# Patient Record
Sex: FEMALE | Race: WHITE | NOT HISPANIC OR LATINO | Employment: FULL TIME | URBAN - NONMETROPOLITAN AREA
[De-identification: names, ages, dates, MRNs, and addresses within clinical notes are randomized per-mention and may not be internally consistent; named-entity substitution may affect disease eponyms.]

---

## 2017-01-30 ENCOUNTER — OFFICE VISIT (OUTPATIENT)
Dept: PSYCHIATRY | Facility: OTHER | Age: 27
End: 2017-01-30
Attending: PSYCHIATRY & NEUROLOGY

## 2017-01-30 VITALS
DIASTOLIC BLOOD PRESSURE: 78 MMHG | HEIGHT: 60 IN | WEIGHT: 155 LBS | TEMPERATURE: 98.4 F | HEART RATE: 93 BPM | BODY MASS INDEX: 30.43 KG/M2 | SYSTOLIC BLOOD PRESSURE: 114 MMHG

## 2017-01-30 DIAGNOSIS — F41.1 GAD (GENERALIZED ANXIETY DISORDER): ICD-10-CM

## 2017-01-30 DIAGNOSIS — F90.2 ADHD (ATTENTION DEFICIT HYPERACTIVITY DISORDER), COMBINED TYPE: Primary | ICD-10-CM

## 2017-01-30 PROCEDURE — 99214 OFFICE O/P EST MOD 30 MIN: CPT | Performed by: PSYCHIATRY & NEUROLOGY

## 2017-01-30 RX ORDER — DEXTROAMPHETAMINE SACCHARATE, AMPHETAMINE ASPARTATE MONOHYDRATE, DEXTROAMPHETAMINE SULFATE AND AMPHETAMINE SULFATE 7.5; 7.5; 7.5; 7.5 MG/1; MG/1; MG/1; MG/1
30 CAPSULE, EXTENDED RELEASE ORAL 2 TIMES DAILY
Qty: 30 CAPSULE | Refills: 0 | Status: SHIPPED | OUTPATIENT
Start: 2017-01-30 | End: 2017-03-08

## 2017-01-30 RX ORDER — BUSPIRONE HYDROCHLORIDE 5 MG/1
5 TABLET ORAL 3 TIMES DAILY
Qty: 90 TABLET | Refills: 11 | Status: SHIPPED | OUTPATIENT
Start: 2017-01-30 | End: 2017-07-05

## 2017-01-30 RX ORDER — DEXTROAMPHETAMINE SACCHARATE, AMPHETAMINE ASPARTATE MONOHYDRATE, DEXTROAMPHETAMINE SULFATE AND AMPHETAMINE SULFATE 7.5; 7.5; 7.5; 7.5 MG/1; MG/1; MG/1; MG/1
30 CAPSULE, EXTENDED RELEASE ORAL 2 TIMES DAILY
Qty: 60 CAPSULE | Refills: 0 | Status: SHIPPED | OUTPATIENT
Start: 2017-01-30 | End: 2017-01-30 | Stop reason: DRUGHIGH

## 2017-01-30 RX ORDER — DEXTROAMPHETAMINE SACCHARATE, AMPHETAMINE ASPARTATE MONOHYDRATE, DEXTROAMPHETAMINE SULFATE AND AMPHETAMINE SULFATE 7.5; 7.5; 7.5; 7.5 MG/1; MG/1; MG/1; MG/1
30 CAPSULE, EXTENDED RELEASE ORAL 2 TIMES DAILY
Qty: 30 CAPSULE | Refills: 0 | Status: SHIPPED | OUTPATIENT
Start: 2017-02-15 | End: 2017-03-08

## 2017-01-30 RX ORDER — DEXTROAMPHETAMINE SACCHARATE, AMPHETAMINE ASPARTATE MONOHYDRATE, DEXTROAMPHETAMINE SULFATE AND AMPHETAMINE SULFATE 7.5; 7.5; 7.5; 7.5 MG/1; MG/1; MG/1; MG/1
30 CAPSULE, EXTENDED RELEASE ORAL 2 TIMES DAILY
Qty: 60 CAPSULE | Refills: 0 | Status: SHIPPED | OUTPATIENT
Start: 2017-03-01 | End: 2017-04-19

## 2017-01-30 ASSESSMENT — ANXIETY QUESTIONNAIRES
3. WORRYING TOO MUCH ABOUT DIFFERENT THINGS: SEVERAL DAYS
1. FEELING NERVOUS, ANXIOUS, OR ON EDGE: MORE THAN HALF THE DAYS
GAD7 TOTAL SCORE: 8
5. BEING SO RESTLESS THAT IT IS HARD TO SIT STILL: NOT AT ALL
2. NOT BEING ABLE TO STOP OR CONTROL WORRYING: MORE THAN HALF THE DAYS
IF YOU CHECKED OFF ANY PROBLEMS ON THIS QUESTIONNAIRE, HOW DIFFICULT HAVE THESE PROBLEMS MADE IT FOR YOU TO DO YOUR WORK, TAKE CARE OF THINGS AT HOME, OR GET ALONG WITH OTHER PEOPLE: SOMEWHAT DIFFICULT
6. BECOMING EASILY ANNOYED OR IRRITABLE: SEVERAL DAYS
7. FEELING AFRAID AS IF SOMETHING AWFUL MIGHT HAPPEN: SEVERAL DAYS

## 2017-01-30 ASSESSMENT — PAIN SCALES - GENERAL: PAINLEVEL: MILD PAIN (3)

## 2017-01-30 ASSESSMENT — PATIENT HEALTH QUESTIONNAIRE - PHQ9: 5. POOR APPETITE OR OVEREATING: SEVERAL DAYS

## 2017-01-30 NOTE — NURSING NOTE
Chief Complaint   Patient presents with     RECHECK     Mental health.  Patient would like to discuss medications and a couple of life events.       Initial /78 mmHg  Pulse 93  Temp(Src) 98.4  F (36.9  C)  Ht 5' (1.524 m)  Wt 155 lb (70.308 kg)  BMI 30.27 kg/m2 Estimated body mass index is 30.27 kg/(m^2) as calculated from the following:    Height as of this encounter: 5' (1.524 m).    Weight as of this encounter: 155 lb (70.308 kg).  BP completed using cuff size: shaw LYN

## 2017-01-30 NOTE — MR AVS SNAPSHOT
"              After Visit Summary   2017    Nyasia Raya    MRN: [id number]           Patient Information     Date Of Birth          1990        Visit Information        Provider Department      2017 4:40 PM Jeanne Guerrero MD [facility]        Today's Diagnoses     ADHD (attention deficit hyperactivity disorder), combined type    -  1     RANDI (generalized anxiety disorder)            Follow-ups after your visit        Who to contact     If you have questions or need follow up information about today's clinic visit or your schedule please contact [facility] directly at [phone].  Normal or non-critical lab and imaging results will be communicated to you by Oxtexhart, letter or phone within 4 business days after the clinic has received the results. If you do not hear from us within 7 days, please contact the clinic through Oxtexhart or phone. If you have a critical or abnormal lab result, we will notify you by phone as soon as possible.  Submit refill requests through Probiodrug or call your pharmacy and they will forward the refill request to us. Please allow 3 business days for your refill to be completed.          Additional Information About Your Visit        MyChart Information     Probiodrug lets you send messages to your doctor, view your test results, renew your prescriptions, schedule appointments and more. To sign up, go to [email or web address] . Click on \"Log in\" on the left side of the screen, which will take you to the Welcome page. Then click on \"Sign up Now\" on the right side of the page.     You will be asked to enter the access code listed below, as well as some personal information. Please follow the directions to create your username and password.     Your access code is: [email or web address]  Expires: 2017  5:43 PM     Your access code will  in 90 days. If you need help or a new code, please call your [facility] or [phone].        Care " EveryWhere ID     This is your Care EveryWhere ID. This could be used by other organizations to access your [place] medical records  [id number]        Your Vitals Were     Pulse Temperature Height BMI (Body Mass Index)          93 98.4  F (36.9  C) 5' (1.524 m) 30.27 kg/m2         Blood Pressure from Last 3 Encounters:   01/30/17 114/78   09/12/16 123/78   08/09/16 96/76    Weight from Last 3 Encounters:   01/30/17 155 lb (70.308 kg)   08/09/16 155 lb (70.308 kg)   04/18/16 158 lb (71.668 kg)              Today, you had the following     No orders found for display         Today's Medication Changes          These changes are accurate as of: 1/30/17  5:43 PM.  If you have any questions, ask your nurse or doctor.               Start taking these medicines.        Dose/Directions    * amphetamine-dextroamphetamine 30 MG per 24 hr capsule   Commonly known as:  ADDERALL XR   Used for:  ADHD (attention deficit hyperactivity disorder), combined type   Replaces:  amphetamine-dextroamphetamine 20 MG per 24 hr capsule   Started by:  Jeanne Guerrero MD        Dose:  30 mg   Take 1 capsule (30 mg) by mouth 2 times daily   Quantity:  30 capsule   Refills:  0       * amphetamine-dextroamphetamine 30 MG per 24 hr capsule   Commonly known as:  ADDERALL XR   Used for:  ADHD (attention deficit hyperactivity disorder), combined type   Replaces:  amphetamine-dextroamphetamine 20 MG per 24 hr capsule   Started by:  Jeanne Guerrero MD        Dose:  30 mg   Start taking on:  2/15/2017   Take 1 capsule (30 mg) by mouth 2 times daily   Quantity:  30 capsule   Refills:  0       * amphetamine-dextroamphetamine 30 MG per 24 hr capsule   Commonly known as:  ADDERALL XR   Used for:  ADHD (attention deficit hyperactivity disorder), combined type   Replaces:  amphetamine-dextroamphetamine 20 MG per 24 hr capsule   Started by:  Jeanne Guerrero MD        Dose:  30 mg   Start taking on:  3/1/2017   Take 1 capsule (30 mg) by mouth 2 times  daily   Quantity:  60 capsule   Refills:  0       busPIRone 5 MG tablet   Commonly known as:  BUSPAR   Used for:  RANDI (generalized anxiety disorder)   Started by:  Jeanne Guerrero MD        Dose:  5 mg   Take 1 tablet (5 mg) by mouth 3 times daily   Quantity:  90 tablet   Refills:  11       * Notice:  This list has 3 medication(s) that are the same as other medications prescribed for you. Read the directions carefully, and ask your doctor or other care provider to review them with you.      These medicines have changed or have updated prescriptions.        Dose/Directions    etonogestrel 68 MG Impl   Commonly known as:  IMPLANON/NEXPLANON   This may have changed:  when to take this   Used for:  General counseling for initiation of other contraceptive measures        Dose:  1 each   1 each (68 mg) by Subdermal route once for 1 dose   Quantity:  1 each   Refills:  0         Stop taking these medicines if you haven't already. Please contact your care team if you have questions.     amphetamine-dextroamphetamine 20 MG per 24 hr capsule   Commonly known as:  ADDERALL XR   Replaced by:  amphetamine-dextroamphetamine 30 MG per 24 hr capsule   Stopped by:  Jeanne Guerrero MD           amphetamine-dextroamphetamine 20 MG per 24 hr capsule   Commonly known as:  ADDERALL XR   Replaced by:  amphetamine-dextroamphetamine 30 MG per 24 hr capsule   Stopped by:  Jeanne Guerrero MD           amphetamine-dextroamphetamine 20 MG per 24 hr capsule   Commonly known as:  ADDERALL XR   Replaced by:  amphetamine-dextroamphetamine 30 MG per 24 hr capsule   Stopped by:  Jeanne Guerrero MD                Where to get your medicines      These medications were sent to [id number]  [place] - [address] [place] [facility] [address], [place] [address]     Phone:  [phone]    - busPIRone 5 MG tablet      Some of these will need a paper prescription and others can be bought over the counter.  Ask  your nurse if you have questions.     Bring a paper prescription for each of these medications    - amphetamine-dextroamphetamine 30 MG per 24 hr capsule  - amphetamine-dextroamphetamine 30 MG per 24 hr capsule  - amphetamine-dextroamphetamine 30 MG per 24 hr capsule             Primary Care Provider Office Phone # Fax #    Nhung Devi [phone]       [facility] [address] [facility] [address]        Thank you!     Thank you for choosing [facility]  for your care. Our goal is always to provide you with excellent care. Hearing back from our patients is one way we can continue to improve our services. Please take a few minutes to complete the written survey that you may receive in the mail after your visit with us. Thank you!             Your Updated Medication List - Protect others around you: Learn how to safely use, store and throw away your medicines at www.disposemymeds.org.          This list is accurate as of: 1/30/17  5:43 PM.  Always use your most recent med list.                   Brand Name Dispense Instructions for use    albuterol 108 (90 BASE) MCG/ACT Inhaler    PROAIR HFA/PROVENTIL HFA/VENTOLIN HFA    1 Inhaler    Inhale 2 puffs into the lungs every 6 hours as needed for shortness of breath / dyspnea       * amphetamine-dextroamphetamine 30 MG per 24 hr capsule    ADDERALL XR    30 capsule    Take 1 capsule (30 mg) by mouth 2 times daily       * amphetamine-dextroamphetamine 30 MG per 24 hr capsule   Start taking on:  2/15/2017    ADDERALL XR    30 capsule    Take 1 capsule (30 mg) by mouth 2 times daily       * amphetamine-dextroamphetamine 30 MG per 24 hr capsule   Start taking on:  3/1/2017    ADDERALL XR    60 capsule    Take 1 capsule (30 mg) by mouth 2 times daily       busPIRone 5 MG tablet    BUSPAR    90 tablet    Take 1 tablet (5 mg) by mouth 3 times daily       diphenhydrAMINE 25 MG tablet    BENADRYL    120 tablet    Take 3-4 tabs bedtime        etonogestrel 68 MG Impl    IMPLANON/NEXPLANON    1 each    1 each (68 mg) by Subdermal route once for 1 dose       hydrOXYzine 25 MG tablet    ATARAX    120 tablet    Take 2 tabs up to 2 times daily as needed for anxiety / panic       melatonin 3 MG Caps     30 capsule    Take 1 capsule by mouth At Bedtime       * Notice:  This list has 3 medication(s) that are the same as other medications prescribed for you. Read the directions carefully, and ask your doctor or other care provider to review them with you.

## 2017-01-30 NOTE — PROGRESS NOTES
"  PSYCHIATRY CLINIC PROGRESS NOTE   20 minute medication management, more than 50% of time spent counseling patient on medications, medication side effects, symptom history and management   SUBJECTIVE / INTERIM HISTORY                                                                       The pt was last seen in clinic 2016: -- . Continue Adderall XR 20 mg am and 20 XR mg afternoon. Gave script for Adderall for  and  d/c. Hydroxyzine 50 mg up to bid prn anxieyt / panic. Buspar 5 mg tid.  Continue Benadryl and melatonin for insomnia   Social- Her fiance, Nu, [place] whom she met at [facility]. He is a good trent and keeps her on track Children-  3 year old is Wallace who is \"the happiest little trent in the world\".  - her and Nu engaged  - job going well at [employer]  - would like to go back on Buspar along with the hydroxyzine this combo she thinks helps a lot  - overall doing much better in terms sx of ADD: able to keep track of things, stay on track  -  Living in [place] now: was in [place]  - sleeping better it seems since started stimulant. Also takes melatonin and benadryl OTC at times to help her sleep  SUBSTANCE USE- MJ in past    SYMPTOMS- sx ADD improved. Sleeping better. Getting more tasks done: not as many unfinished projects / chores  MEDICAL ROS-  Back pain (spondylolisthesis), asthma (cough, SOB/wheezing)  MEDICAL / SURGICAL HISTORY                pregnant [if applicable]--no   Medical Team:     PMD- Dr. Devi       Therapist-  Patient Active Problem List   Diagnosis     Insomnia     Asthma     Spondylolisthesis     Family history of congenital heart defect     Night terrors, adult     Status post  delivery     Obesity     Family planning     Depression     Anxiety     Encounter for routine gynecological examination     Hemorrhage of rectum and anus     Smoker     NO SHOW     ALLERGY   Review of patient's allergies indicates no known allergies.  MEDICATIONS                                   "                                                           Current Outpatient Prescriptions   Medication Sig     hydrOXYzine (ATARAX) 25 MG tablet Take 2 tabs up to 2 times daily as needed for anxiety / panic     amphetamine-dextroamphetamine (ADDERALL XR) 20 MG per 24 hr capsule Take 1 capsule (20 mg) by mouth 2 times daily     melatonin 3 MG CAPS Take 1 capsule by mouth At Bedtime     diphenhydrAMINE (BENADRYL) 25 MG tablet Take 3-4 tabs bedtime     albuterol (PROAIR HFA, PROVENTIL HFA, VENTOLIN HFA) 108 (90 BASE) MCG/ACT inhaler Inhale 2 puffs into the lungs every 6 hours as needed for shortness of breath / dyspnea     amphetamine-dextroamphetamine (ADDERALL XR) 20 MG per 24 hr capsule Take 1 capsule (20 mg) by mouth 2 times daily     amphetamine-dextroamphetamine (ADDERALL XR) 20 MG per capsule Take 1 capsule (20 mg) by mouth 2 times daily     etonogestrel (IMPLANON/NEXPLANON) 68 MG IMPL 1 each (68 mg) by Subdermal route once for 1 dose (Patient taking differently: 1 each by Subdermal route continuous )     [DISCONTINUED] TRAZODONE HCL 1 tablet At Bedtime.     No current facility-administered medications for this visit.       VITALS   /78 mmHg  Pulse 93  Temp(Src) 98.4  F (36.9  C)  Ht 5' (1.524 m)  Wt 155 lb (70.308 kg)  BMI 30.27 kg/m2     PHQ9                       PHQ-9 SCORE 4/18/2016 8/9/2016 1/30/2017   Total Score - - -   Total Score 3 4 5       LABS                                                                                                                           TSH   Date Value Ref Range Status   06/08/2015 0.44 0.40 - 4.00 mU/L Final   ]   MENTAL STATUS EXAM                                                                                        Alert. Oriented to person, place, and date / time. Well groomed, calm, cooperative with good eye contact. No problems with speech or psychomotor behavior. Mood was described as anxious and affect was congruent to speech content and full range.  Thought process, including associations, was unremarkable and thought content was devoid of suicidal and homicidal ideation and psychotic thought. No hallucinations. Insight was good. Judgment was intact and adequate for safety. Fund of knowledge was intact. Pt demonstrates no obvious problems with attention, concentration, language, recent or remote memory although these were not formally tested.     ASSESSMENT                                                                                                      HISTORICAL:  Initial psych consult 6/17/15  Notes:             Gabapentin: lactation. buspar nausea.   Nyasia Raya ADD, depression NOS, and night terrors. Nice young lady who presents as mature for her age : she went through a lot in her household growing up with mom with MS dad working all the time and 5 siblings. Sounds like Nyasia took care of the household and she worked 2 jobs. Didn't end up finishing HS in [place] and looking back she is able to recognize had a lot on her plate. Diagnosed with ADD in past but parents didn't want her on stimulants.  For today we will continue Adderall XR as however increase dose as she is working at [employer] and for most part doing okay but getting more distracted with increased demands of work. . Will continue melatonin and Benadryl for her / insomnia. Tried gabapentin but had lactation thus d/c..Hydroxyzine helping and she notes buspar helped in past too and we are going to have her take hydroxyzine and buspar.     TREATMENT RISK STATEMENT: The risks, benefits, alternatives and potential adverse effects have been explained and are understood by the pt. The pt agrees to the treatment plan with the ability to do so. The pt knows to call the clinic for any problems or access emergency care if needed. Substance use is not a problem as noted above.     DIAGNOSES      (Use of Axes system will continue, although absent from DSM 5)          Axis I - ADD  RANDI  Night  terrors  Axis II - no dx  Axis III - spondolesthesis  Axis IV- Psychosocial Stressors include: mod   Axis V - Global Assessment of Functioning current: 41- 50 Serious symptoms OR any serious impairment in social, occupational, or school functioning  PLAN                                                                                                                         1) MEDICATIONS:   -- Increase Adderall XR 20 mg am and 20 XR mg afternoon to Adderall XR 30 mg bid and gave script for today 30 tabs and then script that starts 2/15/17 for 30 tabs (she is paying out of pocket so easier for her to break up this way because of cost). Continue Hydroxyzine 50 mg up to bid prn anxieyt / panic. Buspar 5 mg tid.  Continue Benadryl and melatonin for insomnia    2) THERAPY: No Change    3) LABS: None    4) PT MONITOR [call for probs]: Worsening symptoms, side effects from medications, SI/HI    5) REFERRALS [CD tx, medical, tests other]: None    6)  RTC: ~1-2 months

## 2017-01-31 ASSESSMENT — PATIENT HEALTH QUESTIONNAIRE - PHQ9: SUM OF ALL RESPONSES TO PHQ QUESTIONS 1-9: 5

## 2017-01-31 ASSESSMENT — ANXIETY QUESTIONNAIRES: GAD7 TOTAL SCORE: 8

## 2017-02-13 ENCOUNTER — TELEPHONE (OUTPATIENT)
Dept: PSYCHIATRY | Facility: OTHER | Age: 27
End: 2017-02-13

## 2017-02-13 NOTE — TELEPHONE ENCOUNTER
Pharmacy calling to get ok for early refill of Adderall XR 30 mg.  Patient will be leaving town and would like early refill of 2 days.  Ok per Dr. Guerrero.

## 2017-03-08 ENCOUNTER — OFFICE VISIT (OUTPATIENT)
Dept: OBGYN | Facility: OTHER | Age: 27
End: 2017-03-08
Attending: OBSTETRICS & GYNECOLOGY

## 2017-03-08 VITALS
DIASTOLIC BLOOD PRESSURE: 64 MMHG | BODY MASS INDEX: 31.41 KG/M2 | SYSTOLIC BLOOD PRESSURE: 100 MMHG | HEART RATE: 60 BPM | WEIGHT: 160 LBS | HEIGHT: 60 IN

## 2017-03-08 DIAGNOSIS — Z30.09 FAMILY PLANNING: Primary | ICD-10-CM

## 2017-03-08 PROCEDURE — 11981 INSERTION DRUG DLVR IMPLANT: CPT | Performed by: OBSTETRICS & GYNECOLOGY

## 2017-03-08 ASSESSMENT — PATIENT HEALTH QUESTIONNAIRE - PHQ9: 5. POOR APPETITE OR OVEREATING: MORE THAN HALF THE DAYS

## 2017-03-08 ASSESSMENT — ANXIETY QUESTIONNAIRES
1. FEELING NERVOUS, ANXIOUS, OR ON EDGE: MORE THAN HALF THE DAYS
IF YOU CHECKED OFF ANY PROBLEMS ON THIS QUESTIONNAIRE, HOW DIFFICULT HAVE THESE PROBLEMS MADE IT FOR YOU TO DO YOUR WORK, TAKE CARE OF THINGS AT HOME, OR GET ALONG WITH OTHER PEOPLE: SOMEWHAT DIFFICULT
5. BEING SO RESTLESS THAT IT IS HARD TO SIT STILL: SEVERAL DAYS
3. WORRYING TOO MUCH ABOUT DIFFERENT THINGS: SEVERAL DAYS
6. BECOMING EASILY ANNOYED OR IRRITABLE: SEVERAL DAYS
7. FEELING AFRAID AS IF SOMETHING AWFUL MIGHT HAPPEN: NOT AT ALL
2. NOT BEING ABLE TO STOP OR CONTROL WORRYING: SEVERAL DAYS
GAD7 TOTAL SCORE: 8

## 2017-03-08 NOTE — MR AVS SNAPSHOT
"              After Visit Summary   3/8/2017    Nyasia Raya    MRN: [id number]           Patient Information     Date Of Birth          1990        Visit Information        Provider Department      3/8/2017 1:40 PM Leena Mayorga MD [facility]        Today's Diagnoses     Family planning    -  1      Care Instructions    Nexplanon should be removed or replaced in 3 years or sooner if desired.  Return for annual exam.          Follow-ups after your visit        Your next 10 appointments already scheduled     Apr 12, 2017  8:20 AM CDT   (Arrive by 8:05 AM)   Return Visit with Jeanne Guerrero MD   [facility]    [address] [place] [id number] [phone]              Who to contact     If you have questions or need follow up information about today's clinic visit or your schedule please contact [facility] directly at [phone].  Normal or non-critical lab and imaging results will be communicated to you by MyChart, letter or phone within 4 business days after the clinic has received the results. If you do not hear from us within 7 days, please contact the clinic through MyChart or phone. If you have a critical or abnormal lab result, we will notify you by phone as soon as possible.  Submit refill requests through Pheedo or call your pharmacy and they will forward the refill request to us. Please allow 3 business days for your refill to be completed.          Additional Information About Your Visit        Tunesathart Information     Pheedo lets you send messages to your doctor, view your test results, renew your prescriptions, schedule appointments and more. To sign up, go to [email or web address] . Click on \"Log in\" on the left side of the screen, which will take you to the Welcome page. Then click on \"Sign up Now\" on the right side of the page.     You will be asked to enter the access code listed below, as well as some " personal information. Please follow the directions to create your username and password.     Your access code is: [email or web address]  Expires: 2017  5:43 PM     Your access code will  in 90 days. If you need help or a new code, please call your [place] clinic or [phone].        Care EveryWhere ID     This is your Care EveryWhere ID. This could be used by other organizations to access your [place] medical records  [id number]        Your Vitals Were     Pulse Height BMI (Body Mass Index)             60 5' (1.524 m) 31.25 kg/m2          Blood Pressure from Last 3 Encounters:   17 100/64   17 114/78   16 123/78    Weight from Last 3 Encounters:   17 160 lb (72.6 kg)   17 155 lb (70.3 kg)   16 155 lb (70.3 kg)              We Performed the Following     INSERTION NON-BIODEGRADABLE DRUG DELIVERY IMPLANT     REMOVAL NON-BIODEGRADABLE DRUG DELIVERY IMPLANT          Today's Medication Changes          These changes are accurate as of: 3/8/17  2:03 PM.  If you have any questions, ask your nurse or doctor.               These medicines have changed or have updated prescriptions.        Dose/Directions    * etonogestrel 68 MG Impl   Commonly known as:  IMPLANON/NEXPLANON   This may have changed:  when to take this   Used for:  General counseling for initiation of other contraceptive measures   Changed by:  Leena Mayorga MD        Dose:  1 each   1 each (68 mg) by Subdermal route once for 1 dose   Quantity:  1 each   Refills:  0       * etonogestrel 68 MG Impl   Commonly known as:  IMPLANON/NEXPLANON   This may have changed:  You were already taking a medication with the same name, and this prescription was added. Make sure you understand how and when to take each.   Used for:  Family planning   Changed by:  Leena Mayorga MD        Dose:  1 each   1 each (68 mg) by Subdermal route once for 1 dose   Quantity:  1 each   Refills:  0       * Notice:  This list has 2  medication(s) that are the same as other medications prescribed for you. Read the directions carefully, and ask your doctor or other care provider to review them with you.         Where to get your medicines      Some of these will need a paper prescription and others can be bought over the counter.  Ask your nurse if you have questions.     You don't need a prescription for these medications     etonogestrel 68 MG Impl                Primary Care Provider Office Phone # Fax #    Nhung Devi [phone]       [facility] [address] [place] [address] [place] [address]        Thank you!     Thank you for choosing [facility]  for your care. Our goal is always to provide you with excellent care. Hearing back from our patients is one way we can continue to improve our services. Please take a few minutes to complete the written survey that you may receive in the mail after your visit with us. Thank you!             Your Updated Medication List - Protect others around you: Learn how to safely use, store and throw away your medicines at www.disposemymeds.org.          This list is accurate as of: 3/8/17  2:03 PM.  Always use your most recent med list.                   Brand Name Dispense Instructions for use    albuterol 108 (90 BASE) MCG/ACT Inhaler    PROAIR HFA/PROVENTIL HFA/VENTOLIN HFA    1 Inhaler    Inhale 2 puffs into the lungs every 6 hours as needed for shortness of breath / dyspnea       amphetamine-dextroamphetamine 30 MG per 24 hr capsule    ADDERALL XR    60 capsule    Take 1 capsule (30 mg) by mouth 2 times daily       busPIRone 5 MG tablet    BUSPAR    90 tablet    Take 1 tablet (5 mg) by mouth 3 times daily       diphenhydrAMINE 25 MG tablet    BENADRYL    120 tablet    Take 3-4 tabs bedtime       * etonogestrel 68 MG Impl    IMPLANON/NEXPLANON    1 each    1 each (68 mg) by Subdermal route once for 1 dose       * etonogestrel 68 MG Impl    IMPLANON/NEXPLANON    1 each     1 each (68 mg) by Subdermal route once for 1 dose       hydrOXYzine 25 MG tablet    ATARAX    120 tablet    Take 2 tabs up to 2 times daily as needed for anxiety / panic       melatonin 3 MG Caps     30 capsule    Take 1 capsule by mouth At Bedtime       * Notice:  This list has 2 medication(s) that are the same as other medications prescribed for you. Read the directions carefully, and ask your doctor or other care provider to review them with you.

## 2017-03-08 NOTE — NURSING NOTE
Chief Complaint   Patient presents with     Contraception       Initial /64  Pulse 60  Ht 5' (1.524 m)  Wt 160 lb (72.6 kg)  BMI 31.25 kg/m2 Estimated body mass index is 31.25 kg/(m^2) as calculated from the following:    Height as of this encounter: 5' (1.524 m).    Weight as of this encounter: 160 lb (72.6 kg).  Medication Reconciliation: amy Ramos

## 2017-03-08 NOTE — PATIENT INSTRUCTIONS
Nexplanon should be removed or replaced in 3 years or sooner if desired.  Return for annual exam.

## 2017-03-08 NOTE — PROGRESS NOTES
Nyasia Raya is a 27 year old female here with a 3 year old nexplanon. RBAQ's      O:   /64  Pulse 60  Ht 5' (1.524 m)  Wt 160 lb (72.6 kg)  BMI 31.25 kg/m2   nexplanon removed from left arm in usual manner.  Nexplanon placed in left arm in usual manner    A:  Family planning    P:  nexplanon removed and replaced  Aftercare discussed  rto annual  Greater than 15 minutes were spent face to face counseling this patient in addition to procedure    Leena Mayorga MD

## 2017-03-09 ASSESSMENT — PATIENT HEALTH QUESTIONNAIRE - PHQ9: SUM OF ALL RESPONSES TO PHQ QUESTIONS 1-9: 3

## 2017-03-09 ASSESSMENT — ANXIETY QUESTIONNAIRES: GAD7 TOTAL SCORE: 8

## 2017-03-30 ENCOUNTER — HOSPITAL ENCOUNTER (EMERGENCY)
Facility: HOSPITAL | Age: 27
Discharge: HOME | End: 2017-03-30
Attending: NURSE PRACTITIONER | Admitting: NURSE PRACTITIONER

## 2017-03-30 VITALS — TEMPERATURE: 97.9 F | OXYGEN SATURATION: 98 % | RESPIRATION RATE: 18 BRPM

## 2017-03-30 DIAGNOSIS — K08.89 PAIN, DENTAL: ICD-10-CM

## 2017-03-30 PROCEDURE — 99214 OFFICE O/P EST MOD 30 MIN: CPT | Mod: 25

## 2017-03-30 PROCEDURE — 96372 THER/PROPH/DIAG INJ SC/IM: CPT

## 2017-03-30 PROCEDURE — 25000128 H RX IP 250 OP 636: Performed by: NURSE PRACTITIONER

## 2017-03-30 PROCEDURE — 99213 OFFICE O/P EST LOW 20 MIN: CPT | Performed by: NURSE PRACTITIONER

## 2017-03-30 RX ORDER — KETOROLAC TROMETHAMINE 30 MG/ML
60 INJECTION, SOLUTION INTRAMUSCULAR; INTRAVENOUS ONCE
Status: COMPLETED | OUTPATIENT
Start: 2017-03-30 | End: 2017-03-30

## 2017-03-30 RX ORDER — AMOXICILLIN 500 MG/1
1000 CAPSULE ORAL 2 TIMES DAILY
Qty: 40 CAPSULE | Refills: 0 | Status: SHIPPED | OUTPATIENT
Start: 2017-03-30 | End: 2017-04-09

## 2017-03-30 RX ADMIN — KETOROLAC TROMETHAMINE 60 MG: 30 INJECTION, SOLUTION INTRAMUSCULAR; INTRAVENOUS at 19:03

## 2017-03-30 NOTE — ED AVS SNAPSHOT
[facility]    [address]    [place] [address]    Phone:  [phone]                                       Nyasia Raya   MRN: [id number]    Department:  [facility]   Date of Visit:  3/30/2017           Patient Information     Date Of Birth          1990        Your diagnoses for this visit were:     Pain, dental        You were seen by Latasha Turk NP.      Follow-up Information     Follow up with Nhung Devi MD.    Specialty:  Family Practice    Why:  As needed, If symptoms worsen    Contact information:     FAMILY PRACTICE  [address]  [place] [address] [phone]          Follow up with [facility].    Specialty:  EMERGENCY MEDICINE    Why:  As needed, If symptoms worsen    Contact information:    [address]  [place] [address] [phone]    Additional information:    From [facility]: Take [address]. Turn left at [address]. Turn left at the first stoplight on [address]. At the first stop sign, take a right onto [address]. Take a left into the parking lot and continue through until you reach the North enterance of the building.       From [place]: Take [address]. Take the [address] ramp towards [place]. Turn left onto [address]. Take a slight right onto [address]. Turn left at the first stoplight on [address]. At the first stop sign, take a right onto [address]. Take a left into the parking lot and continue through until you reach the North enterance of the building.       From [place]: Take [address]. Take a right at [address]. At the first stop sign, take a right onto [address]. Take a left into the parking lot and continue through until you reach the North enterance of the building.       Discharge References/Attachments     DENTAL PAIN (ENGLISH)      Future Appointments        Provider Department Dept Phone  [place]    4/12/2017 8:20 AM Jeanne Guerrero MD [facility] [phone] [facility]    3/14/2018 1:30 PM Leena Mayorga MD, MD [facility] [phone] [facility]         Review of your medicines      START taking        Dose / Directions Last dose taken    amoxicillin 500 MG capsule   Commonly known as:  AMOXIL   Dose:  1000 mg   Quantity:  40 capsule        Take 2 capsules (1,000 mg) by mouth 2 times daily for 10 days   Refills:  0          Our records show that you are taking the medicines listed below. If these are incorrect, please call your family doctor or clinic.        Dose / Directions Last dose taken    albuterol 108 (90 BASE) MCG/ACT Inhaler   Commonly known as:  PROAIR HFA/PROVENTIL HFA/VENTOLIN HFA   Dose:  2 puff   Quantity:  1 Inhaler        Inhale 2 puffs into the lungs every 6 hours as needed for shortness of breath / dyspnea   Refills:  1        amphetamine-dextroamphetamine 30 MG per 24 hr capsule   Commonly known as:  ADDERALL XR   Dose:  30 mg   Quantity:  60 capsule        Take 1 capsule (30 mg) by mouth 2 times daily   Refills:  0        busPIRone 5 MG tablet   Commonly known as:  BUSPAR   Dose:  5 mg   Quantity:  90 tablet        Take 1 tablet (5 mg) by mouth 3 times daily   Refills:  11        diphenhydrAMINE 25 MG tablet   Commonly known as:  BENADRYL   Quantity:  120 tablet        Take 3-4 tabs bedtime   Refills:  11        etonogestrel 68 MG Impl   Commonly known as:  IMPLANON/NEXPLANON   Dose:  1 each        1 each (68 mg) by Subdermal route continuous   Refills:  0        hydrOXYzine 25 MG tablet   Commonly known as:  ATARAX   Quantity:  120 tablet        Take 2 tabs up to 2 times daily as needed for anxiety / panic   Refills:  11        melatonin 3 MG Caps   Dose:  1 capsule   Quantity:  30 capsule        Take 1 capsule by mouth At Bedtime   Refills:  11        ORABASE-B 20 % Pste paste   Generic drug:  benzocaine        Take by mouth 4 times daily as  "needed for moderate pain   Refills:  0                Prescriptions were sent or printed at these locations (1 Prescription)                   [facility] - [place], [place] - [address]   [address] [address]    Telephone:  [phone]   Fax:  [phone]   Hours:                  E-Prescribed (1 of 1)         amoxicillin (AMOXIL) 500 MG capsule                Orders Needing Specimen Collection     None      Pending Results     No orders found from 3/28/2017 to 3/31/2017.            Pending Culture Results     No orders found from 3/28/2017 to 3/31/2017.            Thank you for choosing [facility]       Thank you for choosing [facility] for your care. Our goal is always to provide you with excellent care. Hearing back from our patients is one way we can continue to improve our services. Please take a few minutes to complete the written survey that you may receive in the mail after you visit with us. Thank you!        TriblioharContentRealtime Information     [facility] lets you send messages to your doctor, view your test results, renew your prescriptions, schedule appointments and more. To sign up, go to [email or web address] . Click on \"Log in\" on the left side of the screen, which will take you to the Welcome page. Then click on \"Sign up Now\" on the right side of the page.     You will be asked to enter the access code listed below, as well as some personal information. Please follow the directions to create your username and password.     Your access code is: [email or web address]  Expires: 2017  6:43 PM     Your access code will  in 90 days. If you need help or a new code, please call your [facility] clinic or [phone].        Care EveryWhere ID     This is your Care EveryWhere ID. This could be used by other organizations to access your [facility] medical records  [id number]        After Visit Summary       This is your record. Keep this with you and show to your community pharmacist(s) and doctor(s) at " your next visit.

## 2017-03-30 NOTE — ED NOTES
Pt ambulated to room 1 accompanied by S.O. Pt reports lower molars hurting 10/10 with chewing. Pt using orajel with some relief. No relief from pills.

## 2017-03-30 NOTE — ED AVS SNAPSHOT
[facility]    [address] [place] [address]    Phone:  [phone]                                       Nyasia Raya   MRN: [id number]    Department:  [facility]   Date of Visit:  3/30/2017           After Visit Summary Signature Page     I have received my discharge instructions, and my questions have been answered. I have discussed any challenges I see with this plan with the nurse or doctor.    ..........................................................................................................................................  Patient/Patient Representative Signature      ..........................................................................................................................................  Patient Representative Print Name and Relationship to Patient    ..................................................               ................................................  Date                                            Time    ..........................................................................................................................................  Reviewed by Signature/Title    ...................................................              ..............................................  Date                                                            Time

## 2017-04-02 NOTE — ED PROVIDER NOTES
"  History     Chief Complaint   Patient presents with     Dental Pain     bilateral lower teeth. states not enough money to go to the dentist. \"my fillings are pushing out.\"      The history is provided by the patient. No  was used.     Nyasia Raya is a 27 year old female who presents with dental pain.  She has not taken anything for pain today. It started today.     I have reviewed the Medications, Allergies, Past Medical and Surgical History, and Social History in the Epic system.    Review of Systems   Constitutional: Negative.    HENT: Positive for dental problem.    Eyes: Negative.    Respiratory: Negative.    Cardiovascular: Negative.    Gastrointestinal: Negative.    Genitourinary: Negative.    Musculoskeletal: Negative.    Neurological: Negative.    Hematological: Negative.        Physical Exam   Heart Rate: 96  Temp: 97.9  F (36.6  C)  Resp: 18  SpO2: 98 %  Physical Exam   Constitutional: She is oriented to person, place, and time. She appears well-developed and well-nourished.   HENT:   Head: Normocephalic and atraumatic.   Right Ear: External ear normal.   Left Ear: External ear normal.   Dental disease through out   Eyes: Conjunctivae and EOM are normal. Pupils are equal, round, and reactive to light. Right eye exhibits no discharge. Left eye exhibits no discharge. No scleral icterus.   Neck: Normal range of motion. Neck supple.   Cardiovascular: Normal rate, regular rhythm and normal heart sounds.    Pulmonary/Chest: Effort normal and breath sounds normal.   Musculoskeletal: Normal range of motion.   Lymphadenopathy:     She has no cervical adenopathy.   Neurological: She is alert and oriented to person, place, and time.   Skin: Skin is warm and dry.   Nursing note and vitals reviewed.      ED Course     ED Course     Procedures             Labs Ordered and Resulted from Time of ED Arrival Up to the Time of Departure from the ED - No data to display    Assessments & Plan (with " Medical Decision Making)     I have reviewed the nursing notes.    I have reviewed the findings, diagnosis, plan and need for follow up with the patient.  Ibuprofen 800 mg TID with food , stop for any stomach upset.  Follow up with the dentist, resources provided  Pt verbalizes understanding and agreement with plan.  Follow up for worsening symptoms    Discharge Medication List as of 3/30/2017  7:24 PM      START taking these medications    Details   amoxicillin (AMOXIL) 500 MG capsule Take 2 capsules (1,000 mg) by mouth 2 times daily for 10 days, Disp-40 capsule, R-0, E-Prescribe             Final diagnoses:   Pain, dental       3/30/2017   [facility]     Latasha Turk, NP  04/03/17 2252

## 2017-04-03 ASSESSMENT — ENCOUNTER SYMPTOMS
NEUROLOGICAL NEGATIVE: 1
CONSTITUTIONAL NEGATIVE: 1
RESPIRATORY NEGATIVE: 1
CARDIOVASCULAR NEGATIVE: 1
MUSCULOSKELETAL NEGATIVE: 1
GASTROINTESTINAL NEGATIVE: 1
HEMATOLOGIC/LYMPHATIC NEGATIVE: 1
EYES NEGATIVE: 1

## 2017-04-12 ENCOUNTER — TELEPHONE (OUTPATIENT)
Dept: PSYCHIATRY | Facility: OTHER | Age: 27
End: 2017-04-12

## 2017-04-12 NOTE — TELEPHONE ENCOUNTER
7:56 AM    Reason for Call: OVERBOOK    Patient is having the following symptoms: would like to be seen sooner than next available which is 5-22-17     The patient is requesting an appointment for ASAP with Jeanne Guerrero.    Was an appointment offered for this call? Yes, patient was scheduled for 5-22-17 and added to the wait list    Preferred method for responding to this message: Telephone Call [phone]    If we cannot reach you directly, may we leave a detailed response at the number you provided? Yes    Can this message wait until your PCP/provider returns, if unavailable today? YES    Yulia Garcia

## 2017-04-19 ENCOUNTER — OFFICE VISIT (OUTPATIENT)
Dept: PSYCHIATRY | Facility: OTHER | Age: 27
End: 2017-04-19
Attending: PSYCHIATRY & NEUROLOGY

## 2017-04-19 VITALS
HEIGHT: 60 IN | DIASTOLIC BLOOD PRESSURE: 76 MMHG | BODY MASS INDEX: 30.43 KG/M2 | TEMPERATURE: 97.4 F | OXYGEN SATURATION: 99 % | WEIGHT: 155 LBS | SYSTOLIC BLOOD PRESSURE: 120 MMHG | HEART RATE: 82 BPM

## 2017-04-19 DIAGNOSIS — F90.2 ADHD (ATTENTION DEFICIT HYPERACTIVITY DISORDER), COMBINED TYPE: Primary | ICD-10-CM

## 2017-04-19 PROCEDURE — 99213 OFFICE O/P EST LOW 20 MIN: CPT | Performed by: PSYCHIATRY & NEUROLOGY

## 2017-04-19 RX ORDER — DEXTROAMPHETAMINE SACCHARATE, AMPHETAMINE ASPARTATE MONOHYDRATE, DEXTROAMPHETAMINE SULFATE AND AMPHETAMINE SULFATE 7.5; 7.5; 7.5; 7.5 MG/1; MG/1; MG/1; MG/1
30 CAPSULE, EXTENDED RELEASE ORAL 2 TIMES DAILY
Qty: 60 CAPSULE | Refills: 0 | Status: SHIPPED | OUTPATIENT
Start: 2017-06-14 | End: 2017-07-05

## 2017-04-19 RX ORDER — DEXTROAMPHETAMINE SACCHARATE, AMPHETAMINE ASPARTATE MONOHYDRATE, DEXTROAMPHETAMINE SULFATE AND AMPHETAMINE SULFATE 7.5; 7.5; 7.5; 7.5 MG/1; MG/1; MG/1; MG/1
30 CAPSULE, EXTENDED RELEASE ORAL 2 TIMES DAILY
Qty: 30 CAPSULE | Refills: 0 | Status: SHIPPED | OUTPATIENT
Start: 2017-05-03 | End: 2017-10-02

## 2017-04-19 RX ORDER — DEXTROAMPHETAMINE SACCHARATE, AMPHETAMINE ASPARTATE MONOHYDRATE, DEXTROAMPHETAMINE SULFATE AND AMPHETAMINE SULFATE 7.5; 7.5; 7.5; 7.5 MG/1; MG/1; MG/1; MG/1
30 CAPSULE, EXTENDED RELEASE ORAL 2 TIMES DAILY
Qty: 30 CAPSULE | Refills: 0 | Status: SHIPPED | OUTPATIENT
Start: 2017-04-19 | End: 2017-07-05

## 2017-04-19 RX ORDER — DEXTROAMPHETAMINE SACCHARATE, AMPHETAMINE ASPARTATE MONOHYDRATE, DEXTROAMPHETAMINE SULFATE AND AMPHETAMINE SULFATE 7.5; 7.5; 7.5; 7.5 MG/1; MG/1; MG/1; MG/1
30 CAPSULE, EXTENDED RELEASE ORAL 2 TIMES DAILY
Qty: 60 CAPSULE | Refills: 0 | Status: SHIPPED | OUTPATIENT
Start: 2017-05-17 | End: 2017-07-05

## 2017-04-19 RX ORDER — DEXTROAMPHETAMINE SACCHARATE, AMPHETAMINE ASPARTATE MONOHYDRATE, DEXTROAMPHETAMINE SULFATE AND AMPHETAMINE SULFATE 7.5; 7.5; 7.5; 7.5 MG/1; MG/1; MG/1; MG/1
30 CAPSULE, EXTENDED RELEASE ORAL 2 TIMES DAILY
Qty: 60 CAPSULE | Refills: 0 | Status: SHIPPED | OUTPATIENT
Start: 2017-04-19 | End: 2017-04-19

## 2017-04-19 ASSESSMENT — PAIN SCALES - GENERAL: PAINLEVEL: MODERATE PAIN (5)

## 2017-04-19 NOTE — MR AVS SNAPSHOT
"              After Visit Summary   4/19/2017    Nyasia Raya    MRN: [id number]           Patient Information     Date Of Birth          1990        Visit Information        Provider Department      4/19/2017 9:00 AM Jeanne Guerrero MD [facility]        Today's Diagnoses     ADHD (attention deficit hyperactivity disorder), combined type    -  1       Follow-ups after your visit        Your next 10 appointments already scheduled     Jul 05, 2017  2:20 PM CDT   (Arrive by 2:05 PM)   Return Visit with Jeanne Guerrero MD   [facility] ([facility])    [address] [place], [place] [address] [id number] [phone]            Mar 14, 2018  1:30 PM CDT   (Arrive by 1:15 PM)   PHYSICAL with Leena Mayorga MD   [facility] ([facility])    [address] [place], [place] [address] [id number] [phone]              Who to contact     If you have questions or need follow up information about today's clinic visit or your schedule please contact [facility] directly at [phone].  Normal or non-critical lab and imaging results will be communicated to you by AppInstitutehart, letter or phone within 4 business days after the clinic has received the results. If you do not hear from us within 7 days, please contact the clinic through [employer] or phone. If you have a critical or abnormal lab result, we will notify you by phone as soon as possible.  Submit refill requests through [employer] or call your pharmacy and they will forward the refill request to us. Please allow 3 business days for your refill to be completed.          Additional Information About Your Visit        AppInstitutehart Information     [employer] lets you send messages to your doctor, view your test results, renew your prescriptions, schedule appointments and more. To sign up, go to [email or web address] . Click on \"Log in\" on the left side of the screen, which will take you to the Welcome page. Then click on \"Sign " "up Now\" on the right side of the page.     You will be asked to enter the access code listed below, as well as some personal information. Please follow the directions to create your username and password.     Your access code is: [email or web address]  Expires: 2017  6:43 PM     Your access code will  in 90 days. If you need help or a new code, please call your [facility] or [phone].        Care EveryWhere ID     This is your Care EveryWhere ID. This could be used by other organizations to access your [facility] medical records  [id number]        Your Vitals Were     Pulse Temperature Height Pulse Oximetry BMI (Body Mass Index)       82 97.4  F (36.3  C) (Tympanic) 5' (1.524 m) 99% 30.27 kg/m2        Blood Pressure from Last 3 Encounters:   17 120/76   17 100/64   17 114/78    Weight from Last 3 Encounters:   17 155 lb (70.3 kg)   17 160 lb (72.6 kg)   17 155 lb (70.3 kg)              Today, you had the following     No orders found for display         Today's Medication Changes          These changes are accurate as of: 17  9:36 AM.  If you have any questions, ask your nurse or doctor.               These medicines have changed or have updated prescriptions.        Dose/Directions    * amphetamine-dextroamphetamine 30 MG per 24 hr capsule   Commonly known as:  ADDERALL XR   This may have changed:  You were already taking a medication with the same name, and this prescription was added. Make sure you understand how and when to take each.   Used for:  ADHD (attention deficit hyperactivity disorder), combined type   Changed by:  Jeanne Guerrero MD        Dose:  30 mg   Take 1 capsule (30 mg) by mouth 2 times daily   Quantity:  30 capsule   Refills:  0       * amphetamine-dextroamphetamine 30 MG per 24 hr capsule   Commonly known as:  ADDERALL XR   This may have changed:  Another medication with the same name was added. Make sure you understand how and when to take " each.   Used for:  ADHD (attention deficit hyperactivity disorder), combined type   Changed by:  Jeanne Guerrero MD        Dose:  30 mg   Start taking on:  5/3/2017   Take 1 capsule (30 mg) by mouth 2 times daily   Quantity:  30 capsule   Refills:  0       * amphetamine-dextroamphetamine 30 MG per 24 hr capsule   Commonly known as:  ADDERALL XR   This may have changed:  You were already taking a medication with the same name, and this prescription was added. Make sure you understand how and when to take each.   Used for:  ADHD (attention deficit hyperactivity disorder), combined type   Changed by:  Jeanne Guerrero MD        Dose:  30 mg   Start taking on:  5/17/2017   Take 1 capsule (30 mg) by mouth 2 times daily   Quantity:  60 capsule   Refills:  0       * amphetamine-dextroamphetamine 30 MG per 24 hr capsule   Commonly known as:  ADDERALL XR   This may have changed:  You were already taking a medication with the same name, and this prescription was added. Make sure you understand how and when to take each.   Used for:  ADHD (attention deficit hyperactivity disorder), combined type   Changed by:  Jeanne Guerrero MD        Dose:  30 mg   Start taking on:  6/14/2017   Take 1 capsule (30 mg) by mouth 2 times daily   Quantity:  60 capsule   Refills:  0       * Notice:  This list has 4 medication(s) that are the same as other medications prescribed for you. Read the directions carefully, and ask your doctor or other care provider to review them with you.      Stop taking these medicines if you haven't already. Please contact your care team if you have questions.     ORABASE-B 20 % Pste paste   Generic drug:  benzocaine   Stopped by:  Jeanne Guerrero MD                Where to get your medicines      Some of these will need a paper prescription and others can be bought over the counter.  Ask your nurse if you have questions.     Bring a paper prescription for each of these medications      amphetamine-dextroamphetamine 30 MG per 24 hr capsule    amphetamine-dextroamphetamine 30 MG per 24 hr capsule    amphetamine-dextroamphetamine 30 MG per 24 hr capsule    amphetamine-dextroamphetamine 30 MG per 24 hr capsule                Primary Care Provider Office Phone # Fax #    Nhung Devi [phone]        FAMILY [facility] [address] [place] [address]        Thank you!     Thank you for choosing [facility]  for your care. Our goal is always to provide you with excellent care. Hearing back from our patients is one way we can continue to improve our services. Please take a few minutes to complete the written survey that you may receive in the mail after your visit with us. Thank you!             Your Updated Medication List - Protect others around you: Learn how to safely use, store and throw away your medicines at www.disposemymeds.org.          This list is accurate as of: 4/19/17  9:36 AM.  Always use your most recent med list.                   Brand Name Dispense Instructions for use    albuterol 108 (90 BASE) MCG/ACT Inhaler    PROAIR HFA/PROVENTIL HFA/VENTOLIN HFA    1 Inhaler    Inhale 2 puffs into the lungs every 6 hours as needed for shortness of breath / dyspnea       * amphetamine-dextroamphetamine 30 MG per 24 hr capsule    ADDERALL XR    30 capsule    Take 1 capsule (30 mg) by mouth 2 times daily       * amphetamine-dextroamphetamine 30 MG per 24 hr capsule   Start taking on:  5/3/2017    ADDERALL XR    30 capsule    Take 1 capsule (30 mg) by mouth 2 times daily       * amphetamine-dextroamphetamine 30 MG per 24 hr capsule   Start taking on:  5/17/2017    ADDERALL XR    60 capsule    Take 1 capsule (30 mg) by mouth 2 times daily       * amphetamine-dextroamphetamine 30 MG per 24 hr capsule   Start taking on:  6/14/2017    ADDERALL XR    60 capsule    Take 1 capsule (30 mg) by mouth 2 times daily       busPIRone 5 MG tablet    BUSPAR    90 tablet     Take 1 tablet (5 mg) by mouth 3 times daily       diphenhydrAMINE 25 MG tablet    BENADRYL    120 tablet    Take 3-4 tabs bedtime       etonogestrel 68 MG Impl    IMPLANON/NEXPLANON     1 each (68 mg) by Subdermal route continuous       hydrOXYzine 25 MG tablet    ATARAX    120 tablet    Take 2 tabs up to 2 times daily as needed for anxiety / panic       melatonin 3 MG Caps     30 capsule    Take 1 capsule by mouth At Bedtime       * Notice:  This list has 4 medication(s) that are the same as other medications prescribed for you. Read the directions carefully, and ask your doctor or other care provider to review them with you.

## 2017-04-19 NOTE — PROGRESS NOTES
"  PSYCHIATRY CLINIC PROGRESS NOTE   20 minute medication management, more than 50% of time spent counseling patient on medications, medication side effects, symptom history and management   SUBJECTIVE / INTERIM HISTORY                                                                       The pt was last seen in clinic 1/30/17: -- Increase Adderall XR 20 mg am and 20 XR mg afternoon to Adderall XR 30 mg bid and gave script for today 30 tabs and then script that starts 2/15/17 for 30 tabs (she is paying out of pocket so easier for her to break up this way because of cost). Continue Hydroxyzine 50 mg up to bid prn anxieyt / panic. Buspar 5 mg tid.  Continue Benadryl and melatonin for insomnia   Social- Her fiance, Nu, [place] whom she [facility] at [facility]. He is a good trent and keeps her on track Children-  3 year old is Wallace who is \"the happiest little trent in the world\".  - with increased Adderall: family, home, work all are going better  - missed last appointment: ended up working that day picked up an extra shift  - her bosses ask her if she is doing okay: they note she looks frazzled like she is going to cry  - her and Nu engaged  - job going well at [employer]  - overall doing much better in terms sx of ADD: able to keep track of things, stay on track  -  Living in [place] now: was in [place]  - sleeping better it seems since started stimulant. Also takes melatonin and benadryl OTC at times to help her sleep  SUBSTANCE USE- MJ in past    SYMPTOMS- sx ADD improved. Sleeping better. Getting more tasks done: not as many unfinished projects / chores. Anxiety has improved as has insomnia.  MEDICAL ROS-  Back pain (spondylolisthesis), asthma (cough, SOB/wheezing)  MEDICAL / SURGICAL HISTORY                pregnant [if applicable]--no   Medical Team:     PMD- Dr. Devi       Therapist-  Patient Active Problem List   Diagnosis     Insomnia     Asthma     Spondylolisthesis     Family history of congenital heart defect     Night " supriya, adult     Status post  delivery     Obesity     Family planning     Depression     Anxiety     Encounter for routine gynecological examination     Hemorrhage of rectum and anus     Smoker     NO SHOW     ALLERGY   Review of patient's allergies indicates no known allergies.  MEDICATIONS                                                                                             Current Outpatient Prescriptions   Medication Sig     etonogestrel (IMPLANON/NEXPLANON) 68 MG IMPL 1 each (68 mg) by Subdermal route continuous     amphetamine-dextroamphetamine (ADDERALL XR) 30 MG per 24 hr capsule Take 1 capsule (30 mg) by mouth 2 times daily     busPIRone (BUSPAR) 5 MG tablet Take 1 tablet (5 mg) by mouth 3 times daily     hydrOXYzine (ATARAX) 25 MG tablet Take 2 tabs up to 2 times daily as needed for anxiety / panic     melatonin 3 MG CAPS Take 1 capsule by mouth At Bedtime     diphenhydrAMINE (BENADRYL) 25 MG tablet Take 3-4 tabs bedtime     albuterol (PROAIR HFA, PROVENTIL HFA, VENTOLIN HFA) 108 (90 BASE) MCG/ACT inhaler Inhale 2 puffs into the lungs every 6 hours as needed for shortness of breath / dyspnea     [DISCONTINUED] TRAZODONE HCL 1 tablet At Bedtime.     No current facility-administered medications for this visit.        VITALS   /76 (BP Location: Left arm, Patient Position: Chair, Cuff Size: Adult Regular)  Pulse 82  Temp 97.4  F (36.3  C) (Tympanic)  Ht 5' (1.524 m)  Wt 155 lb (70.3 kg)  SpO2 99%  BMI 30.27 kg/m2     PHQ9                       PHQ-9 SCORE 2016 2017 3/8/2017   Total Score - - -   Total Score 4 5 3       LABS                                                                                                                           TSH   Date Value Ref Range Status   2015 0.44 0.40 - 4.00 mU/L Final   ]   MENTAL STATUS EXAM                                                                                        Alert. Oriented to person, place, and date /  time. Well groomed, calm, cooperative with good eye contact. No problems with speech or psychomotor behavior. Mood was euthymic and affect was congruent to speech content and full range. Thought process, including associations, was unremarkable and thought content was devoid of suicidal and homicidal ideation and psychotic thought. No hallucinations. Insight was good. Judgment was intact and adequate for safety. Fund of knowledge was intact. Pt demonstrates no obvious problems with attention, concentration, language, recent or remote memory although these were not formally tested.     ASSESSMENT                                                                                                      HISTORICAL:  Initial psych consult 6/17/15  Notes:             Gabapentin: lactation. buspar nausea.   Nyasia Laurents ADD, depression NOS, and night terrors. Nice young lady who presents as mature for her age : she went through a lot in her household growing up with mom with MS dad working all the time and 5 siblings. Sounds like Nyasia took care of the household and she worked 2 jobs. Didn't end up finishing HS in [place] and looking back she is able to recognize had a lot on her plate. Diagnosed with ADD in past but parents didn't want her on stimulants.  For today we will continue Adderall XR as however increase dose as she is working at [employer] and for most part doing okay but getting more distracted with increased demands of work. . Will continue melatonin and Benadryl for her / insomnia. Tried gabapentin but had lactation thus d/c..Hydroxyzine helping and buspar helping as well. Also benadryl and melatonin helping. No changes today.     TREATMENT RISK STATEMENT: The risks, benefits, alternatives and potential adverse effects have been explained and are understood by the pt. The pt agrees to the treatment plan with the ability to do so. The pt knows to call the clinic for any problems or access emergency care if needed.  Substance use is not a problem as noted above.     DIAGNOSES      (Use of Axes system will continue, although absent from DSM 5)          Axis I - ADD  RANDI  Night terrors  Axis II - no dx  Axis III - spondolesthesis  Axis IV- Psychosocial Stressors include: mod   Axis V - Global Assessment of Functioning current: 41- 50 Serious symptoms OR any serious impairment in social, occupational, or school functioning  PLAN                                                                                                                         1) MEDICATIONS:   -- continue  Adderall XR 30 mg bid and gave script 4/19/17, 5/17/17, 6/14/17 Continue Hydroxyzine 50 mg up to bid prn anxieyt / panic. Buspar 5 mg tid.  Continue Benadryl and melatonin for insomnia    2) THERAPY: No Change    3) LABS: None    4) PT MONITOR [call for probs]: Worsening symptoms, side effects from medications, SI/HI    5) REFERRALS [CD tx, medical, tests other]: None    6)  RTC: ~3 months

## 2017-04-19 NOTE — NURSING NOTE
Chief Complaint   Patient presents with     RECHECK     Mental health.       Initial /76 (BP Location: Left arm, Patient Position: Chair, Cuff Size: Adult Regular)  Pulse 82  Temp 97.4  F (36.3  C) (Tympanic)  Ht 5' (1.524 m)  Wt 155 lb (70.3 kg)  SpO2 99%  BMI 30.27 kg/m2 Estimated body mass index is 30.27 kg/(m^2) as calculated from the following:    Height as of this encounter: 5' (1.524 m).    Weight as of this encounter: 155 lb (70.3 kg).  Medication Reconciliation: complete     LUIS CARLOS ARMSTRONG

## 2017-06-29 ENCOUNTER — HOSPITAL ENCOUNTER (EMERGENCY)
Facility: HOSPITAL | Age: 27
Discharge: HOME | End: 2017-06-29
Attending: PHYSICIAN ASSISTANT | Admitting: PHYSICIAN ASSISTANT

## 2017-06-29 VITALS
OXYGEN SATURATION: 98 % | SYSTOLIC BLOOD PRESSURE: 120 MMHG | HEART RATE: 98 BPM | RESPIRATION RATE: 16 BRPM | TEMPERATURE: 98 F | DIASTOLIC BLOOD PRESSURE: 75 MMHG

## 2017-06-29 DIAGNOSIS — K04.7 DENTAL INFECTION: ICD-10-CM

## 2017-06-29 PROCEDURE — 99283 EMERGENCY DEPT VISIT LOW MDM: CPT | Performed by: PHYSICIAN ASSISTANT

## 2017-06-29 PROCEDURE — 99283 EMERGENCY DEPT VISIT LOW MDM: CPT

## 2017-06-29 RX ORDER — PENICILLIN V POTASSIUM 500 MG/1
500 TABLET, FILM COATED ORAL 4 TIMES DAILY
Qty: 40 TABLET | Refills: 0 | Status: SHIPPED | OUTPATIENT
Start: 2017-06-29 | End: 2017-07-09

## 2017-06-29 NOTE — ED AVS SNAPSHOT
[facility]    [address] [place] [address]    Phone:  [phone]                                       Nyasia Raya   MRN: [id number]    Department:  [facility]   Date of Visit:  6/29/2017           After Visit Summary Signature Page     I have received my discharge instructions, and my questions have been answered. I have discussed any challenges I see with this plan with the nurse or doctor.    ..........................................................................................................................................  Patient/Patient Representative Signature      ..........................................................................................................................................  Patient Representative Print Name and Relationship to Patient    ..................................................               ................................................  Date                                            Time    ..........................................................................................................................................  Reviewed by Signature/Title    ...................................................              ..............................................  Date                                                            Time

## 2017-06-29 NOTE — DISCHARGE INSTRUCTIONS
"Take the Penicillin as prescribed for your dental infection. Follow up with a dentist [facility] for definitive treatment. RETURN HERE WITH ANY WORSENING SWELLING, DIFFICULTY SWALLOWING OR BREATHING, OR FEVERS DEVELOP.        Dental Abscess    A dental abscess is an infection of the tooth socket. It often starts with a crack or cavity in the tooth. A pocket of pus forms between the tooth and the bone. The infection causes pain and swelling of the gum, cheek, or jaw. The pain is often made worse by drinking hot or cold fluids, or biting on hard foods. Pain may be felt in the facial sinus or in the ear. A severe infection can interfere with swallowing and breathing.  Causes    Cavities    Trauma    Previous dental work  Symptoms    Pain    Swelling around the tooth or face and cheek    Redness    Bad breath    Bad taste in the mouth    Fever  You will be started on an antibiotic. However, final treatment requires drainage of the pus. This can be done by removing the tooth or performing a root canal. A root canal is done by an oral surgeon. It involves drilling an opening in the tooth to drain the pus. After the infection has healed, a crown is placed over the tooth.  Home care  The following guidelines will help you care for your abscess at home:    Avoid hot and cold foods and liquids, since your tooth may be sensitive to temperature changes.    If your tooth is chipped or cracked, or if there is a large open cavity, apply oil of cloves (available over-the-counter in drug stores) directly to the tooth to reduce pain. Some drugstores carry an over-the-counter \"toothache kit.\" This contains oil of cloves and a paste, which can be applied over the exposed tooth to decrease sensitivity.    Apply an ice pack (ice cubes in a plastic bag, wrapped in a towel) over the injured area for 10 to 20 minutes every 1 to 2 hours the first day for pain relief. Continue this 3 to 4 times a day until the pain and swelling goes away.    You " can take acetaminophen or ibuprofen for pain, unless you were given a different pain medicine to use. (Note: If you have chronic liver or kidney disease, have ever had a stomach ulcer or gastrointestinal bleeding, or are taking blood-thinning medicines, talk with your healthcare provider before using these medicines.)    An antibiotic will be prescribed. Take it as directed until completed, even if you are feeling better sooner.  Follow-up care  Follow up as advised with a dentist or oral surgeon. Even though your pain may improve with the treatment given today, only a dentist or oral surgeon can provide full treatment for this problem.    If a culture was done, you will be notified if the treatment needs to be changed. You can call in as directed for the results.    If X-rays were taken, they will be reviewed by a specialist. You will be notified of the results, especially if they affect treatment.  Call 911  Call emergency services right away if any of these occur:    Trouble breathing or swallowing, or wheezing    Hoarse voice or trouble speaking    Confusion    Extreme drowsiness or trouble awakening    Fainting or loss of consciousness    Rapid heart rate  When to seek medical advice  Call your healthcare provider right away if any of these occur:    Your face or eyelid becomes swollen or red.    Pain worsens or spreads to the neck.    You develop a fever of 100.4 F (38 C) or higher.    You have unusual drowsiness, a headache or stiff neck, or weakness.    Pus drains from the gum or tooth.    You are unable to open your mouth wide.  Date Last Reviewed: 7/30/2015 2000-2017 [employer]. [address] [place] [address], [place] [address]. All rights reserved. This information is not intended as a substitute for professional medical care. Always follow your healthcare professional's instructions.

## 2017-06-29 NOTE — ED NOTES
Presents with dental pain. The patient states she has had issues with her left lower jaw (wisdom teeth area) since September but she has not had insurance therefore she let that go. The patient states she can't hear from her right ear, she can't see from her right eye and she is in a lot of pain. Assessment as per chart. Call light in reach

## 2017-06-29 NOTE — ED AVS SNAPSHOT
[facility]    [address] [address]    Phone:  [phone]                                       Nyasia Raya   MRN: [id number]    Department:  [facility]   Date of Visit:  6/29/2017           Patient Information     Date Of Birth          1990        Your diagnoses for this visit were:     Dental infection        You were seen by Adina Frazier PA-C.      Follow-up Information     Follow up with Dentist In 1 week.        Follow up with [facility].    Specialty:  EMERGENCY MEDICINE    Why:  If symptoms worsen    Contact information:    [address] [place] [address] [phone]    Additional information:    From [place] Area: Take [address]. Turn left at [address]. Turn left at the first stoplight on [address]. At the first stop sign, take a right onto [address]. Take a left into the parking lot and continue through until you reach the North enterance of the building.       From [place]: Take [address]. Take the [address] ramp towards [place]. Turn left onto [address]. Take a slight right onto [address]. Turn left at the first stoplight on [address]. At the first stop sign, take a right onto [address]. Take a left into the parking lot and continue through until you reach the North enterance of the building.       From [place]: Take [address]. Take a right at [address]. At the first stop sign, take a right onto [address]. Take a left into the parking lot and continue through until you reach the North enterance of the building.         Discharge Instructions       Take the Penicillin as prescribed for your dental infection. Follow up with a dentist ASAP for definitive treatment. RETURN HERE WITH ANY WORSENING SWELLING, DIFFICULTY SWALLOWING OR BREATHING, OR FEVERS DEVELOP.        Dental Abscess    A dental abscess is an  "infection of the tooth socket. It often starts with a crack or cavity in the tooth. A pocket of pus forms between the tooth and the bone. The infection causes pain and swelling of the gum, cheek, or jaw. The pain is often made worse by drinking hot or cold fluids, or biting on hard foods. Pain may be felt in the facial sinus or in the ear. A severe infection can interfere with swallowing and breathing.  Causes    Cavities    Trauma    Previous dental work  Symptoms    Pain    Swelling around the tooth or face and cheek    Redness    Bad breath    Bad taste in the mouth    Fever  You will be started on an antibiotic. However, final treatment requires drainage of the pus. This can be done by removing the tooth or performing a root canal. A root canal is done by an oral surgeon. It involves drilling an opening in the tooth to drain the pus. After the infection has healed, a crown is placed over the tooth.  Home care  The following guidelines will help you care for your abscess at home:    Avoid hot and cold foods and liquids, since your tooth may be sensitive to temperature changes.    If your tooth is chipped or cracked, or if there is a large open cavity, apply oil of cloves (available over-the-counter in drug stores) directly to the tooth to reduce pain. Some drugstores carry an over-the-counter \"toothache kit.\" This contains oil of cloves and a paste, which can be applied over the exposed tooth to decrease sensitivity.    Apply an ice pack (ice cubes in a plastic bag, wrapped in a towel) over the injured area for 10 to 20 minutes every 1 to 2 hours the first day for pain relief. Continue this 3 to 4 times a day until the pain and swelling goes away.    You can take acetaminophen or ibuprofen for pain, unless you were given a different pain medicine to use. (Note: If you have chronic liver or kidney disease, have ever had a stomach ulcer or gastrointestinal bleeding, or are taking blood-thinning medicines, talk with " your healthcare provider before using these medicines.)    An antibiotic will be prescribed. Take it as directed until completed, even if you are feeling better sooner.  Follow-up care  Follow up as advised with a dentist or oral surgeon. Even though your pain may improve with the treatment given today, only a dentist or oral surgeon can provide full treatment for this problem.    If a culture was done, you will be notified if the treatment needs to be changed. You can call in as directed for the results.    If X-rays were taken, they will be reviewed by a specialist. You will be notified of the results, especially if they affect treatment.  Call 911  Call emergency services right away if any of these occur:    Trouble breathing or swallowing, or wheezing    Hoarse voice or trouble speaking    Confusion    Extreme drowsiness or trouble awakening    Fainting or loss of consciousness    Rapid heart rate  When to seek medical advice  Call your healthcare provider right away if any of these occur:    Your face or eyelid becomes swollen or red.    Pain worsens or spreads to the neck.    You develop a fever of 100.4 F (38 C) or higher.    You have unusual drowsiness, a headache or stiff neck, or weakness.    Pus drains from the gum or tooth.    You are unable to open your mouth wide.  Date Last Reviewed: 7/30/2015 2000-2017 [employer]. [address] [place] [address]. All rights reserved. This information is not intended as a substitute for professional medical care. Always follow your healthcare professional's instructions.          Future Appointments        Provider Department Dept Phone Center    7/5/2017 2:20 PM Jeanne Guerrero MD [facility] [phone] [place]    3/14/2018 1:30 PM Leena Mayorga MD, MD [facility] [phone] [place]         Review of your medicines      START taking        Dose / Directions Last dose taken    penicillin V  potassium 500 MG tablet   Commonly known as:  VEETID   Dose:  500 mg   Quantity:  40 tablet        Take 1 tablet (500 mg) by mouth 4 times daily for 10 days   Refills:  0          Our records show that you are taking the medicines listed below. If these are incorrect, please call your family doctor or clinic.        Dose / Directions Last dose taken    albuterol 108 (90 BASE) MCG/ACT Inhaler   Commonly known as:  PROAIR HFA/PROVENTIL HFA/VENTOLIN HFA   Dose:  2 puff   Quantity:  1 Inhaler        Inhale 2 puffs into the lungs every 6 hours as needed for shortness of breath / dyspnea   Refills:  1        * amphetamine-dextroamphetamine 30 MG per 24 hr capsule   Commonly known as:  ADDERALL XR   Dose:  30 mg   Quantity:  30 capsule        Take 1 capsule (30 mg) by mouth 2 times daily   Refills:  0        * amphetamine-dextroamphetamine 30 MG per 24 hr capsule   Commonly known as:  ADDERALL XR   Dose:  30 mg   Quantity:  30 capsule        Take 1 capsule (30 mg) by mouth 2 times daily   Refills:  0        * amphetamine-dextroamphetamine 30 MG per 24 hr capsule   Commonly known as:  ADDERALL XR   Dose:  30 mg   Quantity:  60 capsule        Take 1 capsule (30 mg) by mouth 2 times daily   Refills:  0        * amphetamine-dextroamphetamine 30 MG per 24 hr capsule   Commonly known as:  ADDERALL XR   Dose:  30 mg   Quantity:  60 capsule        Take 1 capsule (30 mg) by mouth 2 times daily   Refills:  0        busPIRone 5 MG tablet   Commonly known as:  BUSPAR   Dose:  5 mg   Quantity:  90 tablet        Take 1 tablet (5 mg) by mouth 3 times daily   Refills:  11        diphenhydrAMINE 25 MG tablet   Commonly known as:  BENADRYL   Quantity:  120 tablet        Take 3-4 tabs bedtime   Refills:  11        etonogestrel 68 MG Impl   Commonly known as:  IMPLANON/NEXPLANON   Dose:  1 each        1 each (68 mg) by Subdermal route continuous   Refills:  0        hydrOXYzine 25 MG tablet   Commonly known as:  ATARAX   Quantity:  120 tablet  "       Take 2 tabs up to 2 times daily as needed for anxiety / panic   Refills:  11        melatonin 3 MG Caps   Dose:  1 capsule   Quantity:  30 capsule        Take 1 capsule by mouth At Bedtime   Refills:  11        * Notice:  This list has 4 medication(s) that are the same as other medications prescribed for you. Read the directions carefully, and ask your doctor or other care provider to review them with you.            Prescriptions were sent or printed at these locations (1 Prescription)                   [facility] [id number]  [place], [place] - [address] [facility] [address]   [address] [facility] [address] [address]    Telephone:  [phone]   Fax:  [phone]   Hours:                  E-Prescribed (1 of 1)         penicillin V potassium (VEETID) 500 MG tablet                Orders Needing Specimen Collection     None      Pending Results     No orders found from 2017 to 2017.            Pending Culture Results     No orders found from 2017 to 2017.            Thank you for choosing [facility]       Thank you for choosing [facility] for your care. Our goal is always to provide you with excellent care. Hearing back from our patients is one way we can continue to improve our services. Please take a few minutes to complete the written survey that you may receive in the mail after you visit with us. Thank you!        [employer] Information     [employer] lets you send messages to your doctor, view your test results, renew your prescriptions, schedule appointments and more. To sign up, go to [email or web address] . Click on \"Log in\" on the left side of the screen, which will take you to the Welcome page. Then click on \"Sign up Now\" on the right side of the page.     You will be asked to enter the access code listed below, as well as some personal information. Please follow the directions to create your username and password.     Your access code is: [email or web address]  Expires: 8/15/2017  9:33 AM     Your access code will  " in 90 days. If you need help or a new code, please call your [facility] clinic or [phone].        Care EveryWhere ID     This is your Care EveryWhere ID. This could be used by other organizations to access your [facility] medical records  [id number]        Equal Access to Services     BRONSON LUNDY : Varun faria Sovasile, waaxda luqadaha, qaybta kaalmada eliana, estuardo banks. So [facility] [phone].    ATENCIÓN: Si habla español, tiene a mar disposición servicios gratuitos de asistencia lingüística. Llame al [phone].    We comply with applicable federal civil rights laws and [place] laws. We do not discriminate on the basis of race, color, national origin, age, disability sex, sexual orientation or gender identity.            After Visit Summary       This is your record. Keep this with you and show to your community pharmacist(s) and doctor(s) at your next visit.

## 2017-06-29 NOTE — PROGRESS NOTES
Met briefly with Nyasia as she does not have insurance and is in need of dental care.  She shared that she was going to get insurance through her fiance's union.  She was later told that she could not until they were legally .  This ended up happening after open enrollment at her place of employment [employer].  Contacted patient financial to meet with her.  Nyasia agreeable to this but does not believe she would qualify for assistance, as she has not in the past.  Nyasia appeared anxious to leave, stating that her ride is only available for a short period of time and that she cannot leave her son at day care much longer.  Advised that writer could look into the availability of patient crisis fund to provide assistance for fees at [facility].  Will follow up with Nyasia at a later date.  Blank Calero RN.

## 2017-06-30 ENCOUNTER — TELEPHONE (OUTPATIENT)
Dept: CASE MANAGEMENT | Facility: HOSPITAL | Age: 27
End: 2017-06-30

## 2017-06-30 NOTE — ED PROVIDER NOTES
History     Chief Complaint   Patient presents with     Dental Pain     X 1 year, worse X 2-3 days with vision and hearing changes. Right-sided lower     HPI  Nyasia Raya is a 27 year old female who presents with right sided lower dental pain with facial swelling x 2-3 days. States this has been happening intermittently over the past 1 year. She does not have medical insurance and wants the least amount of testing possible. She hasn't been able to afford to see a dentist either. She states the pain radiates to both ears. No fevers/chils. No difficulty swallowing or breathing.     I have reviewed the Medications, Allergies, Past Medical and Surgical History, and Social History in the [employer] system.    Past Medical History:   Past Medical History:   Diagnosis Date     Asthma 5/10/2013     Insomnia 5/10/2013     Spondylolisthesis 5/10/2013     Tobacco abuse        Past Surgical History:   Procedure Laterality Date      SECTION  1/15/2014    Procedure:  SECTION;;  Surgeon: Leena Mayorga MD;  Location: [facility] OR       Social History     Social History     Marital status: Single     Spouse name: N/A     Number of children: N/A     Years of education: N/A     Occupational History     Not on file.     Social History Main Topics     Smoking status: Current Every Day Smoker     Packs/day: 0.08     Types: Cigarettes     Smokeless tobacco: Never Used      Comment: Patient smokes 4 times per month.     Alcohol use No     Drug use: No     Sexual activity: Yes     Partners: Male     Other Topics Concern     Parent/Sibling W/ Cabg, Mi Or Angioplasty Before 65f 55m? Yes     Social History Narrative       Discharge Medication List as of 2017  4:30 PM      START taking these medications    Details   penicillin V potassium (VEETID) 500 MG tablet Take 1 tablet (500 mg) by mouth 4 times daily for 10 days, Disp-40 tablet, R-0, E-Prescribe         CONTINUE these medications which have NOT CHANGED    Details   !!  amphetamine-dextroamphetamine (ADDERALL XR) 30 MG per 24 hr capsule Take 1 capsule (30 mg) by mouth 2 times daily, Disp-60 capsule, R-0, Local Print      etonogestrel (IMPLANON/NEXPLANON) 68 MG IMPL 1 each (68 mg) by Subdermal route continuous, No Print Out      hydrOXYzine (ATARAX) 25 MG tablet Take 2 tabs up to 2 times daily as needed for anxiety / panic, Disp-120 tablet, R-11, E-Prescribe      melatonin 3 MG CAPS Take 1 capsule by mouth At Bedtime, Disp-30 capsule, R-11, E-Prescribe      !! amphetamine-dextroamphetamine (ADDERALL XR) 30 MG per 24 hr capsule Take 1 capsule (30 mg) by mouth 2 times daily, Disp-60 capsule, R-0, Local Print      !! amphetamine-dextroamphetamine (ADDERALL XR) 30 MG per 24 hr capsule Take 1 capsule (30 mg) by mouth 2 times daily, Disp-30 capsule, R-0, Local Print      !! amphetamine-dextroamphetamine (ADDERALL XR) 30 MG per 24 hr capsule Take 1 capsule (30 mg) by mouth 2 times daily, Disp-30 capsule, R-0, Local Print      busPIRone (BUSPAR) 5 MG tablet Take 1 tablet (5 mg) by mouth 3 times daily, Disp-90 tablet, R-11, E-Prescribe      diphenhydrAMINE (BENADRYL) 25 MG tablet Take 3-4 tabs bedtime, Disp-120 tablet, R-11, E-Prescribe      albuterol (PROAIR HFA, PROVENTIL HFA, VENTOLIN HFA) 108 (90 BASE) MCG/ACT inhaler Inhale 2 puffs into the lungs every 6 hours as needed for shortness of breath / dyspnea, Disp-1 Inhaler, R-1, E-Prescribe       !! - Potential duplicate medications found. Please discuss with provider.          Allergies: Review of patient's allergies indicates no known allergies.    Review of Systems   All other systems reviewed and are negative.      Physical Exam   BP: 123/83  Pulse: 108  Temp: 97.6  F (36.4  C)  Resp: 24  SpO2: 100 %  Physical Exam   Constitutional: She is oriented to person, place, and time. She appears well-developed and well-nourished.  Non-toxic appearance. She does not have a sickly appearance. She does not appear ill. No distress.   HENT:   Head:  Normocephalic and atraumatic.   Mouth/Throat: Uvula is midline, oropharynx is clear and moist and mucous membranes are normal. No trismus in the jaw. No dental abscesses or uvula swelling. No oropharyngeal exudate, posterior oropharyngeal edema, posterior oropharyngeal erythema or tonsillar abscesses.       Eyes: Conjunctivae and EOM are normal. Pupils are equal, round, and reactive to light. Right eye exhibits no discharge. Left eye exhibits no discharge.   Neck: Trachea normal, normal range of motion and full passive range of motion without pain. Neck supple. Edema (See illustration. ) present.       Cardiovascular: Normal rate, regular rhythm and normal heart sounds.    Pulmonary/Chest: Effort normal and breath sounds normal.   Neurological: She is alert and oriented to person, place, and time.   Skin: Skin is warm and dry. She is not diaphoretic. No erythema.   Psychiatric: She has a normal mood and affect. Her behavior is normal.   Nursing note and vitals reviewed.      ED Course     ED Course     Procedures             Labs Ordered and Resulted from Time of ED Arrival Up to the Time of Departure from the ED - No data to display    Assessments & Plan (with Medical Decision Making)   Pt requests the least amount of intervention today as she doesn't have insurance. She has mild to moderate soft tissue swelling to the right lower jaw and TTP to tooth #29. No submandibular swelling. No difficulty breathing or swallowing. No fever. Will start her on PCN VK QID x 10 days but advised her to return here with any new or worsening symptoms as she may require IV antibiotics, labs, and CT if symptoms worsen or do not improve. Our  and financial department met with pt and she believe she will be able to afford the dentist Tiffani, our  recommended. She was discharged home in good condition, see plan below.       Plan: Take the Penicillin as prescribed for your dental infection. Follow up with a  dentist ASAP for definitive treatment. RETURN HERE WITH ANY WORSENING SWELLING, DIFFICULTY SWALLOWING OR BREATHING, OR FEVERS DEVELOP.    I have reviewed the nursing notes.    I have reviewed the findings, diagnosis, plan and need for follow up with the patient.    Discharge Medication List as of 6/29/2017  4:30 PM      START taking these medications    Details   penicillin V potassium (VEETID) 500 MG tablet Take 1 tablet (500 mg) by mouth 4 times daily for 10 days, Disp-40 tablet, R-0, E-Prescribe             Final diagnoses:   Dental infection       6/29/2017   [facility]     Adina Frazier PA-C  06/29/17 2020

## 2017-06-30 NOTE — TELEPHONE ENCOUNTER
Submitted patient crisis fund application.  Jackelyn DE LEON advised that administration is not available until after the holiday to review.   Informed Nyasia of this information.  She understood and was grateful.

## 2017-06-30 NOTE — TELEPHONE ENCOUNTER
Spoke with Nyasia in regards to follow up dental care.  She plans to contact [facility] to set up an appointment for a referral.  She states that the last time she called around dental offices were telling her she would need $100 up front as she does not have insurance. She is agreeable to putting the received funds, if approved, from patient crisis fund towards the fee for the oral surgeon.  She is to call back once she has an appointment arranged.

## 2017-07-05 ENCOUNTER — OFFICE VISIT (OUTPATIENT)
Dept: PSYCHIATRY | Facility: OTHER | Age: 27
End: 2017-07-05
Attending: PSYCHIATRY & NEUROLOGY

## 2017-07-05 VITALS
TEMPERATURE: 98 F | HEIGHT: 60 IN | WEIGHT: 155 LBS | SYSTOLIC BLOOD PRESSURE: 112 MMHG | HEART RATE: 101 BPM | DIASTOLIC BLOOD PRESSURE: 72 MMHG | BODY MASS INDEX: 30.43 KG/M2

## 2017-07-05 DIAGNOSIS — F41.1 GAD (GENERALIZED ANXIETY DISORDER): ICD-10-CM

## 2017-07-05 DIAGNOSIS — F90.2 ADHD (ATTENTION DEFICIT HYPERACTIVITY DISORDER), COMBINED TYPE: ICD-10-CM

## 2017-07-05 PROCEDURE — 99214 OFFICE O/P EST MOD 30 MIN: CPT | Performed by: PSYCHIATRY & NEUROLOGY

## 2017-07-05 RX ORDER — DEXTROAMPHETAMINE SACCHARATE, AMPHETAMINE ASPARTATE MONOHYDRATE, DEXTROAMPHETAMINE SULFATE AND AMPHETAMINE SULFATE 7.5; 7.5; 7.5; 7.5 MG/1; MG/1; MG/1; MG/1
30 CAPSULE, EXTENDED RELEASE ORAL 2 TIMES DAILY
Qty: 60 CAPSULE | Refills: 0 | Status: SHIPPED | OUTPATIENT
Start: 2017-09-05 | End: 2017-10-10

## 2017-07-05 RX ORDER — DEXTROAMPHETAMINE SACCHARATE, AMPHETAMINE ASPARTATE MONOHYDRATE, DEXTROAMPHETAMINE SULFATE AND AMPHETAMINE SULFATE 7.5; 7.5; 7.5; 7.5 MG/1; MG/1; MG/1; MG/1
30 CAPSULE, EXTENDED RELEASE ORAL 2 TIMES DAILY
Qty: 60 CAPSULE | Refills: 0 | Status: SHIPPED | OUTPATIENT
Start: 2017-07-12 | End: 2017-10-10

## 2017-07-05 RX ORDER — BUSPIRONE HYDROCHLORIDE 5 MG/1
10 TABLET ORAL 2 TIMES DAILY
Qty: 60 TABLET | Refills: 11 | Status: SHIPPED | OUTPATIENT
Start: 2017-07-05 | End: 2017-10-10

## 2017-07-05 RX ORDER — DEXTROAMPHETAMINE SACCHARATE, AMPHETAMINE ASPARTATE MONOHYDRATE, DEXTROAMPHETAMINE SULFATE AND AMPHETAMINE SULFATE 7.5; 7.5; 7.5; 7.5 MG/1; MG/1; MG/1; MG/1
30 CAPSULE, EXTENDED RELEASE ORAL 2 TIMES DAILY
Qty: 60 CAPSULE | Refills: 0 | Status: SHIPPED | OUTPATIENT
Start: 2017-08-09 | End: 2018-01-10

## 2017-07-05 RX ORDER — DEXTROAMPHETAMINE SACCHARATE, AMPHETAMINE ASPARTATE MONOHYDRATE, DEXTROAMPHETAMINE SULFATE AND AMPHETAMINE SULFATE 7.5; 7.5; 7.5; 7.5 MG/1; MG/1; MG/1; MG/1
30 CAPSULE, EXTENDED RELEASE ORAL 2 TIMES DAILY
Qty: 30 CAPSULE | Refills: 0 | Status: SHIPPED | OUTPATIENT
Start: 2017-09-05 | End: 2017-07-05

## 2017-07-05 ASSESSMENT — ANXIETY QUESTIONNAIRES
1. FEELING NERVOUS, ANXIOUS, OR ON EDGE: NEARLY EVERY DAY
7. FEELING AFRAID AS IF SOMETHING AWFUL MIGHT HAPPEN: MORE THAN HALF THE DAYS
IF YOU CHECKED OFF ANY PROBLEMS ON THIS QUESTIONNAIRE, HOW DIFFICULT HAVE THESE PROBLEMS MADE IT FOR YOU TO DO YOUR WORK, TAKE CARE OF THINGS AT HOME, OR GET ALONG WITH OTHER PEOPLE: SOMEWHAT DIFFICULT
5. BEING SO RESTLESS THAT IT IS HARD TO SIT STILL: SEVERAL DAYS
GAD7 TOTAL SCORE: 14
2. NOT BEING ABLE TO STOP OR CONTROL WORRYING: MORE THAN HALF THE DAYS
3. WORRYING TOO MUCH ABOUT DIFFERENT THINGS: NEARLY EVERY DAY
6. BECOMING EASILY ANNOYED OR IRRITABLE: SEVERAL DAYS

## 2017-07-05 ASSESSMENT — PAIN SCALES - GENERAL: PAINLEVEL: MODERATE PAIN (5)

## 2017-07-05 ASSESSMENT — PATIENT HEALTH QUESTIONNAIRE - PHQ9: 5. POOR APPETITE OR OVEREATING: MORE THAN HALF THE DAYS

## 2017-07-05 NOTE — PROGRESS NOTES
"  PSYCHIATRY CLINIC PROGRESS NOTE   20 minute medication management, more than 50% of time spent counseling patient on medications, medication side effects, symptom history and management   SUBJECTIVE / INTERIM HISTORY                                                                       The pt was last seen in clinic 17: -- continue  Adderall XR 30 mg bid and gave script 17, 17, 17 Continue Hydroxyzine 50 mg up to bid prn anxieyt / panic. Buspar 5 mg tid.  Continue Benadryl and melatonin for insomnia   Social- Her fiance, Nu, [place] whom she met at [facility]. He is a good trent and keeps her on track Children-  3 year old is Wallace who is \"the happiest little trent in the world\".  - lots of stressors: dental abscess, had to use holiday time off.. Mom hoarder and getting evicted  -   - with increased Adderall: family, home, work all are going better  - missed last appointment: ended up working that day picked up an extra shift  - her bosses ask her if she is doing okay: they note she looks frazzled like she is going to cry  - her and Nu engaged  - job going well at [employer]  - overall doing much better in terms sx of ADD: able to keep track of things, stay on track  -  Living in [place] now: was in [place]  - sleeping better it seems since started stimulant. Also takes melatonin and benadryl OTC at times to help her sleep  SUBSTANCE USE- MJ in past    SYMPTOMS- sx ADD improved. Sleeping better. Getting more tasks done: not as many unfinished projects / chores. Anxiety has improved as has insomnia.  MEDICAL ROS-  Back pain (spondylolisthesis), asthma (cough, SOB/wheezing)  MEDICAL / SURGICAL HISTORY                pregnant [if applicable]--no   Medical Team:     PMD- Dr. Devi       Therapist-  Patient Active Problem List   Diagnosis     Insomnia     Asthma     Spondylolisthesis     Family history of congenital heart defect     Night terrors, adult     Status post  delivery     Obesity     " Family planning     Depression     Anxiety     Encounter for routine gynecological examination     Hemorrhage of rectum and anus     Smoker     NO SHOW     ALLERGY   Review of patient's allergies indicates no known allergies.  MEDICATIONS                                                                                             Current Outpatient Prescriptions   Medication Sig     penicillin V potassium (VEETID) 500 MG tablet Take 1 tablet (500 mg) by mouth 4 times daily for 10 days     amphetamine-dextroamphetamine (ADDERALL XR) 30 MG per 24 hr capsule Take 1 capsule (30 mg) by mouth 2 times daily     amphetamine-dextroamphetamine (ADDERALL XR) 30 MG per 24 hr capsule Take 1 capsule (30 mg) by mouth 2 times daily     amphetamine-dextroamphetamine (ADDERALL XR) 30 MG per 24 hr capsule Take 1 capsule (30 mg) by mouth 2 times daily     amphetamine-dextroamphetamine (ADDERALL XR) 30 MG per 24 hr capsule Take 1 capsule (30 mg) by mouth 2 times daily     etonogestrel (IMPLANON/NEXPLANON) 68 MG IMPL 1 each (68 mg) by Subdermal route continuous     busPIRone (BUSPAR) 5 MG tablet Take 1 tablet (5 mg) by mouth 3 times daily     hydrOXYzine (ATARAX) 25 MG tablet Take 2 tabs up to 2 times daily as needed for anxiety / panic     melatonin 3 MG CAPS Take 1 capsule by mouth At Bedtime     diphenhydrAMINE (BENADRYL) 25 MG tablet Take 3-4 tabs bedtime     albuterol (PROAIR HFA, PROVENTIL HFA, VENTOLIN HFA) 108 (90 BASE) MCG/ACT inhaler Inhale 2 puffs into the lungs every 6 hours as needed for shortness of breath / dyspnea     [DISCONTINUED] TRAZODONE HCL 1 tablet At Bedtime.     No current facility-administered medications for this visit.        VITALS   /72 (Cuff Size: Adult Regular)  Pulse 101  Temp 98  F (36.7  C) (Tympanic)  Ht 5' (1.524 m)  Wt 155 lb (70.3 kg)  BMI 30.27 kg/m2     PHQ9                       PHQ-9 SCORE 1/30/2017 3/8/2017 7/5/2017   Total Score - - -   Total Score 5 3 6       LABS                                                                                                                            TSH   Date Value Ref Range Status   06/08/2015 0.44 0.40 - 4.00 mU/L Final   ]   MENTAL STATUS EXAM                                                                                        Alert. Oriented to person, place, and date / time. Well groomed, calm, cooperative with good eye contact. No problems with speech or psychomotor behavior. Mood was euthymic and affect was congruent to speech content and full range. Thought process, including associations, was unremarkable and thought content was devoid of suicidal and homicidal ideation and psychotic thought. No hallucinations. Insight was good. Judgment was intact and adequate for safety. Fund of knowledge was intact. Pt demonstrates no obvious problems with attention, concentration, language, recent or remote memory although these were not formally tested.     ASSESSMENT                                                                                                      HISTORICAL:  Initial psych consult 6/17/15  Notes:             Gabapentin: lactation. buspar nausea.   Nyasia Thomasgins ADD, depression NOS, and night terrors. Nice young lady who presents as mature for her age : she went through a lot in her household growing up with mom with MS dad working all the time and 5 siblings. Sounds like Nyasia took care of the household and she worked 2 jobs. Didn't end up finishing HS in [place] and looking back she is able to recognize had a lot on her plate. Diagnosed with ADD in past but parents didn't want her on stimulants.  For today we will continue Adderall XR as however increase dose as she is working at [employer] and for most part doing okay but getting more distracted with increased demands of work. . Will continue melatonin and Benadryl for her / insomnia. Tried gabapentin but had lactation thus d/c..Hydroxyzine helping and buspar helping as well. Also benadryl  and melatonin helping. No changes today.     TREATMENT RISK STATEMENT: The risks, benefits, alternatives and potential adverse effects have been explained and are understood by the pt. The pt agrees to the treatment plan with the ability to do so. The pt knows to call the clinic for any problems or access emergency care if needed. Substance use is not a problem as noted above.     DIAGNOSES      (Use of Axes system will continue, although absent from DSM 5)          Axis I - ADD  RANDI  Night terrors  Axis II - no dx  Axis III - spondolesthesis  Axis IV- Psychosocial Stressors include: mod   Axis V - Global Assessment of Functioning current: 41- 50 Serious symptoms OR any serious impairment in social, occupational, or school functioning  PLAN                                                                                                                         1) MEDICATIONS:   -- continue  Adderall XR 30 mg bid and last script 6/14/17: today gave script 7/12/17, 8/9/17, 9/5/17 Continue Hydroxyzine 50 mg up to bid prn anxieyt / panic. Increase Buspar 5 mg bid to 10 mg bid  Continue Benadryl and melatonin for insomnia    2) THERAPY: No Change    3) LABS: None    4) PT MONITOR [call for probs]: Worsening symptoms, side effects from medications, SI/HI    5) REFERRALS [CD tx, medical, tests other]: None    6)  RTC: ~3 months

## 2017-07-05 NOTE — NURSING NOTE
Chief Complaint   Patient presents with     RECHECK     Mental Health       Initial /72 (Cuff Size: Adult Regular)  Pulse 101  Temp 98  F (36.7  C) (Tympanic)  Ht 5' (1.524 m)  Wt 155 lb (70.3 kg)  BMI 30.27 kg/m2 Estimated body mass index is 30.27 kg/(m^2) as calculated from the following:    Height as of this encounter: 5' (1.524 m).    Weight as of this encounter: 155 lb (70.3 kg).  Medication Reconciliation: amy Mccarthy

## 2017-07-05 NOTE — MR AVS SNAPSHOT
"              After Visit Summary   7/5/2017    Nyasia Raya    MRN: [id number]           Patient Information     Date Of Birth          1990        Visit Information        Provider Department      7/5/2017 2:20 PM Jeanne Guerrero MD [facility]        Today's Diagnoses     ADHD (attention deficit hyperactivity disorder), combined type        RANDI (generalized anxiety disorder)           Follow-ups after your visit        Your next 10 appointments already scheduled     Mar 14, 2018  1:30 PM CDT   (Arrive by 1:15 PM)   PHYSICAL with Leena Mayorga MD   [facility] [place] ([facility] [place] )    [address]  [place] [address]   [phone]              Who to contact     If you have questions or need follow up information about today's clinic visit or your schedule please contact [facility] directly at [phone].  Normal or non-critical lab and imaging results will be communicated to you by MyChart, letter or phone within 4 business days after the clinic has received the results. If you do not hear from us within 7 days, please contact the clinic through MyChart or phone. If you have a critical or abnormal lab result, we will notify you by phone as soon as possible.  Submit refill requests through [email or web address] or call your pharmacy and they will forward the refill request to us. Please allow 3 business days for your refill to be completed.          Additional Information About Your Visit        MyChart Information     [email or web address] lets you send messages to your doctor, view your test results, renew your prescriptions, schedule appointments and more. To sign up, go to [email or web address] . Click on \"Log in\" on the left side of the screen, which will take you to the Welcome page. Then click on \"Sign up Now\" on the right side of the page.     You will be asked to enter the access code listed below, as well as some personal information. Please follow the " directions to create your username and password.     Your access code is: [email or web address]  Expires: 8/15/2017  9:33 AM     Your access code will  in 90 days. If you need help or a new code, please call your [place] clinic or [phone].        Care EveryWhere ID     This is your Care EveryWhere ID. This could be used by other organizations to access your [place] medical records  [id number]        Your Vitals Were     Pulse Temperature Height BMI (Body Mass Index)          101 98  F (36.7  C) (Tympanic) 5' (1.524 m) 30.27 kg/m2         Blood Pressure from Last 3 Encounters:   17 112/72   17 120/75   17 120/76    Weight from Last 3 Encounters:   17 155 lb (70.3 kg)   17 155 lb (70.3 kg)   17 160 lb (72.6 kg)              Today, you had the following     No orders found for display         Today's Medication Changes          These changes are accurate as of: 17  2:47 PM.  If you have any questions, ask your nurse or doctor.               These medicines have changed or have updated prescriptions.        Dose/Directions    * amphetamine-dextroamphetamine 30 MG per 24 hr capsule   Commonly known as:  ADDERALL XR   This may have changed:  Another medication with the same name was added. Make sure you understand how and when to take each.   Used for:  ADHD (attention deficit hyperactivity disorder), combined type   Changed by:  Jeanne Guerrero MD        Dose:  30 mg   Take 1 capsule (30 mg) by mouth 2 times daily   Quantity:  30 capsule   Refills:  0       * amphetamine-dextroamphetamine 30 MG per 24 hr capsule   Commonly known as:  ADDERALL XR   This may have changed:  Another medication with the same name was added. Make sure you understand how and when to take each.   Used for:  ADHD (attention deficit hyperactivity disorder), combined type   Changed by:  Jeanne Guerrero MD        Dose:  30 mg   Start taking on:  2017   Take 1 capsule (30 mg) by mouth 2 times  daily   Quantity:  60 capsule   Refills:  0       * amphetamine-dextroamphetamine 30 MG per 24 hr capsule   Commonly known as:  ADDERALL XR   This may have changed:  Another medication with the same name was added. Make sure you understand how and when to take each.   Used for:  ADHD (attention deficit hyperactivity disorder), combined type   Changed by:  Jeanne Guerrero MD        Dose:  30 mg   Start taking on:  8/9/2017   Take 1 capsule (30 mg) by mouth 2 times daily   Quantity:  60 capsule   Refills:  0       * amphetamine-dextroamphetamine 30 MG per 24 hr capsule   Commonly known as:  ADDERALL XR   This may have changed:  You were already taking a medication with the same name, and this prescription was added. Make sure you understand how and when to take each.   Used for:  ADHD (attention deficit hyperactivity disorder), combined type   Changed by:  Jeanne Guerrero MD        Dose:  30 mg   Start taking on:  9/5/2017   Take 1 capsule (30 mg) by mouth 2 times daily   Quantity:  60 capsule   Refills:  0       busPIRone 5 MG tablet   Commonly known as:  BUSPAR   This may have changed:    - how much to take  - when to take this   Used for:  RANDI (generalized anxiety disorder)   Changed by:  Jeanne Guerrero MD        Dose:  10 mg   Take 2 tablets (10 mg) by mouth 2 times daily   Quantity:  60 tablet   Refills:  11       * Notice:  This list has 4 medication(s) that are the same as other medications prescribed for you. Read the directions carefully, and ask your doctor or other care provider to review them with you.         Where to get your medicines      [email or web address] [address] [facility] [address] [facility] [address] [place] [address]     Phone:  [phone]     busPIRone 5 MG tablet         Some of these will need a paper prescription and others can be bought over the counter.  Ask your nurse if you have questions.     Bring a paper  prescription for each of these medications     amphetamine-dextroamphetamine 30 MG per 24 hr capsule    amphetamine-dextroamphetamine 30 MG per 24 hr capsule    amphetamine-dextroamphetamine 30 MG per 24 hr capsule                Primary Care Provider Office Phone # Fax #    Nhung Devi [phone]       [facility] [address] [place] [address]        Equal Access to Services     [facility] Center[facility] : Hadii aad ku hadasho Soomaali, waaxda luqadaha, qaybta kaalmada adeegyada, waxay idiin hayaan adeeg kharash la'aan ah. So [facility] [phone].    ATENCIÓN: Si habla español, tiene a mar disposición servicios gratuitos de asistencia lingüística. [facility] [phone].    We comply with applicable federal civil rights laws and [place] laws. We do not discriminate on the basis of race, color, national origin, age, disability sex, sexual orientation or gender identity.            Thank you!     Thank you for choosing [facility]  for your care. Our goal is always to provide you with excellent care. Hearing back from our patients is one way we can continue to improve our services. Please take a few minutes to complete the written survey that you may receive in the mail after your visit with us. Thank you!             Your Updated Medication List - Protect others around you: Learn how to safely use, store and throw away your medicines at www.disposemymeds.org.          This list is accurate as of: 7/5/17  2:47 PM.  Always use your most recent med list.                   Brand Name Dispense Instructions for use Diagnosis    albuterol 108 (90 BASE) MCG/ACT Inhaler    PROAIR HFA/PROVENTIL HFA/VENTOLIN HFA    1 Inhaler    Inhale 2 puffs into the lungs every 6 hours as needed for shortness of breath / dyspnea    Cough       * amphetamine-dextroamphetamine 30 MG per 24 hr capsule    ADDERALL XR    30 capsule    Take 1 capsule (30 mg) by mouth 2 times daily    ADHD (attention deficit  hyperactivity disorder), combined type       * amphetamine-dextroamphetamine 30 MG per 24 hr capsule   Start taking on:  7/12/2017    ADDERALL XR    60 capsule    Take 1 capsule (30 mg) by mouth 2 times daily    ADHD (attention deficit hyperactivity disorder), combined type       * amphetamine-dextroamphetamine 30 MG per 24 hr capsule   Start taking on:  8/9/2017    ADDERALL XR    60 capsule    Take 1 capsule (30 mg) by mouth 2 times daily    ADHD (attention deficit hyperactivity disorder), combined type       * amphetamine-dextroamphetamine 30 MG per 24 hr capsule   Start taking on:  9/5/2017    ADDERALL XR    60 capsule    Take 1 capsule (30 mg) by mouth 2 times daily    ADHD (attention deficit hyperactivity disorder), combined type       busPIRone 5 MG tablet    BUSPAR    60 tablet    Take 2 tablets (10 mg) by mouth 2 times daily    RANDI (generalized anxiety disorder)       diphenhydrAMINE 25 MG tablet    BENADRYL    120 tablet    Take 3-4 tabs bedtime    Routine general medical examination at a health care facility       etonogestrel 68 MG Impl    IMPLANON/NEXPLANON     1 each (68 mg) by Subdermal route continuous    Family planning       hydrOXYzine 25 MG tablet    ATARAX    120 tablet    Take 2 tabs up to 2 times daily as needed for anxiety / panic    Panic disorder without agoraphobia       melatonin 3 MG Caps     30 capsule    Take 1 capsule by mouth At Bedtime    Insomnia       penicillin V potassium 500 MG tablet    VEETID    40 tablet    Take 1 tablet (500 mg) by mouth 4 times daily for 10 days        * Notice:  This list has 4 medication(s) that are the same as other medications prescribed for you. Read the directions carefully, and ask your doctor or other care provider to review them with you.

## 2017-07-06 ENCOUNTER — TELEPHONE (OUTPATIENT)
Dept: CASE MANAGEMENT | Facility: HOSPITAL | Age: 27
End: 2017-07-06

## 2017-07-06 ASSESSMENT — ANXIETY QUESTIONNAIRES: GAD7 TOTAL SCORE: 14

## 2017-07-06 ASSESSMENT — PATIENT HEALTH QUESTIONNAIRE - PHQ9: SUM OF ALL RESPONSES TO PHQ QUESTIONS 1-9: 6

## 2017-07-06 NOTE — TELEPHONE ENCOUNTER
Spoke with Jackelyn Perez in regards to patient crisis fund application.  She states that it was approved.  Contacted Stephen at [facility] and provided the contact information to send the bill.  Left a message for Nyasia in regards to this.

## 2017-09-22 ENCOUNTER — TELEPHONE (OUTPATIENT)
Dept: PSYCHIATRY | Facility: OTHER | Age: 27
End: 2017-09-22

## 2017-10-02 DIAGNOSIS — F90.2 ADHD (ATTENTION DEFICIT HYPERACTIVITY DISORDER), COMBINED TYPE: ICD-10-CM

## 2017-10-02 RX ORDER — DEXTROAMPHETAMINE SACCHARATE, AMPHETAMINE ASPARTATE MONOHYDRATE, DEXTROAMPHETAMINE SULFATE AND AMPHETAMINE SULFATE 7.5; 7.5; 7.5; 7.5 MG/1; MG/1; MG/1; MG/1
30 CAPSULE, EXTENDED RELEASE ORAL 2 TIMES DAILY
Qty: 30 CAPSULE | Refills: 0 | Status: SHIPPED | OUTPATIENT
Start: 2017-10-02 | End: 2017-10-02

## 2017-10-02 RX ORDER — DEXTROAMPHETAMINE SACCHARATE, AMPHETAMINE ASPARTATE MONOHYDRATE, DEXTROAMPHETAMINE SULFATE AND AMPHETAMINE SULFATE 7.5; 7.5; 7.5; 7.5 MG/1; MG/1; MG/1; MG/1
30 CAPSULE, EXTENDED RELEASE ORAL 2 TIMES DAILY
Qty: 60 CAPSULE | Refills: 0 | Status: SHIPPED | OUTPATIENT
Start: 2017-10-02 | End: 2018-01-14

## 2017-10-02 NOTE — TELEPHONE ENCOUNTER
adderall      Last Written Prescription Date: 9/5/17  Last Fill Quantity: 60,  # refills: 0   Last Office Visit with [facility], [facility] or  Health prescribing provider: 7/5/17                                         Next 5 appointments (look out 90 days)     Oct 10, 2017  3:00 PM CDT   (Arrive by 2:45 PM)   Return Visit with Jeanne Guerrero MD   [facility] ([facility] [place] )    [facility] [address] [place] [id number] [phone]

## 2017-10-02 NOTE — TELEPHONE ENCOUNTER
Lauren I filled Adderall for her. Can you relay this to her. She has apptmt with me next week.     I will put on your desk.     Thanks! (she uses [facility] so she will probably pick it up at registration)

## 2017-10-10 ENCOUNTER — OFFICE VISIT (OUTPATIENT)
Dept: PSYCHIATRY | Facility: OTHER | Age: 27
End: 2017-10-10
Attending: PSYCHIATRY & NEUROLOGY

## 2017-10-10 VITALS
WEIGHT: 160 LBS | DIASTOLIC BLOOD PRESSURE: 70 MMHG | TEMPERATURE: 98.2 F | BODY MASS INDEX: 31.41 KG/M2 | OXYGEN SATURATION: 98 % | HEART RATE: 101 BPM | SYSTOLIC BLOOD PRESSURE: 115 MMHG | HEIGHT: 60 IN

## 2017-10-10 DIAGNOSIS — F90.2 ADHD (ATTENTION DEFICIT HYPERACTIVITY DISORDER), COMBINED TYPE: ICD-10-CM

## 2017-10-10 PROCEDURE — 99213 OFFICE O/P EST LOW 20 MIN: CPT | Performed by: PSYCHIATRY & NEUROLOGY

## 2017-10-10 RX ORDER — DEXTROAMPHETAMINE SACCHARATE, AMPHETAMINE ASPARTATE MONOHYDRATE, DEXTROAMPHETAMINE SULFATE AND AMPHETAMINE SULFATE 7.5; 7.5; 7.5; 7.5 MG/1; MG/1; MG/1; MG/1
30 CAPSULE, EXTENDED RELEASE ORAL 2 TIMES DAILY
Qty: 60 CAPSULE | Refills: 0 | Status: SHIPPED | OUTPATIENT
Start: 2017-11-29 | End: 2018-01-10

## 2017-10-10 RX ORDER — DEXTROAMPHETAMINE SACCHARATE, AMPHETAMINE ASPARTATE MONOHYDRATE, DEXTROAMPHETAMINE SULFATE AND AMPHETAMINE SULFATE 7.5; 7.5; 7.5; 7.5 MG/1; MG/1; MG/1; MG/1
30 CAPSULE, EXTENDED RELEASE ORAL 2 TIMES DAILY
Qty: 60 CAPSULE | Refills: 0 | Status: SHIPPED | OUTPATIENT
Start: 2017-11-01 | End: 2018-01-10

## 2017-10-10 ASSESSMENT — PAIN SCALES - GENERAL: PAINLEVEL: NO PAIN (0)

## 2017-10-10 NOTE — NURSING NOTE
Chief Complaint   Patient presents with     RECHECK     mental health       Initial /70  Pulse 101  Temp 98.2  F (36.8  C) (Tympanic)  Ht 5' (1.524 m)  Wt 160 lb (72.6 kg)  SpO2 98%  BMI 31.25 kg/m2 Estimated body mass index is 31.25 kg/(m^2) as calculated from the following:    Height as of this encounter: 5' (1.524 m).    Weight as of this encounter: 160 lb (72.6 kg).  Medication Reconciliation: complete     Ada Bernardo

## 2017-10-10 NOTE — MR AVS SNAPSHOT
"              After Visit Summary   10/10/2017    Nyasia Raya    MRN: [id number]           Patient Information     Date Of Birth          1990        Visit Information        Provider Department      10/10/2017 3:00 PM Jeanne Guerrero MD [facility]        Today's Diagnoses     ADHD (attention deficit hyperactivity disorder), combined type           Follow-ups after your visit        Your next 10 appointments already scheduled     Dec 12, 2017  3:20 PM CST   (Arrive by 3:05 PM)   Return Visit [facility]   [facility] ([facility] [place] )    [address] [place] [address]  [place] [id number] [phone]            Mar 14, 2018  1:30 PM CDT   (Arrive by 1:15 PM)   PHYSICAL with Leena Mayorga MD   [facility] ([facility] [place] )    [address] [facility]  [place] [address]   [phone]              Who to contact     If you have questions or need follow up information about today's clinic visit or your schedule please contact [facility] directly at [phone].  Normal or non-critical lab and imaging results will be communicated to you by MyChart, letter or phone within 4 business days after the clinic has received the results. If you do not hear from us within 7 days, please contact the clinic through Gr[employer] or phone. If you have a critical or abnormal lab result, we will notify you by phone as soon as possible.  Submit refill requests through LiquidFrameworks or call your pharmacy and they will forward the refill request to us. Please allow 3 business days for your refill to be completed.          Additional Information About Your Visit        MyChart Information     L[employer]Frameworks lets you send messages to your doctor, view your test results, renew your prescriptions, schedule appointments and more. To sign up, go to [email or web address] . Click on \"Log in\" on the left side of the screen, which will take you to the Welcome " "page. Then click on \"Sign up Now\" on the right side of the page.     You will be asked to enter the access code listed below, as well as some personal information. Please follow the directions to create your username and password.     Your access code is: [email or web address]  Expires: 2018  3:40 PM     Your access code will  in 90 days. If you need help or a new code, please call your [facility] or [phone].        Care EveryWhere ID     This is your Care EveryWhere ID. This could be used by other organizations to access your [place] medical records  [id number]        Your Vitals Were     Pulse Temperature Height Pulse Oximetry BMI (Body Mass Index)       101 98.2  F (36.8  C) (Tympanic) 5' (1.524 m) 98% 31.25 kg/m2        Blood Pressure from Last 3 Encounters:   10/10/17 115/70   17 112/72   17 120/75    Weight from Last 3 Encounters:   10/10/17 160 lb (72.6 kg)   17 155 lb (70.3 kg)   17 155 lb (70.3 kg)              Today, you had the following     No orders found for display         Today's Medication Changes          These changes are accurate as of: 10/10/17  3:40 PM.  If you have any questions, ask your nurse or doctor.               Stop taking these medicines if you haven't already. Please contact your care team if you have questions.     busPIRone 5 MG tablet   Commonly known as:  BUSPAR   Stopped by:  Jeanne Guerrero MD           hydrOXYzine 25 MG tablet   Commonly known as:  ATARAX   Stopped by:  Jeanne Guerrero MD                Where to get your medicines      Some of these will need a paper prescription and others can be bought over the counter.  Ask your nurse if you have questions.     Bring a paper prescription for each of these medications     amphetamine-dextroamphetamine 30 MG per 24 hr capsule    amphetamine-dextroamphetamine 30 MG per 24 hr capsule                Primary Care Provider Office Phone # Fax #    Nhung Devi [phone] " [phone]       [facility] [address] [place] [address]        Equal Access to Services     BRONSON LUNDY : Hadii aad ku hadpaulo Soyoandyali, waaxda luqadaha, qaybta kaalmada adehalieda, estuardo narenin hayaajessica yadavjody carrillo lorraine banks. So [facility] [phone].    ATENCIÓN: Si habla español, tiene a mar disposición servicios gratuitos de asistencia lingüística. Llame al [phone].    We comply with applicable federal civil rights laws and [place] laws. We do not discriminate on the basis of race, color, national origin, age, disability, sex, sexual orientation, or gender identity.            Thank you!     Thank you for choosing [facility]  for your care. Our goal is always to provide you with excellent care. Hearing back from our patients is one way we can continue to improve our services. Please take a few minutes to complete the written survey that you may receive in the mail after your visit with us. Thank you!             Your Updated Medication List - Protect others around you: Learn how to safely use, store and throw away your medicines at www.disposemymeds.org.          This list is accurate as of: 10/10/17  3:40 PM.  Always use your most recent med list.                   Brand Name Dispense Instructions for use Diagnosis    albuterol 108 (90 BASE) MCG/ACT Inhaler    PROAIR HFA/PROVENTIL HFA/VENTOLIN HFA    1 Inhaler    Inhale 2 puffs into the lungs every 6 hours as needed for shortness of breath / dyspnea    Cough       * amphetamine-dextroamphetamine 30 MG per 24 hr capsule    ADDERALL XR    60 capsule    Take 1 capsule (30 mg) by mouth 2 times daily    ADHD (attention deficit hyperactivity disorder), combined type       * amphetamine-dextroamphetamine 30 MG per 24 hr capsule    ADDERALL XR    60 capsule    Take 1 capsule (30 mg) by mouth 2 times daily    ADHD (attention deficit hyperactivity disorder), combined type       * amphetamine-dextroamphetamine 30 MG per 24 hr capsule    Start taking on:  11/1/2017    ADDERALL XR    60 capsule    Take 1 capsule (30 mg) by mouth 2 times daily    ADHD (attention deficit hyperactivity disorder), combined type       * amphetamine-dextroamphetamine 30 MG per 24 hr capsule   Start taking on:  11/29/2017    ADDERALL XR    60 capsule    Take 1 capsule (30 mg) by mouth 2 times daily    ADHD (attention deficit hyperactivity disorder), combined type       diphenhydrAMINE 25 MG tablet    BENADRYL    120 tablet    Take 3-4 tabs bedtime    Routine general medical examination at a health care facility       etonogestrel 68 MG Impl    IMPLANON/NEXPLANON     1 each (68 mg) by Subdermal route continuous    Family planning       melatonin 3 MG Caps     30 capsule    Take 1 capsule by mouth At Bedtime    Insomnia       * Notice:  This list has 4 medication(s) that are the same as other medications prescribed for you. Read the directions carefully, and ask your doctor or other care provider to review them with you.

## 2017-10-10 NOTE — PROGRESS NOTES
"  PSYCHIATRY CLINIC PROGRESS NOTE   20 minute medication management, more than 50% of time spent counseling patient on medications, medication side effects, symptom history and management   SUBJECTIVE / INTERIM HISTORY                                                                       The pt was last seen in clinic: -- continue  Adderall XR 30 mg bid and last script 17: today gave script 17, 17, 17 Continue Hydroxyzine 50 mg up to bid prn anxieyt / panic. Increase Buspar 5 mg bid to 10 mg bid  Continue Benadryl and melatonin for insomnia     Social- Her fiance, Nu, [place] whom she met at [facility]. He is a good trent and keeps her on track Children-  3 year old is Wallace who is \"the happiest little trent in the world\".  - is feeling in better place as compared to last visit  - with increased Adderall: family, home, work all are going better  - missed last appointment: ended up working that day picked up an extra shift  - her bosses ask her if she is doing okay: they note she looks frazzled like she is going to cry  - her and Nu engaged  - job going well at [employer]  - overall doing much better in terms sx of ADD: able to keep track of things, stay on track  -  Living in [place] now: was in [place]  - sleeping better it seems since started stimulant. Also takes melatonin and benadryl OTC at times to help her sleep  SUBSTANCE USE- MJ in past    SYMPTOMS- sx ADD improved. Sleeping better. Getting more tasks done: not as many unfinished projects / chores. Anxiety has improved as has insomnia.  MEDICAL ROS-  Back pain (spondylolisthesis), asthma (cough, SOB/wheezing)  MEDICAL / SURGICAL HISTORY                pregnant [if applicable]--no   Medical Team:     PMD- Dr. Devi       Therapist-  Patient Active Problem List   Diagnosis     Insomnia     Asthma     Spondylolisthesis     Family history of congenital heart defect     Night terrors, adult     Status post  delivery     Obesity     Family " planning     Depression     Anxiety     Encounter for routine gynecological examination     Hemorrhage of rectum and anus     Smoker     NO SHOW     ALLERGY   Review of patient's allergies indicates no known allergies.  MEDICATIONS                                                                                             Current Outpatient Prescriptions   Medication Sig     amphetamine-dextroamphetamine (ADDERALL XR) 30 MG per 24 hr capsule Take 1 capsule (30 mg) by mouth 2 times daily     amphetamine-dextroamphetamine (ADDERALL XR) 30 MG per 24 hr capsule Take 1 capsule (30 mg) by mouth 2 times daily     amphetamine-dextroamphetamine (ADDERALL XR) 30 MG per 24 hr capsule Take 1 capsule (30 mg) by mouth 2 times daily     amphetamine-dextroamphetamine (ADDERALL XR) 30 MG per 24 hr capsule Take 1 capsule (30 mg) by mouth 2 times daily     etonogestrel (IMPLANON/NEXPLANON) 68 MG IMPL 1 each (68 mg) by Subdermal route continuous     melatonin 3 MG CAPS Take 1 capsule by mouth At Bedtime     diphenhydrAMINE (BENADRYL) 25 MG tablet Take 3-4 tabs bedtime     albuterol (PROAIR HFA, PROVENTIL HFA, VENTOLIN HFA) 108 (90 BASE) MCG/ACT inhaler Inhale 2 puffs into the lungs every 6 hours as needed for shortness of breath / dyspnea     [DISCONTINUED] TRAZODONE HCL 1 tablet At Bedtime.     No current facility-administered medications for this visit.        VITALS   /70  Pulse 101  Temp 98.2  F (36.8  C) (Tympanic)  Ht 5' (1.524 m)  Wt 160 lb (72.6 kg)  SpO2 98%  BMI 31.25 kg/m2     PHQ9                       PHQ-9 SCORE 1/30/2017 3/8/2017 7/5/2017   Total Score - - -   Total Score 5 3 6       LABS                                                                                                                           TSH   Date Value Ref Range Status   06/08/2015 0.44 0.40 - 4.00 mU/L Final   ]   MENTAL STATUS EXAM                                                                                        Alert. Oriented  to person, place, and date / time. Well groomed, calm, cooperative with good eye contact. No problems with speech or psychomotor behavior. Mood was euthymic and affect was congruent to speech content and full range. Thought process, including associations, was unremarkable and thought content was devoid of suicidal and homicidal ideation and psychotic thought. No hallucinations. Insight was good. Judgment was intact and adequate for safety. Fund of knowledge was intact. Pt demonstrates no obvious problems with attention, concentration, language, recent or remote memory although these were not formally tested.     ASSESSMENT                                                                                                      HISTORICAL:  Initial psych consult 6/17/15  Notes:             Gabapentin: lactation. buspar nausea.   Nyasia Laurents ADD, depression NOS, and night terrors. Nice young lady who presents as mature for her age : she went through a lot in her household growing up with mom with MS dad working all the time and 5 siblings. Sounds like Nyasia took care of the household and she worked 2 jobs. Didn't end up finishing HS in [place] and looking back she is able to recognize had a lot on her plate. Diagnosed with ADD in past but parents didn't want her on stimulants.  For today we will continue Adderall XR as however increase dose as she is working at [employer] and for most part doing okay but getting more distracted with increased demands of work. . Will continue melatonin and Benadryl for her / insomnia. Tried gabapentin but had lactation thus d/c. Today she seems to be doing well and she inquired about going back on buspar but we ultimately agreed not because she is generally doing pretty good. We will consider buspar need be though for in future.    TREATMENT RISK STATEMENT: The risks, benefits, alternatives and potential adverse effects have been explained and are understood by the pt. The pt agrees to the  treatment plan with the ability to do so. The pt knows to call the clinic for any problems or access emergency care if needed. Substance use is not a problem as noted above.     DIAGNOSES      (Use of Axes system will continue, although absent from DSM 5)          Axis I - ADD  RANDI  Night terrors  Axis II - no dx  Axis III - spondolesthesis  Axis IV- Psychosocial Stressors include: mod   Axis V - Global Assessment of Functioning current: 41- 50 Serious symptoms OR any serious impairment in social, occupational, or school functioning  PLAN                                                                                                                         1) MEDICATIONS:   -- continue  Adderall XR 30 mg bid and last script 10/2/17 so will give script today for 11/1/17, 11/29/17:  Continue Hydroxyzine 50 mg up to bid prn anxieyt / panic. Consider buspar in future for if anxiety worsens.  Continue Benadryl and melatonin for insomnia    2) THERAPY: No Change    3) LABS: None    4) PT MONITOR [call for probs]: Worsening symptoms, side effects from medications, SI/HI    5) REFERRALS [CD tx, medical, tests other]: None    6)  RTC: ~2 months.

## 2018-01-10 ENCOUNTER — OFFICE VISIT (OUTPATIENT)
Dept: PSYCHIATRY | Facility: OTHER | Age: 28
End: 2018-01-10
Attending: PSYCHIATRY & NEUROLOGY
Payer: COMMERCIAL

## 2018-01-10 VITALS
SYSTOLIC BLOOD PRESSURE: 110 MMHG | BODY MASS INDEX: 31.25 KG/M2 | HEART RATE: 104 BPM | WEIGHT: 160 LBS | DIASTOLIC BLOOD PRESSURE: 68 MMHG | TEMPERATURE: 97 F

## 2018-01-10 DIAGNOSIS — Z00.00 ROUTINE GENERAL MEDICAL EXAMINATION AT A HEALTH CARE FACILITY: ICD-10-CM

## 2018-01-10 DIAGNOSIS — F99 INSOMNIA DUE TO OTHER MENTAL DISORDER: ICD-10-CM

## 2018-01-10 DIAGNOSIS — F90.2 ADHD (ATTENTION DEFICIT HYPERACTIVITY DISORDER), COMBINED TYPE: ICD-10-CM

## 2018-01-10 DIAGNOSIS — F51.05 INSOMNIA DUE TO OTHER MENTAL DISORDER: ICD-10-CM

## 2018-01-10 DIAGNOSIS — F41.1 GAD (GENERALIZED ANXIETY DISORDER): Primary | ICD-10-CM

## 2018-01-10 PROCEDURE — 99213 OFFICE O/P EST LOW 20 MIN: CPT | Performed by: PSYCHIATRY & NEUROLOGY

## 2018-01-10 RX ORDER — DEXTROAMPHETAMINE SACCHARATE, AMPHETAMINE ASPARTATE MONOHYDRATE, DEXTROAMPHETAMINE SULFATE AND AMPHETAMINE SULFATE 7.5; 7.5; 7.5; 7.5 MG/1; MG/1; MG/1; MG/1
30 CAPSULE, EXTENDED RELEASE ORAL 2 TIMES DAILY
Qty: 60 CAPSULE | Refills: 0 | Status: SHIPPED | OUTPATIENT
Start: 2018-02-08 | End: 2018-01-14

## 2018-01-10 RX ORDER — DEXTROAMPHETAMINE SACCHARATE, AMPHETAMINE ASPARTATE MONOHYDRATE, DEXTROAMPHETAMINE SULFATE AND AMPHETAMINE SULFATE 7.5; 7.5; 7.5; 7.5 MG/1; MG/1; MG/1; MG/1
30 CAPSULE, EXTENDED RELEASE ORAL 2 TIMES DAILY
Qty: 60 CAPSULE | Refills: 0 | Status: SHIPPED | OUTPATIENT
Start: 2018-03-08 | End: 2018-01-10

## 2018-01-10 RX ORDER — DEXTROAMPHETAMINE SACCHARATE, AMPHETAMINE ASPARTATE MONOHYDRATE, DEXTROAMPHETAMINE SULFATE AND AMPHETAMINE SULFATE 7.5; 7.5; 7.5; 7.5 MG/1; MG/1; MG/1; MG/1
30 CAPSULE, EXTENDED RELEASE ORAL 2 TIMES DAILY
Qty: 60 CAPSULE | Refills: 0 | Status: SHIPPED | OUTPATIENT
Start: 2018-02-08 | End: 2018-01-10

## 2018-01-10 RX ORDER — BUSPIRONE HYDROCHLORIDE 5 MG/1
5 TABLET ORAL 3 TIMES DAILY
Qty: 90 TABLET | Refills: 11 | Status: SHIPPED | OUTPATIENT
Start: 2018-01-10 | End: 2018-04-16

## 2018-01-10 RX ORDER — DEXTROAMPHETAMINE SACCHARATE, AMPHETAMINE ASPARTATE MONOHYDRATE, DEXTROAMPHETAMINE SULFATE AND AMPHETAMINE SULFATE 7.5; 7.5; 7.5; 7.5 MG/1; MG/1; MG/1; MG/1
30 CAPSULE, EXTENDED RELEASE ORAL 2 TIMES DAILY
Qty: 60 CAPSULE | Refills: 0 | Status: SHIPPED | OUTPATIENT
Start: 2018-01-10 | End: 2018-01-14

## 2018-01-10 RX ORDER — DEXTROAMPHETAMINE SACCHARATE, AMPHETAMINE ASPARTATE MONOHYDRATE, DEXTROAMPHETAMINE SULFATE AND AMPHETAMINE SULFATE 7.5; 7.5; 7.5; 7.5 MG/1; MG/1; MG/1; MG/1
30 CAPSULE, EXTENDED RELEASE ORAL 2 TIMES DAILY
Qty: 60 CAPSULE | Refills: 0 | Status: SHIPPED | OUTPATIENT
Start: 2018-01-10 | End: 2018-01-10

## 2018-01-10 RX ORDER — DEXTROAMPHETAMINE SACCHARATE, AMPHETAMINE ASPARTATE MONOHYDRATE, DEXTROAMPHETAMINE SULFATE AND AMPHETAMINE SULFATE 7.5; 7.5; 7.5; 7.5 MG/1; MG/1; MG/1; MG/1
30 CAPSULE, EXTENDED RELEASE ORAL 2 TIMES DAILY
Qty: 60 CAPSULE | Refills: 0 | Status: SHIPPED | OUTPATIENT
Start: 2018-03-08 | End: 2018-01-14

## 2018-01-10 RX ORDER — DIPHENHYDRAMINE HCL 25 MG
TABLET ORAL
Qty: 120 TABLET | Refills: 11 | Status: SHIPPED | OUTPATIENT
Start: 2018-01-10 | End: 2019-06-12

## 2018-01-10 ASSESSMENT — PATIENT HEALTH QUESTIONNAIRE - PHQ9
5. POOR APPETITE OR OVEREATING: SEVERAL DAYS
SUM OF ALL RESPONSES TO PHQ QUESTIONS 1-9: 8

## 2018-01-10 ASSESSMENT — ANXIETY QUESTIONNAIRES
2. NOT BEING ABLE TO STOP OR CONTROL WORRYING: MORE THAN HALF THE DAYS
7. FEELING AFRAID AS IF SOMETHING AWFUL MIGHT HAPPEN: NOT AT ALL
3. WORRYING TOO MUCH ABOUT DIFFERENT THINGS: MORE THAN HALF THE DAYS
1. FEELING NERVOUS, ANXIOUS, OR ON EDGE: SEVERAL DAYS
5. BEING SO RESTLESS THAT IT IS HARD TO SIT STILL: SEVERAL DAYS
GAD7 TOTAL SCORE: 9
6. BECOMING EASILY ANNOYED OR IRRITABLE: MORE THAN HALF THE DAYS
IF YOU CHECKED OFF ANY PROBLEMS ON THIS QUESTIONNAIRE, HOW DIFFICULT HAVE THESE PROBLEMS MADE IT FOR YOU TO DO YOUR WORK, TAKE CARE OF THINGS AT HOME, OR GET ALONG WITH OTHER PEOPLE: SOMEWHAT DIFFICULT

## 2018-01-10 ASSESSMENT — PAIN SCALES - GENERAL: PAINLEVEL: NO PAIN (0)

## 2018-01-10 NOTE — MR AVS SNAPSHOT
After Visit Summary   1/10/2018    Nyasia Raya    MRN: [id number]           Patient Information     Date Of Birth          1990        Visit Information        Provider Department      1/10/2018 9:20 AM Jeanne Guerrero MD [facility]        Today's Diagnoses     RANDI (generalized anxiety disorder)    -  1    ADHD (attention deficit hyperactivity disorder), combined type        Insomnia due to other mental disorder        Routine general medical examination at a health care facility           Follow-ups after your visit        Your next 10 appointments already scheduled     Mar 14, 2018  1:30 PM CDT   (Arrive by 1:15 PM)   PHYSICAL with Leena Mayorga MD   [facility] [place] ([facility] [place] )    [address]  [place] [id number] [phone]            Apr 11, 2018  1:40 PM CDT   (Arrive by 1:25 PM)   Return Visit with Jeanne Guerrero MD   [facility] [place] ([facility] [place] )    [address] [place] [address]  [place] [address] [phone]              Who to contact     If you have questions or need follow up information about today's clinic visit or your schedule please contact [facility] directly at [phone].  Normal or non-critical lab and imaging results will be communicated to you by MyChart, letter or phone within 4 business days after the clinic has received the results. If you do not hear from us within 7 days, please contact the clinic through MyChart or phone. If you have a critical or abnormal lab result, we will notify you by phone as soon as possible.  Submit refill requests through [employer] or call your pharmacy and they will forward the refill request to us. Please allow 3 business days for your refill to be completed.          Additional Information About Your Visit        [employer] Information     [employer] lets you send messages to your doctor, view your test results, renew your prescriptions,  "schedule appointments and more. To sign up, go to [email or web address] . Click on \"Log in\" on the left side of the screen, which will take you to the Welcome page. Then click on \"Sign up Now\" on the right side of the page.     You will be asked to enter the access code listed below, as well as some personal information. Please follow the directions to create your username and password.     Your access code is: [email or web address]  Expires: 4/10/2018  9:53 AM     Your access code will  in 90 days. If you need help or a new code, please call your [facility] clinic or [phone].        Care EveryWhere ID     This is your Care EveryWhere ID. This could be used by other organizations to access your [facility] medical records  [id number]        Your Vitals Were     Pulse Temperature BMI (Body Mass Index)             104 97  F (36.1  C) (Tympanic) 31.25 kg/m2          Blood Pressure from Last 3 Encounters:   01/10/18 110/68   10/10/17 115/70   17 112/72    Weight from Last 3 Encounters:   01/10/18 160 lb (72.6 kg)   10/10/17 160 lb (72.6 kg)   17 155 lb (70.3 kg)              Today, you had the following     No orders found for display         Today's Medication Changes          These changes are accurate as of: 1/10/18  9:53 AM.  If you have any questions, ask your nurse or doctor.               Start taking these medicines.        Dose/Directions    busPIRone 5 MG tablet   Commonly known as:  BUSPAR   Used for:  RANDI (generalized anxiety disorder)   Started by:  Jeanne Guerrero MD        Dose:  5 mg   Take 1 tablet (5 mg) by mouth 3 times daily   Quantity:  90 tablet   Refills:  11            Where to get your medicines      [facility] [id number]  [place] - [address] [facility] [place] [address] [place] [address]     Phone:  [phone]     busPIRone 5 MG tablet    diphenhydrAMINE 25 MG tablet    melatonin 3 MG Caps         Some of these will " need a paper prescription and others can be bought over the counter.  Ask your nurse if you have questions.     Bring a paper prescription for each of these medications     amphetamine-dextroamphetamine 30 MG per 24 hr capsule    amphetamine-dextroamphetamine 30 MG per 24 hr capsule    amphetamine-dextroamphetamine 30 MG per 24 hr capsule                Primary Care Provider Office Phone # Fax #    Nhung Devi [phone]       [facility] [address]  [facility] [address]        Equal Access to Services     BRONSON LUNDY : Hadii aad ku hadasho Soomaali, waaxda luqadaha, qaybta kaalmada adeegyada, waxay idiin hayaan adeeg kharash la'kassandra . So [facility] [phone].    ATENCIÓN: Si habla español, tiene a mar disposición servicios gratuitos de asistencia lingüística. [facility] [phone].    We comply with applicable federal civil rights laws and [place] laws. We do not discriminate on the basis of race, color, national origin, age, disability, sex, sexual orientation, or gender identity.            Thank you!     Thank you for choosing [facility]  for your care. Our goal is always to provide you with excellent care. Hearing back from our patients is one way we can continue to improve our services. Please take a few minutes to complete the written survey that you may receive in the mail after your visit with us. Thank you!             Your Updated Medication List - Protect others around you: Learn how to safely use, store and throw away your medicines at www.disposemymeds.org.          This list is accurate as of: 1/10/18  9:53 AM.  Always use your most recent med list.                   Brand Name Dispense Instructions for use Diagnosis    albuterol 108 (90 BASE) MCG/ACT Inhaler    PROAIR HFA/PROVENTIL HFA/VENTOLIN HFA    1 Inhaler    Inhale 2 puffs into the lungs every 6 hours as needed for shortness of breath / dyspnea    Cough       * amphetamine-dextroamphetamine 30  MG per 24 hr capsule    ADDERALL XR    60 capsule    Take 1 capsule (30 mg) by mouth 2 times daily    ADHD (attention deficit hyperactivity disorder), combined type       * amphetamine-dextroamphetamine 30 MG per 24 hr capsule    ADDERALL XR    60 capsule    Take 1 capsule (30 mg) by mouth 2 times daily    ADHD (attention deficit hyperactivity disorder), combined type       * amphetamine-dextroamphetamine 30 MG per 24 hr capsule   Start taking on:  2/8/2018    ADDERALL XR    60 capsule    Take 1 capsule (30 mg) by mouth 2 times daily    ADHD (attention deficit hyperactivity disorder), combined type       * amphetamine-dextroamphetamine 30 MG per 24 hr capsule   Start taking on:  3/8/2018    ADDERALL XR    60 capsule    Take 1 capsule (30 mg) by mouth 2 times daily    ADHD (attention deficit hyperactivity disorder), combined type       busPIRone 5 MG tablet    BUSPAR    90 tablet    Take 1 tablet (5 mg) by mouth 3 times daily    RANDI (generalized anxiety disorder)       diphenhydrAMINE 25 MG tablet    BENADRYL    120 tablet    Take 3-4 tabs bedtime    Routine general medical examination at a health care facility       etonogestrel 68 MG Impl    IMPLANON/NEXPLANON     1 each (68 mg) by Subdermal route continuous    Family planning       melatonin 3 MG Caps     30 capsule    Take 1 capsule by mouth At Bedtime    Insomnia due to other mental disorder       * Notice:  This list has 4 medication(s) that are the same as other medications prescribed for you. Read the directions carefully, and ask your doctor or other care provider to review them with you.

## 2018-01-10 NOTE — PROGRESS NOTES
"  PSYCHIATRY CLINIC PROGRESS NOTE   20 minute medication management, more than 50% of time spent counseling patient on medications, medication side effects, symptom history and management   SUBJECTIVE / INTERIM HISTORY                                                                       The pt was last seen in clinic October: -- continue  Adderall XR 30 mg bid and last script 10/2/17 so will give script today for 17, 17:  Continue Hydroxyzine 50 mg up to bid prn anxieyt / panic. Consider buspar in future for if anxiety worsens.  Continue Benadryl and melatonin for insomnia     Social- Her fiance, Nu, [place] whom she met at [facility]. He is a good trent and keeps her on track Children-  3 year old is Wallace who is \"the happiest little trent in the world\".  - generally doing well, wants to get back on buspar for anxiety  - working a lot   - her and Nu engaged  - job going well at [employer]  - overall doing much better in terms sx of ADD: able to keep track of things, stay on track  -  Living in [place] now: was in [place]  - sleeping better it seems since started stimulant. Also takes melatonin and benadryl OTC at times to help her sleep  SUBSTANCE USE- MJ in past    SYMPTOMS- sx ADD improved. Sleeping better. Getting more tasks done: not as many unfinished projects / chores. Anxiety has improved as has insomnia.  MEDICAL ROS-  Back pain (spondylolisthesis), asthma (cough, SOB/wheezing)  MEDICAL / SURGICAL HISTORY                pregnant [if applicable]--no   Medical Team:     PMD- Dr. Devi       Therapist-  Patient Active Problem List   Diagnosis     Insomnia     Asthma     Spondylolisthesis     Family history of congenital heart defect     Night terrors, adult     Status post  delivery     Obesity     Family planning     Depression     Anxiety     Encounter for routine gynecological examination     Hemorrhage of rectum and anus     Smoker     NO SHOW     ALLERGY   Review of patient's allergies " indicates no known allergies.  MEDICATIONS                                                                                             Current Outpatient Prescriptions   Medication Sig     etonogestrel (IMPLANON/NEXPLANON) 68 MG IMPL 1 each (68 mg) by Subdermal route continuous     melatonin 3 MG CAPS Take 1 capsule by mouth At Bedtime     diphenhydrAMINE (BENADRYL) 25 MG tablet Take 3-4 tabs bedtime     albuterol (PROAIR HFA, PROVENTIL HFA, VENTOLIN HFA) 108 (90 BASE) MCG/ACT inhaler Inhale 2 puffs into the lungs every 6 hours as needed for shortness of breath / dyspnea     amphetamine-dextroamphetamine (ADDERALL XR) 30 MG per 24 hr capsule Take 1 capsule (30 mg) by mouth 2 times daily (Patient not taking: Reported on 1/10/2018)     [DISCONTINUED] TRAZODONE HCL 1 tablet At Bedtime.     No current facility-administered medications for this visit.        VITALS   /68 (BP Location: Right arm, Patient Position: Sitting, Cuff Size: Adult Regular)  Pulse 104  Temp 97  F (36.1  C) (Tympanic)  Wt 160 lb (72.6 kg)  BMI 31.25 kg/m2     PHQ9                       PHQ-9 SCORE 3/8/2017 7/5/2017 1/10/2018   Total Score - - -   Total Score 3 6 8       LABS                                                                                                                           TSH   Date Value Ref Range Status   06/08/2015 0.44 0.40 - 4.00 mU/L Final   ]   MENTAL STATUS EXAM                                                                                        Alert. Oriented to person, place, and date / time. Well groomed, calm, cooperative with good eye contact. No problems with speech or psychomotor behavior. Mood was euthymic and affect was congruent to speech content and full range. Thought process, including associations, was unremarkable and thought content was devoid of suicidal and homicidal ideation and psychotic thought. No hallucinations. Insight was good. Judgment was intact and adequate for safety. [facility]  knowledge was intact. Pt demonstrates no obvious problems with attention, concentration, language, recent or remote memory although these were not formally tested.     ASSESSMENT                                                                                                      HISTORICAL:  Initial psych consult 6/17/15  Notes:             Gabapentin: lactation. buspar nausea.   Nyasia Raya ADD, depression NOS, and night terrors. Nice young lady who presents as mature for her age : she went through a lot in her household growing up with mom with MS dad working all the time and 5 siblings. Sounds like Nyasia took care of the household and she worked 2 jobs. Didn't end up finishing HS in [employer] and looking back she is able to recognize had a lot on her plate. Diagnosed with ADD in past but parents didn't want her on stimulants.  For today we will continue Adderall XR as however increase dose as she is working at [employer] and for most part doing okay but getting more distracted with increased demands of work. . Will continue melatonin and Benadryl for her / insomnia. Tried gabapentin but had lactation thus d/c. Today she seems to be doing well and she inquired about going back on buspar and we are going to have her take buspirone again.     TREATMENT RISK STATEMENT: The risks, benefits, alternatives and potential adverse effects have been explained and are understood by the pt. The pt agrees to the treatment plan with the ability to do so. The pt knows to call the clinic for any problems or access emergency care if needed. Substance use is not a problem as noted above.     DIAGNOSES           ADD  RANDI  Night terrors    PLAN                                                                                                                         1) MEDICATIONS:   -- continue  Adderall XR 30 mg bid and last script 11/29/17 and gave scripts today 1/10/18, 2/8/18, and 3/8/18:  Continue Hydroxyzine 50 mg up to bid prn anxieyt  / panic. I am going to restart buspar 5 mg 3 times daily.  Continue Benadryl and melatonin for insomnia    2) THERAPY: No Change    3) LABS: None    4) PT MONITOR [call for probs]: Worsening symptoms, side effects from medications, SI/HI    5) REFERRALS [CD tx, medical, tests other]: None    6)  RTC: ~2 months.

## 2018-01-10 NOTE — NURSING NOTE
Chief Complaint   Patient presents with     RECHECK     Mental health.  Medications       Initial /68 (BP Location: Right arm, Patient Position: Sitting, Cuff Size: Adult Regular)  Pulse 104  Temp 97  F (36.1  C) (Tympanic)  Wt 160 lb (72.6 kg)  BMI 31.25 kg/m2 Estimated body mass index is 31.25 kg/(m^2) as calculated from the following:    Height as of 10/10/17: 5' (1.524 m).    Weight as of this encounter: 160 lb (72.6 kg).  Medication Reconciliation: complete    LARRY LYN

## 2018-01-11 ENCOUNTER — TELEPHONE (OUTPATIENT)
Dept: PSYCHIATRY | Facility: OTHER | Age: 28
End: 2018-01-11

## 2018-01-11 DIAGNOSIS — F90.2 ADHD (ATTENTION DEFICIT HYPERACTIVITY DISORDER), COMBINED TYPE: ICD-10-CM

## 2018-01-11 ASSESSMENT — ANXIETY QUESTIONNAIRES: GAD7 TOTAL SCORE: 9

## 2018-01-11 NOTE — TELEPHONE ENCOUNTER
Patient is requesting that the Adderall XR 30 mg be written for 2 capsules a day for 2 weeks.  She will need 2 scripts per month.  Pharmacist suggested this in order for insurance to pay.  Thank you, please advise.

## 2018-01-14 NOTE — TELEPHONE ENCOUNTER
Okay. Script for 1/14/18, 1/29/18, 2/12/18, and 2/26/18, 3/12/18 and 3/26/18.  each with instructions BID and #30. Will print and sign when back in clinic tomorrow. Thank you!

## 2018-01-15 RX ORDER — DEXTROAMPHETAMINE SACCHARATE, AMPHETAMINE ASPARTATE MONOHYDRATE, DEXTROAMPHETAMINE SULFATE AND AMPHETAMINE SULFATE 7.5; 7.5; 7.5; 7.5 MG/1; MG/1; MG/1; MG/1
30 CAPSULE, EXTENDED RELEASE ORAL 2 TIMES DAILY
Qty: 30 CAPSULE | Refills: 0 | Status: SHIPPED | OUTPATIENT
Start: 2018-01-29 | End: 2018-04-16

## 2018-01-15 RX ORDER — DEXTROAMPHETAMINE SACCHARATE, AMPHETAMINE ASPARTATE MONOHYDRATE, DEXTROAMPHETAMINE SULFATE AND AMPHETAMINE SULFATE 7.5; 7.5; 7.5; 7.5 MG/1; MG/1; MG/1; MG/1
30 CAPSULE, EXTENDED RELEASE ORAL 2 TIMES DAILY
Qty: 30 CAPSULE | Refills: 0 | Status: SHIPPED | OUTPATIENT
Start: 2018-02-12 | End: 2018-04-16

## 2018-01-15 RX ORDER — DEXTROAMPHETAMINE SACCHARATE, AMPHETAMINE ASPARTATE MONOHYDRATE, DEXTROAMPHETAMINE SULFATE AND AMPHETAMINE SULFATE 7.5; 7.5; 7.5; 7.5 MG/1; MG/1; MG/1; MG/1
30 CAPSULE, EXTENDED RELEASE ORAL 2 TIMES DAILY
Qty: 30 CAPSULE | Refills: 0 | Status: SHIPPED | OUTPATIENT
Start: 2018-02-26 | End: 2018-04-16

## 2018-01-15 RX ORDER — DEXTROAMPHETAMINE SACCHARATE, AMPHETAMINE ASPARTATE MONOHYDRATE, DEXTROAMPHETAMINE SULFATE AND AMPHETAMINE SULFATE 7.5; 7.5; 7.5; 7.5 MG/1; MG/1; MG/1; MG/1
30 CAPSULE, EXTENDED RELEASE ORAL 2 TIMES DAILY
Qty: 30 CAPSULE | Refills: 0 | Status: SHIPPED | OUTPATIENT
Start: 2018-03-26 | End: 2018-07-18

## 2018-01-15 RX ORDER — DEXTROAMPHETAMINE SACCHARATE, AMPHETAMINE ASPARTATE MONOHYDRATE, DEXTROAMPHETAMINE SULFATE AND AMPHETAMINE SULFATE 7.5; 7.5; 7.5; 7.5 MG/1; MG/1; MG/1; MG/1
30 CAPSULE, EXTENDED RELEASE ORAL 2 TIMES DAILY
Qty: 30 CAPSULE | Refills: 0 | Status: SHIPPED | OUTPATIENT
Start: 2018-01-15 | End: 2018-04-16

## 2018-01-15 RX ORDER — DEXTROAMPHETAMINE SACCHARATE, AMPHETAMINE ASPARTATE MONOHYDRATE, DEXTROAMPHETAMINE SULFATE AND AMPHETAMINE SULFATE 7.5; 7.5; 7.5; 7.5 MG/1; MG/1; MG/1; MG/1
30 CAPSULE, EXTENDED RELEASE ORAL 2 TIMES DAILY
Qty: 30 CAPSULE | Refills: 0 | Status: SHIPPED | OUTPATIENT
Start: 2018-03-12 | End: 2018-04-16

## 2018-01-17 ENCOUNTER — TELEPHONE (OUTPATIENT)
Dept: PSYCHIATRY | Facility: OTHER | Age: 28
End: 2018-01-17

## 2018-01-17 NOTE — TELEPHONE ENCOUNTER
Patient contacted.  Will  Rx hard copy of Adderall XR at lower level of clinic lobby.  Needs Rx every 2 weeks for insurance reasons.

## 2018-01-25 ENCOUNTER — TELEPHONE (OUTPATIENT)
Dept: PSYCHIATRY | Facility: OTHER | Age: 28
End: 2018-01-25

## 2018-03-13 ENCOUNTER — TELEPHONE (OUTPATIENT)
Dept: OBGYN | Facility: OTHER | Age: 28
End: 2018-03-13

## 2018-03-13 NOTE — TELEPHONE ENCOUNTER
Patient would like to schedule appointment with Dr. Mayorga for birth control removal.     Please advise.  Thank you  Ne

## 2018-04-16 ENCOUNTER — OFFICE VISIT (OUTPATIENT)
Dept: PSYCHIATRY | Facility: OTHER | Age: 28
End: 2018-04-16
Attending: PSYCHIATRY & NEUROLOGY
Payer: COMMERCIAL

## 2018-04-16 VITALS
SYSTOLIC BLOOD PRESSURE: 110 MMHG | HEART RATE: 94 BPM | BODY MASS INDEX: 32.03 KG/M2 | WEIGHT: 164 LBS | DIASTOLIC BLOOD PRESSURE: 90 MMHG | TEMPERATURE: 97.3 F

## 2018-04-16 DIAGNOSIS — F90.2 ADHD (ATTENTION DEFICIT HYPERACTIVITY DISORDER), COMBINED TYPE: ICD-10-CM

## 2018-04-16 DIAGNOSIS — F41.1 GAD (GENERALIZED ANXIETY DISORDER): ICD-10-CM

## 2018-04-16 PROCEDURE — 99214 OFFICE O/P EST MOD 30 MIN: CPT | Performed by: PSYCHIATRY & NEUROLOGY

## 2018-04-16 RX ORDER — BUSPIRONE HYDROCHLORIDE 5 MG/1
TABLET ORAL
Qty: 150 TABLET | Refills: 6 | Status: SHIPPED | OUTPATIENT
Start: 2018-04-16 | End: 2018-10-17

## 2018-04-16 RX ORDER — DEXTROAMPHETAMINE SACCHARATE, AMPHETAMINE ASPARTATE MONOHYDRATE, DEXTROAMPHETAMINE SULFATE AND AMPHETAMINE SULFATE 7.5; 7.5; 7.5; 7.5 MG/1; MG/1; MG/1; MG/1
30 CAPSULE, EXTENDED RELEASE ORAL 2 TIMES DAILY
Qty: 60 CAPSULE | Refills: 0 | Status: SHIPPED | OUTPATIENT
Start: 2018-06-14 | End: 2018-07-13

## 2018-04-16 RX ORDER — DEXTROAMPHETAMINE SACCHARATE, AMPHETAMINE ASPARTATE MONOHYDRATE, DEXTROAMPHETAMINE SULFATE AND AMPHETAMINE SULFATE 7.5; 7.5; 7.5; 7.5 MG/1; MG/1; MG/1; MG/1
30 CAPSULE, EXTENDED RELEASE ORAL 2 TIMES DAILY
Qty: 60 CAPSULE | Refills: 0 | Status: SHIPPED | OUTPATIENT
Start: 2018-05-16 | End: 2018-06-15

## 2018-04-16 RX ORDER — DEXTROAMPHETAMINE SACCHARATE, AMPHETAMINE ASPARTATE MONOHYDRATE, DEXTROAMPHETAMINE SULFATE AND AMPHETAMINE SULFATE 7.5; 7.5; 7.5; 7.5 MG/1; MG/1; MG/1; MG/1
30 CAPSULE, EXTENDED RELEASE ORAL 2 TIMES DAILY
Qty: 60 CAPSULE | Refills: 0 | Status: SHIPPED | OUTPATIENT
Start: 2018-04-16 | End: 2018-05-16

## 2018-04-16 ASSESSMENT — ANXIETY QUESTIONNAIRES
GAD7 TOTAL SCORE: 7
2. NOT BEING ABLE TO STOP OR CONTROL WORRYING: MORE THAN HALF THE DAYS
6. BECOMING EASILY ANNOYED OR IRRITABLE: NOT AT ALL
5. BEING SO RESTLESS THAT IT IS HARD TO SIT STILL: NOT AT ALL
7. FEELING AFRAID AS IF SOMETHING AWFUL MIGHT HAPPEN: SEVERAL DAYS
1. FEELING NERVOUS, ANXIOUS, OR ON EDGE: MORE THAN HALF THE DAYS
3. WORRYING TOO MUCH ABOUT DIFFERENT THINGS: SEVERAL DAYS

## 2018-04-16 ASSESSMENT — PATIENT HEALTH QUESTIONNAIRE - PHQ9: 5. POOR APPETITE OR OVEREATING: SEVERAL DAYS

## 2018-04-16 ASSESSMENT — PAIN SCALES - GENERAL: PAINLEVEL: MODERATE PAIN (5)

## 2018-04-16 NOTE — PROGRESS NOTES
"  PSYCHIATRY CLINIC PROGRESS NOTE   20 minute medication management, more than 50% of time spent counseling patient on medications, medication side effects, symptom history and management   SUBJECTIVE / INTERIM HISTORY                                                                       Last visit:   continue  Adderall XR 30 mg bid and last script 17 and gave scripts today 1/10/18, 18, and 3/8/18:  Continue Hydroxyzine 50 mg up to bid prn anxieyt / panic. I am going to restart buspar 5 mg 3 times daily.  Continue Benadryl and melatonin for insomnia   Social- Her fiance, Nu, [place] whom she met at [facility]. He is a good trent and keeps her on track Children-  3 year old is Wallace who is \"the happiest little trent in the world\".  - buspar helping at night for sleep: she has been taking 2-3 at night rather than any during day. Anxiety bad durinig day  - Nu was laid off for awhile  - working a lot : 60 hours some weeks  - work: some issues with when she gets excited she interrupts during lectures  -  Living in [place] now: was in [place]  - sleeping better it seems since started stimulant. Also takes melatonin and benadryl OTC at times to help her sleep  SUBSTANCE USE- MJ in past    SYMPTOMS- sx ADD improved. Sleeping better. Getting more tasks done: not as many unfinished projects / chores. Anxiety has improved as has insomnia.  MEDICAL ROS-  Back pain (spondylolisthesis), asthma (cough, SOB/wheezing)  MEDICAL / SURGICAL HISTORY                pregnant [if applicable]--no   Medical Team:     PMD- Dr. Devi       Therapist-  Patient Active Problem List   Diagnosis     Insomnia     Asthma     Spondylolisthesis     Family history of congenital heart defect     Night terrors, adult     Status post  delivery     Obesity     Family planning     Depression     Anxiety     Encounter for routine gynecological examination     Hemorrhage of rectum and anus     Smoker     NO SHOW     ALLERGY   Review of patient's " allergies indicates no known allergies.  MEDICATIONS                                                                                             Current Outpatient Prescriptions   Medication Sig     busPIRone (BUSPAR) 5 MG tablet Take 1 tablet in am, 1 tablet in afternoon and take 2-3 at bedtime     amphetamine-dextroamphetamine (ADDERALL XR) 30 MG per 24 hr capsule Take 1 capsule (30 mg) by mouth 2 times daily     [START ON 5/16/2018] amphetamine-dextroamphetamine (ADDERALL XR) 30 MG per 24 hr capsule Take 1 capsule (30 mg) by mouth 2 times daily     [START ON 6/14/2018] amphetamine-dextroamphetamine (ADDERALL XR) 30 MG per 24 hr capsule Take 1 capsule (30 mg) by mouth 2 times daily     amphetamine-dextroamphetamine (ADDERALL XR) 30 MG per 24 hr capsule Take 1 capsule (30 mg) by mouth 2 times daily     melatonin 3 MG CAPS Take 1 capsule by mouth At Bedtime     diphenhydrAMINE (BENADRYL) 25 MG tablet Take 3-4 tabs bedtime     etonogestrel (IMPLANON/NEXPLANON) 68 MG IMPL 1 each (68 mg) by Subdermal route continuous     albuterol (PROAIR HFA, PROVENTIL HFA, VENTOLIN HFA) 108 (90 BASE) MCG/ACT inhaler Inhale 2 puffs into the lungs every 6 hours as needed for shortness of breath / dyspnea     [DISCONTINUED] TRAZODONE HCL 1 tablet At Bedtime.     No current facility-administered medications for this visit.        VITALS   /90 (BP Location: Left arm, Patient Position: Sitting, Cuff Size: Adult Regular)  Pulse 94  Temp 97.3  F (36.3  C) (Tympanic)  Wt 164 lb (74.4 kg)  BMI 32.03 kg/m2     PHQ9                       PHQ-9 SCORE 7/5/2017 1/10/2018 4/16/2018   Total Score - - -   Total Score 6 8 3       LABS                                                                                                                           TSH   Date Value Ref Range Status   06/08/2015 0.44 0.40 - 4.00 mU/L Final   ]   MENTAL STATUS EXAM                                                                                         Alert. Oriented to person, place, and date / time. Well groomed, calm, cooperative with good eye contact. No problems with speech or psychomotor behavior. Mood was euthymic and affect was congruent to speech content and full range. Thought process, including associations, was unremarkable and thought content was devoid of suicidal and homicidal ideation and psychotic thought. No hallucinations. Insight was good. Judgment was intact and adequate for safety. Fund of knowledge was intact. Pt demonstrates no obvious problems with attention, concentration, language, recent or remote memory although these were not formally tested.     ASSESSMENT                                                                                                      HISTORICAL:  Initial psych consult 6/17/15  Notes:             Gabapentin: lactation. buspar nausea.   Nyasia Laurents ADD, depression NOS, and night terrors. Nyasia went through a lot in her household growing up with mom with MS dad working all the time and 5 siblings. Sounds like Nyasia took care of the household and she worked 2 jobs. Didn't end up finishing HS in [place] and looking back she is able to recognize had a lot on her plate. Diagnosed with ADD in past but parents didn't want her on stimulants.  For today we will continue Adderall XR and we will adjust her buspar as she notes has been taking 2-3 of tabs at night which actually helps for sleep but anxiety during day so I 5 mg am and 5 mg afternoon.     TREATMENT RISK STATEMENT: The risks, benefits, alternatives and potential adverse effects have been explained and are understood by the pt. The pt agrees to the treatment plan with the ability to do so. The pt knows to call the clinic for any problems or access emergency care if needed. Substance use is not a problem as noted above.     DIAGNOSES           ADHD  RANDI  Night terrors    PLAN                                                                                                                          1) MEDICATIONS:   -- continue  Adderall XR 30 mg bid and last script was march so gave scripts today 4/16/18,  5/16/18, 6/14/18 Continue Hydroxyzine 50 mg up to bid prn anxieyt / panic. We will change buspar from as she is taking 5 mg 2-3 tabs HS to 5 mg am, 5 mg afternoon and tehn 10 - 15 mg HS (is helping her esleep). She uses benadryl and melatonin prn insomnia but hasn't needed to take them lately    2) THERAPY: No Change    3) LABS: None    4) PT MONITOR [call for probs]: Worsening symptoms, side effects from medications, SI/HI    5) REFERRALS [CD tx, medical, tests other]: None    6)  RTC: ~4-6 weeks

## 2018-04-16 NOTE — NURSING NOTE
Chief Complaint   Patient presents with     RECHECK     Mental health.  Discuss anxiety medication.  Refill Adderall       Initial /90 (BP Location: Left arm, Patient Position: Sitting, Cuff Size: Adult Regular)  Pulse 94  Temp 97.3  F (36.3  C) (Tympanic)  Wt 164 lb (74.4 kg)  BMI 32.03 kg/m2 Estimated body mass index is 32.03 kg/(m^2) as calculated from the following:    Height as of 10/10/17: 5' (1.524 m).    Weight as of this encounter: 164 lb (74.4 kg).  Medication Reconciliation: complete     LARRY LYN

## 2018-04-16 NOTE — MR AVS SNAPSHOT
"              After Visit Summary   4/16/2018    Nyasia Raya    MRN: [id number]           Patient Information     Date Of Birth          1990        Visit Information        Provider Department      4/16/2018 8:00 AM Jeanne [facility]ro MD [facility]        Today's Diagnoses     RANDI (generalized anxiety disorder)        ADHD (attention deficit hyperactivity disorder), combined type           Follow-ups after your visit        Your next 10 appointments already scheduled     May 02, 2018  9:30 AM CDT   (Arrive by 9:15 AM)   PHYSICAL with Leena Mayorga MD   [facility] [place] ([facility] [place] )    [address]  [place] [address]   [phone]              Who to contact     If you have questions or need follow up information about today's clinic visit or your schedule please contact [facility] directly at [phone].  Normal or non-critical lab and imaging results will be communicated to you by MyChart, letter or phone within 4 business days after the clinic has received the results. If you do not hear from us within 7 days, please contact the clinic through MyChart or phone. If you have a critical or abnormal lab result, we will notify you by phone as soon as possible.  Submit refill requests through [employer] or call your pharmacy and they will forward the refill request to us. Please allow 3 business days for your refill to be completed.          Additional Information About Your Visit        MyChart Information     [employer] lets you send messages to your doctor, view your test results, renew your prescriptions, schedule appointments and more. To sign up, go to [email or web address] . Click on \"Log in\" on the left side of the screen, which will take you to the Welcome page. Then click on \"Sign up Now\" on the right side of the page.     You will be asked to enter the access code listed below, as well as some personal information. Please follow the " directions to create your username and password.     Your access code is: [email or web address]  Expires: 7/15/2018  9:27 AM     Your access code will  in 90 days. If you need help or a new code, please call your [facility] clinic or [phone].        Care EveryWhere ID     This is your Care EveryWhere ID. This could be used by other organizations to access your [facility] medical records  [id number]        Your Vitals Were     Pulse Temperature BMI (Body Mass Index)             94 97.3  F (36.3  C) (Tympanic) 32.03 kg/m2          Blood Pressure from Last 3 Encounters:   18 110/90   01/10/18 110/68   10/10/17 115/70    Weight from Last 3 Encounters:   18 164 lb (74.4 kg)   01/10/18 160 lb (72.6 kg)   10/10/17 160 lb (72.6 kg)              Today, you had the following     No orders found for display         Today's Medication Changes          These changes are accurate as of 18  9:27 AM.  If you have any questions, ask your nurse or doctor.               These medicines have changed or have updated prescriptions.        Dose/Directions    * amphetamine-dextroamphetamine 30 MG per 24 hr capsule   Commonly known as:  ADDERALL XR   This may have changed:  Another medication with the same name was added. Make sure you understand how and when to take each.   Used for:  ADHD (attention deficit hyperactivity disorder), combined type   Changed by:  Jeanne Guerrero MD        Dose:  30 mg   Take 1 capsule (30 mg) by mouth 2 times daily   Quantity:  30 capsule   Refills:  0       * amphetamine-dextroamphetamine 30 MG per 24 hr capsule   Commonly known as:  ADDERALL XR   This may have changed:  You were already taking a medication with the same name, and this prescription was added. Make sure you understand how and when to take each.   Used for:  ADHD (attention deficit hyperactivity disorder), combined type   Changed by:  Jeanne Guerrero MD        Dose:  30 mg   Take 1 capsule (30 mg) by mouth 2 times daily    Quantity:  60 capsule   Refills:  0       * amphetamine-dextroamphetamine 30 MG per 24 hr capsule   Commonly known as:  ADDERALL XR   This may have changed:  You were already taking a medication with the same name, and this prescription was added. Make sure you understand how and when to take each.   Used for:  ADHD (attention deficit hyperactivity disorder), combined type   Changed by:  Jeanne Guerrero MD        Dose:  30 mg   Start taking on:  5/16/2018   Take 1 capsule (30 mg) by mouth 2 times daily   Quantity:  60 capsule   Refills:  0       * amphetamine-dextroamphetamine 30 MG per 24 hr capsule   Commonly known as:  ADDERALL XR   This may have changed:  You were already taking a medication with the same name, and this prescription was added. Make sure you understand how and when to take each.   Used for:  ADHD (attention deficit hyperactivity disorder), combined type   Changed by:  Jeanne Guerrero MD        Dose:  30 mg   Start taking on:  6/14/2018   Take 1 capsule (30 mg) by mouth 2 times daily   Quantity:  60 capsule   Refills:  0       busPIRone 5 MG tablet   Commonly known as:  BUSPAR   This may have changed:    - how much to take  - how to take this  - when to take this  - additional instructions   Used for:  RANDI (generalized anxiety disorder)   Changed by:  Jeanne Guerrero MD        Take 1 tablet in am, 1 tablet in afternoon and take 2-3 at bedtime   Quantity:  150 tablet   Refills:  6       * Notice:  This list has 4 medication(s) that are the same as other medications prescribed for you. Read the directions carefully, and ask your doctor or other care provider to review them with you.         Where to get your medicines      These medications were sent to [id number] - [place], [place] - [address] AT [facility] [address], [place] [address]     Phone:  [phone]     busPIRone 5 MG tablet         Some of these will need a paper prescription and others can  be bought over the counter.  Ask your nurse if you have questions.     Bring a paper prescription for each of these medications     amphetamine-dextroamphetamine 30 MG per 24 hr capsule    amphetamine-dextroamphetamine 30 MG per 24 hr capsule    amphetamine-dextroamphetamine 30 MG per 24 hr capsule                Primary Care Provider Office Phone # Fax #    Nhung Devi [phone]       [address]  [facility] [address]        Equal Access to Services     [facility]: Hadii aad ku hadasho Soomaali, waaxda luqadaha, qaybta kaalmada adeegyada, waxay idiin hayaan adeeg kharash la'kassandra . So [facility] [phone].    ATENCIÓN: Si habla español, tiene a mar disposición servicios gratuitos de asistencia lingüística. [facility] [phone].    We comply with applicable federal civil rights laws and [place] laws. We do not discriminate on the basis of race, color, national origin, age, disability, sex, sexual orientation, or gender identity.            Thank you!     Thank you for choosing [facility]  for your care. Our goal is always to provide you with excellent care. Hearing back from our patients is one way we can continue to improve our services. Please take a few minutes to complete the written survey that you may receive in the mail after your visit with us. Thank you!             Your Updated Medication List - Protect others around you: Learn how to safely use, store and throw away your medicines at www.disposemymeds.org.          This list is accurate as of 4/16/18  9:27 AM.  Always use your most recent med list.                   Brand Name Dispense Instructions for use Diagnosis    albuterol 108 (90 Base) MCG/ACT Inhaler    PROAIR HFA/PROVENTIL HFA/VENTOLIN HFA    1 Inhaler    Inhale 2 puffs into the lungs every 6 hours as needed for shortness of breath / dyspnea    Cough       * amphetamine-dextroamphetamine 30 MG per 24 hr capsule    ADDERALL XR    30 capsule    Take 1  capsule (30 mg) by mouth 2 times daily    ADHD (attention deficit hyperactivity disorder), combined type       * amphetamine-dextroamphetamine 30 MG per 24 hr capsule    ADDERALL XR    60 capsule    Take 1 capsule (30 mg) by mouth 2 times daily    ADHD (attention deficit hyperactivity disorder), combined type       * amphetamine-dextroamphetamine 30 MG per 24 hr capsule   Start taking on:  5/16/2018    ADDERALL XR    60 capsule    Take 1 capsule (30 mg) by mouth 2 times daily    ADHD (attention deficit hyperactivity disorder), combined type       * amphetamine-dextroamphetamine 30 MG per 24 hr capsule   Start taking on:  6/14/2018    ADDERALL XR    60 capsule    Take 1 capsule (30 mg) by mouth 2 times daily    ADHD (attention deficit hyperactivity disorder), combined type       busPIRone 5 MG tablet    BUSPAR    150 tablet    Take 1 tablet in am, 1 tablet in afternoon and take 2-3 at bedtime    RANDI (generalized anxiety disorder)       diphenhydrAMINE 25 MG tablet    BENADRYL    120 tablet    Take 3-4 tabs bedtime    Routine general medical examination at a health care facility       etonogestrel 68 MG Impl    IMPLANON/NEXPLANON     1 each (68 mg) by Subdermal route continuous    Family planning       melatonin 3 MG Caps     30 capsule    Take 1 capsule by mouth At Bedtime    Insomnia due to other mental disorder       * Notice:  This list has 4 medication(s) that are the same as other medications prescribed for you. Read the directions carefully, and ask your doctor or other care provider to review them with you.

## 2018-04-17 ASSESSMENT — PATIENT HEALTH QUESTIONNAIRE - PHQ9: SUM OF ALL RESPONSES TO PHQ QUESTIONS 1-9: 3

## 2018-04-17 ASSESSMENT — ANXIETY QUESTIONNAIRES: GAD7 TOTAL SCORE: 7

## 2018-05-02 ENCOUNTER — OFFICE VISIT (OUTPATIENT)
Dept: OBGYN | Facility: OTHER | Age: 28
End: 2018-05-02
Attending: OBSTETRICS & GYNECOLOGY
Payer: COMMERCIAL

## 2018-05-02 VITALS
BODY MASS INDEX: 31.02 KG/M2 | SYSTOLIC BLOOD PRESSURE: 104 MMHG | HEART RATE: 64 BPM | HEIGHT: 60 IN | DIASTOLIC BLOOD PRESSURE: 78 MMHG | WEIGHT: 158 LBS

## 2018-05-02 DIAGNOSIS — Z00.00 ROUTINE GENERAL MEDICAL EXAMINATION AT A HEALTH CARE FACILITY: Primary | ICD-10-CM

## 2018-05-02 DIAGNOSIS — N64.52 BREAST DISCHARGE: ICD-10-CM

## 2018-05-02 DIAGNOSIS — Z20.2 POSSIBLE EXPOSURE TO STD: ICD-10-CM

## 2018-05-02 LAB
C TRACH DNA SPEC QL PROBE+SIG AMP: NOT DETECTED
N GONORRHOEA DNA SPEC QL PROBE+SIG AMP: NOT DETECTED
PROLACTIN SERPL-MCNC: 11 UG/L (ref 3–27)
SPECIMEN SOURCE: NORMAL
SPECIMEN SOURCE: NORMAL
TSH SERPL DL<=0.005 MIU/L-ACNC: 0.44 MU/L (ref 0.4–4)
WET PREP SPEC: NORMAL

## 2018-05-02 PROCEDURE — 36415 COLL VENOUS BLD VENIPUNCTURE: CPT | Performed by: OBSTETRICS & GYNECOLOGY

## 2018-05-02 PROCEDURE — 99213 OFFICE O/P EST LOW 20 MIN: CPT | Mod: 25 | Performed by: OBSTETRICS & GYNECOLOGY

## 2018-05-02 PROCEDURE — 87340 HEPATITIS B SURFACE AG IA: CPT | Mod: 90 | Performed by: OBSTETRICS & GYNECOLOGY

## 2018-05-02 PROCEDURE — 86803 HEPATITIS C AB TEST: CPT | Mod: 90 | Performed by: OBSTETRICS & GYNECOLOGY

## 2018-05-02 PROCEDURE — 88142 CYTOPATH C/V THIN LAYER: CPT | Performed by: OBSTETRICS & GYNECOLOGY

## 2018-05-02 PROCEDURE — 84146 ASSAY OF PROLACTIN: CPT | Mod: 90 | Performed by: OBSTETRICS & GYNECOLOGY

## 2018-05-02 PROCEDURE — 99395 PREV VISIT EST AGE 18-39: CPT | Performed by: OBSTETRICS & GYNECOLOGY

## 2018-05-02 PROCEDURE — 86780 TREPONEMA PALLIDUM: CPT | Mod: 90 | Performed by: OBSTETRICS & GYNECOLOGY

## 2018-05-02 PROCEDURE — 87389 HIV-1 AG W/HIV-1&-2 AB AG IA: CPT | Mod: 90 | Performed by: OBSTETRICS & GYNECOLOGY

## 2018-05-02 PROCEDURE — 87591 N.GONORRHOEAE DNA AMP PROB: CPT | Performed by: OBSTETRICS & GYNECOLOGY

## 2018-05-02 PROCEDURE — 87210 SMEAR WET MOUNT SALINE/INK: CPT | Performed by: OBSTETRICS & GYNECOLOGY

## 2018-05-02 PROCEDURE — 87491 CHLMYD TRACH DNA AMP PROBE: CPT | Performed by: OBSTETRICS & GYNECOLOGY

## 2018-05-02 PROCEDURE — 99000 SPECIMEN HANDLING OFFICE-LAB: CPT | Performed by: OBSTETRICS & GYNECOLOGY

## 2018-05-02 PROCEDURE — 84443 ASSAY THYROID STIM HORMONE: CPT | Performed by: OBSTETRICS & GYNECOLOGY

## 2018-05-02 ASSESSMENT — PATIENT HEALTH QUESTIONNAIRE - PHQ9: 5. POOR APPETITE OR OVEREATING: SEVERAL DAYS

## 2018-05-02 ASSESSMENT — ANXIETY QUESTIONNAIRES
IF YOU CHECKED OFF ANY PROBLEMS ON THIS QUESTIONNAIRE, HOW DIFFICULT HAVE THESE PROBLEMS MADE IT FOR YOU TO DO YOUR WORK, TAKE CARE OF THINGS AT HOME, OR GET ALONG WITH OTHER PEOPLE: NOT DIFFICULT AT ALL
7. FEELING AFRAID AS IF SOMETHING AWFUL MIGHT HAPPEN: NOT AT ALL
5. BEING SO RESTLESS THAT IT IS HARD TO SIT STILL: NOT AT ALL
6. BECOMING EASILY ANNOYED OR IRRITABLE: NOT AT ALL
1. FEELING NERVOUS, ANXIOUS, OR ON EDGE: SEVERAL DAYS
2. NOT BEING ABLE TO STOP OR CONTROL WORRYING: SEVERAL DAYS
3. WORRYING TOO MUCH ABOUT DIFFERENT THINGS: SEVERAL DAYS
GAD7 TOTAL SCORE: 4

## 2018-05-02 ASSESSMENT — PAIN SCALES - GENERAL: PAINLEVEL: NO PAIN (0)

## 2018-05-02 NOTE — PATIENT INSTRUCTIONS
Pap and labs today.  Start prenatal vitamins.  Return on 5/23/18 for Nexplanon removal.  Return for annual exam in 1 year with Dillon Espino.

## 2018-05-02 NOTE — PROGRESS NOTES
ANNUAL    Nyasia is a 28 year old  female who presents for annual exam.   Youngest Child: 4  Largest Child: 9#  Gestational Diabetes: n Hypertension: n  Patient's last menstrual period was 2018 (exact date).  Menses: irreg pain n Contraception nexplanon.  Planning pregnancy: +/-  Concerns in addition to routine health maintenance?  Breast leakage  GYNECOLOGIC HISTORY:   Surgery: n  History sexually transmitted infections:CT in   Safe?  y  STI testing offered?  y  History of abnormal Pap smear: n  Problems with sex? Sometimes painful  Family history of: breast cancer: n   Uterine cancer n ovarian cancer: n   colon cancer:n    HEALTH MAINTENANCE:  Cholesterol: (No results found for: CHOL History of abnormal lipids: n  Mammo: n . History of abnormal Mammo:n   Regular Self Breast Exams: y Calcium/Vitamin D or Vitamin: n Exercise y    TSH: (  TSH   Date Value Ref Range Status   2015 0.44 0.40 - 4.00 mU/L Final    )  Pap; (  Lab Results   Component Value Date    PAP NIL 2015    PAP NIL 2013    )    HISTORY:  Obstetric History       T1      L1     SAB0   TAB1   Ectopic0   Multiple0   Live Births1       # Outcome Date GA Lbr Moiz/2nd Weight Sex Delivery Anes PTL Lv   2 Term 01/15/14 41w4d  9 lb 6 oz (4.252 kg) M CS-LTranv TOPICAL,Spinal  TING      Name: Wallace      Apgar1:  6                Apgar5: 8   1 TAB  6w0d               Past Medical History:   Diagnosis Date     Asthma 5/10/2013     Insomnia 5/10/2013     Spondylolisthesis 5/10/2013     Tobacco abuse      Past Surgical History:   Procedure Laterality Date      SECTION  1/15/2014    Procedure:  SECTION;;  Surgeon: Leena Mayorga MD;  Location: [facility]     Family History   Problem Relation Age of Onset     DIABETES Mother      DIABETES Father      HEART DISEASE Father 47     MI     Social History     Social History     Marital status: Single     Spouse name: N/A     Number of children: N/A     Years of  education: N/A     Social History Main Topics     Smoking status: Current Every Day Smoker     Packs/day: 0.08     Types: Cigarettes     Smokeless tobacco: Never Used      Comment: Patient smokes 4 times per month.  Patient will safiashaheed on this through her place of employment (1-10-18)     Alcohol use No      Comment: signed up for quit plan and her workplace has programs     Drug use: No     Sexual activity: Yes     Partners: Male     Other Topics Concern     Parent/Sibling W/ Cabg, Mi Or Angioplasty Before 65f 55m? Yes     Social History Narrative       Current Outpatient Prescriptions:      albuterol (PROAIR HFA, PROVENTIL HFA, VENTOLIN HFA) 108 (90 BASE) MCG/ACT inhaler, Inhale 2 puffs into the lungs every 6 hours as needed for shortness of breath / dyspnea, Disp: 1 Inhaler, Rfl: 1     amphetamine-dextroamphetamine (ADDERALL XR) 30 MG per 24 hr capsule, Take 1 capsule (30 mg) by mouth 2 times daily, Disp: 30 capsule, Rfl: 0     amphetamine-dextroamphetamine (ADDERALL XR) 30 MG per 24 hr capsule, Take 1 capsule (30 mg) by mouth 2 times daily, Disp: 60 capsule, Rfl: 0     [START ON 5/16/2018] amphetamine-dextroamphetamine (ADDERALL XR) 30 MG per 24 hr capsule, Take 1 capsule (30 mg) by mouth 2 times daily, Disp: 60 capsule, Rfl: 0     [START ON 6/14/2018] amphetamine-dextroamphetamine (ADDERALL XR) 30 MG per 24 hr capsule, Take 1 capsule (30 mg) by mouth 2 times daily, Disp: 60 capsule, Rfl: 0     busPIRone (BUSPAR) 5 MG tablet, Take 1 tablet in am, 1 tablet in afternoon and take 2-3 at bedtime, Disp: 150 tablet, Rfl: 6     diphenhydrAMINE (BENADRYL) 25 MG tablet, Take 3-4 tabs bedtime, Disp: 120 tablet, Rfl: 11     etonogestrel (IMPLANON/NEXPLANON) 68 MG IMPL, 1 each (68 mg) by Subdermal route continuous, Disp: , Rfl:      melatonin 3 MG CAPS, Take 1 capsule by mouth At Bedtime, Disp: 30 capsule, Rfl: 11     [DISCONTINUED] TRAZODONE HCL, 1 tablet At Bedtime., Disp: , Rfl:    No Known Allergies    Past medical,  surgical, social and family history were reviewed and updated in EPIC.    ROS:  BRBPR from outside  CONSTITUTIONAL:     NEGATIVE for fever, chills, change in weight  INTEGUMENTARY/SKIN:       NEGATIVE for worrisome rashes, moles or lesions  EYES:     NEGATIVE for vision changes or irritation  ENT/MOUTH: NEGATIVE for ear, mouth and throat problems  RESP:     NEGATIVE for significant cough or SOB  CV:   NEGATIVE for chest pain, palpitations or peripheral edema  GI:     NEGATIVE for nausea, abdominal pain, heartburn, or change in bowel habits  :   NEGATIVE for frequency, dysuria, hematuria, vaginal discharge, or irregular bleeding,incontinence   MUSCULOSKELETAL:     NEGATIVE for significant arthralgias or myalgia  NEURO:      NEGATIVE for weakness, dizziness or paresthesias  ENDOCRINE:      NEGATIVE for temperature intolerance, skin/hair changes  PSYCHIATRIC:      NEGATIVE for changes in mood or affect.     EXAM:   /78  Pulse 64  Ht 5' (1.524 m)  Wt 158 lb (71.7 kg)  LMP 04/18/2018 (Exact Date)  BMI 30.86 kg/m2   BMI: Body mass index is 30.86 kg/(m^2).  Constitutional: healthy, alert and no distress  Head: Normocephalic. No masses, lesions, tenderness or abnormalities  Neck: Neck supple. Trachea midline. No adenopathy. Thyroid symmetric, normal size.   Cardiovascular: RRR.   Respiratory: lungs clear   Breast: Breasts reveal mild symmetric fibrocystic densities, but there are no dominant, discrete, fixed or suspicious masses found.  Gastrointestinal: Abdomen soft, non-tender, non-distended. No masses, organomegaly.  :  Vulva:  No external lesions, normal female hair distribution, no inguinal adenopathy.    Urethra:  Midline, non-tender, well supported, no discharge  Vagina:  Moist, pink, no abnormal discharge, no lesions  Cervix: clean   Uterus:  Normal size,  , non-tender, freely mobile  Ovaries:  No masses appreciated  Rectal Exam: not done  Musculoskeletal: extremities normal  Skin: no suspicious  lesions or rashes  Psychiatric: Affect appropriate, cooperative,mentation appears normal.     COUNSELING:   regular exercise  healthy diet/nutrition  Immunizations   contraception  family planning  Folic Acid Counseling     reports that she has been smoking Cigarettes.  She has been smoking about 0.08 packs per day. She has never used smokeless tobacco.  Tobacco Cessation Action Plan: not motivated  Body mass index is 30.86 kg/(m^2).  Weight management plan: lo gly  FRAX Risk Assessment    ASSESSMENT:  28 year old female with satisfactory annual exam  Smoker,family planning,exposure  PLAN:   Pap,gc,ct,hep,trep,hiv,wet prep  Tsh  Check MIIC  Return to office: 1 year RE  RTO 5/23/18 for nexplanon removal  prl and tsh  Start prenatals    Greater than 15 minutes spent discussing other than annual including her plan for anxiety    Leena Mayorga MD

## 2018-05-02 NOTE — MR AVS SNAPSHOT
"              After Visit Summary   5/2/2018    Nyasia Raya    MRN: [id number]           Patient Information     Date Of Birth          1990        Visit Information        Provider Department      5/2/2018 9:30 AM Leena Mayorga MD [facility]        Today's Diagnoses     Routine general medical examination at a health care facility    -  1    Breast discharge        Possible exposure to STD          Care Instructions    Pap and labs today.  Start prenatal vitamins.  Return on 5/23/18 for Nexplanon removal.  Return for annual exam in 1 year with Dillon Espino.             Follow-ups after your visit        Your next 10 appointments already scheduled     May 30, 2018  1:00 PM CDT   (Arrive by 12:45 PM)   Return Visit with Jeanne Guerrero MD   [facility] ([facility] )    [address] [place] [id number] [phone]              Who to contact     If you have questions or need follow up information about today's clinic visit or your schedule please contact [facility] directly at [phone].  Normal or non-critical lab and imaging results will be communicated to you by MyChart, letter or phone within 4 business days after the clinic has received the results. If you do not hear from us within 7 days, please contact the clinic through MyChart or phone. If you have a critical or abnormal lab result, we will notify you by phone as soon as possible.  Submit refill requests through [facility] or call your pharmacy and they will forward the refill request to us. Please allow 3 business days for your refill to be completed.          Additional Information About Your Visit        MyChart Information     [facility] lets you send messages to your doctor, view your test results, renew your prescriptions, schedule appointments and more. To sign up, go to [email or web address] . Click on \"Log in\" on the left side of the screen, which will take you " "to the Welcome page. Then click on \"Sign up Now\" on the right side of the page.     You will be asked to enter the access code listed below, as well as some personal information. Please follow the directions to create your username and password.     Your access code is: [email or web address]  Expires: 7/15/2018  9:27 AM     Your access code will  in 90 days. If you need help or a new code, please call your [place] clinic or [phone].        Care EveryWhere ID     This is your Care EveryWhere ID. This could be used by other organizations to access your [place] medical records  [id number]        Your Vitals Were     Pulse Height Last Period BMI (Body Mass Index)          64 5' (1.524 m) 2018 (Exact Date) 30.86 kg/m2         Blood Pressure from Last 3 Encounters:   18 104/78   18 110/90   01/10/18 110/68    Weight from Last 3 Encounters:   18 158 lb (71.7 kg)   18 164 lb (74.4 kg)   01/10/18 160 lb (72.6 kg)              We Performed the Following     A pap thin layer screen reflex to HPV if ASCUS - recommend age 25 - 29     Anti Treponema     GC/Chlamydia by PCR - HI,GH     Hepatitis B surface antigen     Hepatitis C antibody     HIV Antigen Antibody Combo     Prolactin     TSH     Wet prep        Primary Care Provider Office Phone # Fax #    Nhung Devi [phone]       [address]  [facility] [address]        Equal Access to Services     [facility]: Hadii sharon ku hadasho Soomaali, waaxda luqadaha, qaybta kaalmada adeegkelseyda, estuardo banks. So [facility] [phone].    ATENCIÓN: Si habla español, tiene a mar disposición servicios gratuitos de asistencia lingüística. Llame al [phone].    We comply with applicable federal civil rights laws and [place] laws. We do not discriminate on the basis of race, color, national origin, age, disability, sex, sexual orientation, or gender identity.            Thank you!     Thank you for " choosing [facility]  for your care. Our goal is always to provide you with excellent care. Hearing back from our patients is one way we can continue to improve our services. Please take a few minutes to complete the written survey that you may receive in the mail after your visit with us. Thank you!             Your Updated Medication List - Protect others around you: Learn how to safely use, store and throw away your medicines at www.disposemymeds.org.          This list is accurate as of 5/2/18 10:20 AM.  Always use your most recent med list.                   Brand Name Dispense Instructions for use Diagnosis    albuterol 108 (90 Base) MCG/ACT Inhaler    PROAIR HFA/PROVENTIL HFA/VENTOLIN HFA    1 Inhaler    Inhale 2 puffs into the lungs every 6 hours as needed for shortness of breath / dyspnea    Cough       * amphetamine-dextroamphetamine 30 MG per 24 hr capsule    ADDERALL XR    30 capsule    Take 1 capsule (30 mg) by mouth 2 times daily    ADHD (attention deficit hyperactivity disorder), combined type       * amphetamine-dextroamphetamine 30 MG per 24 hr capsule    ADDERALL XR    60 capsule    Take 1 capsule (30 mg) by mouth 2 times daily    ADHD (attention deficit hyperactivity disorder), combined type       * amphetamine-dextroamphetamine 30 MG per 24 hr capsule   Start taking on:  5/16/2018    ADDERALL XR    60 capsule    Take 1 capsule (30 mg) by mouth 2 times daily    ADHD (attention deficit hyperactivity disorder), combined type       * amphetamine-dextroamphetamine 30 MG per 24 hr capsule   Start taking on:  6/14/2018    ADDERALL XR    60 capsule    Take 1 capsule (30 mg) by mouth 2 times daily    ADHD (attention deficit hyperactivity disorder), combined type       busPIRone 5 MG tablet    BUSPAR    150 tablet    Take 1 tablet in am, 1 tablet in afternoon and take 2-3 at bedtime    RANDI (generalized anxiety disorder)       diphenhydrAMINE 25 MG tablet    BENADRYL    120 tablet    Take 3-4  tabs bedtime    Routine general medical examination at a health care facility       etonogestrel 68 MG Impl    IMPLANON/NEXPLANON     1 each (68 mg) by Subdermal route continuous    Family planning       melatonin 3 MG Caps     30 capsule    Take 1 capsule by mouth At Bedtime    Insomnia due to other mental disorder       * Notice:  This list has 4 medication(s) that are the same as other medications prescribed for you. Read the directions carefully, and ask your doctor or other care provider to review them with you.

## 2018-05-02 NOTE — NURSING NOTE
Chief Complaint   Patient presents with     Gyn Exam       Initial /78  Pulse 64  Ht 5' (1.524 m)  Wt 158 lb (71.7 kg)  LMP 04/18/2018 (Exact Date)  BMI 30.86 kg/m2 Estimated body mass index is 30.86 kg/(m^2) as calculated from the following:    Height as of this encounter: 5' (1.524 m).    Weight as of this encounter: 158 lb (71.7 kg).  Medication Reconciliation: amy Ramos

## 2018-05-03 LAB
HBV SURFACE AG SERPL QL IA: NONREACTIVE
HCV AB SERPL QL IA: NONREACTIVE
HIV 1+2 AB+HIV1 P24 AG SERPL QL IA: NONREACTIVE
T PALLIDUM AB SER QL: NONREACTIVE

## 2018-05-03 ASSESSMENT — PATIENT HEALTH QUESTIONNAIRE - PHQ9: SUM OF ALL RESPONSES TO PHQ QUESTIONS 1-9: 3

## 2018-05-03 ASSESSMENT — ANXIETY QUESTIONNAIRES: GAD7 TOTAL SCORE: 4

## 2018-05-08 LAB
COPATH REPORT: NORMAL
PAP: NORMAL

## 2018-05-14 ENCOUNTER — TELEPHONE (OUTPATIENT)
Dept: PSYCHIATRY | Facility: OTHER | Age: 28
End: 2018-05-14

## 2018-05-14 NOTE — TELEPHONE ENCOUNTER
Patient and [facility] contacted for ok per Dr. Guerrero to fill Adderall 2 days early.  Pharmacy is ok with this.  Will fill today.

## 2018-05-14 NOTE — TELEPHONE ENCOUNTER
Patient is requesting permission for  an early refill of Adderall by 2 days.  Will be going out of town for work.  Uses [facility]. Cannot fill until 5-16 per hard copy.  Thank you, please advise.

## 2018-07-11 ENCOUNTER — TELEPHONE (OUTPATIENT)
Dept: PSYCHIATRY | Facility: OTHER | Age: 28
End: 2018-07-11

## 2018-07-11 DIAGNOSIS — F90.2 ADHD (ATTENTION DEFICIT HYPERACTIVITY DISORDER), COMBINED TYPE: ICD-10-CM

## 2018-07-11 NOTE — TELEPHONE ENCOUNTER
3:53 PM    Reason for Call: Phone Call    Description: Patient was in and sated she wanted to get in early adv no openings, she would like to see if she can get a refill on her meds before her appt she will be going out of town and does not want to be without her meds. Please call patient.     Was an appointment offered for this call? No  If yes : Appointment type              Date    Preferred method for responding to this message: Telephone Call  What is your phone number ? [phone]    If we cannot reach you directly, may we leave a detailed response at the number you provided? Yes    Can this message wait until your PCP/provider returns, if available today? Not applicable,     Sera Rodriguez

## 2018-07-12 NOTE — TELEPHONE ENCOUNTER
Refill request for Adderall.  Patient will be out of town when Adderall  d/t be filled on 7-14-18.  She will be back for her appt with us on 7-18-18.  Requesting refill of Adderall XR 30 mg.  Takes twice daily.  Last fill on 6-14-18.  Last office visit on 4-16-18.  Next f/u appt on 7-18-18.  Patient uses [facility] pharmacy.  Will  script at lower level of clinic.    Patient also wondering if NOT taking her anti-anxiety medication (Buspar) can increase her BP?  I explained that the Adderall can increase BP.  Thank you, please advise.

## 2018-07-13 RX ORDER — DEXTROAMPHETAMINE SACCHARATE, AMPHETAMINE ASPARTATE MONOHYDRATE, DEXTROAMPHETAMINE SULFATE AND AMPHETAMINE SULFATE 7.5; 7.5; 7.5; 7.5 MG/1; MG/1; MG/1; MG/1
30 CAPSULE, EXTENDED RELEASE ORAL 2 TIMES DAILY
Qty: 60 CAPSULE | Refills: 0 | Status: SHIPPED | OUTPATIENT
Start: 2018-07-13 | End: 2018-10-17

## 2018-07-13 NOTE — TELEPHONE ENCOUNTER
Patient contacted and notified hard copy RX (Adderall XR 30 mg dated for 7-13-18)) ready to be picked up at lower level clinic registration desk.  Also explained that anxiety can raise BP and indirectly not taking anxiety med could raise BP.

## 2018-07-13 NOTE — TELEPHONE ENCOUNTER
Okay, I will fill. Script in my office. Anxiety certainly can raise BP so I could see how indirectly not taking anxiety med could raise BP. Thanks CJ

## 2018-07-18 ENCOUNTER — OFFICE VISIT (OUTPATIENT)
Dept: PSYCHIATRY | Facility: OTHER | Age: 28
End: 2018-07-18
Attending: PSYCHIATRY & NEUROLOGY
Payer: COMMERCIAL

## 2018-07-18 VITALS
DIASTOLIC BLOOD PRESSURE: 76 MMHG | BODY MASS INDEX: 30.66 KG/M2 | SYSTOLIC BLOOD PRESSURE: 120 MMHG | HEART RATE: 103 BPM | TEMPERATURE: 98.5 F | WEIGHT: 157 LBS

## 2018-07-18 DIAGNOSIS — F90.2 ADHD (ATTENTION DEFICIT HYPERACTIVITY DISORDER), COMBINED TYPE: Primary | ICD-10-CM

## 2018-07-18 PROCEDURE — 99214 OFFICE O/P EST MOD 30 MIN: CPT | Performed by: PSYCHIATRY & NEUROLOGY

## 2018-07-18 RX ORDER — DEXTROAMPHETAMINE SACCHARATE, AMPHETAMINE ASPARTATE MONOHYDRATE, DEXTROAMPHETAMINE SULFATE AND AMPHETAMINE SULFATE 7.5; 7.5; 7.5; 7.5 MG/1; MG/1; MG/1; MG/1
30 CAPSULE, EXTENDED RELEASE ORAL 2 TIMES DAILY
Qty: 60 CAPSULE | Refills: 0 | Status: SHIPPED | OUTPATIENT
Start: 2018-08-10 | End: 2018-10-12

## 2018-07-18 RX ORDER — DEXTROAMPHETAMINE SACCHARATE, AMPHETAMINE ASPARTATE MONOHYDRATE, DEXTROAMPHETAMINE SULFATE AND AMPHETAMINE SULFATE 7.5; 7.5; 7.5; 7.5 MG/1; MG/1; MG/1; MG/1
30 CAPSULE, EXTENDED RELEASE ORAL 2 TIMES DAILY
Qty: 60 CAPSULE | Refills: 0 | Status: SHIPPED | OUTPATIENT
Start: 2018-09-07 | End: 2018-10-12

## 2018-07-18 ASSESSMENT — PATIENT HEALTH QUESTIONNAIRE - PHQ9: 5. POOR APPETITE OR OVEREATING: MORE THAN HALF THE DAYS

## 2018-07-18 ASSESSMENT — ANXIETY QUESTIONNAIRES
GAD7 TOTAL SCORE: 10
3. WORRYING TOO MUCH ABOUT DIFFERENT THINGS: SEVERAL DAYS
7. FEELING AFRAID AS IF SOMETHING AWFUL MIGHT HAPPEN: MORE THAN HALF THE DAYS
1. FEELING NERVOUS, ANXIOUS, OR ON EDGE: NEARLY EVERY DAY
2. NOT BEING ABLE TO STOP OR CONTROL WORRYING: SEVERAL DAYS
6. BECOMING EASILY ANNOYED OR IRRITABLE: SEVERAL DAYS
5. BEING SO RESTLESS THAT IT IS HARD TO SIT STILL: NOT AT ALL

## 2018-07-18 ASSESSMENT — PAIN SCALES - GENERAL: PAINLEVEL: NO PAIN (0)

## 2018-07-18 NOTE — PROGRESS NOTES
"  PSYCHIATRY CLINIC PROGRESS NOTE   20 minute medication management, more than 50% of time spent counseling patient on medications, medication side effects, symptom history and management   SUBJECTIVE / INTERIM HISTORY                                                                       Last visit: continue  Adderall XR 30 mg bid and last script was march so gave scripts today 18,  18, 18 Continue Hydroxyzine 50 mg up to bid prn anxieyt / panic. We will change buspar from as she is taking 5 mg 2-3 tabs HS to 5 mg am, 5 mg afternoon and tehn 10 - 15 mg HS (is helping her esleep). She uses benadryl and melatonin prn insomnia but hasn't needed to take them lately   Social- Her fiance, Nu, [place] whom she [facility] at [facility]. He is a good trent and keeps her on track Children-  3 year old is Wallace who is \"the happiest little trent in the world\".  - lot been going on. Fortunato she spent it helping her parents move. They were moving - evicted.   - got her driving permit. Going to dentist now and been 10 years  - thinking of family therapy for her, Wallace, and Nu  - stopped taking buspar. Felt maybe was only thing she could control  -  Living in [place] now: was in [place]    SUBSTANCE USE- MJ in past    SYMPTOMS- sx ADD improve when takes Adderall. + anxiety, insomnia, overwhelmed.  MEDICAL ROS-  Back pain (spondylolisthesis), asthma (cough, SOB/wheezing)  MEDICAL / SURGICAL HISTORY                pregnant [if applicable]--no   Medical Team:     PMD- Dr. Devi       Therapist-  Patient Active Problem List   Diagnosis     Insomnia     Asthma     Spondylolisthesis     Family history of congenital heart defect     Night terrors, adult     Status post  delivery     Obesity     Family planning     Depression     Anxiety     Encounter for routine gynecological examination     Hemorrhage of rectum and anus     Smoker     NO SHOW     ALLERGY   Review of patient's allergies indicates no known allergies.  MEDICATIONS   "                                                                                           Current Outpatient Prescriptions   Medication Sig     albuterol (PROAIR HFA, PROVENTIL HFA, VENTOLIN HFA) 108 (90 BASE) MCG/ACT inhaler Inhale 2 puffs into the lungs every 6 hours as needed for shortness of breath / dyspnea     amphetamine-dextroamphetamine (ADDERALL XR) 30 MG per 24 hr capsule Take 1 capsule (30 mg) by mouth 2 times daily     diphenhydrAMINE (BENADRYL) 25 MG tablet Take 3-4 tabs bedtime     etonogestrel (IMPLANON/NEXPLANON) 68 MG IMPL 1 each (68 mg) by Subdermal route continuous     melatonin 3 MG CAPS Take 1 capsule by mouth At Bedtime     busPIRone (BUSPAR) 5 MG tablet Take 1 tablet in am, 1 tablet in afternoon and take 2-3 at bedtime (Patient not taking: Reported on 7/18/2018)     [DISCONTINUED] TRAZODONE HCL 1 tablet At Bedtime.     No current facility-administered medications for this visit.        VITALS   /76 (BP Location: Right arm, Patient Position: Sitting, Cuff Size: Adult Regular)  Pulse 103  Temp 98.5  F (36.9  C) (Tympanic)  Wt 157 lb (71.2 kg)  BMI 30.66 kg/m2     PHQ9                       PHQ-9 SCORE 1/10/2018 4/16/2018 5/2/2018   Total Score - - -   Total Score 8 3 3       LABS                                                                                                                           TSH   Date Value Ref Range Status   05/02/2018 0.44 0.40 - 4.00 mU/L Final   ]   MENTAL STATUS EXAM                                                                                        Alert. Oriented to person, place, and date / time. Well groomed, calm, cooperative with good eye contact. No problems with speech or psychomotor behavior. Mood was euthymic and affect was congruent to speech content and full range. Thought process, including associations, was unremarkable and thought content was devoid of suicidal and homicidal ideation and psychotic thought. No hallucinations. Insight was  good. Judgment was intact and adequate for safety. Fund of knowledge was intact. Pt demonstrates no obvious problems with attention, concentration, language, recent or remote memory although these were not formally tested.     ASSESSMENT                                                                                                      HISTORICAL:  Initial psych consult 6/17/15  Notes:             Gabapentin: lactation. buspar nausea.   Nyasia Thomasgins ADD, depression NOS, and night terrors. Nyasia went through a lot in her household growing up with mom with MS dad working all the time and 5 siblings. Sounds like Nyasia took care of the household and she worked 2 jobs. Didn't end up finishing HS in [place] and looking back she is able to recognize had a lot on her plate. Diagnosed with ADHD in past but parents didn't want her on stimulants.  For today we will continue Adderall XR. 30 mg BID. She hasn't been taking buspar but is thinking she ma start it again as it did help.     TREATMENT RISK STATEMENT: The risks, benefits, alternatives and potential adverse effects have been explained and are understood by the pt. The pt agrees to the treatment plan with the ability to do so. The pt knows to call the clinic for any problems or access emergency care if needed. Substance use is not a problem as noted above.     DIAGNOSES           ADHD  RANDI  Night terrors    PLAN                                                                                                                         1) MEDICATIONS:   -- continue  Adderall XR 30 mg bid and last script 7/13/18 and gave scripts for 8/10/18 and  9/7/18. Buspar is 5 mg am, 5 mg afternoon and 10 - 15 mg HS    2) THERAPY: No Change    3) LABS: None    4) PT MONITOR [call for probs]: Worsening symptoms, side effects from medications, SI/HI    5) REFERRALS [CD tx, medical, tests other]: None    6)  RTC: ~2-3 weeks

## 2018-07-18 NOTE — NURSING NOTE
Chief Complaint   Patient presents with     RECHECK     Mental health.  Discuss medications and therapy.       Initial /76 (BP Location: Right arm, Patient Position: Sitting, Cuff Size: Adult Regular)  Pulse 103  Temp 98.5  F (36.9  C) (Tympanic)  Wt 157 lb (71.2 kg)  BMI 30.66 kg/m2 Estimated body mass index is 30.66 kg/(m^2) as calculated from the following:    Height as of 5/2/18: 5' (1.524 m).    Weight as of this encounter: 157 lb (71.2 kg).  Medication Reconciliation: complete    LARRY LYN LPN

## 2018-07-18 NOTE — MR AVS SNAPSHOT
After Visit Summary   7/18/2018    Nyasia Raya    MRN: [id number]           Patient Information     Date Of Birth          1990        Visit Information        Provider Department      7/18/2018 3:20 PM Jeanne Guerrero MD [facility]        Today's Diagnoses     ADHD (attention deficit hyperactivity disorder), combined type    -  1       Follow-ups after your visit        Your next 10 appointments already scheduled     Aug 01, 2018  9:15 AM CDT   (Arrive by 9:00 AM)   SHORT with Nhung Devi MD   [facility] [place] [facility]    [address]  [place] [address]   [phone]           Please have the patient arrive 15 minutes early.            Aug 03, 2018 10:20 AM CDT   Return Visit with Jeanne Geurrero MD   [facility] [place] )    [address] [place] [address]  [place] [address] [phone]            Jun 19, 2019 10:00 AM CDT   (Arrive by 9:45 AM)   PHYSICAL with K[facility] [place] [facility]    [address]  [place] [address]   [phone]              Who to contact     If you have questions or need follow up information about today's clinic visit or your schedule please contact [facility] directly at [phone].  Normal or non-critical lab and imaging results will be communicated to you by MyChart, letter or phone within 4 business days after the clinic has received the results. If you do not hear from us within 7 days, please contact the clinic through MyChart or phone. If you have a critical or abnormal lab result, we will notify you by phone as soon as possible.  Submit refill requests through [facility] or call your pharmacy and they will forward the refill request to us. Please allow 3 business days for your refill to be completed.          Additional Information About Your Visit        MyChart Information      [facility]Â® gives you secure access to your electronic health record. If you see a primary care provider, you can also send messages to your care team and make appointments. If you have questions, please call your primary care clinic.  If you do not have a primary care provider, please call [phone] and they will assist you.        Care EveryWhere ID     This is your Care EveryWhere ID. This could be used by other organizations to access your [place] medical records  [id number]        Your Vitals Were     Pulse Temperature BMI (Body Mass Index)             103 98.5  F (36.9  C) (Tympanic) 30.66 kg/m2          Blood Pressure from Last 3 Encounters:   07/18/18 120/76   05/02/18 104/78   04/16/18 110/90    Weight from Last 3 Encounters:   07/18/18 157 lb (71.2 kg)   05/02/18 158 lb (71.7 kg)   04/16/18 164 lb (74.4 kg)              Today, you had the following     No orders found for display         Today's Medication Changes          These changes are accurate as of 7/18/18  4:03 PM.  If you have any questions, ask your nurse or doctor.               These medicines have changed or have updated prescriptions.        Dose/Directions    * amphetamine-dextroamphetamine 30 MG per 24 hr capsule   Commonly known as:  ADDERALL XR   This may have changed:  Another medication with the same name was added. Make sure you understand how and when to take each.   Used for:  ADHD (attention deficit hyperactivity disorder), combined type   Changed by:  Jeanne Guerrero MD        Dose:  30 mg   Take 1 capsule (30 mg) by mouth 2 times daily   Quantity:  60 capsule   Refills:  0       * amphetamine-dextroamphetamine 30 MG per 24 hr capsule   Commonly known as:  ADDERALL XR   This may have changed:  You were already taking a medication with the same name, and this prescription was added. Make sure you understand how and when to take each.   Used for:  ADHD (attention deficit hyperactivity disorder), combined type   Changed by:   Jeanne Guerrero MD        Dose:  30 mg   Start taking on:  8/10/2018   Take 1 capsule (30 mg) by mouth 2 times daily   Quantity:  60 capsule   Refills:  0       * amphetamine-dextroamphetamine 30 MG per 24 hr capsule   Commonly known as:  ADDERALL XR   This may have changed:  You were already taking a medication with the same name, and this prescription was added. Make sure you understand how and when to take each.   Used for:  ADHD (attention deficit hyperactivity disorder), combined type   Changed by:  Jeanne Guerrero MD        Dose:  30 mg   Start taking on:  9/7/2018   Take 1 capsule (30 mg) by mouth 2 times daily   Quantity:  60 capsule   Refills:  0       * Notice:  This list has 3 medication(s) that are the same as other medications prescribed for you. Read the directions carefully, and ask your doctor or other care provider to review them with you.         Where to get your medicines      Some of these will need a paper prescription and others can be bought over the counter.  Ask your nurse if you have questions.     Bring a paper prescription for each of these medications     amphetamine-dextroamphetamine 30 MG per 24 hr capsule    amphetamine-dextroamphetamine 30 MG per 24 hr capsule                Primary Care Provider Office Phone # Fax #    Nhung Devi [phone] [address] [place] [address]        Equal Access to Services     [facility]: Varun correao Sovasile, waaxda luqadaha, qaybta kaalmada adeegyada, estuardo banks. So [facility] [phone].    ATENCIÓN: Si habla español, tiene a mar disposición servicios gratuitos de asistencia lingüística. Llame al [phone].    We comply with applicable federal civil rights laws and [place] laws. We do not discriminate on the basis of race, color, national origin, age, disability, sex, sexual orientation, or gender identity.            Thank you!     Thank you for choosing [facility]  [facility]. Our goal is always to provide you with excellent care. Hearing back from our patients is one way we can continue to improve our services. Please take a few minutes to complete the written survey that you may receive in the mail after your visit with us. Thank you!             Your Updated Medication List - Protect others around you: Learn how to safely use, store and throw away your medicines at www.disposemymeds.org.          This list is accurate as of 7/18/18  4:03 PM.  Always use your most recent med list.                   Brand Name Dispense Instructions for use Diagnosis    albuterol 108 (90 Base) MCG/ACT Inhaler    PROAIR HFA/PROVENTIL HFA/VENTOLIN HFA    1 Inhaler    Inhale 2 puffs into the lungs every 6 hours as needed for shortness of breath / dyspnea    Cough       * amphetamine-dextroamphetamine 30 MG per 24 hr capsule    ADDERALL XR    60 capsule    Take 1 capsule (30 mg) by mouth 2 times daily    ADHD (attention deficit hyperactivity disorder), combined type       * amphetamine-dextroamphetamine 30 MG per 24 hr capsule   Start taking on:  8/10/2018    ADDERALL XR    60 capsule    Take 1 capsule (30 mg) by mouth 2 times daily    ADHD (attention deficit hyperactivity disorder), combined type       * amphetamine-dextroamphetamine 30 MG per 24 hr capsule   Start taking on:  9/7/2018    ADDERALL XR    60 capsule    Take 1 capsule (30 mg) by mouth 2 times daily    ADHD (attention deficit hyperactivity disorder), combined type       busPIRone 5 MG tablet    BUSPAR    150 tablet    Take 1 tablet in am, 1 tablet in afternoon and take 2-3 at bedtime    RANDI (generalized anxiety disorder)       diphenhydrAMINE 25 MG tablet    BENADRYL    120 tablet    Take 3-4 tabs bedtime    Routine general medical examination at a health care facility       etonogestrel 68 MG Impl    IMPLANON/NEXPLANON     1 each (68 mg) by Subdermal route continuous    Family planning       melatonin 3 MG Caps      30 capsule    Take 1 capsule by mouth At Bedtime    Insomnia due to other mental disorder       * Notice:  This list has 3 medication(s) that are the same as other medications prescribed for you. Read the directions carefully, and ask your doctor or other care provider to review them with you.

## 2018-07-19 ASSESSMENT — ANXIETY QUESTIONNAIRES: GAD7 TOTAL SCORE: 10

## 2018-07-19 ASSESSMENT — PATIENT HEALTH QUESTIONNAIRE - PHQ9: SUM OF ALL RESPONSES TO PHQ QUESTIONS 1-9: 6

## 2018-10-12 ENCOUNTER — TELEPHONE (OUTPATIENT)
Dept: PSYCHIATRY | Facility: OTHER | Age: 28
End: 2018-10-12

## 2018-10-12 DIAGNOSIS — F90.2 ADHD (ATTENTION DEFICIT HYPERACTIVITY DISORDER), COMBINED TYPE: ICD-10-CM

## 2018-10-12 RX ORDER — DEXTROAMPHETAMINE SACCHARATE, AMPHETAMINE ASPARTATE MONOHYDRATE, DEXTROAMPHETAMINE SULFATE AND AMPHETAMINE SULFATE 7.5; 7.5; 7.5; 7.5 MG/1; MG/1; MG/1; MG/1
30 CAPSULE, EXTENDED RELEASE ORAL 2 TIMES DAILY
Qty: 60 CAPSULE | Refills: 0 | Status: SHIPPED | OUTPATIENT
Start: 2018-10-12 | End: 2019-01-16

## 2018-10-12 RX ORDER — DEXTROAMPHETAMINE SACCHARATE, AMPHETAMINE ASPARTATE MONOHYDRATE, DEXTROAMPHETAMINE SULFATE AND AMPHETAMINE SULFATE 7.5; 7.5; 7.5; 7.5 MG/1; MG/1; MG/1; MG/1
30 CAPSULE, EXTENDED RELEASE ORAL 2 TIMES DAILY
Qty: 60 CAPSULE | Refills: 0 | Status: SHIPPED | OUTPATIENT
Start: 2018-10-12 | End: 2018-10-17

## 2018-10-12 NOTE — TELEPHONE ENCOUNTER
Refill request for Adderall XR 30 mg twice daily.  Last fill on 9-7-18.  Has been out for a few days (?)  Has f/u appt on 10-17-18.  Thank you

## 2018-10-12 NOTE — TELEPHONE ENCOUNTER
Can you relay to her I'm not there today hence either her primary or Maggi would have to fill. I understand however if they are not comfortable filling it and I can certainly fill when I get back on Monday.. Nyasia has appointment with us next Wednesday thus if either of the ladies fill they could fill until Nyasia's appointment. I'll route this to Maggi & Dr. Devi too

## 2018-10-15 NOTE — TELEPHONE ENCOUNTER
Patient notified that hard copy RX (Adderall) is ready at the [facility]  registration to be picked up.      Honey Metcalf RN

## 2018-10-17 ENCOUNTER — OFFICE VISIT (OUTPATIENT)
Dept: PSYCHIATRY | Facility: OTHER | Age: 28
End: 2018-10-17
Attending: PSYCHIATRY & NEUROLOGY
Payer: COMMERCIAL

## 2018-10-17 VITALS
BODY MASS INDEX: 32.22 KG/M2 | WEIGHT: 165 LBS | SYSTOLIC BLOOD PRESSURE: 118 MMHG | DIASTOLIC BLOOD PRESSURE: 76 MMHG | TEMPERATURE: 97.9 F | HEART RATE: 97 BPM

## 2018-10-17 DIAGNOSIS — F90.2 ADHD (ATTENTION DEFICIT HYPERACTIVITY DISORDER), COMBINED TYPE: Primary | ICD-10-CM

## 2018-10-17 PROCEDURE — 99213 OFFICE O/P EST LOW 20 MIN: CPT | Performed by: PSYCHIATRY & NEUROLOGY

## 2018-10-17 RX ORDER — DEXTROAMPHETAMINE SACCHARATE, AMPHETAMINE ASPARTATE MONOHYDRATE, DEXTROAMPHETAMINE SULFATE AND AMPHETAMINE SULFATE 7.5; 7.5; 7.5; 7.5 MG/1; MG/1; MG/1; MG/1
30 CAPSULE, EXTENDED RELEASE ORAL 2 TIMES DAILY
Qty: 60 CAPSULE | Refills: 0 | Status: SHIPPED | OUTPATIENT
Start: 2018-11-14 | End: 2019-01-16

## 2018-10-17 RX ORDER — DEXTROAMPHETAMINE SACCHARATE, AMPHETAMINE ASPARTATE MONOHYDRATE, DEXTROAMPHETAMINE SULFATE AND AMPHETAMINE SULFATE 7.5; 7.5; 7.5; 7.5 MG/1; MG/1; MG/1; MG/1
30 CAPSULE, EXTENDED RELEASE ORAL 2 TIMES DAILY
Qty: 60 CAPSULE | Refills: 0 | Status: SHIPPED | OUTPATIENT
Start: 2018-12-13 | End: 2019-01-16

## 2018-10-17 ASSESSMENT — ANXIETY QUESTIONNAIRES
6. BECOMING EASILY ANNOYED OR IRRITABLE: NOT AT ALL
7. FEELING AFRAID AS IF SOMETHING AWFUL MIGHT HAPPEN: MORE THAN HALF THE DAYS
GAD7 TOTAL SCORE: 11
3. WORRYING TOO MUCH ABOUT DIFFERENT THINGS: MORE THAN HALF THE DAYS
2. NOT BEING ABLE TO STOP OR CONTROL WORRYING: MORE THAN HALF THE DAYS
5. BEING SO RESTLESS THAT IT IS HARD TO SIT STILL: SEVERAL DAYS
1. FEELING NERVOUS, ANXIOUS, OR ON EDGE: MORE THAN HALF THE DAYS

## 2018-10-17 ASSESSMENT — PATIENT HEALTH QUESTIONNAIRE - PHQ9: 5. POOR APPETITE OR OVEREATING: MORE THAN HALF THE DAYS

## 2018-10-17 ASSESSMENT — PAIN SCALES - GENERAL: PAINLEVEL: SEVERE PAIN (7)

## 2018-10-17 NOTE — MR AVS SNAPSHOT
After Visit Summary   10/17/2018    Nyasia Raya    MRN: [id number]           Patient Information     Date Of Birth          1990        Visit Information        Provider Department      10/17/2018 8:00 AM Jeanne Guerrero MD [facility]        Today's Diagnoses     ADHD (attention deficit hyperactivity disorder), combined type    -  1       Follow-ups after your visit        Your next 10 appointments already scheduled     Jan 23, 2019  8:20 AM CST   (Arrive by 8:05 AM)   [facility]    [address] [place] [id number] [phone]            Jun 19, 2019 10:00 AM CDT   (Arrive by 9:45 AM)   PHYSICAL with KING Santiago CNM   [facility]    [address] [facility] [address]   [phone]              Who to contact     If you have questions or need follow up information about today's clinic visit or your schedule please contact [facility] directly at [phone].  Normal or non-critical lab and imaging results will be communicated to you by MyChart, letter or phone within 4 business days after the clinic has received the results. If you do not hear from us within 7 days, please contact the clinic through MyChart or phone. If you have a critical or abnormal lab result, we will notify you by phone as soon as possible.  Submit refill requests through [facility] or call your pharmacy and they will forward the refill request to us. Please allow 3 business days for your refill to be completed.          Additional Information About Your Visit        MyChart Information     [facility] gives you secure access to your electronic health record. If you see a primary care provider, you can also send messages to your care team and make appointments. If you have questions, please call your  primary care clinic.  If you do not have a primary care provider, please call [phone] and they will assist you.        Care EveryWhere ID     This is your Care EveryWhere ID. This could be used by other organizations to access your [facility] medical records  [id number]        Your Vitals Were     Pulse Temperature BMI (Body Mass Index)             97 97.9  F (36.6  C) (Tympanic) 32.22 kg/m2          Blood Pressure from Last 3 Encounters:   10/17/18 118/76   07/18/18 120/76   05/02/18 104/78    Weight from Last 3 Encounters:   10/17/18 74.8 kg (165 lb)   07/18/18 71.2 kg (157 lb)   05/02/18 71.7 kg (158 lb)              Today, you had the following     No orders found for display         Today's Medication Changes          These changes are accurate as of 10/17/18  8:32 AM.  If you have any questions, ask your nurse or doctor.               These medicines have changed or have updated prescriptions.        Dose/Directions    * amphetamine-dextroamphetamine 30 MG per 24 hr capsule   Commonly known as:  ADDERALL XR   This may have changed:  Another medication with the same name was added. Make sure you understand how and when to take each.   Used for:  ADHD (attention deficit hyperactivity disorder), combined type   Changed by:  Jeanne Guerrero MD        Dose:  30 mg   Take 1 capsule (30 mg) by mouth 2 times daily   Quantity:  60 capsule   Refills:  0       * amphetamine-dextroamphetamine 30 MG per 24 hr capsule   Commonly known as:  ADDERALL XR   This may have changed:  You were already taking a medication with the same name, and this prescription was added. Make sure you understand how and when to take each.   Used for:  ADHD (attention deficit hyperactivity disorder), combined type   Changed by:  Jeanne Guerrero MD        Dose:  30 mg   Start taking on:  11/14/2018   Take 1 capsule (30 mg) by mouth 2 times daily   Quantity:  60 capsule   Refills:  0       * amphetamine-dextroamphetamine 30 MG per 24 hr  capsule   Commonly known as:  ADDERALL XR   This may have changed:  You were already taking a medication with the same name, and this prescription was added. Make sure you understand how and when to take each.   Used for:  ADHD (attention deficit hyperactivity disorder), combined type   Changed by:  Jeanne Guerrero MD        Dose:  30 mg   Start taking on:  12/13/2018   Take 1 capsule (30 mg) by mouth 2 times daily   Quantity:  60 capsule   Refills:  0       * Notice:  This list has 3 medication(s) that are the same as other medications prescribed for you. Read the directions carefully, and ask your doctor or other care provider to review them with you.         Where to get your medicines      Some of these will need a paper prescription and others can be bought over the counter.  Ask your nurse if you have questions.     Bring a paper prescription for each of these medications     amphetamine-dextroamphetamine 30 MG per 24 hr capsule    amphetamine-dextroamphetamine 30 MG per 24 hr capsule                Primary Care Provider Office Phone # Fax #    Nhung Devi [phone] [address] [place] [address]        Equal Access to Services     [facility]: Hadii aad ku hadasho Soomaali, waaxda luqadaha, qaybta kaalmada adeegyachi, estuardo peters . So [facility] [phone].    ATENCIÓN: Si habla español, tiene a mar disposición servicios gratuitos de asistencia lingüística. Llame al [phone].    We comply with applicable federal civil rights laws and [place] laws. We do not discriminate on the basis of race, color, national origin, age, disability, sex, sexual orientation, or gender identity.            Thank you!     Thank you for choosing [facility]  for your care. Our goal is always to provide you with excellent care. Hearing back from our patients is one way we can continue to improve our services. Please take a few minutes to  complete the written survey that you may receive in the mail after your visit with us. Thank you!             Your Updated Medication List - Protect others around you: Learn how to safely use, store and throw away your medicines at www.PartTecemymeds.org.          This list is accurate as of 10/17/18  8:32 AM.  Always use your most recent med list.                   Brand Name Dispense Instructions for use Diagnosis    albuterol 108 (90 Base) MCG/ACT inhaler    PROAIR HFA/PROVENTIL HFA/VENTOLIN HFA    1 Inhaler    Inhale 2 puffs into the lungs every 6 hours as needed for shortness of breath / dyspnea    Cough       * amphetamine-dextroamphetamine 30 MG per 24 hr capsule    ADDERALL XR    60 capsule    Take 1 capsule (30 mg) by mouth 2 times daily    ADHD (attention deficit hyperactivity disorder), combined type       * amphetamine-dextroamphetamine 30 MG per 24 hr capsule   Start taking on:  11/14/2018    ADDERALL XR    60 capsule    Take 1 capsule (30 mg) by mouth 2 times daily    ADHD (attention deficit hyperactivity disorder), combined type       * amphetamine-dextroamphetamine 30 MG per 24 hr capsule   Start taking on:  12/13/2018    ADDERALL XR    60 capsule    Take 1 capsule (30 mg) by mouth 2 times daily    ADHD (attention deficit hyperactivity disorder), combined type       diphenhydrAMINE 25 MG tablet    BENADRYL    120 tablet    Take 3-4 tabs bedtime    Routine general medical examination at a health care facility       etonogestrel 68 MG Impl    IMPLANON/NEXPLANON     1 each (68 mg) by Subdermal route continuous    Family planning       melatonin 3 MG Caps     30 capsule    Take 1 capsule by mouth At Bedtime    Insomnia due to other mental disorder       * Notice:  This list has 3 medication(s) that are the same as other medications prescribed for you. Read the directions carefully, and ask your doctor or other care provider to review them with you.

## 2018-10-17 NOTE — NURSING NOTE
Chief Complaint   Patient presents with     RECHECK     Mental health.  Patient stopped taking Buspar.       Initial /76 (BP Location: Right arm, Patient Position: Sitting, Cuff Size: Adult Regular)  Pulse 97  Temp 97.9  F (36.6  C) (Tympanic)  Wt 74.8 kg (165 lb)  BMI 32.22 kg/m2 Estimated body mass index is 32.22 kg/(m^2) as calculated from the following:    Height as of 5/2/18: 1.524 m (5').    Weight as of this encounter: 74.8 kg (165 lb).  Medication Reconciliation: complete    LARRY LYN LPN

## 2018-10-17 NOTE — PROGRESS NOTES
"  PSYCHIATRY CLINIC PROGRESS NOTE   20 minute medication management, more than 50% of time spent counseling patient on medications, medication side effects, symptom history and management   SUBJECTIVE / INTERIM HISTORY                                                                       Last visit: 18: continue  Adderall XR 30 mg bid and last script 18 and gave scripts for 8/10/18 and  18. Buspar is 5 mg am, 5 mg afternoon and 10 - 15 mg HS   Social- Her fiance, Nu, [place] whom she [facility] at [facility]. He is a good trent and keeps her on track Children-  3 year old is Wallace who is \"the happiest little trent in the world\".  - Nu was present for part of our visit today  - felt like her buspar \"anxiety meds\" were actually making her worse. Sad and anxious  - working on the letting go / boundaries of her family including siblings and parents  - things been getting better except for lost bunch stuff when she moved / evicted. Clothes, washer & dryer  - got her driving permit. Going to dentist now and been 10 years  - thinking of family therapy for her, Wallace, and Nu  -  Living in [place] still but moved. was in [place]    SUBSTANCE USE- MJ in past    SYMPTOMS- not having the tearfulness everyday like she was. sx ADD improve when takes Adderall. + anxiety, insomnia, overwhelmed.  MEDICAL ROS-  Back pain (spondylolisthesis), asthma (cough, SOB/wheezing)  MEDICAL / SURGICAL HISTORY                pregnant [if applicable]--no   Medical Team:     PMD- Dr. Devi       Therapist-  Patient Active Problem List   Diagnosis     Insomnia     Asthma     Spondylolisthesis     Family history of congenital heart defect     Night terrors, adult     Status post  delivery     Obesity     Family planning     Depression     Anxiety     Encounter for routine gynecological examination     Hemorrhage of rectum and anus     Smoker     NO SHOW     ALLERGY   Review of patient's allergies indicates no known " allergies.  MEDICATIONS                                                                                             Current Outpatient Prescriptions   Medication Sig     albuterol (PROAIR HFA, PROVENTIL HFA, VENTOLIN HFA) 108 (90 BASE) MCG/ACT inhaler Inhale 2 puffs into the lungs every 6 hours as needed for shortness of breath / dyspnea     diphenhydrAMINE (BENADRYL) 25 MG tablet Take 3-4 tabs bedtime     etonogestrel (IMPLANON/NEXPLANON) 68 MG IMPL 1 each (68 mg) by Subdermal route continuous     amphetamine-dextroamphetamine (ADDERALL XR) 30 MG per 24 hr capsule Take 1 capsule (30 mg) by mouth 2 times daily     amphetamine-dextroamphetamine (ADDERALL XR) 30 MG per 24 hr capsule Take 1 capsule (30 mg) by mouth 2 times daily     busPIRone (BUSPAR) 5 MG tablet Take 1 tablet in am, 1 tablet in afternoon and take 2-3 at bedtime (Patient not taking: Reported on 7/18/2018)     melatonin 3 MG CAPS Take 1 capsule by mouth At Bedtime (Patient not taking: Reported on 10/17/2018)     [DISCONTINUED] TRAZODONE HCL 1 tablet At Bedtime.     No current facility-administered medications for this visit.        VITALS   /76 (BP Location: Right arm, Patient Position: Sitting, Cuff Size: Adult Regular)  Pulse 97  Temp 97.9  F (36.6  C) (Tympanic)  Wt 74.8 kg (165 lb)  BMI 32.22 kg/m2     PHQ9                       PHQ-9 SCORE 5/2/2018 7/18/2018 10/17/2018   Total Score - - -   Total Score 3 6 12       LABS                                                                                                                           TSH   Date Value Ref Range Status   05/02/2018 0.44 0.40 - 4.00 mU/L Final   ]   MENTAL STATUS EXAM                                                                                        Alert. Oriented to person, place, and date / time. Well groomed, calm, cooperative with good eye contact. No problems with speech or psychomotor behavior. Mood was euthymic and affect was congruent to speech content and  full range. Thought process, including associations, was unremarkable and thought content was devoid of suicidal and homicidal ideation and psychotic thought. No hallucinations. Insight was good. Judgment was intact and adequate for safety. Fund of knowledge was intact. Pt demonstrates no obvious problems with attention, concentration, language, recent or remote memory although these were not formally tested.     ASSESSMENT                                                                                                      HISTORICAL:  Initial psych consult 6/17/15  Notes:             Gabapentin: lactation. buspar nausea and felt like was making her more sad.   Nyasia Raya ADHD, depression NOS, and night terrors. Nyasia went through a lot in her household growing up with mom with MS dad working all the time and 5 siblings. Sounds like Nyasia took care of the household and she worked 2 jobs. Didn't end up finishing HS in [place] and looking back she is able to recognize had a lot on her plate. Diagnosed with ADHD in past but parents didn't want her on stimulants.  For today we will continue Adderall XR. 30 mg BID. She hasn't been taking buspar as she felt was actually making her worse.     TREATMENT RISK STATEMENT: The risks, benefits, alternatives and potential adverse effects have been explained and are understood by the pt. The pt agrees to the treatment plan with the ability to do so. The pt knows to call the clinic for any problems or access emergency care if needed. Substance use is not a problem as noted above.     DIAGNOSES           ADHD  RANDI  Night terrors    PLAN                                                                                                                         1) MEDICATIONS:   -- continue  Adderall XR 30 mg bid and last script 10/12/18 and she didn't pick it up so will  today. Gave script for 11/14/18 and 12/13/18     2) THERAPY: No Change    3) LABS: None    4) PT MONITOR  [call for probs]: Worsening symptoms, side effects from medications, SI/HI    5) REFERRALS [CD tx, medical, tests other]: None    6)  RTC: ~3 months

## 2018-10-18 ASSESSMENT — ANXIETY QUESTIONNAIRES: GAD7 TOTAL SCORE: 11

## 2018-10-18 ASSESSMENT — PATIENT HEALTH QUESTIONNAIRE - PHQ9: SUM OF ALL RESPONSES TO PHQ QUESTIONS 1-9: 12

## 2018-10-22 ENCOUNTER — TELEPHONE (OUTPATIENT)
Dept: PSYCHIATRY | Facility: OTHER | Age: 28
End: 2018-10-22

## 2018-10-22 NOTE — TELEPHONE ENCOUNTER
Pt called asking if her medical leave ([employer]) could be extended from 10/25 to 10/29 as she hasn't been on her meds as of yet, due to financial issues.  Will forward request to Dr Guerrero; and pt would like a call back advising.  Ok to leave a message for her.  Sera Ray

## 2018-10-23 ENCOUNTER — TELEPHONE (OUTPATIENT)
Dept: FAMILY MEDICINE | Facility: OTHER | Age: 28
End: 2018-10-23

## 2018-10-23 NOTE — TELEPHONE ENCOUNTER
"10/23/2018 - Dr. Devi - We received a \"Drug Change Request\" from [facility] for d-amphetamine ER 30 mg salt combo.  Drug is not covered by patient plan.  The preferred alternative is adderall xr cap mg.  Please call/fax the pharmacy to change medication along with strength, directions, quantity, and refills.\"  Thank you!  Guerda Rasmussen, [facility] Specialist.  "

## 2018-10-23 NOTE — TELEPHONE ENCOUNTER
CJ can you give Nyasia a call? I'd be happy to do this. Just ask her what paperwork we should use? Or letter? Fax?

## 2018-10-24 ENCOUNTER — TELEPHONE (OUTPATIENT)
Dept: PSYCHIATRY | Facility: OTHER | Age: 28
End: 2018-10-24

## 2018-10-24 NOTE — TELEPHONE ENCOUNTER
Patient would like to change her return to work day to Nov. 1st instead of the 25th.  She will let us know where to send this information. Thank you JOSE MIGUEL

## 2018-11-05 ENCOUNTER — TELEPHONE (OUTPATIENT)
Dept: PSYCHIATRY | Facility: OTHER | Age: 28
End: 2018-11-05

## 2018-11-05 NOTE — TELEPHONE ENCOUNTER
CJ would you please give Nyasia a call and relay this message? Ask her to help us clarify for Chalino why she has been unable to work -> what duties of her job would she not be able to do right now?

## 2018-11-05 NOTE — TELEPHONE ENCOUNTER
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  She is wondering what the estimated time for return to work date is--Nov 2018 or Nov 2019.  She is also wondering what is the basis / rationale for being out of work from Sept 22 through Nov 2018.  Thank you, please advise.

## 2018-11-06 NOTE — TELEPHONE ENCOUNTER
"Patient contacted.  She states that the main reason for being out of work is d/t depression, not being on medications, confusion and forgetfulness.  She is now back to work.  She was not able to do her job duties d/t not getting out of bed, not feeling alert or \"present\" at work and feeling emotionally drained.  Thank you.  I can let Chalino know this information if this is ok.  "

## 2018-11-07 NOTE — TELEPHONE ENCOUNTER
Contacted Digna at [facility] re:  Clarification for being out of work from Sept to Nov 2018.  Gave information as stated below along with my contact # for any further questions.

## 2019-01-03 ENCOUNTER — TELEPHONE (OUTPATIENT)
Dept: PSYCHIATRY | Facility: OTHER | Age: 29
End: 2019-01-03

## 2019-01-03 NOTE — TELEPHONE ENCOUNTER
Lost hard copy Rx for Adderall.  Patient states that she has moved a couple of times and she thinks that sig. other has thrown out hard copy.  Cannot find it anywhere.  I explained to patient that Dr. Guerrero does not re-new hard copy scripts for Adderall.  Will route information to Dr. Guerrero to review and advise.  Next f/u appt is on 1-16-19.  Will also put patient on wait list.  Thank you, please advise.

## 2019-01-04 NOTE — TELEPHONE ENCOUNTER
Not going to replace scripts as like you said we for most part do not re-new copies of controlled substances. Thank you CJ

## 2019-01-16 ENCOUNTER — OFFICE VISIT (OUTPATIENT)
Dept: PSYCHIATRY | Facility: OTHER | Age: 29
End: 2019-01-16
Attending: PSYCHIATRY & NEUROLOGY
Payer: COMMERCIAL

## 2019-01-16 VITALS
WEIGHT: 167 LBS | HEART RATE: 98 BPM | TEMPERATURE: 98.3 F | SYSTOLIC BLOOD PRESSURE: 112 MMHG | DIASTOLIC BLOOD PRESSURE: 82 MMHG | BODY MASS INDEX: 32.61 KG/M2

## 2019-01-16 DIAGNOSIS — F90.2 ADHD (ATTENTION DEFICIT HYPERACTIVITY DISORDER), COMBINED TYPE: ICD-10-CM

## 2019-01-16 DIAGNOSIS — F41.0 PANIC DISORDER WITHOUT AGORAPHOBIA: Primary | ICD-10-CM

## 2019-01-16 PROCEDURE — 99214 OFFICE O/P EST MOD 30 MIN: CPT | Performed by: PSYCHIATRY & NEUROLOGY

## 2019-01-16 RX ORDER — DEXTROAMPHETAMINE SACCHARATE, AMPHETAMINE ASPARTATE MONOHYDRATE, DEXTROAMPHETAMINE SULFATE AND AMPHETAMINE SULFATE 7.5; 7.5; 7.5; 7.5 MG/1; MG/1; MG/1; MG/1
30 CAPSULE, EXTENDED RELEASE ORAL 2 TIMES DAILY
Qty: 60 CAPSULE | Refills: 0 | Status: SHIPPED | OUTPATIENT
Start: 2019-02-14 | End: 2019-03-19

## 2019-01-16 RX ORDER — DEXTROAMPHETAMINE SACCHARATE, AMPHETAMINE ASPARTATE MONOHYDRATE, DEXTROAMPHETAMINE SULFATE AND AMPHETAMINE SULFATE 7.5; 7.5; 7.5; 7.5 MG/1; MG/1; MG/1; MG/1
30 CAPSULE, EXTENDED RELEASE ORAL 2 TIMES DAILY
Qty: 60 CAPSULE | Refills: 0 | Status: SHIPPED | OUTPATIENT
Start: 2019-01-16 | End: 2019-03-19

## 2019-01-16 ASSESSMENT — PATIENT HEALTH QUESTIONNAIRE - PHQ9
SUM OF ALL RESPONSES TO PHQ QUESTIONS 1-9: 17
5. POOR APPETITE OR OVEREATING: NEARLY EVERY DAY

## 2019-01-16 ASSESSMENT — ANXIETY QUESTIONNAIRES
7. FEELING AFRAID AS IF SOMETHING AWFUL MIGHT HAPPEN: SEVERAL DAYS
2. NOT BEING ABLE TO STOP OR CONTROL WORRYING: NEARLY EVERY DAY
GAD7 TOTAL SCORE: 18
6. BECOMING EASILY ANNOYED OR IRRITABLE: NEARLY EVERY DAY
5. BEING SO RESTLESS THAT IT IS HARD TO SIT STILL: MORE THAN HALF THE DAYS
3. WORRYING TOO MUCH ABOUT DIFFERENT THINGS: NEARLY EVERY DAY
1. FEELING NERVOUS, ANXIOUS, OR ON EDGE: NEARLY EVERY DAY

## 2019-01-16 ASSESSMENT — PAIN SCALES - GENERAL: PAINLEVEL: NO PAIN (0)

## 2019-01-16 NOTE — PROGRESS NOTES
"  PSYCHIATRY CLINIC PROGRESS NOTE   40 minute medication management, more than 50% of time spent counseling patient on medications, medication side effects, symptom history and management   SUBJECTIVE / INTERIM HISTORY                                                                       Last visit 10/17/18: continue  Adderall XR 30 mg bid and last script 10/12/18 and she didn't pick it up so will  today. Gave script for 11/14/18 and 12/13/18    Social- Her fiance, Nu, Ugandan whom she met at [facility]. He is a good trent and keeps her on track Children-  3 year old is Wallace who is \"the happiest little trent in the world\".  - Nu broke up with her. \"I don't even know what happened. It's like it's been a string of events.\" Nu ended up lost his job the next day!  - Eviction and \"I had to give away my pets. I lost everything.\" [facility] place was beautiful was $800 per month. For whole month she notes had everything down and routine. They they had to move again and ended up in a house in [place] and only couple hundred dollars per month but lady who was there was a hoarder.  - went back to November, worked for month, left December 9th. [employer] will be contacting me likely...  - sleep is back and forth  - felt like her buspar \"anxiety meds\" were actually making her worse. Sad and anxious  - Nu noted ; less cursing, talking less, stop yelling  - working on the letting go / boundaries of her family including siblings and parents  - Wallace is doing well despite all of above: 6 yo    SUBSTANCE USE- MJ in past    SYMPTOMS- irritability, easily crying, erratic sleep, distracted, poor attention & concentration,  sx ADD improve when takes Adderall. + anxiety, insomnia, overwhelmed.  MEDICAL ROS-  Back pain (spondylolisthesis), asthma (cough, SOB/wheezing)  MEDICAL / SURGICAL HISTORY                pregnant [if applicable]--no   Medical Team:     PMD- Dr. Devi       Therapist-  Patient Active Problem List   Diagnosis     " Insomnia     Asthma     Spondylolisthesis     Family history of congenital heart defect     Night terrors, adult     Status post  delivery     Obesity     Family planning     Depression     Anxiety     Encounter for routine gynecological examination     Hemorrhage of rectum and anus     Smoker     NO SHOW     ALLERGY   Patient has no known allergies.  MEDICATIONS                                                                                             Current Outpatient Medications   Medication Sig     albuterol (PROAIR HFA, PROVENTIL HFA, VENTOLIN HFA) 108 (90 BASE) MCG/ACT inhaler Inhale 2 puffs into the lungs every 6 hours as needed for shortness of breath / dyspnea     amphetamine-dextroamphetamine (ADDERALL XR) 30 MG per 24 hr capsule Take 1 capsule (30 mg) by mouth 2 times daily     diphenhydrAMINE (BENADRYL) 25 MG tablet Take 3-4 tabs bedtime     etonogestrel (IMPLANON/NEXPLANON) 68 MG IMPL 1 each (68 mg) by Subdermal route continuous     melatonin 3 MG CAPS Take 1 capsule by mouth At Bedtime     No current facility-administered medications for this visit.        VITALS   /82 (BP Location: Right arm, Patient Position: Sitting, Cuff Size: Adult Regular)   Pulse 98   Temp 98.3  F (36.8  C) (Tympanic)   Wt 75.8 kg (167 lb)   BMI 32.61 kg/m       PHQ9                       PHQ-9 SCORE 2018 10/17/2018 2019   PHQ-9 Total Score - - -   PHQ-9 Total Score 6 12 17       LABS                                                                                                                           TSH   Date Value Ref Range Status   2018 0.44 0.40 - 4.00 mU/L Final   ]   MENTAL STATUS EXAM                                                                                        Alert. Oriented to person, place, and date / time. Casually groomed,cooperative with fair  eye contact. No problems with speech or psychomotor behavior. Mood was anxious and depressed  and affect was congruent to  speech content and full range and tearful Thought process circumferential but was able to redirect , thought content positive for SI with no intent or plan.  NO homicidal ideation and psychotic thought. No hallucinations. Insight was fair. Judgment was intact and adequate for safety. Fund of knowledge was intact.Attention and concentration were impaired --- needed to redirect her during our visit back to topic of conversation.     ASSESSMENT                                                                                                      HISTORICAL:  Initial psych consult 6/17/15  Notes:             Gabapentin: lactation. buspar nausea and felt like was making her more sad.   Nyasia Raya ADHD, MDD, recurrent, moderate to severe , GADand night terrors.     Nyasia went through a lot in her household growing up with mom with MS dad working all the time and 5 siblings. Nyasia took care of the household and she worked 2 jobs. Didn't end up finishing HS in [place] and looking back she is able to recognize had a lot on her plate. Diagnosed with ADHD in past but parents didn't want her on stimulants.      She is not doing well: thought process is more off track and scattered lately, she is tearful. I feel that unfortunately her mental health is interfering with her work, her relationships... Today we talked about ? Bipolar disorder?? Seems her mood is up and down , we reviewed sx that warrant dx of bipolar II disorder and for today we noted we will continue talking about. At this point I think Nyasia is struggling with combination of anxiety, ADHD sx, depression and hence presenting as bit disorganized and with emotional lability. Bipolar disorder is however on my radar.      Meds: we both agreed time she try med for anxiety and depression -- agreed will have her try Zoloft and given she has never taken med for depression / anxiety we will start at low dose. We are going to continue Adderall.     20 minutes today visit  spent on psychotherapy. Somethings we worked on were boundaries with her family, SO, and friends...    TREATMENT RISK STATEMENT: The risks, benefits, alternatives and potential adverse effects have been explained and are understood by the pt. The pt agrees to the treatment plan with the ability to do so. The pt knows to call the clinic for any problems or access emergency care if needed. Substance use is not a problem as noted above.     DIAGNOSES           ADHD  MDD, recurrent, severe without psychosis  RANDI  Rule out bipolar disorder  Night terrors    PLAN                                                                                                                         1) MEDICATIONS:   -- Start Zoloft 25 mg daily for one week then increase to 50 mg daily. last script for Adderall XR 30 mg twice daily and last script 12/13/18 and gave scripts for 1/16/19 , 2/14/19,     2) THERAPY: No Change    3) LABS: None    4) PT MONITOR [call for probs]: Worsening symptoms, side effects from medications, SI/HI    5) REFERRALS [CD tx, medical, tests other]: None    6)  RTC: ~1 month

## 2019-01-16 NOTE — NURSING NOTE
Chief Complaint   Patient presents with     RECHECK     ADHD, anxiety, night terrors.  Patient c/o depression.  Has been out of work.       Initial /82 (BP Location: Right arm, Patient Position: Sitting, Cuff Size: Adult Regular)   Pulse 98   Temp 98.3  F (36.8  C) (Tympanic)   Wt 75.8 kg (167 lb)   BMI 32.61 kg/m   Estimated body mass index is 32.61 kg/m  as calculated from the following:    Height as of 5/2/18: 1.524 m (5').    Weight as of this encounter: 75.8 kg (167 lb).  Medication Reconciliation: complete    LARRY LYN LPN

## 2019-01-17 ASSESSMENT — ANXIETY QUESTIONNAIRES: GAD7 TOTAL SCORE: 18

## 2019-03-19 ENCOUNTER — OFFICE VISIT (OUTPATIENT)
Dept: PSYCHIATRY | Facility: OTHER | Age: 29
End: 2019-03-19
Attending: PSYCHIATRY & NEUROLOGY
Payer: COMMERCIAL

## 2019-03-19 VITALS
HEART RATE: 110 BPM | DIASTOLIC BLOOD PRESSURE: 76 MMHG | WEIGHT: 170 LBS | SYSTOLIC BLOOD PRESSURE: 126 MMHG | TEMPERATURE: 97.8 F | OXYGEN SATURATION: 97 % | BODY MASS INDEX: 33.2 KG/M2

## 2019-03-19 DIAGNOSIS — F31.81 BIPOLAR II DISORDER (H): Primary | ICD-10-CM

## 2019-03-19 DIAGNOSIS — F90.2 ADHD (ATTENTION DEFICIT HYPERACTIVITY DISORDER), COMBINED TYPE: ICD-10-CM

## 2019-03-19 PROCEDURE — 99214 OFFICE O/P EST MOD 30 MIN: CPT | Performed by: PSYCHIATRY & NEUROLOGY

## 2019-03-19 RX ORDER — DEXTROAMPHETAMINE SACCHARATE, AMPHETAMINE ASPARTATE MONOHYDRATE, DEXTROAMPHETAMINE SULFATE AND AMPHETAMINE SULFATE 7.5; 7.5; 7.5; 7.5 MG/1; MG/1; MG/1; MG/1
30 CAPSULE, EXTENDED RELEASE ORAL 2 TIMES DAILY
Qty: 60 CAPSULE | Refills: 0 | Status: SHIPPED | OUTPATIENT
Start: 2019-03-19 | End: 2019-04-09 | Stop reason: DRUGHIGH

## 2019-03-19 RX ORDER — ARIPIPRAZOLE 2 MG/1
2 TABLET ORAL DAILY
Qty: 30 TABLET | Refills: 3 | Status: SHIPPED | OUTPATIENT
Start: 2019-03-19 | End: 2019-03-26

## 2019-03-19 ASSESSMENT — ANXIETY QUESTIONNAIRES
3. WORRYING TOO MUCH ABOUT DIFFERENT THINGS: NEARLY EVERY DAY
7. FEELING AFRAID AS IF SOMETHING AWFUL MIGHT HAPPEN: MORE THAN HALF THE DAYS
GAD7 TOTAL SCORE: 18
2. NOT BEING ABLE TO STOP OR CONTROL WORRYING: NEARLY EVERY DAY
6. BECOMING EASILY ANNOYED OR IRRITABLE: MORE THAN HALF THE DAYS
5. BEING SO RESTLESS THAT IT IS HARD TO SIT STILL: MORE THAN HALF THE DAYS
1. FEELING NERVOUS, ANXIOUS, OR ON EDGE: NEARLY EVERY DAY

## 2019-03-19 ASSESSMENT — PATIENT HEALTH QUESTIONNAIRE - PHQ9
5. POOR APPETITE OR OVEREATING: NEARLY EVERY DAY
SUM OF ALL RESPONSES TO PHQ QUESTIONS 1-9: 14

## 2019-03-19 ASSESSMENT — PAIN SCALES - GENERAL: PAINLEVEL: SEVERE PAIN (7)

## 2019-03-19 NOTE — NURSING NOTE
Chief Complaint   Patient presents with     RECHECK     ADHD, depression, anxiety.       Initial /76 (BP Location: Right arm, Patient Position: Sitting, Cuff Size: Adult Regular)   Pulse 110   Temp 97.8  F (36.6  C) (Tympanic)   Wt 77.1 kg (170 lb)   SpO2 97%   BMI 33.20 kg/m   Estimated body mass index is 33.2 kg/m  as calculated from the following:    Height as of 5/2/18: 1.524 m (5').    Weight as of this encounter: 77.1 kg (170 lb).  Medication Reconciliation: complete    LARRY LYN LPN

## 2019-03-19 NOTE — PROGRESS NOTES
"  PSYCHIATRY CLINIC PROGRESS NOTE   20 minute medication management, more than 50% of time spent counseling patient on medications, medication side effects, symptom history and management   SUBJECTIVE / INTERIM HISTORY                                                                       Last visit 19: -- Start Zoloft 25 mg daily for one week then increase to 50 mg daily. last script for Adderall XR 30 mg twice daily and last script 18 and gave scripts for 19 , 19   Social- Her fiance, Nu, Hungarian whom she met at [facility]. He is a good trent and keeps her on track Children-  3 year old is Wallace who is \"the happiest little trent in the world\".  - Sertraline : really liked it, yet she notes tingling, tongue swelling??  - Eviction and \"I had to give away my pets. I lost everything.\" Middle place was beautiful was $800 per month. For whole month she notes had everything down and routine. They they had to move again and ended up in a house in [place] and only couple hundred dollars per month but lady who was there was a hoarder.  - sleep is back and forth  - Wallace is doing well despite all of above: 6 yo  - Nu resigned from his job. She is concerned about becoming homeless in relation to this because they just bought a house in [place]. This has caused a great deal of stress.   SUBSTANCE USE- MJ in past    SYMPTOMS- irritability, easily crying, erratic sleep, distracted, poor attention & concentration,  sx ADD improve when takes Adderall. + anxiety, insomnia, overwhelmed.   MEDICAL ROS-  Back pain (spondylolisthesis), asthma (cough, SOB/wheezing)  MEDICAL / SURGICAL HISTORY                pregnant [if applicable]--no   Medical Team:     PMD- Dr. Devi       Therapist-  Patient Active Problem List   Diagnosis     Insomnia     Asthma     Spondylolisthesis     Family history of congenital heart defect     Night terrors, adult     Status post  delivery     Obesity     Family planning     " Depression     Anxiety     Encounter for routine gynecological examination     Hemorrhage of rectum and anus     Smoker     NO SHOW     ALLERGY   Patient has no known allergies.  MEDICATIONS                                                                                             Current Outpatient Medications   Medication Sig     albuterol (PROAIR HFA, PROVENTIL HFA, VENTOLIN HFA) 108 (90 BASE) MCG/ACT inhaler Inhale 2 puffs into the lungs every 6 hours as needed for shortness of breath / dyspnea     diphenhydrAMINE (BENADRYL) 25 MG tablet Take 3-4 tabs bedtime     etonogestrel (IMPLANON/NEXPLANON) 68 MG IMPL 1 each (68 mg) by Subdermal route continuous     melatonin 3 MG CAPS Take 1 capsule by mouth At Bedtime     amphetamine-dextroamphetamine (ADDERALL XR) 30 MG 24 hr capsule Take 1 capsule (30 mg) by mouth 2 times daily     sertraline (ZOLOFT) 50 MG tablet Take 1/2 tablet daily for one week then increase to 1 tablet daily (Patient not taking: Reported on 3/19/2019)     No current facility-administered medications for this visit.        VITALS   /76   Pulse 110   Temp 97.8  F (36.6  C) (Tympanic)   Wt 77.1 kg (170 lb)   SpO2 97%   BMI 33.20 kg/m       PHQ9                       PHQ-9 SCORE 7/18/2018 10/17/2018 1/16/2019   PHQ-9 Total Score - - -   PHQ-9 Total Score 6 12 17       LABS                                                                                                                           TSH   Date Value Ref Range Status   05/02/2018 0.44 0.40 - 4.00 mU/L Final   ]   MENTAL STATUS EXAM                                                                                        Alert. Oriented to person, place, and date / time. Casually groomed,cooperative with fair eye contact. No problems with speech or psychomotor behavior. Mood was anxious and affect was congruent to speech content and full range. Speech is more rapid and pressured.  Thought process circumferential but was able to  "redirectt.  NO suicidal ideation, homicidal ideation or psychotic thought. No hallucinations. Insight was fair. Judgment was intact and adequate for safety. Fund of knowledge was intact. Attention and concentration were impaired --- needed to redirect her during our visit back to topic of conversation.     ASSESSMENT                                                                                                      HISTORICAL:  Initial psych consult 6/17/15  Notes:             Gabapentin: lactation. buspar nausea and felt like was making her more sad.   Nyasia Raya ADHD, MDD, recurrent, moderate to severe , RANDI and night terrors.     Nyasia went through a lot in her household growing up with mom with MS dad working all the time and 5 siblings. Nyasia took care of the household and she worked 2 jobs. Didn't end up finishing HS in [place] and looking back she is able to recognize had a lot on her plate. Diagnosed with ADHD in past but parents didn't want her on stimulants.      She is not doing well: thought process is more off track and scattered lately, she is almost tearful at times. I feel that unfortunately her mental health is interfering with her work, her relationships... Today we talked about Bipolar disorder. Seems her mood is up and down , we reviewed sx that warrant dx of bipolar II disorder. At this point I think Nyasia is struggling with combination of anxiety, ADHD sx, depression/anxiety and hence presenting as bit disorganized and with emotional lability. Bipolar disorder is however on my radar.      Sleep described as erratic. Sometimes she is sleeping a lot and other times not enough. Speech is rapid and pressured today. Nyasia reports she is also working \"way too much.\"     Patient stopped taking zoloft due to side effects. She described paresthesias to fingers and toes, swelling of tongue, and blurred vision. She describes the zoloft helping her \"taking the edge off\" though so wants to retrial. " "She reports was less angry and anxious. The severity of different experiences was reduced. Does state she has still been able to care for son. She describes many events going on, the biggest being moving to a home in [place] but fearing her and her family will be homeless because her fiance resigned from his job.     Questioning Bipolar II Disorder. She describes impulsivity and being \"scattered\". Experiencing irritability. Feels the zoloft may have pushed her into symptoms of nanette. Reports depressive symptoms in October and November of 2018.     Meds: Discussed possibility of Bipolar Disorder and initiating a mood stabilizing medication to help manage the mood lability. We discussed her Adderall and the effects they can have on mood. Perhaps not the ideal thing to be on ight now but we agreed we will make changes slowly. We are going to continue Adderall at this time.       TREATMENT RISK STATEMENT: The risks, benefits, alternatives and potential adverse effects have been explained and are understood by the pt. The pt agrees to the treatment plan with the ability to do so. The pt knows to call the clinic for any problems or access emergency care if needed. Substance use is not a problem as noted above.     DIAGNOSES           ADHD  MDD, recurrent, severe without psychosis  RANDI  Rule out bipolar disorder  Night terrors    PLAN                                                                                                                         1) MEDICATIONS:   -- Abilify 2 mg daily. last script for Adderall XR 30 mg twice daily  2/14/19 and  Gave script today for 3/19/19    2) THERAPY: No Change    3) LABS: None    4) PT MONITOR [call for probs]: Worsening symptoms, side effects from medications, SI/HI    5) REFERRALS [CD tx, medical, tests other]: None    6)  RTC: ~1 month                                                                                                                                    "

## 2019-03-20 ASSESSMENT — ANXIETY QUESTIONNAIRES: GAD7 TOTAL SCORE: 18

## 2019-03-26 ENCOUNTER — APPOINTMENT (OUTPATIENT)
Dept: GENERAL RADIOLOGY | Facility: HOSPITAL | Age: 29
End: 2019-03-26
Attending: FAMILY MEDICINE
Payer: COMMERCIAL

## 2019-03-26 ENCOUNTER — HOSPITAL ENCOUNTER (EMERGENCY)
Facility: HOSPITAL | Age: 29
Discharge: HOME | End: 2019-03-26
Attending: FAMILY MEDICINE | Admitting: FAMILY MEDICINE
Payer: COMMERCIAL

## 2019-03-26 VITALS
HEART RATE: 105 BPM | DIASTOLIC BLOOD PRESSURE: 76 MMHG | TEMPERATURE: 98.2 F | WEIGHT: 169 LBS | OXYGEN SATURATION: 99 % | HEIGHT: 59 IN | SYSTOLIC BLOOD PRESSURE: 119 MMHG | BODY MASS INDEX: 34.07 KG/M2 | RESPIRATION RATE: 16 BRPM

## 2019-03-26 DIAGNOSIS — K59.01 SLOW TRANSIT CONSTIPATION: Primary | ICD-10-CM

## 2019-03-26 DIAGNOSIS — E86.0 DEHYDRATION: ICD-10-CM

## 2019-03-26 LAB
ALBUMIN SERPL-MCNC: 3.9 G/DL (ref 3.4–5)
ALBUMIN UR-MCNC: 10 MG/DL
ALP SERPL-CCNC: 100 U/L (ref 40–150)
ALT SERPL W P-5'-P-CCNC: 18 U/L (ref 0–50)
AMPHETAMINES UR QL SCN: POSITIVE
ANION GAP SERPL CALCULATED.3IONS-SCNC: 4 MMOL/L (ref 3–14)
APPEARANCE UR: CLEAR
AST SERPL W P-5'-P-CCNC: 11 U/L (ref 0–45)
BACTERIA #/AREA URNS HPF: ABNORMAL /HPF
BARBITURATES UR QL: NEGATIVE
BASOPHILS # BLD AUTO: 0 10E9/L (ref 0–0.2)
BASOPHILS NFR BLD AUTO: 0.5 %
BENZODIAZ UR QL: NEGATIVE
BILIRUB SERPL-MCNC: 0.6 MG/DL (ref 0.2–1.3)
BILIRUB UR QL STRIP: NEGATIVE
BUN SERPL-MCNC: 6 MG/DL (ref 7–30)
CALCIUM SERPL-MCNC: 8.8 MG/DL (ref 8.5–10.1)
CANNABINOIDS UR QL SCN: POSITIVE
CHLORIDE SERPL-SCNC: 108 MMOL/L (ref 94–109)
CO2 SERPL-SCNC: 28 MMOL/L (ref 20–32)
COCAINE UR QL: NEGATIVE
COLOR UR AUTO: YELLOW
CREAT SERPL-MCNC: 0.67 MG/DL (ref 0.52–1.04)
DIFFERENTIAL METHOD BLD: NORMAL
EOSINOPHIL # BLD AUTO: 0.2 10E9/L (ref 0–0.7)
EOSINOPHIL NFR BLD AUTO: 4.4 %
ERYTHROCYTE [DISTWIDTH] IN BLOOD BY AUTOMATED COUNT: 12.2 % (ref 10–15)
GFR SERPL CREATININE-BSD FRML MDRD: >90 ML/MIN/{1.73_M2}
GLUCOSE SERPL-MCNC: 80 MG/DL (ref 70–99)
GLUCOSE UR STRIP-MCNC: NEGATIVE MG/DL
HCG UR QL: NEGATIVE
HCT VFR BLD AUTO: 40.2 % (ref 35–47)
HGB BLD-MCNC: 13.8 G/DL (ref 11.7–15.7)
HGB UR QL STRIP: NEGATIVE
IMM GRANULOCYTES # BLD: 0 10E9/L (ref 0–0.4)
IMM GRANULOCYTES NFR BLD: 0.2 %
KETONES UR STRIP-MCNC: NEGATIVE MG/DL
LACTATE BLD-SCNC: 0.8 MMOL/L (ref 0.7–2)
LEUKOCYTE ESTERASE UR QL STRIP: NEGATIVE
LYMPHOCYTES # BLD AUTO: 1.5 10E9/L (ref 0.8–5.3)
LYMPHOCYTES NFR BLD AUTO: 35.4 %
MCH RBC QN AUTO: 30.7 PG (ref 26.5–33)
MCHC RBC AUTO-ENTMCNC: 34.3 G/DL (ref 31.5–36.5)
MCV RBC AUTO: 90 FL (ref 78–100)
METHADONE UR QL SCN: NEGATIVE
MONOCYTES # BLD AUTO: 0.4 10E9/L (ref 0–1.3)
MONOCYTES NFR BLD AUTO: 9.1 %
MUCOUS THREADS #/AREA URNS LPF: PRESENT /LPF
NEUTROPHILS # BLD AUTO: 2.2 10E9/L (ref 1.6–8.3)
NEUTROPHILS NFR BLD AUTO: 50.4 %
NITRATE UR QL: NEGATIVE
NRBC # BLD AUTO: 0 10*3/UL
NRBC BLD AUTO-RTO: 0 /100
OPIATES UR QL SCN: NEGATIVE
PCP UR QL SCN: NEGATIVE
PH UR STRIP: 6.5 PH (ref 4.7–8)
PLATELET # BLD AUTO: 212 10E9/L (ref 150–450)
POTASSIUM SERPL-SCNC: 4 MMOL/L (ref 3.4–5.3)
PROT SERPL-MCNC: 7.4 G/DL (ref 6.8–8.8)
RBC # BLD AUTO: 4.49 10E12/L (ref 3.8–5.2)
RBC #/AREA URNS AUTO: 2 /HPF (ref 0–2)
SODIUM SERPL-SCNC: 140 MMOL/L (ref 133–144)
SOURCE: ABNORMAL
SP GR UR STRIP: 1.03 (ref 1–1.03)
UROBILINOGEN UR STRIP-MCNC: 4 MG/DL (ref 0–2)
WBC # BLD AUTO: 4.3 10E9/L (ref 4–11)
WBC #/AREA URNS AUTO: 1 /HPF (ref 0–5)

## 2019-03-26 PROCEDURE — 25000128 H RX IP 250 OP 636: Performed by: FAMILY MEDICINE

## 2019-03-26 PROCEDURE — 80307 DRUG TEST PRSMV CHEM ANLYZR: CPT

## 2019-03-26 PROCEDURE — 96360 HYDRATION IV INFUSION INIT: CPT

## 2019-03-26 PROCEDURE — 74019 RADEX ABDOMEN 2 VIEWS: CPT | Mod: TC

## 2019-03-26 PROCEDURE — 81001 URINALYSIS AUTO W/SCOPE: CPT | Performed by: FAMILY MEDICINE

## 2019-03-26 PROCEDURE — 36415 COLL VENOUS BLD VENIPUNCTURE: CPT | Performed by: FAMILY MEDICINE

## 2019-03-26 PROCEDURE — 99284 EMERGENCY DEPT VISIT MOD MDM: CPT | Mod: Z6 | Performed by: FAMILY MEDICINE

## 2019-03-26 PROCEDURE — 81025 URINE PREGNANCY TEST: CPT | Performed by: FAMILY MEDICINE

## 2019-03-26 PROCEDURE — 83605 ASSAY OF LACTIC ACID: CPT | Performed by: FAMILY MEDICINE

## 2019-03-26 PROCEDURE — 99284 EMERGENCY DEPT VISIT MOD MDM: CPT | Mod: 25

## 2019-03-26 PROCEDURE — 85025 COMPLETE CBC W/AUTO DIFF WBC: CPT | Performed by: FAMILY MEDICINE

## 2019-03-26 PROCEDURE — 80053 COMPREHEN METABOLIC PANEL: CPT | Performed by: FAMILY MEDICINE

## 2019-03-26 RX ORDER — ONDANSETRON 2 MG/ML
4 INJECTION INTRAMUSCULAR; INTRAVENOUS ONCE
Status: DISCONTINUED | OUTPATIENT
Start: 2019-03-26 | End: 2019-03-26 | Stop reason: HOSPADM

## 2019-03-26 RX ORDER — SODIUM CHLORIDE 9 MG/ML
1000 INJECTION, SOLUTION INTRAVENOUS CONTINUOUS
Status: DISCONTINUED | OUTPATIENT
Start: 2019-03-26 | End: 2019-03-26 | Stop reason: HOSPADM

## 2019-03-26 RX ORDER — POLYETHYLENE GLYCOL 3350 17 G/17G
1 POWDER, FOR SOLUTION ORAL DAILY
Qty: 1 BOTTLE | Refills: 3 | Status: SHIPPED | OUTPATIENT
Start: 2019-03-26 | End: 2019-07-17

## 2019-03-26 RX ADMIN — SODIUM CHLORIDE 1000 ML: 9 INJECTION, SOLUTION INTRAVENOUS at 10:10

## 2019-03-26 ASSESSMENT — ENCOUNTER SYMPTOMS
HEMATURIA: 0
CONFUSION: 0
HEADACHES: 0
ARTHRALGIAS: 0
DIFFICULTY URINATING: 0
RHINORRHEA: 0
EYE REDNESS: 0
BACK PAIN: 1
SINUS PAIN: 0
DYSURIA: 0
BLOOD IN STOOL: 0
FEVER: 1
COLOR CHANGE: 0
DIARRHEA: 0
NECK STIFFNESS: 0
APNEA: 0
NAUSEA: 1
SHORTNESS OF BREATH: 0
FLANK PAIN: 0
SORE THROAT: 0
CHILLS: 1

## 2019-03-26 ASSESSMENT — MIFFLIN-ST. JEOR: SCORE: 1407.21

## 2019-03-26 NOTE — ED NOTES
Condition stable, understands discharge instructions, prescription x 1 e-scribed to Pharmacy of choice.  Discharged home.

## 2019-03-26 NOTE — ED PROVIDER NOTES
"  History     Chief Complaint   Patient presents with     Abdominal Pain     LUQ and RLQ, with nausea and dry heaving      Sinusitis     Patient has multiple C/O, including headache, hearing/vision/voice changes, dizziness, and numbness     HPI  Nyasia Raya is a 29 year old woman who presents with multiple complaints across various organ systems. She reports diffuse abdominal pain, \"maybe since 3rd grade because I've had constipation, but I went this morning.\" She also reports mild right shoulder pain, maybe for several days without a history of injury. She also reports a, \"different pain in my right forearm,\" as well as tingling without weakness in her fingers on both hands. She notes that she has an infected tooth, but, \"can't get it out at the dentist until the infection is gone.\" She also reports left ear pain of unclear duration. She also mentions that she could be pregnant, but cannot recall her last menses. She also notes that she feels acutely dehydrated because she has not been drinking as much water the last several days.    Allergies:  No Known Allergies    Problem List:    Patient Active Problem List    Diagnosis Date Noted     NO SHOW 2016     Priority: Medium     No showed Dr. Mayorga 16  No showed Dr. Mayorga 18       Encounter for routine gynecological examination 2015     Priority: Medium     Problem list name updated by automated process. Provider to review       Hemorrhage of rectum and anus 2015     Priority: Medium     Patient states she has longstanding fissure she can see and doesn't want attention at this time       Smoker 2015     Priority: Medium     Almost stopped...trying       Depression 2014     Priority: Medium     No HI or SI tools given. Will give note for cat therapy       Anxiety 2014     Priority: Medium     Family planning 2014     Priority: Medium     Status post  delivery 01/15/2014     Priority: Medium     Obesity " 01/15/2014     Priority: Medium     Night terrors, adult 2013     Priority: Medium     Family history of congenital heart defect 2013     Priority: Medium     Brother       Insomnia 05/10/2013     Priority: Medium     Asthma 05/10/2013     Priority: Medium     Needs asthma action plan       Spondylolisthesis 05/10/2013     Priority: Medium        Past Medical History:    Past Medical History:   Diagnosis Date     Asthma 5/10/2013     Insomnia 5/10/2013     Spondylolisthesis 5/10/2013     Tobacco abuse        Past Surgical History:    Past Surgical History:   Procedure Laterality Date      SECTION  1/15/2014    Procedure:  SECTION;;  Surgeon: Leena Mayorga MD;  Location: [facility]       Family History:    Family History   Problem Relation Age of Onset     Diabetes Mother      Diabetes Father      Heart Disease Father 47        MI       Social History:  Marital Status:  Single [1]  Social History     Tobacco Use     Smoking status: Current Every Day Smoker     Packs/day: 0.08     Types: Cigarettes     Smokeless tobacco: Never Used     Tobacco comment: Patient smokes 4 times per month.  Patient will safiak on this through her place of employment (1-10-18)   Substance Use Topics     Alcohol use: No     Comment: signed up for quit plan and her workplace has programs     Drug use: No        Medications:      albuterol (PROAIR HFA, PROVENTIL HFA, VENTOLIN HFA) 108 (90 BASE) MCG/ACT inhaler   amphetamine-dextroamphetamine (ADDERALL XR) 30 MG 24 hr capsule   diphenhydrAMINE (BENADRYL) 25 MG tablet   etonogestrel (IMPLANON/NEXPLANON) 68 MG IMPL   melatonin 3 MG CAPS   polyethylene glycol (MIRALAX) powder         Review of Systems   Constitutional: Positive for chills and fever.   HENT: Negative for congestion, ear discharge, rhinorrhea, sinus pain and sore throat.    Eyes: Negative for redness.   Respiratory: Negative for apnea and shortness of breath.    Cardiovascular: Negative for chest pain.  "  Gastrointestinal: Positive for nausea. Negative for blood in stool and diarrhea.   Genitourinary: Negative for difficulty urinating, dysuria, flank pain and hematuria.   Musculoskeletal: Positive for back pain. Negative for arthralgias and neck stiffness.   Skin: Negative for color change.   Neurological: Negative for headaches.   Psychiatric/Behavioral: Negative for confusion.       Physical Exam   BP: 123/82  Pulse: 105  Temp: 98  F (36.7  C)  Resp: 20  Height: 149.9 cm (4' 11\")  Weight: 76.7 kg (169 lb)  SpO2: 98 %      Physical Exam  General Appearance: well-developed, well-nourished, alert & oriented, no apparent distress.    HEENT: atraumatic. Pupils equal, round & reactive to light, extraocular movements intact. Bilateral ear canals clear. Nose without rhinorrhea or epistaxis. Clear oropharynx. Moist mucous membranes.    Neck: normal inspection, non-tender, full & painless ROM, supple, no lymphadenopathy or nuchal rigidity. No jugular venous distension.    Cardiovascular: regular rate, rhythm, normal S1 & S2, no murmurs, rubs or gallops.    Respiratory: clear lung sounds with good air entry, no wheezes rales or rhonchi, no acute respiratory distress.    Gastrointestinal: normal inspection, normal bowel sounds, non-tender, no masses or organomegaly.    Extremities: 2+/4+ pulses bilaterally, normal & painless ROM, non-tender, joints normal, normal inspection.    Neurologic: CN II - XII intact, no motor/sensory deficit.    Psychiatric: anxious mood and affect, mildly agitated, mildly loose associations with mild flight of ideas, pressured & nearly continuous speech, fidgety.    Skin: normal color, no skin rash.    ED Course        Procedures                 Results for orders placed or performed during the hospital encounter of 03/26/19 (from the past 24 hour(s))   CBC with platelets differential   Result Value Ref Range    WBC 4.3 4.0 - 11.0 10e9/L    RBC Count 4.49 3.8 - 5.2 10e12/L    Hemoglobin 13.8 11.7 - " 15.7 g/dL    Hematocrit 40.2 35.0 - 47.0 %    MCV 90 78 - 100 fl    MCH 30.7 26.5 - 33.0 pg    MCHC 34.3 31.5 - 36.5 g/dL    RDW 12.2 10.0 - 15.0 %    Platelet Count 212 150 - 450 10e9/L    Diff Method Automated Method     % Neutrophils 50.4 %    % Lymphocytes 35.4 %    % Monocytes 9.1 %    % Eosinophils 4.4 %    % Basophils 0.5 %    % Immature Granulocytes 0.2 %    Nucleated RBCs 0 0 /100    Absolute Neutrophil 2.2 1.6 - 8.3 10e9/L    Absolute Lymphocytes 1.5 0.8 - 5.3 10e9/L    Absolute Monocytes 0.4 0.0 - 1.3 10e9/L    Absolute Eosinophils 0.2 0.0 - 0.7 10e9/L    Absolute Basophils 0.0 0.0 - 0.2 10e9/L    Abs Immature Granulocytes 0.0 0 - 0.4 10e9/L    Absolute Nucleated RBC 0.0    Comprehensive metabolic panel   Result Value Ref Range    Sodium 140 133 - 144 mmol/L    Potassium 4.0 3.4 - 5.3 mmol/L    Chloride 108 94 - 109 mmol/L    Carbon Dioxide 28 20 - 32 mmol/L    Anion Gap 4 3 - 14 mmol/L    Glucose 80 70 - 99 mg/dL    Urea Nitrogen 6 (L) 7 - 30 mg/dL    Creatinine 0.67 0.52 - 1.04 mg/dL    GFR Estimate >90 >60 mL/min/[1.73_m2]    GFR Estimate If Black >90 >60 mL/min/[1.73_m2]    Calcium 8.8 8.5 - 10.1 mg/dL    Bilirubin Total 0.6 0.2 - 1.3 mg/dL    Albumin 3.9 3.4 - 5.0 g/dL    Protein Total 7.4 6.8 - 8.8 g/dL    Alkaline Phosphatase 100 40 - 150 U/L    ALT 18 0 - 50 U/L    AST 11 0 - 45 U/L   Lactic acid whole blood   Result Value Ref Range    Lactic Acid 0.8 0.7 - 2.0 mmol/L   HCG qualitative urine   Result Value Ref Range    HCG Qual Urine Negative NEG^Negative   UA reflex to Microscopic and Culture   Result Value Ref Range    Color Urine Yellow     Appearance Urine Clear     Glucose Urine Negative NEG^Negative mg/dL    Bilirubin Urine Negative NEG^Negative    Ketones Urine Negative NEG^Negative mg/dL    Specific Gravity Urine 1.027 1.003 - 1.035    Blood Urine Negative NEG^Negative    pH Urine 6.5 4.7 - 8.0 pH    Protein Albumin Urine 10 (A) NEG^Negative mg/dL    Urobilinogen mg/dL 4.0 (H) 0.0 - 2.0  mg/dL    Nitrite Urine Negative NEG^Negative    Leukocyte Esterase Urine Negative NEG^Negative    Source Midstream Urine     RBC Urine 2 0 - 2 /HPF    WBC Urine 1 0 - 5 /HPF    Bacteria Urine None (A) NEG^Negative /HPF    Mucous Urine Present (A) NEG^Negative /LPF   Drug Screen Urine (Range)   Result Value Ref Range    Amphetamine Qual Urine Positive (A) NEG^Negative    Barbiturates Qual Urine Negative NEG^Negative    Benzodiazepine Qual Urine Negative NEG^Negative    Cannabinoids Qual Urine Positive (A) NEG^Negative    Cocaine Qual Urine Negative NEG^Negative    Opiates Qualitative Urine Negative NEG^Negative    Methadone Qual Urine Negative NEG^Negative    PCP Qual Urine Negative NEG^Negative       Medications   0.9% sodium chloride BOLUS (0 mLs Intravenous Stopped 3/26/19 1114)     Followed by   sodium chloride 0.9% infusion (not administered)   ondansetron (ZOFRAN) injection 4 mg (4 mg Intravenous Not Given 3/26/19 1013)       Assessments & Plan (with Medical Decision Making)   The patient is a 29 year old woman who presents with multiple complaints with finding of multifactorial mild constipation including mild dehydration.    1) Mild constipation - patient will be treated with rest, increased fiber diet, increased oral fluids and PEG 3350 with the goal of having a soft daily BM with plan for PCP follow-up in 5 - 7 days regarding this ER visit. The patient verbalizes agreement with this plan as well as to immediately return to the ER with any new/worsening S/Sx.      I have reviewed the nursing notes.    I have reviewed the findings, diagnosis, plan and need for follow up with the patient.         Medication List      Started    polyethylene glycol powder  Commonly known as:  MIRALAX  1 capful, Oral, DAILY            Final diagnoses:   Slow transit constipation   Dehydration       3/26/2019   [facility]     Donnell Lorenzo MD  03/26/19 1128

## 2019-03-26 NOTE — ED AVS SNAPSHOT
[facility]  [address] [place] [id number]  Phone:  [phone]                                    Nyasia Raya   MRN: [id number]    Department:  [facility]   Date of Visit:  3/26/2019           After Visit Summary Signature Page    I have received my discharge instructions, and my questions have been answered. I have discussed any challenges I see with this plan with the nurse or doctor.    ..........................................................................................................................................  Patient/Patient Representative Signature      ..........................................................................................................................................  Patient Representative Print Name and Relationship to Patient    ..................................................               ................................................  Date                                   Time    ..........................................................................................................................................  Reviewed by Signature/Title    ...................................................              ..............................................  Date                                               Time          22EPIC Rev 08/18

## 2019-03-26 NOTE — ED NOTES
"\"Here for coughing, headaches, left ear aches, neck is swollen and numb on the left side, left side of face and neck painful. \"  "

## 2019-04-09 ENCOUNTER — OFFICE VISIT (OUTPATIENT)
Dept: PSYCHIATRY | Facility: OTHER | Age: 29
End: 2019-04-09
Attending: PSYCHIATRY & NEUROLOGY
Payer: COMMERCIAL

## 2019-04-09 VITALS
DIASTOLIC BLOOD PRESSURE: 80 MMHG | SYSTOLIC BLOOD PRESSURE: 108 MMHG | BODY MASS INDEX: 33.33 KG/M2 | WEIGHT: 165 LBS | TEMPERATURE: 97.8 F | OXYGEN SATURATION: 99 % | HEART RATE: 102 BPM

## 2019-04-09 DIAGNOSIS — F31.10 BIPOLAR I DISORDER, MOST RECENT EPISODE (OR CURRENT) MANIC (H): ICD-10-CM

## 2019-04-09 DIAGNOSIS — F90.2 ADHD (ATTENTION DEFICIT HYPERACTIVITY DISORDER), COMBINED TYPE: Primary | ICD-10-CM

## 2019-04-09 PROCEDURE — 99214 OFFICE O/P EST MOD 30 MIN: CPT | Performed by: PSYCHIATRY & NEUROLOGY

## 2019-04-09 RX ORDER — DEXTROAMPHETAMINE SACCHARATE, AMPHETAMINE ASPARTATE, DEXTROAMPHETAMINE SULFATE AND AMPHETAMINE SULFATE 7.5; 7.5; 7.5; 7.5 MG/1; MG/1; MG/1; MG/1
30 TABLET ORAL 2 TIMES DAILY
Qty: 60 TABLET | Refills: 0 | Status: SHIPPED | OUTPATIENT
Start: 2019-05-08 | End: 2019-06-12

## 2019-04-09 RX ORDER — DEXTROAMPHETAMINE SACCHARATE, AMPHETAMINE ASPARTATE, DEXTROAMPHETAMINE SULFATE AND AMPHETAMINE SULFATE 7.5; 7.5; 7.5; 7.5 MG/1; MG/1; MG/1; MG/1
30 TABLET ORAL 2 TIMES DAILY
Qty: 60 TABLET | Refills: 0 | Status: SHIPPED | OUTPATIENT
Start: 2019-04-09 | End: 2019-06-12

## 2019-04-09 RX ORDER — ARIPIPRAZOLE 2 MG/1
2 TABLET ORAL DAILY
Qty: 30 TABLET | Refills: 3 | Status: SHIPPED | OUTPATIENT
Start: 2019-04-09 | End: 2019-06-12

## 2019-04-09 ASSESSMENT — ANXIETY QUESTIONNAIRES
3. WORRYING TOO MUCH ABOUT DIFFERENT THINGS: MORE THAN HALF THE DAYS
1. FEELING NERVOUS, ANXIOUS, OR ON EDGE: MORE THAN HALF THE DAYS
2. NOT BEING ABLE TO STOP OR CONTROL WORRYING: MORE THAN HALF THE DAYS
GAD7 TOTAL SCORE: 10
7. FEELING AFRAID AS IF SOMETHING AWFUL MIGHT HAPPEN: NOT AT ALL
6. BECOMING EASILY ANNOYED OR IRRITABLE: SEVERAL DAYS
5. BEING SO RESTLESS THAT IT IS HARD TO SIT STILL: SEVERAL DAYS

## 2019-04-09 ASSESSMENT — PATIENT HEALTH QUESTIONNAIRE - PHQ9
5. POOR APPETITE OR OVEREATING: MORE THAN HALF THE DAYS
SUM OF ALL RESPONSES TO PHQ QUESTIONS 1-9: 9

## 2019-04-09 ASSESSMENT — PAIN SCALES - GENERAL: PAINLEVEL: SEVERE PAIN (7)

## 2019-04-09 NOTE — NURSING NOTE
"Chief Complaint   Patient presents with     RECHECK     Mental health.       Initial /80 (BP Location: Right arm, Patient Position: Sitting, Cuff Size: Adult Regular)   Pulse 102   Temp 97.8  F (36.6  C) (Tympanic)   Wt 74.8 kg (165 lb)   SpO2 99%   BMI 33.33 kg/m   Estimated body mass index is 33.33 kg/m  as calculated from the following:    Height as of 3/26/19: 1.499 m (4' 11\").    Weight as of this encounter: 74.8 kg (165 lb).  Medication Reconciliation: complete    LARRY LYN LPN    "

## 2019-04-09 NOTE — PROGRESS NOTES
"  PSYCHIATRY CLINIC PROGRESS NOTE   20 minute medication management, more than 50% of time spent counseling patient on medications, medication side effects, symptom history and management   SUBJECTIVE / INTERIM HISTORY                                                                       Last visit 19: -- Start Abilify 2 mg daily, discontinue Zoloft   Social- Her fiance, Nu, Vincent[place] whom she met at [facility]. He is a good trent and keeps her on track Children-  3 year old is Wallace who is \"the happiest little trent in the world\".  - working a lot, 60 hours per week or so. [place] however since yesterday  - her and Nu doing better  - insurance canceled their contract with [employer] so she couldn't  the Abilify. We can she notes try sending to [facility].   - sleep is back and forth  - Wallace 4 yo she worries is starting to be effected  - Nu resigned from his job. She is concerned about becoming homeless in relation to this because they just bought a house in [place]. This has caused a great deal of stress.   SUBSTANCE USE- MJ in past    SYMPTOMS- irritability, easily crying, erratic sleep, distracted, poor attention & concentration,  sx ADD improve when takes Adderall. + anxiety, insomnia, overwhelmed.   MEDICAL ROS-  Back pain (spondylolisthesis), asthma (cough, SOB/wheezing)  MEDICAL / SURGICAL HISTORY                pregnant [if applicable]--no   Medical Team:     PMD- Dr. Devi       Therapist-  Patient Active Problem List   Diagnosis     Insomnia     Asthma     Spondylolisthesis     Family history of congenital heart defect     Night terrors, adult     Status post  delivery     Obesity     Family planning     Depression     Anxiety     Encounter for routine gynecological examination     Hemorrhage of rectum and anus     Smoker     NO SHOW     ALLERGY   Patient has no known allergies.  MEDICATIONS                                                                                             Current " Outpatient Medications   Medication Sig     albuterol (PROAIR HFA, PROVENTIL HFA, VENTOLIN HFA) 108 (90 BASE) MCG/ACT inhaler Inhale 2 puffs into the lungs every 6 hours as needed for shortness of breath / dyspnea     amphetamine-dextroamphetamine (ADDERALL XR) 30 MG 24 hr capsule Take 1 capsule (30 mg) by mouth 2 times daily     diphenhydrAMINE (BENADRYL) 25 MG tablet Take 3-4 tabs bedtime     etonogestrel (IMPLANON/NEXPLANON) 68 MG IMPL 1 each (68 mg) by Subdermal route continuous     melatonin 3 MG CAPS Take 1 capsule by mouth At Bedtime     polyethylene glycol (MIRALAX) powder Take 17 g (1 capful) by mouth daily     No current facility-administered medications for this visit.        VITALS   /80 (BP Location: Right arm, Patient Position: Sitting, Cuff Size: Adult Regular)   Pulse 102   Temp 97.8  F (36.6  C) (Tympanic)   Wt 74.8 kg (165 lb)   SpO2 99%   BMI 33.33 kg/m       PHQ9                       PHQ-9 SCORE 1/16/2019 3/19/2019 4/9/2019   PHQ-9 Total Score - - -   PHQ-9 Total Score 17 14 9       LABS                                                                                                                           TSH   Date Value Ref Range Status   05/02/2018 0.44 0.40 - 4.00 mU/L Final   ]   MENTAL STATUS EXAM                                                                                        Alert. Oriented to person, place, and date / time. Casually groomed,cooperative with fair eye contact. No problems with speech or psychomotor behavior. Mood was anxious and affect was congruent to speech content and full range. Speech regular rate and rhythm.  Thought process linear and logical today.   NO suicidal ideation, homicidal ideation or psychotic thought. No hallucinations. Insight was fair. Judgment was intact and adequate for safety. Fund of knowledge was intact. Attention and concentration were impaired --- needed to redirect her during our visit back to topic of conversation.  "    ASSESSMENT                                                                                                      HISTORICAL:  Initial psych consult 6/17/15  Notes:             Gabapentin: lactation. buspar nausea and felt like was making her more sad.   Nyasia Raya ADHD, MDD, recurrent, moderate to severe , RANDI and night terrors.     Nyasia went through a lot in her household growing up with mom with MS dad working all the time and 5 siblings. Nyasia took care of the household and she worked 2 jobs. Didn't end up finishing HS in [place] and looking back she is able to recognize had a lot on her plate. Diagnosed with ADHD in past but parents didn't want her on stimulants.      She is not doing well: thought process is more off track and scattered lately, she is almost tearful at times. I feel that unfortunately her mental health is interfering with her work, her relationships... Today we talked about Bipolar disorder. Seems her mood is up and down , we reviewed sx that warrant dx of bipolar II disorder. At this point I think yNasia is struggling with combination of anxiety, ADHD sx, depression/anxiety and hence presenting as bit disorganized and with emotional lability. Bipolar disorder is however on my radar.      Questioning Bipolar II Disorder. Last visit Nyasia described impulsivity and being \"scattered\". Experiencing irritability. Feels the zoloft may have pushed her into symptoms of nanette. Reports depressive symptoms in October and November of 2018. Today she is much calmer, thought process linear and logical. No longer taking Zoloft and she feels now it did send her into nanette. Abilify we will try again and send to [facility].      Discussed possibility of Bipolar Disorder and initiating a mood stabilizing medication to help manage the mood lability. We discussed her Adderall and the effects they can have on mood. Perhaps not the ideal thing to be on right now but we agreed we will make changes slowly. We " are going to continue Adderall at this time.       TREATMENT RISK STATEMENT: The risks, benefits, alternatives and potential adverse effects have been explained and are understood by the pt. The pt agrees to the treatment plan with the ability to do so. The pt knows to call the clinic for any problems or access emergency care if needed. Substance use is not a problem as noted above.     DIAGNOSES           ADHD  MDD, recurrent, severe without psychosis  RANDI  Rule out bipolar disorder  Night terrors    PLAN                                                                                                                         1) MEDICATIONS:   -- Abilify 2 mg daily and [facility]. last script for Adderall XR 30 mg twice daily 3/19/19 and we are switching to Adderall IR 30 mg twice daily gave script today 4/9/19 and for 5/8/19    2) THERAPY: No Change    3) LABS: None    4) PT MONITOR [call for probs]: Worsening symptoms, side effects from medications, SI/HI    5) REFERRALS [CD tx, medical, tests other]: None    6)  RTC: ~1 month

## 2019-04-10 ASSESSMENT — ANXIETY QUESTIONNAIRES: GAD7 TOTAL SCORE: 10

## 2019-06-12 ENCOUNTER — PATIENT OUTREACH (OUTPATIENT)
Dept: CARE COORDINATION | Facility: OTHER | Age: 29
End: 2019-06-12

## 2019-06-12 ENCOUNTER — OFFICE VISIT (OUTPATIENT)
Dept: PSYCHIATRY | Facility: OTHER | Age: 29
End: 2019-06-12
Attending: PSYCHIATRY & NEUROLOGY
Payer: COMMERCIAL

## 2019-06-12 VITALS
SYSTOLIC BLOOD PRESSURE: 106 MMHG | DIASTOLIC BLOOD PRESSURE: 74 MMHG | WEIGHT: 180 LBS | OXYGEN SATURATION: 96 % | TEMPERATURE: 97.4 F | HEART RATE: 91 BPM | BODY MASS INDEX: 36.36 KG/M2

## 2019-06-12 DIAGNOSIS — F31.10 BIPOLAR I DISORDER, MOST RECENT EPISODE (OR CURRENT) MANIC (H): ICD-10-CM

## 2019-06-12 DIAGNOSIS — Z79.899 ENCOUNTER FOR LONG-TERM (CURRENT) USE OF MEDICATIONS: Primary | ICD-10-CM

## 2019-06-12 DIAGNOSIS — Z00.00 ROUTINE GENERAL MEDICAL EXAMINATION AT A HEALTH CARE FACILITY: ICD-10-CM

## 2019-06-12 DIAGNOSIS — F51.05 INSOMNIA DUE TO OTHER MENTAL DISORDER: ICD-10-CM

## 2019-06-12 DIAGNOSIS — F99 INSOMNIA DUE TO OTHER MENTAL DISORDER: ICD-10-CM

## 2019-06-12 DIAGNOSIS — F41.9 ANXIETY: Primary | ICD-10-CM

## 2019-06-12 DIAGNOSIS — F90.2 ADHD (ATTENTION DEFICIT HYPERACTIVITY DISORDER), COMBINED TYPE: ICD-10-CM

## 2019-06-12 PROCEDURE — 99214 OFFICE O/P EST MOD 30 MIN: CPT | Performed by: PSYCHIATRY & NEUROLOGY

## 2019-06-12 RX ORDER — DIPHENHYDRAMINE HCL 25 MG
TABLET ORAL
Qty: 120 TABLET | Refills: 5 | Status: SHIPPED | OUTPATIENT
Start: 2019-06-12 | End: 2020-09-21

## 2019-06-12 RX ORDER — DEXTROAMPHETAMINE SACCHARATE, AMPHETAMINE ASPARTATE, DEXTROAMPHETAMINE SULFATE AND AMPHETAMINE SULFATE 7.5; 7.5; 7.5; 7.5 MG/1; MG/1; MG/1; MG/1
30 TABLET ORAL 2 TIMES DAILY
Qty: 60 TABLET | Refills: 0 | Status: SHIPPED | OUTPATIENT
Start: 2019-06-12 | End: 2019-07-09

## 2019-06-12 RX ORDER — ARIPIPRAZOLE 5 MG/1
5 TABLET ORAL DAILY
Qty: 30 TABLET | Refills: 2 | Status: SHIPPED | OUTPATIENT
Start: 2019-06-12 | End: 2019-07-17 | Stop reason: SINTOL

## 2019-06-12 RX ORDER — TOPIRAMATE 25 MG/1
25 TABLET, FILM COATED ORAL AT BEDTIME
Qty: 30 TABLET | Refills: 3 | Status: SHIPPED | OUTPATIENT
Start: 2019-06-12 | End: 2019-07-17

## 2019-06-12 ASSESSMENT — ANXIETY QUESTIONNAIRES
GAD7 TOTAL SCORE: 19
2. NOT BEING ABLE TO STOP OR CONTROL WORRYING: NEARLY EVERY DAY
6. BECOMING EASILY ANNOYED OR IRRITABLE: NEARLY EVERY DAY
1. FEELING NERVOUS, ANXIOUS, OR ON EDGE: NEARLY EVERY DAY
7. FEELING AFRAID AS IF SOMETHING AWFUL MIGHT HAPPEN: NEARLY EVERY DAY
5. BEING SO RESTLESS THAT IT IS HARD TO SIT STILL: MORE THAN HALF THE DAYS
IF YOU CHECKED OFF ANY PROBLEMS ON THIS QUESTIONNAIRE, HOW DIFFICULT HAVE THESE PROBLEMS MADE IT FOR YOU TO DO YOUR WORK, TAKE CARE OF THINGS AT HOME, OR GET ALONG WITH OTHER PEOPLE: SOMEWHAT DIFFICULT
3. WORRYING TOO MUCH ABOUT DIFFERENT THINGS: NEARLY EVERY DAY

## 2019-06-12 ASSESSMENT — PATIENT HEALTH QUESTIONNAIRE - PHQ9
5. POOR APPETITE OR OVEREATING: MORE THAN HALF THE DAYS
SUM OF ALL RESPONSES TO PHQ QUESTIONS 1-9: 8

## 2019-06-12 ASSESSMENT — PAIN SCALES - GENERAL: PAINLEVEL: NO PAIN (0)

## 2019-06-12 ASSESSMENT — ACTIVITIES OF DAILY LIVING (ADL): DEPENDENT_IADLS:: INDEPENDENT

## 2019-06-12 NOTE — LETTER
[facility]  06/12/19    Patient: Nyasia Raya  YOB: 1990  Medical Record Number: [id number]  CSN: [id number]                                                                              Non-opioid Controlled Substance Agreement    I understand that my care provider has prescribed a controlled substance to help manage my condition(s). I am taking this medicine to help me function or work. I know this is strong medicine, and that it can cause serious side effects. Controlled substances can be sedating, addicting and may cause a dependency on the drug. They can affect my ability to drive or think, and cause depression. They need to be taken exactly as prescribed. Combining controlled substances with certain medicines or chemicals (such as cocaine, sedatives and tranquilizers, sleeping pills, meth) can be dangerous or even fatal. Also, if I stop controlled substances suddenly, I may have severe withdrawal symptoms.  If not helpful, I may be asked to stop them.    The risks, benefits, and side effects of these medicine(s) were explained to me. I agree that:    1. I will take part in other treatments as advised by my care team. This may be psychiatry or counseling, physical therapy, behavioral therapy, group treatment or a referral to a pain clinic. I will reduce or stop my medicine when my care team tells me to do so.  2. I will take my medicines as prescribed. I will not change the dose or schedule unless my care team tells me to. There will be no refills if I  run out early.   I may be contactedwithout warning and asked to complete a urine drug test or pill count at any time.   3. I will keep all my appointments, and understand this is part of the monitoring of controlled substances. My care team may require an office visit for EVERY controlled substance refill. If I miss appointments or don t follow instructions, my care team may stop my medicine.  4. I will not ask other providers to  prescribe controlled substances, and I will not accept controlled substances from other people. If I need another prescribed controlled substance for a new reason, I will tell my care team within 1 business day.  5. I will use one pharmacy to fill all of my controlled substance prescriptions, and it is up to me to make sure that I do not run out of my medicines on weekends or holidays. If my care team is willing to refill my controlled substance prescription without a visit, I must request refills only during office hours, refills may take up to 3 days to process, and it may take up to 5 to 7 days for my medicine to be mailed and ready at my pharmacy. Prescriptions will not be mailed anywhere except my pharmacy.    6. I am responsible for my prescriptions. If the medicine/prescription is lost or stolen, it will not be replaced. I also agree not to share controlled substance medicines with anyone.              [facility]  06/12/19  Patient:  Nyasia Raya  YOB: 1990  Medical Record Number: [id number]  CSN: [id number]    7. I agree to not use ANY illegal or recreational drugs. This includes marijuana, cocaine, bath salts or other drugs. I agree not to use alcohol unless my care team says I may. I agree to give urine samples whenever asked. If I don t give a urine sample, the care team may stop my medicine.    8. If I enroll in the [place] Medical Marijuana program, I will tell my care team. I will also sign an agreement to share my medical records with my care team.    9. I will bring in my list of medicines (or my medicine bottles) each time I come to the clinic.   10. I will tell my care team right away if I become pregnant or have a new medical problem treated outside of my regular clinic.  11. I understand that this medicine can affect my thinking and judgment. It may be unsafe for me to drive, use machinery and do dangerous tasks. I will not do any of these things until I know how the  medicine affects me. If my dose changes, I will wait to see how it affects me. I will contact my care team if I have concerns about medicine side effects.    I understand that if I do not follow any of the conditions above, my prescriptions or treatment may be stopped.      I agree that my provider, clinic care team, and pharmacy may work with any city, state or federal law enforcement agency that investigates the misuse, sale, or other diversion of my controlled medicine. I will allow my provider to discuss my care with or share a copy of this agreement with any other treating provider, pharmacy or emergency room where I receive care. I agree to give up (waive) any right of privacy or confidentiality with respect to these consents.   I have read this agreement and have asked questions about anything I did not understand.    ____________________________________________________    ____________  ________  Patient signature                                                         Date      Time    ____________________________________________________     ____________  ________  Witness                                                          Date      Time    ____________________________________________________  Provider signature

## 2019-06-12 NOTE — LETTER
[facility]  [address] [place], [place] [address] [phone]      June 12, 2019      Nyasia Raya  [address] [facility] [address]      EMERGENCY CARE PLAN  Presenting Problem Treatment Plan   Questions or concerns during clinic hours    24 hour Nurse line available - Call main clinic line and follow prompts I will call the clinic directly: [phone]    24 Hour Nurse Line [phone]- Follow prompts and press option for nurse advisor    [facility]  [address] [facility] [place] [address]   Patient needs to schedule an appointment  I will call the scheduling team during business hours at [phone]   Same day treatment   I will call the clinic first, then urgent care if needed   Clinic Care Coordinators [facility]  RN Clinic Care Coordinator  [phone]    [phone]   Crisis Services:  Behavioral or Mental Health [facility]al [facility]  [phone]   Emergency treatment--Immediately CALL 911

## 2019-06-12 NOTE — LETTER
[place] CARE COORDINATION  [address] [address]  [phone] option #7    June 12, 2019    Nyasia Raya  [facility] [address] [facility] [address]      Dear Nyasia,    I am a clinic care coordinator who works with Dr.Misty Guerrero. I wanted to thank you for spending the time to talk with me.  I wanted to provide you with my contact information so that you can call me with questions or concerns about your health care. Below is a description of clinic care coordination and how I can further assist you.     The clinic care coordinator is a registered nurse and/or  who understand the health care system. The goal of clinic care coordination is to help you manage your health and improve access to the [facility] system in the most efficient manner. The registered nurse can assist you in meeting your health care goals by providing education, coordinating services, and strengthening the communication among your providers. The  can assist you with financial, behavioral, psychosocial, chemical dependency, counseling, and/or psychiatric resources.    Please feel free to contact me at [phone] option #7, with any questions or concerns. We at [facility] are focused on providing you with the highest-quality healthcare experience possible and that all starts with you.     Sincerely,     Lauren Mitchell RN-BSN  Care Coordination, Behavioral Health    Enclosed: I have enclosed a copy of a 24 Hour Access Plan. This has helpful phone numbers for you to call when needed. Please keep this in an easy to access place to use as needed. and I have enclosed a copy of the Complex Care Plan. This has helpful information and goals that we have talked about. Please keep this in an easy to access place to use as needed.

## 2019-06-12 NOTE — NURSING NOTE
"Chief Complaint   Patient presents with     RECHECK     ADHD, anxiety, depression.  Discuss medications, med combinations.  Weight gain.       Initial /74 (BP Location: Right arm, Patient Position: Sitting, Cuff Size: Adult Large)   Pulse 91   Temp 97.4  F (36.3  C) (Tympanic)   Wt 81.6 kg (180 lb)   SpO2 96%   BMI 36.36 kg/m   Estimated body mass index is 36.36 kg/m  as calculated from the following:    Height as of 3/26/19: 1.499 m (4' 11\").    Weight as of this encounter: 81.6 kg (180 lb).  Medication Reconciliation: complete    LARRY LYN LPN    "

## 2019-06-12 NOTE — LETTER
My Depression Action Plan  Name: Nyasia Raya   Date of Birth 1990  Date: 6/12/2019    My doctor: Jeanne Guerrero  My clinic: [address] [address]  [phone]           GREEN    ZONE   Good Control    What it looks like:     Things are going generally well. You have normal up s and down s. You may even feel depressed from time to time, but bad moods usually last less than a day.   What you need to do:  1. Continue to care for yourself (see self care plan)  2. Check your depression survival kit and update it as needed  3. Follow your physician s recommendations including any medication.  4. Do not stop taking medication unless you consult with your physician first.           YELLOW         ZONE Getting Worse    What it looks like:     Depression is starting to interfere with your life.     It may be hard to get out of bed; you may be starting to isolate yourself from others.    Symptoms of depression are starting to last most all day and this has happened for several days.     You may have suicidal thoughts but they are not constant.   What you need to do:     1. Call your care team, your response to treatment will improve if you keep your care team informed of your progress. Yellow periods are signs an adjustment may need to be made.     2. Continue your self-care, even if you have to fake it!    3. Talk to someone in your support network    4. Open up your depression survival kit           RED    ZONE Medical Alert - Get Help    What it looks like:     Depression is seriously interfering with your life.     You may experience these or other symptoms: You can t get out of bed most days, can t work or engage in other necessary activities, you have trouble taking care of basic hygiene, or basic responsibilities, thoughts of suicide or death that will not go away, self-injurious behavior.     What you need to do:  1. Call your care team and  request a same-day appointment. If they are not available (weekends or after hours) call your local crisis line, emergency room or 911.            Depression Self Care Plan / Survival Kit    Self-Care for Depression  Here s the deal. Your body and mind are really not as separate as most people think.  What you do and think affects how you feel and how you feel influences what you do and think. This means if you do things that people who feel good do, it will help you feel better.  Sometimes this is all it takes.  There is also a place for medication and therapy depending on how severe your depression is, so be sure to consult with your medical provider and/ or Behavioral Health Consultant if your symptoms are worsening or not improving.     In order to better manage my stress, I will:    Exercise  Get some form of exercise, every day. This will help reduce pain and release endorphins, the  feel good  chemicals in your brain. This is almost as good as taking antidepressants!  This is not the same as joining a gym and then never going! (they count on that by the way ) It can be as simple as just going for a walk or doing some gardening, anything that will get you moving.      Hygiene   Maintain good hygiene (Get out of bed in the morning, Make your bed, Brush your teeth, Take a shower, and Get dressed like you were going to work, even if you are unemployed).  If your clothes don't fit try to get ones that do.    Diet  I will strive to eat foods that are good for me, drink plenty of water, and avoid excessive sugar, caffeine, alcohol, and other mood-altering substances.  Some foods that are helpful in depression are: complex carbohydrates, B vitamins, flaxseed, fish or fish oil, fresh fruits and vegetables.    Psychotherapy  I agree to participate in Individual Therapy (if recommended).    Medication  If prescribed medications, I agree to take them.  Missing doses can result in serious side effects.  I understand that  drinking alcohol, or other illicit drug use, may cause potential side effects.  I will not stop my medication abruptly without first discussing it with my provider.    Staying Connected With Others  I will stay in touch with my friends, family members, and my primary care provider/team.    Use your imagination  Be creative.  We all have a creative side; it doesn t matter if it s oil painting, sand castles, or mud pies! This will also kick up the endorphins.    Witness Beauty  (AKA stop and smell the roses) Take a look outside, even in mid-winter. Notice colors, textures. Watch the squirrels and birds.     Service to others  Be of service to others.  There is always someone else in need.  By helping others we can  get out of ourselves  and remember the really important things.  This also provides opportunities for practicing all the other parts of the program.    Humor  Laugh and be silly!  Adjust your TV habits for less news and crime-drama and more comedy.    Control your stress  Try breathing deep, massage therapy, biofeedback, and meditation. Find time to relax each day.

## 2019-06-12 NOTE — PROGRESS NOTES
Clinic Care Coordination Contact    Clinic Care Coordination Contact  OUTREACH    Referral Information:  Referral Source: Specialist    Primary Diagnosis: Behavioral Health    Chief Complaint   Patient presents with     Clinic Care Coordination - Initial        Universal Utilization:  Clinic Utilization  Difficulty keeping appointments:: Yes  No-Show Concerns: Yes  No PCP office visit in Past Year: Yes  Utilization    Last refreshed: 6/12/2019  8:58 AM:  Hospital Admissions 0           Last refreshed: 6/12/2019  8:58 AM:  ED Visits 1           Last refreshed: 6/12/2019  8:58 AM:  No Show Count (past year) 6              Current as of: 6/12/2019  8:58 AM            Clinical Concerns:  Current Medical Concerns:  N/A    Current Behavioral Concerns: Anxiety, nanette    Education Provided to patient: Yes   Pain  Pain (GOAL):: No  Health Maintenance Reviewed:    Clinical Pathway: Clinic Care Coordination Anxiety Assessment    Have the follow up appointments been scheduled? Yes  If not, can I help you set up these appointments? No  Transportation concerns (GOAL):: No    Symptoms:   Anxiety  Currently being treated or treated in past for your anxiety?: Yes  Type of treatment:: Medication  Based on results of your GAD7 you may be experiencing anxiety: Yes  Symptom relief: Yes  Which methods below provide symptom relief:: Medications, Thinking, Sleep    Medication Management: Discussed in office visit     Functional Status:  Dependent ADLs:: Independent  Dependent IADLs:: Independent  Bed or wheelchair confined:: No  Mobility Status: Independent  Fallen 2 or more times in the past year?: No  Any fall with injury in the past year?: No    Living Situation:  Current living arrangement:: I live in a private home  Type of residence:: Private home - stairs    Diet/Exercise/Sleep:  Diet:: Regular  Inadequate nutrition (GOAL):: No  Food Insecurity: No  Tube Feeding: No  Inadequate activity/exercise (GOAL):: No  Significant changes in  sleep pattern (GOAL): No    Transportation:  Transportation concerns (GOAL):: No  Transportation means:: Accessible car     Psychosocial:  Judaism or spiritual beliefs that impact treatment:: No  Mental health DX:: Yes  Mental health DX how managed:: Medication, Psychiatrist  Mental health management concern (GOAL):: Yes  Informal Support system:: Children, Family, Significant other     Financial/Insurance:   Financial/Insurance concerns (GOAL):: Yes     Resources and Interventions:  Current Resources:    ;   Community Resources: None  Supplies used at home:: None  Equipment Currently Used at Home: none               Goals:         Patient/Caregiver understanding: Yes    Outreach Frequency: monthly  Future Appointments              In 1 week Dillon Espino APRN CNM [facility]    In 1 month Jeanne Guerrero MD [facility]          Plan: Patient will reach out with any concerns.  Care coordinator will contact patient in about 2 weeks to check in.  Care coordination letter and information will be mailed out today.  Will attend future office visit with  and patient to discuss any further needs, concerns, problems, etc.    Lauren Mitchell RN-BSN  Care Coordination, Behavioral Health

## 2019-06-12 NOTE — PROGRESS NOTES
"  PSYCHIATRY CLINIC PROGRESS NOTE   20 minute medication management, more than 50% of time spent counseling patient on medications, medication side effects, symptom history and management   SUBJECTIVE / INTERIM HISTORY                                                                       Last visit : 1) MEDICATIONS:   -- Abilify 2 mg daily and will send to [facility] last script for Adderall XR 30 mg twice daily 3/19/19 and we are switching to Adderall IR 30 mg twice daily gave script today 19 and for 19   Social- Her fiance, Nu, [place] whom she [facility] at [facility]. He is a good trent and keeps her on track Children-  5 year old is Wallace who is \"the happiest little trent in the world\".  - still with manic sx: spending, impulsivity. \"there are things about my brain space that I have not shared ever\". Distrustful and paranoia. More I ask her about paranoia not really - not like she feels people following her or can read peoples thoughts or people can read her mind   - sleep: two days in past week with no sleep.   - frustrated with weight gain since on Abilify (~20 lbs) - frustrated with her spending. Spent money she didn't have : bunch on clothes, etc. And didn't tell Nu  - not working    SUBSTANCE USE- MJ in past    SYMPTOMS- irritability, easily crying, erratic sleep, distracted, poor attention & concentration,  sx ADD improve when takes Adderall. + anxiety, insomnia, overwhelmed.   MEDICAL ROS-  Weight gain, akathisia Back pain (spondylolisthesis), asthma (cough, SOB/wheezing)  MEDICAL / SURGICAL HISTORY                pregnant [if applicable]--no   Medical Team:     PMD- Dr. Devi       Therapist-  Patient Active Problem List   Diagnosis     Insomnia     Asthma     Spondylolisthesis     Family history of congenital heart defect     Night terrors, adult     Status post  delivery     Obesity     Family planning     Depression     Anxiety     Encounter for routine gynecological examination     Hemorrhage of " rectum and anus     Smoker     NO SHOW     ALLERGY   Patient has no known allergies.  MEDICATIONS                                                                                             Current Outpatient Medications   Medication Sig     albuterol (PROAIR HFA, PROVENTIL HFA, VENTOLIN HFA) 108 (90 BASE) MCG/ACT inhaler Inhale 2 puffs into the lungs every 6 hours as needed for shortness of breath / dyspnea     amphetamine-dextroamphetamine (ADDERALL) 30 MG tablet Take 1 tablet (30 mg) by mouth 2 times daily     ARIPiprazole (ABILIFY) 2 MG tablet Take 1 tablet (2 mg) by mouth daily     diphenhydrAMINE (BENADRYL) 25 MG tablet Take 3-4 tabs bedtime     etonogestrel (IMPLANON/NEXPLANON) 68 MG IMPL 1 each (68 mg) by Subdermal route continuous     melatonin 3 MG CAPS Take 1 capsule by mouth At Bedtime     polyethylene glycol (MIRALAX) powder Take 17 g (1 capful) by mouth daily     No current facility-administered medications for this visit.        VITALS   /74 (BP Location: Right arm, Patient Position: Sitting, Cuff Size: Adult Large)   Pulse 91   Temp 97.4  F (36.3  C) (Tympanic)   Wt 81.6 kg (180 lb)   SpO2 96%   BMI 36.36 kg/m       PHQ9                       PHQ-9 SCORE 3/19/2019 4/9/2019 6/12/2019   PHQ-9 Total Score - - -   PHQ-9 Total Score 14 9 8       LABS                                                                                                                           TSH   Date Value Ref Range Status   05/02/2018 0.44 0.40 - 4.00 mU/L Final   ]   MENTAL STATUS EXAM                                                                                        Alert. Oriented to person, place, and date / time. Casually groomed,cooperative with fair eye contact. Speech: increased rate and volume. Psychomotor: fidgety. Mood was anxious and affect was congruent to speech content and full range. Speech regular rate and rhythm.  Thought process circumstantial. NO suicidal ideation, homicidal ideation or  "psychotic thought. No hallucinations. Insight was fair. Judgment was intact and adequate for safety. Fund of knowledge was intact. Attention and concentration were impaired --- needed to redirect her during our visit back to topic of conversation.     ASSESSMENT                                                                                                      HISTORICAL:  Initial psych consult 6/17/15  Notes:             Gabapentin: lactation. buspar nausea and felt like was making her more sad.   Nyasia Raay ADHD, MDD, recurrent, moderate to severe , RANDI and night terrors.     Nyasia went through a lot in her household growing up with mom with MS dad working all the time and 5 siblings. Nyasia took care of the household and she worked 2 jobs. Didn't end up finishing HS in [employer] and looking back she is able to recognize had a lot on her plate. Diagnosed with ADHD in past but parents didn't want her on stimulants.      She is not doing well: thought process is more off track and scattered lately. I feel that unfortunately her mental health is interfering with her work, her relationships... Last visit we talked about Bipolar disorder.     The more I've gotten to know her :  Bipolar II Disorder and she has become more accepting of sx. Last visit Nyasia described impulsivity and being \"scattered\".  Today she is tangential and still with sx nanette. She notes however she has messed up in terms of taking Zoloft and not the abilify and she thinks has made her more manic... So we agreed stick with abilify albeit weight gain and some akathisia. She thinks benefits outweigh risks.       I discussed her Adderall and the effects they can have on mood. She doesn't think stimulants make things worse: in fact she feels they slow her down and help her sleep. We are also going to have her try Topamax try help as appetite suppressant.       TREATMENT RISK STATEMENT: The risks, benefits, alternatives and potential adverse effects " have been explained and are understood by the pt. The pt agrees to the treatment plan with the ability to do so. The pt knows to call the clinic for any problems or access emergency care if needed. Substance use is not a problem as noted above.     DIAGNOSES           ADHD  MDD, recurrent, severe without psychosis  RANDI  Rule out bipolar disorder  Night terrors    PLAN                                                                                                                         1) MEDICATIONS:   -- Increase Abilify 2 mg daily to 5 mg daily. Start Topamax 25 mg evening. I will try Benadryl 75 to 100 mg HS and melatonin 3 mg HS last script for Adderall XR 30 mg twice daily last script 5/8/19 and gave scripts today 6/12/19 Gave script today     2) THERAPY: No Change    3) LABS: None    4) PT MONITOR [call for probs]: Worsening symptoms, side effects from medications, SI/HI    5) REFERRALS [CD tx, medical, tests other]: None    6)  RTC: ~1 month

## 2019-06-13 ASSESSMENT — ANXIETY QUESTIONNAIRES: GAD7 TOTAL SCORE: 19

## 2019-06-14 ENCOUNTER — TELEPHONE (OUTPATIENT)
Dept: PSYCHIATRY | Facility: OTHER | Age: 29
End: 2019-06-14

## 2019-06-14 NOTE — TELEPHONE ENCOUNTER
Patient is calling with a return date of June 20, 2019. Have paperwork from [facility].  Thank you  JOSE MIGUEL

## 2019-06-17 ENCOUNTER — TELEPHONE (OUTPATIENT)
Dept: PSYCHIATRY | Facility: OTHER | Age: 29
End: 2019-06-17

## 2019-06-17 ENCOUNTER — PATIENT OUTREACH (OUTPATIENT)
Dept: PSYCHIATRY | Facility: OTHER | Age: 29
End: 2019-06-17

## 2019-06-17 ASSESSMENT — ACTIVITIES OF DAILY LIVING (ADL): DEPENDENT_IADLS:: INDEPENDENT

## 2019-06-17 NOTE — PROGRESS NOTES
Clinic Care Coordination Contact  Care Team Conversations    Received voicemail from patient.  Contacted patient this date.  Patient had initially called to have medications called to another pharmacy but stated today that she was going to pick them up.  No further questions or needs at this time.  Encouraged to call with any questions, concerns, or problems.  Patient verbalized understanding.      Lauren Mitchell RN-BSN  Care Coordination, Behavioral Health

## 2019-06-19 ENCOUNTER — OFFICE VISIT (OUTPATIENT)
Dept: OBGYN | Facility: OTHER | Age: 29
End: 2019-06-19
Attending: ADVANCED PRACTICE MIDWIFE
Payer: COMMERCIAL

## 2019-06-19 VITALS
BODY MASS INDEX: 34.27 KG/M2 | WEIGHT: 170 LBS | SYSTOLIC BLOOD PRESSURE: 110 MMHG | HEIGHT: 59 IN | DIASTOLIC BLOOD PRESSURE: 74 MMHG

## 2019-06-19 DIAGNOSIS — Z12.4 ENCOUNTER FOR SCREENING FOR CERVICAL CANCER: ICD-10-CM

## 2019-06-19 DIAGNOSIS — Z01.419 WELL WOMAN EXAM WITH ROUTINE GYNECOLOGICAL EXAM: Primary | ICD-10-CM

## 2019-06-19 DIAGNOSIS — Z11.3 SCREEN FOR STD (SEXUALLY TRANSMITTED DISEASE): ICD-10-CM

## 2019-06-19 DIAGNOSIS — L98.9 SKIN LESION: ICD-10-CM

## 2019-06-19 LAB
C TRACH DNA SPEC QL PROBE+SIG AMP: NOT DETECTED
ERYTHROCYTE [DISTWIDTH] IN BLOOD BY AUTOMATED COUNT: 12.4 % (ref 10–15)
EST. AVERAGE GLUCOSE BLD GHB EST-MCNC: 97 MG/DL
HBA1C MFR BLD: 5 % (ref 0–5.6)
HCT VFR BLD AUTO: 39 % (ref 35–47)
HGB BLD-MCNC: 13.4 G/DL (ref 11.7–15.7)
MCH RBC QN AUTO: 30.7 PG (ref 26.5–33)
MCHC RBC AUTO-ENTMCNC: 34.4 G/DL (ref 31.5–36.5)
MCV RBC AUTO: 89 FL (ref 78–100)
N GONORRHOEA DNA SPEC QL PROBE+SIG AMP: NOT DETECTED
PLATELET # BLD AUTO: 267 10E9/L (ref 150–450)
RBC # BLD AUTO: 4.37 10E12/L (ref 3.8–5.2)
SPECIMEN SOURCE: NORMAL
SPECIMEN SOURCE: NORMAL
TSH SERPL DL<=0.005 MIU/L-ACNC: 0.41 MU/L (ref 0.4–4)
WBC # BLD AUTO: 6.5 10E9/L (ref 4–11)
WET PREP SPEC: NORMAL

## 2019-06-19 PROCEDURE — 85027 COMPLETE CBC AUTOMATED: CPT | Performed by: ADVANCED PRACTICE MIDWIFE

## 2019-06-19 PROCEDURE — 86780 TREPONEMA PALLIDUM: CPT | Mod: 90 | Performed by: ADVANCED PRACTICE MIDWIFE

## 2019-06-19 PROCEDURE — 87491 CHLMYD TRACH DNA AMP PROBE: CPT | Performed by: ADVANCED PRACTICE MIDWIFE

## 2019-06-19 PROCEDURE — 87389 HIV-1 AG W/HIV-1&-2 AB AG IA: CPT | Mod: 90 | Performed by: ADVANCED PRACTICE MIDWIFE

## 2019-06-19 PROCEDURE — 99395 PREV VISIT EST AGE 18-39: CPT | Performed by: ADVANCED PRACTICE MIDWIFE

## 2019-06-19 PROCEDURE — 87591 N.GONORRHOEAE DNA AMP PROB: CPT | Performed by: ADVANCED PRACTICE MIDWIFE

## 2019-06-19 PROCEDURE — 83036 HEMOGLOBIN GLYCOSYLATED A1C: CPT | Performed by: ADVANCED PRACTICE MIDWIFE

## 2019-06-19 PROCEDURE — 87210 SMEAR WET MOUNT SALINE/INK: CPT | Performed by: ADVANCED PRACTICE MIDWIFE

## 2019-06-19 PROCEDURE — 86803 HEPATITIS C AB TEST: CPT | Mod: 90 | Performed by: ADVANCED PRACTICE MIDWIFE

## 2019-06-19 PROCEDURE — 99000 SPECIMEN HANDLING OFFICE-LAB: CPT | Performed by: ADVANCED PRACTICE MIDWIFE

## 2019-06-19 PROCEDURE — 36415 COLL VENOUS BLD VENIPUNCTURE: CPT | Performed by: ADVANCED PRACTICE MIDWIFE

## 2019-06-19 PROCEDURE — 87340 HEPATITIS B SURFACE AG IA: CPT | Mod: 90 | Performed by: ADVANCED PRACTICE MIDWIFE

## 2019-06-19 PROCEDURE — 84443 ASSAY THYROID STIM HORMONE: CPT | Performed by: ADVANCED PRACTICE MIDWIFE

## 2019-06-19 PROCEDURE — 88142 CYTOPATH C/V THIN LAYER: CPT | Performed by: ADVANCED PRACTICE MIDWIFE

## 2019-06-19 RX ORDER — ALBUTEROL SULFATE 90 UG/1
2 AEROSOL, METERED RESPIRATORY (INHALATION)
COMMUNITY
Start: 2015-05-17 | End: 2022-05-05

## 2019-06-19 ASSESSMENT — ANXIETY QUESTIONNAIRES
2. NOT BEING ABLE TO STOP OR CONTROL WORRYING: MORE THAN HALF THE DAYS
IF YOU CHECKED OFF ANY PROBLEMS ON THIS QUESTIONNAIRE, HOW DIFFICULT HAVE THESE PROBLEMS MADE IT FOR YOU TO DO YOUR WORK, TAKE CARE OF THINGS AT HOME, OR GET ALONG WITH OTHER PEOPLE: SOMEWHAT DIFFICULT
7. FEELING AFRAID AS IF SOMETHING AWFUL MIGHT HAPPEN: MORE THAN HALF THE DAYS
6. BECOMING EASILY ANNOYED OR IRRITABLE: MORE THAN HALF THE DAYS
1. FEELING NERVOUS, ANXIOUS, OR ON EDGE: MORE THAN HALF THE DAYS
GAD7 TOTAL SCORE: 13
3. WORRYING TOO MUCH ABOUT DIFFERENT THINGS: MORE THAN HALF THE DAYS
4. TROUBLE RELAXING: MORE THAN HALF THE DAYS
5. BEING SO RESTLESS THAT IT IS HARD TO SIT STILL: SEVERAL DAYS

## 2019-06-19 ASSESSMENT — PAIN SCALES - GENERAL: PAINLEVEL: NO PAIN (0)

## 2019-06-19 ASSESSMENT — PATIENT HEALTH QUESTIONNAIRE - PHQ9: SUM OF ALL RESPONSES TO PHQ QUESTIONS 1-9: 6

## 2019-06-19 ASSESSMENT — MIFFLIN-ST. JEOR: SCORE: 1411.74

## 2019-06-19 NOTE — PATIENT INSTRUCTIONS
Return to office in one year for well woman care and as needed.    Thank you for allowing Dillon MALIK CNM and our OB team to participate in your care.  If you have a scheduling or an appointment question please contact [facility] Unit Coordinator at their direct line [phone].   ALL nursing questions or concerns can be directed to your OB nurse at: [phone] Rosalino Romo/Yulia

## 2019-06-19 NOTE — NURSING NOTE
"Chief Complaint   Patient presents with     Gyn Exam     Contraception     would like nexplanon removed       Initial /74 (BP Location: Left arm, Cuff Size: Adult Regular)   Ht 1.499 m (4' 11\")   Wt 77.1 kg (170 lb)   BMI 34.34 kg/m   Estimated body mass index is 34.34 kg/m  as calculated from the following:    Height as of this encounter: 1.499 m (4' 11\").    Weight as of this encounter: 77.1 kg (170 lb).  Medication Reconciliation: complete      "

## 2019-06-19 NOTE — PROGRESS NOTES
ANNUAL    Nyasia is a 29 year old  female who presents for annual exam.   Youngest child 5  Largest Child 9+ lb  GDM n  Hypertension n  No LMP recorded. Patient has had an implant.  Menses:  Cycles about every 3 months with Nexplanion When did they start 16 How many days bleed 3-5 days with 3 heavy pain n Contraception Nexplanon.  Planning pregnancy: y  Concerns in addition to routine health maintenance?  Nexplanon removal.  GYNECOLOGIC HISTORY:   Surgery: n  History sexually transmitted infections:  Chlamydia   Safe?  y  STI testing offered?  y  History of abnormal Pap smear: n  Problems with sex? (bleeding/pain) n  Family history of: breast cancer: n   Uterine cancer: n ovarian cancer: n   colon cancer: pgf    HEALTH MAINTENANCE:  Cholesterol: (No results found for: CHOL History of abnormal lipids: n  Mammo: n . History of abnormal Mammo:na   Regular Self Breast Exams: y Calcium/Vitamin D or Vitamin: n Exercise y, walking    TSH: (  TSH   Date Value Ref Range Status   2018 0.44 0.40 - 4.00 mU/L Final    )  Pap; (  Lab Results   Component Value Date    PAP NIL 2018    PAP NIL 2015    PAP NIL 2013    )    HISTORY:  OB History    Para Term  AB Living   2 1 1 0 1 1   SAB TAB Ectopic Multiple Live Births   0 1 0 0 1      # Outcome Date GA Lbr Moiz/2nd Weight Sex Delivery Anes PTL Lv   2 Term 01/15/14 41w4d  4.252 kg (9 lb 6 oz) M CS-LTranv TOPICAL, Spinal  TING      Birth Comments: none      Name: Wallace      Apgar1: 6  Apgar5: 8   1 TAB  6w0d             Birth Comments: no comps     Past Medical History:   Diagnosis Date     Asthma 5/10/2013     Insomnia 5/10/2013     Spondylolisthesis 5/10/2013     Tobacco abuse      Past Surgical History:   Procedure Laterality Date      SECTION  1/15/2014    Procedure:  SECTION;;  Surgeon: Leena Mayorga MD;  Location: [facility]     Family History   Problem Relation Age of Onset     Diabetes Mother      Diabetes Father       Heart Disease Father 47        MI     Social History     Socioeconomic History     Marital status: Single     Spouse name: None     Number of children: None     Years of education: None     Highest education level: None   Occupational History     None   Social Needs     Financial resource strain: None     Food insecurity:     Worry: None     Inability: None     Transportation needs:     Medical: None     Non-medical: None   Tobacco Use     Smoking status: Current Every Day Smoker     Packs/day: 0.08     Types: Cigarettes     Smokeless tobacco: Never Used     Tobacco comment: Patient smokes 4 times per month.  Patient will shaheed baig on this through her place of employment (1-10-18)   Substance and Sexual Activity     Alcohol use: No     Comment: signed up for quit plan and her workplace has programs     Drug use: No     Sexual activity: Yes     Partners: Male   Lifestyle     Physical activity:     Days per week: None     Minutes per session: None     Stress: None   Relationships     Social connections:     Talks on phone: None     Gets together: None     Attends Confucianism service: None     Active member of club or organization: None     Attends meetings of clubs or organizations: None     Relationship status: None     Intimate partner violence:     Fear of current or ex partner: None     Emotionally abused: None     Physically abused: None     Forced sexual activity: None   Other Topics Concern     Parent/sibling w/ CABG, MI or angioplasty before 65F 55M? Yes   Social History Narrative     None       Current Outpatient Medications:      albuterol (PROVENTIL HFA) 108 (90 Base) MCG/ACT inhaler, Inhale 2 puffs into the lungs, Disp: , Rfl:      amphetamine-dextroamphetamine (ADDERALL) 30 MG tablet, Take 1 tablet (30 mg) by mouth 2 times daily, Disp: 60 tablet, Rfl: 0     ARIPiprazole (ABILIFY) 5 MG tablet, Take 1 tablet (5 mg) by mouth daily, Disp: 30 tablet, Rfl: 2     diphenhydrAMINE (BENADRYL) 25 MG tablet, Take 3-4 tabs  "bedtime, Disp: 120 tablet, Rfl: 5     etonogestrel (IMPLANON/NEXPLANON) 68 MG IMPL, 1 each (68 mg) by Subdermal route continuous, Disp: , Rfl:      melatonin 3 MG CAPS, Take 1 capsule by mouth At Bedtime, Disp: 30 capsule, Rfl: 5     topiramate (TOPAMAX) 25 MG tablet, Take 1 tablet (25 mg) by mouth At Bedtime, Disp: 30 tablet, Rfl: 3     polyethylene glycol (MIRALAX) powder, Take 17 g (1 capful) by mouth daily (Patient not taking: Reported on 6/19/2019), Disp: 1 Bottle, Rfl: 3   No Known Allergies    Past medical, surgical, social and family history were reviewed and updated in [facility].    ROS:    CONSTITUTIONAL:     NEGATIVE for fever, chills, change in weight  INTEGUMENTARY/SKIN:       NEGATIVE for worrisome rashes, moles.  Growth on tip of nose.  EYES:     NEGATIVE for vision changes or irritation  ENT/MOUTH: NEGATIVE for ear, mouth and throat problems  RESP:     NEGATIVE for significant cough or SOB  CV:   NEGATIVE for chest pain, palpitations or peripheral edema  GI:     NEGATIVE for nausea, abdominal pain, heartburn, or change in bowel habits  :   NEGATIVE for frequency, dysuria, hematuria, vaginal discharge, or irregular bleeding,incontinence   MUSCULOSKELETAL:     NEGATIVE for significant arthralgias or myalgia  NEURO:      NEGATIVE for weakness, dizziness.  Paresthesias on and off on right side and one finger on left side  ENDOCRINE:      NEGATIVE for temperature intolerance, skin/hair changes  PSYCHIATRIC:      NEGATIVE for changes in mood or affect.     EXAM:   /74 (BP Location: Left arm, Cuff Size: Adult Regular)   Ht 1.499 m (4' 11\")   Wt 77.1 kg (170 lb)   BMI 34.34 kg/m     BMI: Body mass index is 34.34 kg/m .  Constitutional: healthy, alert and no distress  Head: Normocephalic. No masses, lesions, tenderness or abnormalities  Neck: Neck supple. Trachea midline. No adenopathy. Thyroid symmetric, normal size.   Cardiovascular: RRR.   Respiratory: lungs clear   Breast: Breasts reveal mild " symmetric fibrocystic densities, but there are no dominant, discrete, fixed or suspicious masses found.  Gastrointestinal: Abdomen soft, non-tender, non-distended. No masses, organomegaly.  :  Vulva:  No external lesions, normal female hair distribution, no inguinal adenopathy.    Urethra:  Midline, non-tender, well supported, no discharge  Vagina:  Moist, pink, no abnormal discharge, no lesions  Cervix: clean   Uterus:  Normal size, non-tender, freely mobile  Ovaries:  No masses appreciated  Rectal Exam: deferred  Musculoskeletal: extremities normal  Skin: no suspicious lesions or rashes  Psychiatric: Affect appropriate, cooperative,mentation appears normal.     COUNSELING:   regular exercise  healthy diet/nutrition  Immunizations   contraception  family planning  Folic Acid Counseling   reports that she has been smoking cigarettes.  She has been smoking about 0.08 packs per day. She has never used smokeless tobacco.  Tobacco Cessation Action Plan: Has a plan for quitting.  Declines counseling  Body mass index is 34.34 kg/m .  Weight management plan: healthy eating and exercise  FRAX Risk Assessment    Procedure:  Nexplanon removal  After obtaining consent from the patient the nexplanon was removed.  Her arm was prepped copiously with betadine and 1% lidocaine ~ 1 cc was injected into her arm over the nexplanon mini.  A 0.8 cm incision was made over the previous scar and the nexplanon mini was removed without difficulty. Pressure was placed over the incision and minimal bleeding was noted.  It was covered with triple antibiotic and 2 x 2 guaze with tegaderm and the arm was bandaged with a kerlex guaze.  She will leave that on for at least 24 hrs.    She tolerated the procedure well.  No complications.     ASSESSMENT:  29 year old female with satisfactory annual exam  Need of cervical cancer screening  Sexually active  Nexplanon in left arm  Requests Nexplanon removal  Considering pregnancy  PLAN:   Pap   Nexplanon  removal  Wet prep, GC/CT, HIV, Hepatitis B & C, Syphilis  TSH, hgb A1C, CBC    Return to office: 1 year for well woman care and as needed    Total visit greater than 30 minutes with 25 minutes spent discussing well woman care, health promotion, and disease prevention.  Additional time spent with nexplanon removal and explaining procedure, site care, and signs of infection.    Dillon Espino, APRN, CNM

## 2019-06-20 ASSESSMENT — ANXIETY QUESTIONNAIRES: GAD7 TOTAL SCORE: 13

## 2019-06-26 ENCOUNTER — PATIENT OUTREACH (OUTPATIENT)
Dept: CARE COORDINATION | Facility: OTHER | Age: 29
End: 2019-06-26

## 2019-06-26 DIAGNOSIS — F90.2 ADHD (ATTENTION DEFICIT HYPERACTIVITY DISORDER), COMBINED TYPE: ICD-10-CM

## 2019-06-26 LAB
COPATH REPORT: NORMAL
PAP: NORMAL

## 2019-07-08 NOTE — PROGRESS NOTES
Clinic Care Coordination Contact  Care Team Conversations    2:45 PM    Attempted to contact patient.  No answer.  Left message with contact information to call back.     Lauren Mitchell RN-BSN  Care Coordination, Behavioral Health

## 2019-07-09 DIAGNOSIS — F90.2 ADHD (ATTENTION DEFICIT HYPERACTIVITY DISORDER), COMBINED TYPE: ICD-10-CM

## 2019-07-09 RX ORDER — DEXTROAMPHETAMINE SACCHARATE, AMPHETAMINE ASPARTATE, DEXTROAMPHETAMINE SULFATE AND AMPHETAMINE SULFATE 7.5; 7.5; 7.5; 7.5 MG/1; MG/1; MG/1; MG/1
30 TABLET ORAL 2 TIMES DAILY
Qty: 60 TABLET | Refills: 0 | Status: CANCELLED | OUTPATIENT
Start: 2019-07-09

## 2019-07-09 RX ORDER — DEXTROAMPHETAMINE SACCHARATE, AMPHETAMINE ASPARTATE, DEXTROAMPHETAMINE SULFATE AND AMPHETAMINE SULFATE 7.5; 7.5; 7.5; 7.5 MG/1; MG/1; MG/1; MG/1
30 TABLET ORAL 2 TIMES DAILY
Qty: 60 TABLET | Refills: 0 | Status: SHIPPED | OUTPATIENT
Start: 2019-07-09 | End: 2019-07-17 | Stop reason: ALTCHOICE

## 2019-07-09 NOTE — PROGRESS NOTES
Clinic Care Coordination Contact  Care Team Conversations    3:36 PM    Contacted patient.  Advised patient that hard copy Adderall RX is ready at the [facility] lower level registration desk to be picked up.    Patient also would like to communicate electronically so advised that when she picks up hard copy Rx I will leave the consent for electronic communication with the script and she can complete that.    Encouraged patient to call in the meantime if she has any questions, concerns, or needs.  Patient verbalized understanding.      Lauren Mitchell, RN-BSN  Care Coordination, Behavioral Health

## 2019-07-09 NOTE — PROGRESS NOTES
Clinic Care Coordination Contact  Care Team Conversations    8:39 AM    Received voicemail from patient.  Attempted to call back number left on voicemail with no answer.  Voicemail not set up.  Will contact at a later time.      Lauren Mitchell, RN-BSN  Care Coordination, Behavioral Health

## 2019-07-09 NOTE — PROGRESS NOTES
Clinic Care Coordination Contact  Care Team Conversations    8:58 AM    Contacted patient to check in.  She states she wants to quit her meds due to gaining 15 lbs since her last visit.  She states her stomach exploded, terrible gas, and always hungry.  She also doesn't think the Topimax has helped with anything.      She states she didn't take Abilify for a week but is taking now but doesn't want to.   Sleep has been fine.      Patient also needs Adderall refill.  Pended Rx.      Lauren Mitchell, RN-BSN  Care Coordination, Behavioral Health

## 2019-07-09 NOTE — PROGRESS NOTES
Clinic Care Coordination Contact  Care Team Conversations    1:28 PM    Attempted to call patient x 3.  Phone goes right to voicemail box that is not set up. Unable to leave a message regarding Adderall hard copy and message below.  Will contact patient at a later time 07/09/19.      Lauren Mitchell, RN-BSN  Care Coordination, Behavioral Health      Jeanne Guerrero MD  You 1 hour ago (11:42 AM)      I will refill Adderall. I understand her going off Abilify. We can discuss next visit to figure out where to go from here.

## 2019-07-10 NOTE — PROGRESS NOTES
Clinic Care Coordination Contact  Care Team Conversations    2:16 PM    Received signed electronic communication consent for patient to communicate via text messages.  Sent to scanning.     Lauren Mitchell RN-BSN  Care Coordination, Behavioral Health

## 2019-07-17 ENCOUNTER — OFFICE VISIT (OUTPATIENT)
Dept: PSYCHIATRY | Facility: OTHER | Age: 29
End: 2019-07-17
Attending: PSYCHIATRY & NEUROLOGY
Payer: COMMERCIAL

## 2019-07-17 ENCOUNTER — PATIENT OUTREACH (OUTPATIENT)
Dept: CARE COORDINATION | Facility: OTHER | Age: 29
End: 2019-07-17

## 2019-07-17 VITALS
HEIGHT: 59 IN | TEMPERATURE: 97.8 F | SYSTOLIC BLOOD PRESSURE: 106 MMHG | BODY MASS INDEX: 37.7 KG/M2 | WEIGHT: 187 LBS | DIASTOLIC BLOOD PRESSURE: 76 MMHG | OXYGEN SATURATION: 98 % | HEART RATE: 91 BPM

## 2019-07-17 DIAGNOSIS — F90.2 ATTENTION DEFICIT HYPERACTIVITY DISORDER (ADHD), COMBINED TYPE: Primary | ICD-10-CM

## 2019-07-17 DIAGNOSIS — F31.10 BIPOLAR I DISORDER, MOST RECENT EPISODE (OR CURRENT) MANIC (H): ICD-10-CM

## 2019-07-17 PROCEDURE — 99214 OFFICE O/P EST MOD 30 MIN: CPT | Performed by: PSYCHIATRY & NEUROLOGY

## 2019-07-17 RX ORDER — LAMOTRIGINE 25 MG/1
TABLET ORAL
Qty: 84 TABLET | Refills: 0 | Status: SHIPPED | OUTPATIENT
Start: 2019-07-17 | End: 2019-08-26 | Stop reason: DRUGHIGH

## 2019-07-17 RX ORDER — DEXTROAMPHETAMINE SACCHARATE, AMPHETAMINE ASPARTATE MONOHYDRATE, DEXTROAMPHETAMINE SULFATE AND AMPHETAMINE SULFATE 7.5; 7.5; 7.5; 7.5 MG/1; MG/1; MG/1; MG/1
30 CAPSULE, EXTENDED RELEASE ORAL 2 TIMES DAILY
Qty: 60 CAPSULE | Refills: 0 | Status: SHIPPED | OUTPATIENT
Start: 2019-07-17 | End: 2019-08-23

## 2019-07-17 ASSESSMENT — ANXIETY QUESTIONNAIRES
5. BEING SO RESTLESS THAT IT IS HARD TO SIT STILL: MORE THAN HALF THE DAYS
6. BECOMING EASILY ANNOYED OR IRRITABLE: MORE THAN HALF THE DAYS
IF YOU CHECKED OFF ANY PROBLEMS ON THIS QUESTIONNAIRE, HOW DIFFICULT HAVE THESE PROBLEMS MADE IT FOR YOU TO DO YOUR WORK, TAKE CARE OF THINGS AT HOME, OR GET ALONG WITH OTHER PEOPLE: VERY DIFFICULT
3. WORRYING TOO MUCH ABOUT DIFFERENT THINGS: MORE THAN HALF THE DAYS
GAD7 TOTAL SCORE: 12
2. NOT BEING ABLE TO STOP OR CONTROL WORRYING: MORE THAN HALF THE DAYS
7. FEELING AFRAID AS IF SOMETHING AWFUL MIGHT HAPPEN: SEVERAL DAYS
1. FEELING NERVOUS, ANXIOUS, OR ON EDGE: MORE THAN HALF THE DAYS

## 2019-07-17 ASSESSMENT — PATIENT HEALTH QUESTIONNAIRE - PHQ9
5. POOR APPETITE OR OVEREATING: SEVERAL DAYS
SUM OF ALL RESPONSES TO PHQ QUESTIONS 1-9: 5

## 2019-07-17 ASSESSMENT — ACTIVITIES OF DAILY LIVING (ADL): DEPENDENT_IADLS:: INDEPENDENT

## 2019-07-17 ASSESSMENT — MIFFLIN-ST. JEOR: SCORE: 1488.86

## 2019-07-17 ASSESSMENT — PAIN SCALES - GENERAL: PAINLEVEL: NO PAIN (0)

## 2019-07-17 NOTE — PROGRESS NOTES
"  PSYCHIATRY CLINIC PROGRESS NOTE   20 minute medication management, more than 50% of time spent counseling patient on medications, medication side effects, symptom history and management   SUBJECTIVE / INTERIM HISTORY                                                                       Last visit: -- Increase Abilify 2 mg daily to 5 mg daily. Start Topamax 25 mg evening. I will try Benadryl 75 to 100 mg HS and melatonin 3 mg HS last script for Adderall XR 30 mg twice daily last script 5/8/19 and gave scripts today 6/12/19 Gave script today    Social- Her fiance, Nu, [place] whom she met at [facility]. He is a good trent and keeps her on track Children-  5 year old is Wallace who is \"the happiest little trent in the world\".  - stopped Topamax as didn't feel was helping  - still taking Abilify but weight gain and prefers not take because of this. That being said prefers try something else. Prefers meds with less chance for weight gain   - NOT having the  manic sx: spending, impulsivity like she was . \"there are things about my brain space that I have not shared ever\". Distrustful and paranoia. More I ask her about paranoia not really - not like she feels people following her or can read peoples thoughts or people can read her mind   - IS sleeping whereas in past sleep: \"two days in past week with no sleep.\"  - frustrated with weight gain since on Abilify (~30 lbs) - frustrated with her spending. Spent money she didn't have : bunch on clothes, etc. And didn't tell Nu    SUBSTANCE USE- MJ in past    SYMPTOMS- irritability, easily crying, erratic sleep, distracted, poor attention & concentration,  sx ADD improve when takes Adderall. + anxiety, insomnia, overwhelmed.   MEDICAL ROS-  Weight gain, akathisia Back pain (spondylolisthesis), asthma (cough, SOB/wheezing)  MEDICAL / SURGICAL HISTORY                pregnant [if applicable]--no   Medical Team:     PMD- Dr. Devi       Therapist-  Patient Active Problem List " "  Diagnosis     Insomnia     Asthma     Spondylolisthesis     Family history of congenital heart defect     Night terrors, adult     Obesity     Family planning     Encounter for routine gynecological examination     Smoker     NO SHOW     Attention deficit hyperactivity disorder (ADHD), combined type     Congenital spondylolisthesis     ALLERGY   Patient has no known allergies.  MEDICATIONS                                                                                             Current Outpatient Medications   Medication Sig     albuterol (PROVENTIL HFA) 108 (90 Base) MCG/ACT inhaler Inhale 2 puffs into the lungs     amphetamine-dextroamphetamine (ADDERALL) 30 MG tablet Take 1 tablet (30 mg) by mouth 2 times daily     ARIPiprazole (ABILIFY) 5 MG tablet Take 1 tablet (5 mg) by mouth daily     diphenhydrAMINE (BENADRYL) 25 MG tablet Take 3-4 tabs bedtime     melatonin 3 MG CAPS Take 1 capsule by mouth At Bedtime     etonogestrel (IMPLANON/NEXPLANON) 68 MG IMPL 1 each (68 mg) by Subdermal route continuous (Patient not taking: Reported on 7/17/2019)     polyethylene glycol (MIRALAX) powder Take 17 g (1 capful) by mouth daily (Patient not taking: Reported on 7/17/2019)     topiramate (TOPAMAX) 25 MG tablet Take 1 tablet (25 mg) by mouth At Bedtime (Patient not taking: Reported on 7/17/2019)     No current facility-administered medications for this visit.        VITALS   /76 (BP Location: Right arm, Patient Position: Sitting, Cuff Size: Adult Regular)   Pulse 91   Temp 97.8  F (36.6  C) (Tympanic)   Ht 1.499 m (4' 11\")   Wt 84.8 kg (187 lb)   SpO2 98%   BMI 37.77 kg/m       PHQ9                       PHQ-9 SCORE 4/9/2019 6/12/2019 6/19/2019   PHQ-9 Total Score - - -   PHQ-9 Total Score 9 8 6       LABS                                                                                                                           TSH   Date Value Ref Range Status   06/19/2019 0.41 0.40 - 4.00 mU/L Final   ] "   MENTAL STATUS EXAM                                                                                        Alert. Oriented to person, place, and date / time. Casually groomed,cooperative with fair eye contact. Speech: normal volume, rhythm, rate. Psychomotor: unremarkable Mood was anxious and affect was congruent to speech content and full range. Speech regular rate and rhythm.  Thought process linear, logical. NO suicidal ideation, homicidal ideation or psychotic thought. No hallucinations. Insight was fair. Judgment was intact and adequate for safety. Fund of knowledge was intact. Attention and concentration were impaired --- needed to redirect her during our visit back to topic of conversation.     ASSESSMENT                                                                                                      HISTORICAL:  Initial psych consult 6/17/15  Notes:             Gabapentin: lactation. buspar nausea and felt like was making her more sad. Topamax: tried dose of 25 mg and didn't help with appetite / weight  Nyasia Raya ADHD, MDD, recurrent, moderate to severe , RANDI and night terrors.     Nyasia went through a lot in her household growing up with mom with MS dad working all the time and 5 siblings. Nyasia took care of the household and she worked 2 jobs. Didn't end up finishing HS in [place] and looking back she is able to recognize had a lot on her plate. Diagnosed with ADHD in past but parents didn't want her on stimulants.        The more I've gotten to know her :  Bipolar II Disorder and she has become more accepting of sx. Last visit Nyasia was tangential and still with sx nanette. We increased Abilify and she is doing great today but wants to get rid of Abilify given the weight gain.      I discussed her Adderall and the effects they can have on mood. She doesn't think stimulants make things worse: in fact she feels they slow her down and help her sleep. WE today agreed on switching from abilify to  Lamictal. We reviewed potential SEs with Lamictal including Dandre blue.       TREATMENT RISK STATEMENT: The risks, benefits, alternatives and potential adverse effects have been explained and are understood by the pt. The pt agrees to the treatment plan with the ability to do so. The pt knows to call the clinic for any problems or access emergency care if needed. Substance use is not a problem as noted above.     DIAGNOSES           ADHD  MDD, recurrent, severe without psychosis  RANDI  Rule out bipolar disorder  Night terrors    PLAN                                                                                                                         1) MEDICATIONS:   --Discontinue Abilify. She discontinued Topamax.  Lamictal 25 mg daily for 2 weeks then increase to 50 mg daily for 2 weeks then increase to 75 mg daily.  Benadryl 75 to 100 mg HS and melatonin 3 mg HS last script for Adderall XR 30 mg twice daily and gave script today for 8/9/19    2) THERAPY: No Change    3) LABS: None    4) PT MONITOR [call for probs]: Worsening symptoms, side effects from medications, SI/HI    5) REFERRALS [CD tx, medical, tests other]: None    6)  RTC: ~1 month

## 2019-07-17 NOTE — NURSING NOTE
"Chief Complaint   Patient presents with     RECHECK     Depression, anxiety, ADHD.  Patient c/o weight gain with Abilify.  Stopped taking Topamax d/t it being of no help.  Also wants to discuss absence from work.  Would also like to switch to Adderall ER       Initial /76 (BP Location: Right arm, Patient Position: Sitting, Cuff Size: Adult Regular)   Pulse 91   Temp 97.8  F (36.6  C) (Tympanic)   Ht 1.499 m (4' 11\")   Wt 84.8 kg (187 lb)   SpO2 98%   BMI 37.77 kg/m   Estimated body mass index is 37.77 kg/m  as calculated from the following:    Height as of this encounter: 1.499 m (4' 11\").    Weight as of this encounter: 84.8 kg (187 lb).  Medication Reconciliation: complete    "

## 2019-07-18 ASSESSMENT — ANXIETY QUESTIONNAIRES: GAD7 TOTAL SCORE: 12

## 2019-08-05 ENCOUNTER — PATIENT OUTREACH (OUTPATIENT)
Dept: CARE COORDINATION | Facility: OTHER | Age: 29
End: 2019-08-05

## 2019-08-05 NOTE — PROGRESS NOTES
Clinic Care Coordination Contact  Care Team Conversations    Attended office visit 7/17/19 with patient, patient's son, and .  Please see 's note for plan of care.  Abilify will be discontinued and patient will start taking Lamictal.  Will contact patient in about 2-3 weeks to check in and notify her that hard copy Adderall is ready to be picked up at the clinic.  Encouraged patient to call or text if she has any questions, concerns, or needs. Patient verbalized understanding.     Lauren Mitchell RN-BSN  Care Coordination, Behavioral Health

## 2019-08-05 NOTE — PROGRESS NOTES
Clinic Care Coordination Contact  Care Team Conversations    2:32 PM    Received call back from patient.  She states she is doing well.  She forgot to increase her Lamictal to 50 mg but did just start the 50 mg yesterday.  She states it has been giving her a headache and some nausea but not too bad and she wants to continue this over the weight gain she had with the Abilify.    Advised that hard copy for Adderall is ready to be picked up at the lower level registration desk.      Encouraged to call back with any further questions, concerns, or needs. Patient verbalized understanding.     Lauren Mitchell RN-BSN  Care Coordination, Behavioral Health  [phone] option #7

## 2019-08-05 NOTE — PROGRESS NOTES
Clinic Care Coordination Contact  Care Team Conversations    2:02 PM    Attempted to contact patient to check in and notify that her August hard copy for Adderall is ready to be picked up.  Left message for patient.       Lauren Mitchell, RN-BSN  Care Coordination, Behavioral Health

## 2019-08-09 ENCOUNTER — TELEPHONE (OUTPATIENT)
Dept: PSYCHOLOGY | Facility: OTHER | Age: 29
End: 2019-08-09

## 2019-08-09 NOTE — TELEPHONE ENCOUNTER
Prescription picked up by Nyasia Raya  ID Verified Yes    ( her refill was under a patient outreach and I wasn't able to edit and put this note with that refill that was given to her)

## 2019-08-26 ENCOUNTER — OFFICE VISIT (OUTPATIENT)
Dept: PSYCHIATRY | Facility: OTHER | Age: 29
End: 2019-08-26
Attending: PSYCHIATRY & NEUROLOGY
Payer: COMMERCIAL

## 2019-08-26 VITALS
TEMPERATURE: 98.1 F | WEIGHT: 170 LBS | DIASTOLIC BLOOD PRESSURE: 68 MMHG | HEART RATE: 88 BPM | OXYGEN SATURATION: 96 % | SYSTOLIC BLOOD PRESSURE: 100 MMHG | BODY MASS INDEX: 34.27 KG/M2 | HEIGHT: 59 IN

## 2019-08-26 DIAGNOSIS — F90.2 ATTENTION DEFICIT HYPERACTIVITY DISORDER (ADHD), COMBINED TYPE: ICD-10-CM

## 2019-08-26 DIAGNOSIS — Z79.899 ENCOUNTER FOR LONG-TERM (CURRENT) USE OF MEDICATIONS: Primary | ICD-10-CM

## 2019-08-26 DIAGNOSIS — F31.9 BIPOLAR I DISORDER (H): ICD-10-CM

## 2019-08-26 PROCEDURE — 80307 DRUG TEST PRSMV CHEM ANLYZR: CPT | Mod: ZL

## 2019-08-26 PROCEDURE — 99214 OFFICE O/P EST MOD 30 MIN: CPT | Performed by: PSYCHIATRY & NEUROLOGY

## 2019-08-26 PROCEDURE — 99000 SPECIMEN HANDLING OFFICE-LAB: CPT | Performed by: PSYCHIATRY & NEUROLOGY

## 2019-08-26 RX ORDER — DEXTROAMPHETAMINE SACCHARATE, AMPHETAMINE ASPARTATE MONOHYDRATE, DEXTROAMPHETAMINE SULFATE AND AMPHETAMINE SULFATE 7.5; 7.5; 7.5; 7.5 MG/1; MG/1; MG/1; MG/1
30 CAPSULE, EXTENDED RELEASE ORAL 2 TIMES DAILY
Qty: 60 CAPSULE | Refills: 0 | Status: SHIPPED | OUTPATIENT
Start: 2019-10-04 | End: 2019-10-23

## 2019-08-26 RX ORDER — DEXTROAMPHETAMINE SACCHARATE, AMPHETAMINE ASPARTATE MONOHYDRATE, DEXTROAMPHETAMINE SULFATE AND AMPHETAMINE SULFATE 7.5; 7.5; 7.5; 7.5 MG/1; MG/1; MG/1; MG/1
30 CAPSULE, EXTENDED RELEASE ORAL 2 TIMES DAILY
Qty: 60 CAPSULE | Refills: 0 | Status: SHIPPED | OUTPATIENT
Start: 2019-09-07 | End: 2019-08-26

## 2019-08-26 RX ORDER — LAMOTRIGINE 100 MG/1
100 TABLET ORAL DAILY
Qty: 30 TABLET | Refills: 5 | Status: SHIPPED | OUTPATIENT
Start: 2019-08-26 | End: 2019-09-10

## 2019-08-26 ASSESSMENT — ANXIETY QUESTIONNAIRES
7. FEELING AFRAID AS IF SOMETHING AWFUL MIGHT HAPPEN: SEVERAL DAYS
GAD7 TOTAL SCORE: 13
5. BEING SO RESTLESS THAT IT IS HARD TO SIT STILL: SEVERAL DAYS
1. FEELING NERVOUS, ANXIOUS, OR ON EDGE: MORE THAN HALF THE DAYS
IF YOU CHECKED OFF ANY PROBLEMS ON THIS QUESTIONNAIRE, HOW DIFFICULT HAVE THESE PROBLEMS MADE IT FOR YOU TO DO YOUR WORK, TAKE CARE OF THINGS AT HOME, OR GET ALONG WITH OTHER PEOPLE: VERY DIFFICULT
2. NOT BEING ABLE TO STOP OR CONTROL WORRYING: MORE THAN HALF THE DAYS
6. BECOMING EASILY ANNOYED OR IRRITABLE: MORE THAN HALF THE DAYS
3. WORRYING TOO MUCH ABOUT DIFFERENT THINGS: MORE THAN HALF THE DAYS

## 2019-08-26 ASSESSMENT — PATIENT HEALTH QUESTIONNAIRE - PHQ9
SUM OF ALL RESPONSES TO PHQ QUESTIONS 1-9: 6
5. POOR APPETITE OR OVEREATING: NEARLY EVERY DAY

## 2019-08-26 ASSESSMENT — PAIN SCALES - GENERAL: PAINLEVEL: NO PAIN (0)

## 2019-08-26 ASSESSMENT — MIFFLIN-ST. JEOR: SCORE: 1411.74

## 2019-08-26 NOTE — NURSING NOTE
"Chief Complaint   Patient presents with     RECHECK     ADHD, depression. anxiety.  Patient reports Lamictal has been working well.       Initial /68 (BP Location: Left arm, Patient Position: Sitting, Cuff Size: Adult Large)   Pulse 88   Temp 98.1  F (36.7  C) (Tympanic)   Ht 1.499 m (4' 11\")   Wt 77.1 kg (170 lb)   SpO2 96%   BMI 34.34 kg/m   Estimated body mass index is 34.34 kg/m  as calculated from the following:    Height as of this encounter: 1.499 m (4' 11\").    Weight as of this encounter: 77.1 kg (170 lb).  Medication Reconciliation: complete    "

## 2019-08-26 NOTE — PROGRESS NOTES
"  PSYCHIATRY CLINIC PROGRESS NOTE   20 minute medication management, more than 50% of time spent counseling patient on medications, medication side effects, symptom history and management   SUBJECTIVE / INTERIM HISTORY                                                                       Last visit 7/17/19: --Discontinue Abilify. She discontinued Topamax.  Lamictal 25 mg daily for 2 weeks then increase to 50 mg daily for 2 weeks then increase to 75 mg daily.  Benadryl 75 to 100 mg HS and melatonin 3 mg HS last script for Adderall XR 30 mg twice daily and gave script today for 8/9/19   Social- Her fiance, Nu, [place] whom she met at [facility]. He is a good trent and keeps her on track Children-  5 year old is Wallace who is \"the happiest little trent in the world\".    - feels Lamictal is helping: doesn't get as amped up. Ruminative thoughts in past and then anger, irritable. Sleeping better.   - Nu is with today and also feels she is doing better. He is going to be [place] [employer] for [employer] 6th grade.  -is fitting back in some of her jeans since off Abilify  - NOT having the  manic sx: spending, impulsivity like she was .   - OF NOTE she didn't take her Adderall yesterday or today      SUBSTANCE USE- MJ in past    SYMPTOMS Not having as much of the : - irritability, easily crying, erratic sleep, distracted, poor attention & concentration,  sx ADD improve when takes Adderall. + anxiety, insomnia, overwhelmed. Still some couple - day - runs of poor sleep  MEDICAL ROS-  Weight hast stabilized, no skin rashes.  Back pain (spondylolisthesis), asthma (cough, SOB/wheezing)  MEDICAL / SURGICAL HISTORY                pregnant [if applicable]--no   Medical Team:     PMD- Dr. Devi       Therapist-  Patient Active Problem List   Diagnosis     Insomnia     Asthma     Spondylolisthesis     Family history of congenital heart defect     Night terrors, adult     Obesity     Family planning     Encounter for routine " "gynecological examination     Smoker     NO SHOW     Attention deficit hyperactivity disorder (ADHD), combined type     Congenital spondylolisthesis     ALLERGY   Patient has no known allergies.  MEDICATIONS                                                                                             Current Outpatient Medications   Medication Sig     albuterol (PROVENTIL HFA) 108 (90 Base) MCG/ACT inhaler Inhale 2 puffs into the lungs     amphetamine-dextroamphetamine (ADDERALL XR) 30 MG 24 hr capsule Take 1 capsule (30 mg) by mouth 2 times daily     diphenhydrAMINE (BENADRYL) 25 MG tablet Take 3-4 tabs bedtime     lamoTRIgine (LAMICTAL) 25 MG tablet Take 1 tablet (25 mg) by mouth daily for 14 days, THEN 2 tablets (50 mg) daily for 14 days, THEN 3 tablets (75 mg) daily.     melatonin 3 MG CAPS Take 1 capsule by mouth At Bedtime     No current facility-administered medications for this visit.        VITALS   /68 (BP Location: Left arm, Patient Position: Sitting, Cuff Size: Adult Large)   Pulse 88   Temp 98.1  F (36.7  C) (Tympanic)   Ht 1.499 m (4' 11\")   Wt 77.1 kg (170 lb)   SpO2 96%   BMI 34.34 kg/m       PHQ9                       PHQ-9 SCORE 6/19/2019 7/17/2019 8/26/2019   PHQ-9 Total Score - - -   PHQ-9 Total Score 6 5 6       LABS                                                                                                                           TSH   Date Value Ref Range Status   06/19/2019 0.41 0.40 - 4.00 mU/L Final   ]   MENTAL STATUS EXAM                                                                                        Alert. Oriented to person, place, and date / time. Casually groomed,cooperative with fair eye contact. Speech: normal volume, rhythm, rate. Psychomotor: unremarkable Mood was anxious and affect was congruent to speech content and full range. Speech regular rate and rhythm.  Thought process linear, logical. NO suicidal ideation, homicidal ideation or psychotic thought. " No hallucinations. Insight was fair. Judgment was intact and adequate for safety. Fund of knowledge was intact. Attention and concentration were impaired --- needed to redirect her during our visit back to topic of conversation.     ASSESSMENT                                                                                                      HISTORICAL:  Initial psych consult 6/17/15  Notes:             Gabapentin: lactation. buspar nausea and felt like was making her more sad. Topamax: tried dose of 25 mg and didn't help with appetite / weight  Nyasia Raya is a 30 yo with  ADHD, bipolar disorder and RANDI.      Nyasia went through a lot in her household growing up with mom with MS dad working all the time and 5 siblings. Nyasia took care of the household and she worked 2 jobs. Didn't end up finishing HS in [place] and looking back she is able to recognize had a lot on her plate. Diagnosed with ADHD in past but parents didn't want her on stimulants.      The more I've gotten to know her :  Bipolar II Disorder and she has become more accepting of sx. Lamictal IS helping however we feel worth trying a small increase. Nu with today and in agreement that she is overall     Controlled substance contract signed last visit. UDS today.    TREATMENT RISK STATEMENT: The risks, benefits, alternatives and potential adverse effects have been explained and are understood by the pt. The pt agrees to the treatment plan with the ability to do so. The pt knows to call the clinic for any problems or access emergency care if needed. Substance use is not a problem as noted above.     DIAGNOSES           ADHD  MDD, recurrent, severe without psychosis  RANDI  Rule out bipolar disorder  Night terrors    PLAN                                                                                                                         1) MEDICATIONS:   --Increase Lamictal from 75 mg to 100 mg daily.    Benadryl 75 to 100 mg HS and melatonin 3 mg  HS. Gave scripts today for  Adderall XR 30 mg twice daily9/7 and for 10/4     2) THERAPY: No Change    3) LABS: None    4) PT MONITOR [call for probs]: Worsening symptoms, side effects from medications, SI/HI    5) REFERRALS [CD tx, medical, tests other]: None    6)  RTC: ~2 months

## 2019-08-27 ASSESSMENT — ANXIETY QUESTIONNAIRES: GAD7 TOTAL SCORE: 13

## 2019-08-30 LAB — PAIN DRUG SCR UR W RPTD MEDS: NORMAL

## 2019-09-10 DIAGNOSIS — F31.10 BIPOLAR I DISORDER, MOST RECENT EPISODE (OR CURRENT) MANIC (H): ICD-10-CM

## 2019-09-10 DIAGNOSIS — F31.9 BIPOLAR I DISORDER (H): ICD-10-CM

## 2019-09-10 RX ORDER — LAMOTRIGINE 100 MG/1
100 TABLET ORAL DAILY
Qty: 30 TABLET | Refills: 3 | Status: SHIPPED | OUTPATIENT
Start: 2019-09-10 | End: 2019-11-12 | Stop reason: DRUGHIGH

## 2019-09-10 RX ORDER — LAMOTRIGINE 25 MG/1
TABLET ORAL
Qty: 84 TABLET | Refills: 0 | OUTPATIENT
Start: 2019-09-10

## 2019-10-23 ENCOUNTER — OFFICE VISIT (OUTPATIENT)
Dept: PSYCHIATRY | Facility: OTHER | Age: 29
End: 2019-10-23
Attending: PSYCHIATRY & NEUROLOGY
Payer: COMMERCIAL

## 2019-10-23 VITALS
BODY MASS INDEX: 35.35 KG/M2 | DIASTOLIC BLOOD PRESSURE: 82 MMHG | WEIGHT: 175 LBS | OXYGEN SATURATION: 97 % | TEMPERATURE: 98.3 F | SYSTOLIC BLOOD PRESSURE: 116 MMHG | HEART RATE: 95 BPM

## 2019-10-23 DIAGNOSIS — F90.2 ATTENTION DEFICIT HYPERACTIVITY DISORDER (ADHD), COMBINED TYPE: ICD-10-CM

## 2019-10-23 PROCEDURE — 99214 OFFICE O/P EST MOD 30 MIN: CPT | Performed by: PSYCHIATRY & NEUROLOGY

## 2019-10-23 RX ORDER — DEXTROAMPHETAMINE SACCHARATE, AMPHETAMINE ASPARTATE MONOHYDRATE, DEXTROAMPHETAMINE SULFATE AND AMPHETAMINE SULFATE 7.5; 7.5; 7.5; 7.5 MG/1; MG/1; MG/1; MG/1
30 CAPSULE, EXTENDED RELEASE ORAL 2 TIMES DAILY
Qty: 60 CAPSULE | Refills: 0 | Status: SHIPPED | OUTPATIENT
Start: 2019-11-01 | End: 2019-10-23

## 2019-10-23 RX ORDER — DEXTROAMPHETAMINE SACCHARATE, AMPHETAMINE ASPARTATE MONOHYDRATE, DEXTROAMPHETAMINE SULFATE AND AMPHETAMINE SULFATE 7.5; 7.5; 7.5; 7.5 MG/1; MG/1; MG/1; MG/1
30 CAPSULE, EXTENDED RELEASE ORAL 2 TIMES DAILY
Qty: 60 CAPSULE | Refills: 0 | Status: SHIPPED | OUTPATIENT
Start: 2019-11-29 | End: 2019-12-10

## 2019-10-23 ASSESSMENT — ANXIETY QUESTIONNAIRES
3. WORRYING TOO MUCH ABOUT DIFFERENT THINGS: NOT AT ALL
2. NOT BEING ABLE TO STOP OR CONTROL WORRYING: SEVERAL DAYS
GAD7 TOTAL SCORE: 3
5. BEING SO RESTLESS THAT IT IS HARD TO SIT STILL: NOT AT ALL
7. FEELING AFRAID AS IF SOMETHING AWFUL MIGHT HAPPEN: NOT AT ALL
1. FEELING NERVOUS, ANXIOUS, OR ON EDGE: SEVERAL DAYS
6. BECOMING EASILY ANNOYED OR IRRITABLE: NOT AT ALL

## 2019-10-23 ASSESSMENT — PATIENT HEALTH QUESTIONNAIRE - PHQ9
SUM OF ALL RESPONSES TO PHQ QUESTIONS 1-9: 4
5. POOR APPETITE OR OVEREATING: SEVERAL DAYS

## 2019-10-23 ASSESSMENT — PAIN SCALES - GENERAL: PAINLEVEL: NO PAIN (0)

## 2019-10-23 NOTE — NURSING NOTE
"Chief Complaint   Patient presents with     RECHECK     ADHD   Patient reports Lamictal is working well.       Initial /82 (BP Location: Left arm, Patient Position: Sitting, Cuff Size: Adult Large)   Pulse 95   Temp 98.3  F (36.8  C) (Tympanic)   Wt 79.4 kg (175 lb)   SpO2 97%   BMI 35.35 kg/m   Estimated body mass index is 35.35 kg/m  as calculated from the following:    Height as of 8/26/19: 1.499 m (4' 11\").    Weight as of this encounter: 79.4 kg (175 lb).  Medication Reconciliation: complete  LARRY LYN LPN    "

## 2019-10-23 NOTE — PROGRESS NOTES
"  PSYCHIATRY CLINIC PROGRESS NOTE   20 minute medication management, more than 50% of time spent counseling patient on medications, medication side effects, symptom history and management   SUBJECTIVE / INTERIM HISTORY                                                                       Last visit 7/17/19: --Discontinue Abilify. She discontinued Topamax.  Lamictal 25 mg daily for 2 weeks then increase to 50 mg daily for 2 weeks then increase to 75 mg daily.  Benadryl 75 to 100 mg HS and melatonin 3 mg HS last script for Adderall XR 30 mg twice daily and gave script today for 8/9/19   Social- Her fiance, Nu, [place] whom she met at [facility]. He is a good trent and keeps her on track Children-  5 year old is Wallace who is \"the happiest little trent in the world\".    - feels Lamictal is helping: doesn't get as amped up. Nu is with today and also thinks Lamictal is helping.   - going to [place] for work in November  - still struggles with sleep off and on  - NOT having the  manic sx: spending, impulsivity like she was .    SUBSTANCE USE- MJ in past    SYMPTOMS Not having as much of the : - irritability, easily crying, erratic sleep, distracted, poor attention & concentration,  sx ADD improve when takes Adderall. + anxiety, insomnia, overwhelmed. Still some couple - day - runs of poor sleep  MEDICAL ROS-  Weight hast stabilized, no skin rashes.  Back pain (spondylolisthesis), asthma (cough, SOB/wheezing)  MEDICAL / SURGICAL HISTORY                pregnant [if applicable]--no   Medical Team:     PMD- Dr. Devi       Therapist-  Patient Active Problem List   Diagnosis     Insomnia     Asthma     Spondylolisthesis     Family history of congenital heart defect     Night terrors, adult     Obesity     Family planning     Encounter for routine gynecological examination     Smoker     NO SHOW     Attention deficit hyperactivity disorder (ADHD), combined type     Congenital spondylolisthesis     ALLERGY   Patient has no known " allergies.  MEDICATIONS                                                                                             Current Outpatient Medications   Medication Sig     albuterol (PROVENTIL HFA) 108 (90 Base) MCG/ACT inhaler Inhale 2 puffs into the lungs     [START ON 11/29/2019] amphetamine-dextroamphetamine (ADDERALL XR) 30 MG 24 hr capsule Take 1 capsule (30 mg) by mouth 2 times daily     diphenhydrAMINE (BENADRYL) 25 MG tablet Take 3-4 tabs bedtime     lamoTRIgine (LAMICTAL) 100 MG tablet Take 1 tablet (100 mg) by mouth daily     melatonin 3 MG CAPS Take 1 capsule by mouth At Bedtime     No current facility-administered medications for this visit.        VITALS   /82 (BP Location: Left arm, Patient Position: Sitting, Cuff Size: Adult Large)   Pulse 95   Temp 98.3  F (36.8  C) (Tympanic)   Wt 79.4 kg (175 lb)   SpO2 97%   BMI 35.35 kg/m       PHQ9                       PHQ-9 SCORE 7/17/2019 8/26/2019 10/23/2019   PHQ-9 Total Score - - -   PHQ-9 Total Score 5 6 4       LABS                                                                                                                           TSH   Date Value Ref Range Status   06/19/2019 0.41 0.40 - 4.00 mU/L Final   ]   MENTAL STATUS EXAM                                                                                        Alert. Oriented to person, place, and date / time. Casually groomed,cooperative with fair eye contact. Speech: normal volume, rhythm, rate. Psychomotor: unremarkable Mood was anxious and affect was congruent to speech content and full range. Speech regular rate and rhythm.  Thought process linear, logical. NO suicidal ideation, homicidal ideation or psychotic thought. No hallucinations. Insight was fair. Judgment was intact and adequate for safety. Fund of knowledge was intact. Attention and concentration were impaired --- needed to redirect her during our visit back to topic of conversation.     ASSESSMENT                                                                                                       HISTORICAL:  Initial psych consult 6/17/15  Notes:             Gabapentin: lactation. buspar nausea and felt like was making her more sad. Topamax: tried dose of 25 mg and didn't help with appetite / weight  Nyasia Raya is a 30 yo with  ADHD, bipolar disorder and RANDI.      Nyasia went through a lot in her household growing up with mom with MS dad working all the time and 5 siblings. Nyasia took care of the household and she worked 2 jobs. Didn't end up finishing HS in [place] and looking back she is able to recognize had a lot on her plate. Diagnosed with ADHD in past but parents didn't want her on stimulants.      The more I've gotten to know her :  Bipolar II Disorder and she has become more accepting of sx. Lamictal IS helping however we felt try small increase last visit. Today Boima with and they both feel she is doing well. We agreed leave meds as are     Controlled substance contract signed and UDS in place given prescribed stimulant for ADHD.    TREATMENT RISK STATEMENT: The risks, benefits, alternatives and potential adverse effects have been explained and are understood by the pt. The pt agrees to the treatment plan with the ability to do so. The pt knows to call the clinic for any problems or access emergency care if needed. Substance use is not a problem as noted above.     DIAGNOSES           ADHD  MDD, recurrent, severe without psychosis  RANDI  Rule out bipolar disorder  Night terrors    PLAN                                                                                                                         1) MEDICATIONS:   --Continue Lamictal 100 mg daily.    Benadryl 75 to 100 mg HS and melatonin 3 mg HS. Last script for Adderall XR 30 mg 10/4  And gave scripts today for 11/1 and for 11/29    2) THERAPY: No Change    3) LABS: None    4) PT MONITOR [call for probs]: Worsening symptoms, side effects from medications,  SI/HI    5) REFERRALS [CD tx, medical, tests other]: None    6)  RTC: ~2 months

## 2019-10-24 ASSESSMENT — ANXIETY QUESTIONNAIRES: GAD7 TOTAL SCORE: 3

## 2019-11-12 ENCOUNTER — PATIENT OUTREACH (OUTPATIENT)
Dept: CARE COORDINATION | Facility: OTHER | Age: 29
End: 2019-11-12

## 2019-11-12 ENCOUNTER — OFFICE VISIT (OUTPATIENT)
Dept: PSYCHIATRY | Facility: OTHER | Age: 29
End: 2019-11-12
Attending: PSYCHIATRY & NEUROLOGY
Payer: COMMERCIAL

## 2019-11-12 VITALS
BODY MASS INDEX: 35.28 KG/M2 | OXYGEN SATURATION: 98 % | HEART RATE: 107 BPM | HEIGHT: 59 IN | WEIGHT: 175 LBS | SYSTOLIC BLOOD PRESSURE: 122 MMHG | TEMPERATURE: 98.1 F | DIASTOLIC BLOOD PRESSURE: 84 MMHG

## 2019-11-12 DIAGNOSIS — F31.30 BIPOLAR I DISORDER, MOST RECENT EPISODE DEPRESSED (H): Primary | ICD-10-CM

## 2019-11-12 DIAGNOSIS — F41.1 GAD (GENERALIZED ANXIETY DISORDER): ICD-10-CM

## 2019-11-12 PROCEDURE — 99214 OFFICE O/P EST MOD 30 MIN: CPT | Performed by: PSYCHIATRY & NEUROLOGY

## 2019-11-12 RX ORDER — HYDROXYZINE HYDROCHLORIDE 10 MG/1
10 TABLET, FILM COATED ORAL 3 TIMES DAILY PRN
Qty: 90 TABLET | Refills: 3 | Status: SHIPPED | OUTPATIENT
Start: 2019-11-12 | End: 2020-02-04 | Stop reason: ALTCHOICE

## 2019-11-12 RX ORDER — LAMOTRIGINE 25 MG/1
TABLET ORAL
Qty: 120 TABLET | Refills: 3 | Status: SHIPPED | OUTPATIENT
Start: 2019-11-12 | End: 2020-03-10

## 2019-11-12 ASSESSMENT — ANXIETY QUESTIONNAIRES
IF YOU CHECKED OFF ANY PROBLEMS ON THIS QUESTIONNAIRE, HOW DIFFICULT HAVE THESE PROBLEMS MADE IT FOR YOU TO DO YOUR WORK, TAKE CARE OF THINGS AT HOME, OR GET ALONG WITH OTHER PEOPLE: EXTREMELY DIFFICULT
2. NOT BEING ABLE TO STOP OR CONTROL WORRYING: NEARLY EVERY DAY
GAD7 TOTAL SCORE: 21
1. FEELING NERVOUS, ANXIOUS, OR ON EDGE: NEARLY EVERY DAY
3. WORRYING TOO MUCH ABOUT DIFFERENT THINGS: NEARLY EVERY DAY
5. BEING SO RESTLESS THAT IT IS HARD TO SIT STILL: NEARLY EVERY DAY
7. FEELING AFRAID AS IF SOMETHING AWFUL MIGHT HAPPEN: NEARLY EVERY DAY
6. BECOMING EASILY ANNOYED OR IRRITABLE: NEARLY EVERY DAY

## 2019-11-12 ASSESSMENT — PATIENT HEALTH QUESTIONNAIRE - PHQ9
SUM OF ALL RESPONSES TO PHQ QUESTIONS 1-9: 19
5. POOR APPETITE OR OVEREATING: NEARLY EVERY DAY

## 2019-11-12 ASSESSMENT — PAIN SCALES - GENERAL: PAINLEVEL: SEVERE PAIN (6)

## 2019-11-12 ASSESSMENT — MIFFLIN-ST. JEOR: SCORE: 1434.42

## 2019-11-12 NOTE — NURSING NOTE
"Chief Complaint   Patient presents with     RECHECK     ADHD, depression, anxiety.  Patient c/o panic attacks.       Initial /84 (BP Location: Right arm, Patient Position: Sitting, Cuff Size: Adult Regular)   Pulse 107   Temp 98.1  F (36.7  C) (Tympanic)   Ht 1.499 m (4' 11\")   Wt 79.4 kg (175 lb)   SpO2 98%   BMI 35.35 kg/m   Estimated body mass index is 35.35 kg/m  as calculated from the following:    Height as of this encounter: 1.499 m (4' 11\").    Weight as of this encounter: 79.4 kg (175 lb).  Medication Reconciliation: complete  LARRY LYN LPN    "

## 2019-11-12 NOTE — PROGRESS NOTES
"  PSYCHIATRY CLINIC PROGRESS NOTE   20 minute medication management, more than 50% of time spent counseling patient on medications, medication side effects, symptom history and management   SUBJECTIVE / INTERIM HISTORY                                                                       Last visit : --Continue Lamictal 100 mg daily.    Benadryl 75 to 100 mg HS and melatonin 3 mg HS. Last script for Adderall XR 30 mg 10/4  And gave scripts today for 11/1 and for 11/29     Social-  He is a good trent and keeps her on track Children-  5 year old is Wallace who is \"the happiest little trent in the world\".    - her and Nu split. She is all out of sort. Panic attacks: mind can't stop, feeling like a rock in her stomach. Frequency has increased.  - Nyasia notes last visit she wasn't completely open because Nu was with. Nyasia notes she felt she couldn't say what she wanted to because Boima would twist things and challenge her  - feels Lamictal is helping: doesn't get as amped up. Nu is with today and also thinks Lamictal is helping.   - going to [employer] for work in November  - still struggles with sleep off and on  - NOT having the  manic sx: spending, impulsivity like she was .    SUBSTANCE USE- MJ in past    SYMPTOMS Not having as much of the : - irritability, easily crying, erratic sleep, distracted, poor attention & concentration,  sx ADD improve when takes Adderall. + anxiety, insomnia, overwhelmed. Still some couple - day - runs of poor sleep  MEDICAL ROS-  Weight hast stabilized, no skin rashes.  Back pain (spondylolisthesis), asthma (cough, SOB/wheezing)  MEDICAL / SURGICAL HISTORY                pregnant [if applicable]--no   Medical Team:     PMD- Dr. Devi       Therapist-  Patient Active Problem List   Diagnosis     Insomnia     Asthma     Spondylolisthesis     Family history of congenital heart defect     Night terrors, adult     Obesity     Family planning     Encounter for routine gynecological " "examination     Smoker     NO SHOW     Attention deficit hyperactivity disorder (ADHD), combined type     Congenital spondylolisthesis     ALLERGY   Patient has no known allergies.  MEDICATIONS                                                                                             Current Outpatient Medications   Medication Sig     [START ON 11/29/2019] amphetamine-dextroamphetamine (ADDERALL XR) 30 MG 24 hr capsule Take 1 capsule (30 mg) by mouth 2 times daily     diphenhydrAMINE (BENADRYL) 25 MG tablet Take 3-4 tabs bedtime     melatonin 3 MG CAPS Take 1 capsule by mouth At Bedtime     albuterol (PROVENTIL HFA) 108 (90 Base) MCG/ACT inhaler Inhale 2 puffs into the lungs     lamoTRIgine (LAMICTAL) 100 MG tablet Take 1 tablet (100 mg) by mouth daily (Patient not taking: Reported on 11/12/2019)     No current facility-administered medications for this visit.        VITALS   /84 (BP Location: Right arm, Patient Position: Sitting, Cuff Size: Adult Regular)   Pulse 107   Temp 98.1  F (36.7  C) (Tympanic)   Ht 1.499 m (4' 11\")   Wt 79.4 kg (175 lb)   SpO2 98%   BMI 35.35 kg/m       PHQ9                       PHQ-9 SCORE 8/26/2019 10/23/2019 11/12/2019   PHQ-9 Total Score - - -   PHQ-9 Total Score 6 4 19       LABS                                                                                                                           TSH   Date Value Ref Range Status   06/19/2019 0.41 0.40 - 4.00 mU/L Final   ]   MENTAL STATUS EXAM                                                                                        Alert. Oriented to person, place, and date / time. Casually groomed,cooperative with fair eye contact. Speech: normal volume, rhythm, rate. Psychomotor: unremarkable Mood was anxious and affect was congruent to speech content and full range. Speech regular rate and rhythm.  Thought process linear, logical. NO suicidal ideation, homicidal ideation or psychotic thought. No hallucinations. " Insight was fair. Judgment was intact and adequate for safety. Fund of knowledge was intact. Attention and concentration were impaired --- needed to redirect her during our visit back to topic of conversation.     ASSESSMENT                                                                                                      HISTORICAL:  Initial psych consult 6/17/15  Notes:             Gabapentin: lactation. buspar nausea and felt like was making her more sad. Topamax: tried dose of 25 mg and didn't help with appetite / weight  Nyasia Raya is a 28 yo with  ADHD, bipolar disorder and RANDI.      Nyasia went through a lot in her household growing up with mom with MS dad working all the time and 5 siblings. Nyasia took care of the household and she worked 2 jobs. Didn't end up finishing HS in [employer] and looking back she is able to recognize had a lot on her plate. Diagnosed with ADHD in past but parents didn't want her on stimulants.      The more I've gotten to know her :  Bipolar II Disorder and she has become more accepting of sx. Lamictal IS helping however we felt try small increase last visit. Today Boima with and they both feel she is doing well. We agreed leave meds as are     Controlled substance contract signed and UDS in place given prescribed stimulant for ADHD.    TREATMENT RISK STATEMENT: The risks, benefits, alternatives and potential adverse effects have been explained and are understood by the pt. The pt agrees to the treatment plan with the ability to do so. The pt knows to call the clinic for any problems or access emergency care if needed. Substance use is not a problem as noted above.     DIAGNOSES           ADHD  MDD, recurrent, severe without psychosis  RANDI  Rule out bipolar disorder  Night terrors    PLAN                                                                                                                         1) MEDICATIONS:   --Lamictal was 100 mg daily: we need to retitrate to  25 mg daily 2 weeks; then increase to 50 mg daily for 2 weeks then increase to 50 mg twice daily. We are going to have her try hydroxyzine 10 mg up to 3 times daily as needed for panic / anxiety.  Benadryl 75 to 100 mg HS and melatonin 3 mg HS.  2) THERAPY: No Change    3) LABS: None    4) PT MONITOR [call for probs]: Worsening symptoms, side effects from medications, SI/HI    5) REFERRALS [CD tx, medical, tests other]: None    6)  RTC: ~1 month

## 2019-11-12 NOTE — TELEPHONE ENCOUNTER
Clinic Care Coordination Contact  Care Team Conversations    Received voicemail from patient stating she had made an emergency appointment 11/8/19 and was unable to make it due to personal issues.    Contacted patient.  Scheduled patient for 11/12/19.  Encouraged patient to call with any other questions or concerns or if she is unable to make it to appointment today.     Lauren Mitchell, RN-BSN  Care Coordination, Behavioral Health

## 2019-11-13 ASSESSMENT — ANXIETY QUESTIONNAIRES: GAD7 TOTAL SCORE: 21

## 2019-12-10 ENCOUNTER — OFFICE VISIT (OUTPATIENT)
Dept: PSYCHIATRY | Facility: OTHER | Age: 29
End: 2019-12-10
Attending: PSYCHIATRY & NEUROLOGY
Payer: COMMERCIAL

## 2019-12-10 VITALS
DIASTOLIC BLOOD PRESSURE: 82 MMHG | HEART RATE: 114 BPM | HEIGHT: 59 IN | OXYGEN SATURATION: 98 % | SYSTOLIC BLOOD PRESSURE: 116 MMHG | TEMPERATURE: 98.6 F | WEIGHT: 160 LBS | BODY MASS INDEX: 32.25 KG/M2

## 2019-12-10 DIAGNOSIS — F41.0 PANIC DISORDER WITHOUT AGORAPHOBIA: Primary | ICD-10-CM

## 2019-12-10 DIAGNOSIS — F90.2 ATTENTION DEFICIT HYPERACTIVITY DISORDER (ADHD), COMBINED TYPE: ICD-10-CM

## 2019-12-10 PROCEDURE — 99214 OFFICE O/P EST MOD 30 MIN: CPT | Performed by: PSYCHIATRY & NEUROLOGY

## 2019-12-10 RX ORDER — DEXTROAMPHETAMINE SACCHARATE, AMPHETAMINE ASPARTATE MONOHYDRATE, DEXTROAMPHETAMINE SULFATE AND AMPHETAMINE SULFATE 7.5; 7.5; 7.5; 7.5 MG/1; MG/1; MG/1; MG/1
30 CAPSULE, EXTENDED RELEASE ORAL 2 TIMES DAILY
Qty: 60 CAPSULE | Refills: 0 | Status: SHIPPED | OUTPATIENT
Start: 2019-12-27 | End: 2020-02-04

## 2019-12-10 RX ORDER — MIRTAZAPINE 15 MG/1
7.5 TABLET, FILM COATED ORAL AT BEDTIME
Qty: 15 TABLET | Refills: 3 | Status: SHIPPED | OUTPATIENT
Start: 2019-12-10 | End: 2020-02-14

## 2019-12-10 ASSESSMENT — ANXIETY QUESTIONNAIRES
6. BECOMING EASILY ANNOYED OR IRRITABLE: MORE THAN HALF THE DAYS
5. BEING SO RESTLESS THAT IT IS HARD TO SIT STILL: MORE THAN HALF THE DAYS
GAD7 TOTAL SCORE: 18
3. WORRYING TOO MUCH ABOUT DIFFERENT THINGS: NEARLY EVERY DAY
1. FEELING NERVOUS, ANXIOUS, OR ON EDGE: NEARLY EVERY DAY
7. FEELING AFRAID AS IF SOMETHING AWFUL MIGHT HAPPEN: MORE THAN HALF THE DAYS
IF YOU CHECKED OFF ANY PROBLEMS ON THIS QUESTIONNAIRE, HOW DIFFICULT HAVE THESE PROBLEMS MADE IT FOR YOU TO DO YOUR WORK, TAKE CARE OF THINGS AT HOME, OR GET ALONG WITH OTHER PEOPLE: EXTREMELY DIFFICULT
2. NOT BEING ABLE TO STOP OR CONTROL WORRYING: NEARLY EVERY DAY

## 2019-12-10 ASSESSMENT — PAIN SCALES - GENERAL: PAINLEVEL: SEVERE PAIN (6)

## 2019-12-10 ASSESSMENT — MIFFLIN-ST. JEOR: SCORE: 1366.39

## 2019-12-10 ASSESSMENT — PATIENT HEALTH QUESTIONNAIRE - PHQ9
5. POOR APPETITE OR OVEREATING: NEARLY EVERY DAY
SUM OF ALL RESPONSES TO PHQ QUESTIONS 1-9: 17

## 2019-12-10 NOTE — PROGRESS NOTES
"  PSYCHIATRY CLINIC PROGRESS NOTE   20 minute medication management, more than 50% of time spent counseling patient on medications, medication side effects, symptom history and management   SUBJECTIVE / INTERIM HISTORY                                                                       Last visit : Lamictal was 100 mg daily: we need to retitrate to 25 mg daily 2 weeks; then increase to 50 mg daily for 2 weeks then increase to 50 mg twice daily. We are going to have her try hydroxyzine 10 mg up to 3 times daily as needed for panic / anxiety.  Benadryl 75 to 100 mg HS and melatonin 3 mg HS.      Social-  He is a good trent and keeps her on track Children-  5 year old is Wallace who is \"the happiest little trent in the world\".  - hydroxyzine NOT helping at all with anxiety. Still having a really rough time with panic attacks. Notes she can handle the general anxiety -> it's more the panic attacks which are twice per week  - Nu still living with her and Wallace. Is really tough  - Nu has been taking her Lamictal and keeping in his pocket. Feels like a control issues  - had a period recently in which felt manic and was buying things she knew she couldn't afford  - still struggles with sleep off and on    SUBSTANCE USE- MJ in past    SYMPTOMS Not having as much of the : - irritability, easily crying, erratic sleep, distracted, poor attention & concentration,  sx ADD improve when takes Adderall. + anxiety, insomnia, overwhelmed.IS having sx panic and anxiety. Panic attacks were 4 times per week and now are 2 times per week  MEDICAL ROS-  Decreased appetite. no skin rashes.  Back pain (spondylolisthesis), asthma (cough, SOB/wheezing)  MEDICAL / SURGICAL HISTORY                pregnant [if applicable]--no   Medical Team:     PMD- Dr. Devi       Therapist-  Patient Active Problem List   Diagnosis     Insomnia     Asthma     Spondylolisthesis     Family history of congenital heart defect     Night terrors, adult     Obesity " "    Family planning     Encounter for routine gynecological examination     Smoker     NO SHOW     Attention deficit hyperactivity disorder (ADHD), combined type     Congenital spondylolisthesis     ALLERGY   Patient has no known allergies.  MEDICATIONS                                                                                             Current Outpatient Medications   Medication Sig     albuterol (PROVENTIL HFA) 108 (90 Base) MCG/ACT inhaler Inhale 2 puffs into the lungs     amphetamine-dextroamphetamine (ADDERALL XR) 30 MG 24 hr capsule Take 1 capsule (30 mg) by mouth 2 times daily     diphenhydrAMINE (BENADRYL) 25 MG tablet Take 3-4 tabs bedtime     lamoTRIgine (LAMICTAL) 25 MG tablet Take 1 tablet (25 mg) daily for 2 weeks; then increase to 2 tablets (50 mg) daily for 2 weeks; then increase to 2 tablets (50 mg) twice daily     melatonin 3 MG CAPS Take 1 capsule by mouth At Bedtime     hydrOXYzine (ATARAX) 10 MG tablet Take 1 tablet (10 mg) by mouth 3 times daily as needed for anxiety (Patient not taking: Reported on 12/10/2019)     No current facility-administered medications for this visit.        VITALS   /82 (BP Location: Left arm, Patient Position: Sitting, Cuff Size: Adult Large)   Pulse 114   Temp 98.6  F (37  C) (Tympanic)   Ht 1.499 m (4' 11\")   Wt 72.6 kg (160 lb)   SpO2 98%   BMI 32.32 kg/m       PHQ9                       PHQ-9 SCORE 10/23/2019 11/12/2019 12/10/2019   PHQ-9 Total Score - - -   PHQ-9 Total Score 4 19 17       LABS                                                                                                                           TSH   Date Value Ref Range Status   06/19/2019 0.41 0.40 - 4.00 mU/L Final   ]   MENTAL STATUS EXAM                                                                                        Alert. Oriented to person, place, and date / time. Casually groomed,cooperative with fair eye contact. Speech: hoarse, normal rhythm, rate. Psychomotor: " unremarkable Mood was anxious and affect was congruent to speech content and full range. Speech regular rate and rhythm.  Thought process linear, logical. NO suicidal ideation, homicidal ideation or psychotic thought. No hallucinations. Insight was fair. Judgment was intact and adequate for safety. Fund of knowledge was intact. Attention and concentration were impaired --- needed to redirect her during our visit back to topic of conversation.     ASSESSMENT                                                                                                      HISTORICAL:  Initial psych consult 6/17/15  Notes:             Gabapentin :  headaches: lactation. buspar nausea and felt like was making her more sad. Topamax: tried dose of 25 mg and didn't help with appetite / weight  Nyasia Raya is a 28 yo with  ADHD, bipolar disorder and RANDI.      Nyasia went through a lot in her household growing up with mom with MS dad working all the time and 5 siblings. Nyasia took care of the household and she worked 2 jobs. Didn't end up finishing HS in [place] and looking back she is able to recognize had a lot on her plate. Diagnosed with ADHD in past but parents didn't want her on stimulants.      The more I've gotten to know her :  Bipolar II Disorder and she has become more accepting of sx. Major issues going on with Nu and though not together they are living with each other still. Panic is the main issue - hydroxyzine did nOT help. We are going to have her try mirtazapine and discussed common sEs including sedation, weight gain.    Controlled substance contract signed and UDS in place given prescribed stimulant for ADHD.    TREATMENT RISK STATEMENT: The risks, benefits, alternatives and potential adverse effects have been explained and are understood by the pt. The pt agrees to the treatment plan with the ability to do so. The pt knows to call the clinic for any problems or access emergency care if needed. Substance use is  not a problem as noted above.     DIAGNOSES           Bipolar II disorder  ADHD  RANDI    Night terrors    PLAN                                                                                                                         1) MEDICATIONS:   --Lamictal back to 100 mg dailiy now. Start mirtazapine 7.5 mg HS. Discontinue hydroxyzine 10 mg up to 3 times daily as needed for panic / anxiety. Continue   Benadryl 75 to 100 mg HS and melatonin 3 mg HS.  2) THERAPY: No Change    3) LABS: None    4) PT MONITOR [call for probs]: Worsening symptoms, side effects from medications, SI/HI    5) REFERRALS [CD tx, medical, tests other]: None    6)  RTC: ~1 month

## 2019-12-10 NOTE — NURSING NOTE
"Chief Complaint   Patient presents with     RECHECK     Mental health.       Initial /82 (BP Location: Left arm, Patient Position: Sitting, Cuff Size: Adult Large)   Pulse 114   Temp 98.6  F (37  C) (Tympanic)   Ht 1.499 m (4' 11\")   Wt 72.6 kg (160 lb)   SpO2 98%   BMI 32.32 kg/m   Estimated body mass index is 32.32 kg/m  as calculated from the following:    Height as of this encounter: 1.499 m (4' 11\").    Weight as of this encounter: 72.6 kg (160 lb).  Medication Reconciliation: complete  LARRY LYN LPN    "

## 2019-12-11 ASSESSMENT — ANXIETY QUESTIONNAIRES: GAD7 TOTAL SCORE: 18

## 2020-01-09 NOTE — PROGRESS NOTES
I met with patient who is here for multiple complaints.  She appears very anxious to me.  States she is moving in a month.   Has a 5 year old child at home who is with her fiance.  She wants a follow up appt with Dr Devi.  I made on on April 4th at 10am.  Pt really has no other complaints to me at this time.  Discussed with Dr Lorenzo.  I will stand by if patient requires further  interventions.    Patsy Villafuerte, DEVON   Care Transitions  
Detail Level: Simple
Additional Notes: P;EASE DO A NO CHARGE ON THIS PATIENT   THANK  YOU

## 2020-02-04 ENCOUNTER — OFFICE VISIT (OUTPATIENT)
Dept: PSYCHIATRY | Facility: OTHER | Age: 30
End: 2020-02-04
Attending: PSYCHIATRY & NEUROLOGY
Payer: COMMERCIAL

## 2020-02-04 VITALS
SYSTOLIC BLOOD PRESSURE: 104 MMHG | HEIGHT: 59 IN | BODY MASS INDEX: 37.29 KG/M2 | DIASTOLIC BLOOD PRESSURE: 76 MMHG | WEIGHT: 185 LBS

## 2020-02-04 DIAGNOSIS — F90.2 ATTENTION DEFICIT HYPERACTIVITY DISORDER (ADHD), COMBINED TYPE: ICD-10-CM

## 2020-02-04 DIAGNOSIS — F41.1 GAD (GENERALIZED ANXIETY DISORDER): Primary | ICD-10-CM

## 2020-02-04 PROCEDURE — 99214 OFFICE O/P EST MOD 30 MIN: CPT | Performed by: PSYCHIATRY & NEUROLOGY

## 2020-02-04 RX ORDER — SERTRALINE HYDROCHLORIDE 100 MG/1
TABLET, FILM COATED ORAL
Qty: 30 TABLET | Refills: 3 | Status: SHIPPED | OUTPATIENT
Start: 2020-02-04 | End: 2020-03-24

## 2020-02-04 RX ORDER — DEXTROAMPHETAMINE SACCHARATE, AMPHETAMINE ASPARTATE MONOHYDRATE, DEXTROAMPHETAMINE SULFATE AND AMPHETAMINE SULFATE 7.5; 7.5; 7.5; 7.5 MG/1; MG/1; MG/1; MG/1
30 CAPSULE, EXTENDED RELEASE ORAL 2 TIMES DAILY
Qty: 60 CAPSULE | Refills: 0 | Status: SHIPPED | OUTPATIENT
Start: 2020-03-03 | End: 2020-02-14

## 2020-02-04 RX ORDER — DEXTROAMPHETAMINE SACCHARATE, AMPHETAMINE ASPARTATE MONOHYDRATE, DEXTROAMPHETAMINE SULFATE AND AMPHETAMINE SULFATE 7.5; 7.5; 7.5; 7.5 MG/1; MG/1; MG/1; MG/1
30 CAPSULE, EXTENDED RELEASE ORAL 2 TIMES DAILY
Qty: 60 CAPSULE | Refills: 0 | Status: SHIPPED | OUTPATIENT
Start: 2020-02-04 | End: 2020-03-24

## 2020-02-04 ASSESSMENT — ANXIETY QUESTIONNAIRES
7. FEELING AFRAID AS IF SOMETHING AWFUL MIGHT HAPPEN: NOT AT ALL
6. BECOMING EASILY ANNOYED OR IRRITABLE: NOT AT ALL
IF YOU CHECKED OFF ANY PROBLEMS ON THIS QUESTIONNAIRE, HOW DIFFICULT HAVE THESE PROBLEMS MADE IT FOR YOU TO DO YOUR WORK, TAKE CARE OF THINGS AT HOME, OR GET ALONG WITH OTHER PEOPLE: VERY DIFFICULT
GAD7 TOTAL SCORE: 8
2. NOT BEING ABLE TO STOP OR CONTROL WORRYING: MORE THAN HALF THE DAYS
1. FEELING NERVOUS, ANXIOUS, OR ON EDGE: NEARLY EVERY DAY
5. BEING SO RESTLESS THAT IT IS HARD TO SIT STILL: NOT AT ALL
3. WORRYING TOO MUCH ABOUT DIFFERENT THINGS: SEVERAL DAYS

## 2020-02-04 ASSESSMENT — MIFFLIN-ST. JEOR: SCORE: 1479.78

## 2020-02-04 ASSESSMENT — PATIENT HEALTH QUESTIONNAIRE - PHQ9
SUM OF ALL RESPONSES TO PHQ QUESTIONS 1-9: 5
5. POOR APPETITE OR OVEREATING: MORE THAN HALF THE DAYS

## 2020-02-04 ASSESSMENT — PAIN SCALES - GENERAL: PAINLEVEL: NO PAIN (0)

## 2020-02-04 NOTE — NURSING NOTE
"Chief Complaint   Patient presents with     RECHECK     Mental health.  Patient c/o weight gain.       Initial /76   Ht 1.499 m (4' 11\")   Wt 83.9 kg (185 lb)   BMI 37.37 kg/m   Estimated body mass index is 37.37 kg/m  as calculated from the following:    Height as of this encounter: 1.499 m (4' 11\").    Weight as of this encounter: 83.9 kg (185 lb).  Medication Reconciliation: complete  LARRY LYN LPN    "

## 2020-02-04 NOTE — PROGRESS NOTES
"  PSYCHIATRY CLINIC PROGRESS NOTE   20 minute medication management, more than 50% of time spent counseling patient on medications, medication side effects, symptom history and management   SUBJECTIVE / INTERIM HISTORY                                                                       Last visit: 12/10/19:  -Lamictal back to 100 mg dailiy now. Start mirtazapine 7.5 mg HS. Discontinue hydroxyzine 10 mg up to 3 times daily as needed for panic / anxiety. Continue   Benadryl 75 to 100 mg HS and melatonin 3 mg HS.     Social-  He is a good trent and keeps her on track Children-  5 year old is Wallace who is \"the happiest little trent in the world\".  - hydroxyzine NOT helping at all with anxiety. Still having a really rough time with panic attacks. Notes she can handle the general anxiety -> it's more the panic attacks which are twice per week  - mirtazapine : helped with depression, but not anxiety. Is very frustrated with weight gain. For example last night panic attack and couldn't sleep well.   - work is really stressful right now given the Corona virus. Is on nights now so didn't get home until 2:30  - Nu still living with her and Wallace. Is really tough  - Nu working at [email or web address]    SUBSTANCE USE- MJ in past    SYMPTOMS Not having as much of the : - irritability, easily crying, erratic sleep, distracted, poor attention & concentration,  sx ADD improve when takes Adderall. + anxiety, insomnia, overwhelmed.IS having sx panic and anxiety. Panic attacks were 4 times per week and now are 2 times per week  MEDICAL ROS-  Decreased appetite. no skin rashes.  Back pain (spondylolisthesis), asthma (cough, SOB/wheezing)  MEDICAL / SURGICAL HISTORY                pregnant [if applicable]--no   Medical Team:     PMD- Dr. Devi       Therapist-  Patient Active Problem List   Diagnosis     Insomnia     Asthma     Spondylolisthesis     Family history of congenital heart defect     Night terrors, adult     Obesity     Family " "planning     Encounter for routine gynecological examination     Smoker     NO SHOW     Attention deficit hyperactivity disorder (ADHD), combined type     Congenital spondylolisthesis     ALLERGY   Patient has no known allergies.  MEDICATIONS                                                                                             Current Outpatient Medications   Medication Sig     albuterol (PROVENTIL HFA) 108 (90 Base) MCG/ACT inhaler Inhale 2 puffs into the lungs     amphetamine-dextroamphetamine (ADDERALL XR) 30 MG 24 hr capsule Take 1 capsule (30 mg) by mouth 2 times daily     diphenhydrAMINE (BENADRYL) 25 MG tablet Take 3-4 tabs bedtime     lamoTRIgine (LAMICTAL) 25 MG tablet Take 1 tablet (25 mg) daily for 2 weeks; then increase to 2 tablets (50 mg) daily for 2 weeks; then increase to 2 tablets (50 mg) twice daily     melatonin 3 MG CAPS Take 1 capsule by mouth At Bedtime     mirtazapine (REMERON) 15 MG tablet Take 0.5 tablets (7.5 mg) by mouth At Bedtime     No current facility-administered medications for this visit.        VITALS   /76   Ht 1.499 m (4' 11\")   Wt 83.9 kg (185 lb)   BMI 37.37 kg/m       PHQ9                       PHQ-9 SCORE 10/23/2019 11/12/2019 12/10/2019   PHQ-9 Total Score - - -   PHQ-9 Total Score 4 19 17       LABS                                                                                                                           TSH   Date Value Ref Range Status   06/19/2019 0.41 0.40 - 4.00 mU/L Final   ]   MENTAL STATUS EXAM                                                                                        Alert. Oriented to person, place, and date / time. Casually groomed,cooperative with fair eye contact. Speech: hoarse, normal rhythm, rate. Psychomotor: unremarkable Mood was anxious and affect was congruent to speech content and full range. Speech regular rate and rhythm.  Thought process linear, logical. NO suicidal ideation, homicidal ideation or psychotic " thought. No hallucinations. Insight was fair. Judgment was intact and adequate for safety. Fund of knowledge was intact. Attention and concentration were impaired --- needed to redirect her during our visit back to topic of conversation.     ASSESSMENT                                                                                                      HISTORICAL:  Initial psych consult 6/17/15  Notes:             Gabapentin :  headaches: lactation. buspar nausea and felt like was making her more sad. Topamax: tried dose of 25 mg and didn't help with appetite / weight  Nyasia Raya is a 28 yo with  ADHD, bipolar disorder and RANDI.      Nyasia went through a lot in her household growing up with mom with MS dad working all the time and 5 siblings. Nyasia took care of the household and she worked 2 jobs. Didn't end up finishing HS in [employer] and looking back she is able to recognize had a lot on her plate. Diagnosed with ADHD in past but parents didn't want her on stimulants.      The more I've gotten to know her :  Bipolar II Disorder and she has become more accepting of sx. Major issues going on with Nu and though not together they are living with each other still. Panic is the main issue - hydroxyzine did nOT help. We tried mirtazapine which helped with depression but caused weight gain. We are going to discontinue and have her try zoloft.     Controlled substance contract signed and UDS in place given prescribed stimulant for ADHD.    TREATMENT RISK STATEMENT: The risks, benefits, alternatives and potential adverse effects have been explained and are understood by the pt. The pt agrees to the treatment plan with the ability to do so. The pt knows to call the clinic for any problems or access emergency care if needed. Substance use is not a problem as noted above.     DIAGNOSES           Bipolar II disorder  ADHD  RANDI    Night terrors    PLAN                                                                                                                          1) MEDICATIONS:   --Continue Lamictal 100 mg daily.  Discontinue mirtazapine.  Start 50 mg daily for one week then increase to 100 mg daily. Continue   Benadryl 75 to 100 mg HS and melatonin 3 mg HS. Continue Adderall XR 30 mg bid and filled today 2/4/20 and for 3/3/20  2) THERAPY: No Change    3) LABS: None    4) PT MONITOR [call for probs]: Worsening symptoms, side effects from medications, SI/HI    5) REFERRALS [CD tx, medical, tests other]: None    6)  RTC: ~1 month

## 2020-02-05 ASSESSMENT — ANXIETY QUESTIONNAIRES: GAD7 TOTAL SCORE: 8

## 2020-02-14 ENCOUNTER — HOSPITAL ENCOUNTER (EMERGENCY)
Facility: OTHER | Age: 30
Discharge: HOME | End: 2020-02-14
Attending: PHYSICIAN ASSISTANT | Admitting: PHYSICIAN ASSISTANT
Payer: COMMERCIAL

## 2020-02-14 ENCOUNTER — APPOINTMENT (OUTPATIENT)
Dept: CT IMAGING | Facility: OTHER | Age: 30
End: 2020-02-14
Attending: PHYSICIAN ASSISTANT
Payer: COMMERCIAL

## 2020-02-14 VITALS
WEIGHT: 180 LBS | BODY MASS INDEX: 36.29 KG/M2 | OXYGEN SATURATION: 99 % | SYSTOLIC BLOOD PRESSURE: 122 MMHG | TEMPERATURE: 98.3 F | RESPIRATION RATE: 18 BRPM | HEIGHT: 59 IN | DIASTOLIC BLOOD PRESSURE: 56 MMHG | HEART RATE: 87 BPM

## 2020-02-14 DIAGNOSIS — E83.42 HYPOMAGNESEMIA: ICD-10-CM
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
## 2020-02-14 DIAGNOSIS — G44.309 POSTTRAUMATIC HEADACHE: ICD-10-CM

## 2020-02-14 LAB
ALBUMIN SERPL-MCNC: 4 G/DL (ref 3.5–5.7)
ALBUMIN UR-MCNC: 10 MG/DL
ALP SERPL-CCNC: 91 U/L (ref 34–104)
ALT SERPL W P-5'-P-CCNC: 8 U/L (ref 7–52)
ANION GAP SERPL CALCULATED.3IONS-SCNC: 6 MMOL/L (ref 3–14)
APPEARANCE UR: ABNORMAL
AST SERPL W P-5'-P-CCNC: 9 U/L (ref 13–39)
BASOPHILS # BLD AUTO: 0 10E9/L (ref 0–0.2)
BASOPHILS NFR BLD AUTO: 0.4 %
BILIRUB SERPL-MCNC: 0.4 MG/DL (ref 0.3–1)
BILIRUB UR QL STRIP: NEGATIVE
BUN SERPL-MCNC: 11 MG/DL (ref 7–25)
CALCIUM SERPL-MCNC: 8.8 MG/DL (ref 8.6–10.3)
CHLORIDE SERPL-SCNC: 105 MMOL/L (ref 98–107)
CO2 SERPL-SCNC: 25 MMOL/L (ref 21–31)
COLOR UR AUTO: YELLOW
CREAT SERPL-MCNC: 0.76 MG/DL (ref 0.6–1.2)
DIFFERENTIAL METHOD BLD: NORMAL
EOSINOPHIL # BLD AUTO: 0.7 10E9/L (ref 0–0.7)
EOSINOPHIL NFR BLD AUTO: 7.2 %
ERYTHROCYTE [DISTWIDTH] IN BLOOD BY AUTOMATED COUNT: 12 % (ref 10–15)
GFR SERPL CREATININE-BSD FRML MDRD: >90 ML/MIN/{1.73_M2}
GLUCOSE SERPL-MCNC: 95 MG/DL (ref 70–105)
GLUCOSE UR STRIP-MCNC: NEGATIVE MG/DL
HCG UR QL: NEGATIVE
HCT VFR BLD AUTO: 38.5 % (ref 35–47)
HGB BLD-MCNC: 12.8 G/DL (ref 11.7–15.7)
HGB UR QL STRIP: NEGATIVE
IMM GRANULOCYTES # BLD: 0.1 10E9/L (ref 0–0.4)
IMM GRANULOCYTES NFR BLD: 0.5 %
KETONES UR STRIP-MCNC: NEGATIVE MG/DL
LEUKOCYTE ESTERASE UR QL STRIP: NEGATIVE
LYMPHOCYTES # BLD AUTO: 3.6 10E9/L (ref 0.8–5.3)
LYMPHOCYTES NFR BLD AUTO: 35.7 %
MAGNESIUM SERPL-MCNC: 1.7 MG/DL (ref 1.9–2.7)
MCH RBC QN AUTO: 29.9 PG (ref 26.5–33)
MCHC RBC AUTO-ENTMCNC: 33.2 G/DL (ref 31.5–36.5)
MCV RBC AUTO: 90 FL (ref 78–100)
MONOCYTES # BLD AUTO: 0.5 10E9/L (ref 0–1.3)
MONOCYTES NFR BLD AUTO: 5.3 %
MUCOUS THREADS #/AREA URNS LPF: PRESENT /LPF
NEUTROPHILS # BLD AUTO: 5.1 10E9/L (ref 1.6–8.3)
NEUTROPHILS NFR BLD AUTO: 50.9 %
NITRATE UR QL: NEGATIVE
PH UR STRIP: 8 PH (ref 5–7)
PLATELET # BLD AUTO: 241 10E9/L (ref 150–450)
POTASSIUM SERPL-SCNC: 3.7 MMOL/L (ref 3.5–5.1)
PROT SERPL-MCNC: 6.3 G/DL (ref 6.4–8.9)
RBC # BLD AUTO: 4.28 10E12/L (ref 3.8–5.2)
RBC #/AREA URNS AUTO: 3 /HPF (ref 0–2)
SODIUM SERPL-SCNC: 136 MMOL/L (ref 134–144)
SOURCE: ABNORMAL
SP GR UR STRIP: 1.03 (ref 1–1.03)
SQUAMOUS #/AREA URNS AUTO: 2 /HPF (ref 0–1)
UROBILINOGEN UR STRIP-MCNC: 2 MG/DL (ref 0–2)
WBC # BLD AUTO: 10 10E9/L (ref 4–11)
WBC #/AREA URNS AUTO: 8 /HPF (ref 0–5)
WBC CLUMPS #/AREA URNS HPF: PRESENT /HPF
YEAST #/AREA URNS HPF: ABNORMAL /HPF

## 2020-02-14 PROCEDURE — 81025 URINE PREGNANCY TEST: CPT | Performed by: PHYSICIAN ASSISTANT

## 2020-02-14 PROCEDURE — 36415 COLL VENOUS BLD VENIPUNCTURE: CPT | Performed by: PHYSICIAN ASSISTANT

## 2020-02-14 PROCEDURE — 81001 URINALYSIS AUTO W/SCOPE: CPT | Performed by: PHYSICIAN ASSISTANT

## 2020-02-14 PROCEDURE — 70450 CT HEAD/BRAIN W/O DYE: CPT

## 2020-02-14 PROCEDURE — 83735 ASSAY OF MAGNESIUM: CPT | Performed by: PHYSICIAN ASSISTANT

## 2020-02-14 PROCEDURE — 25800030 ZZH RX IP 258 OP 636: Performed by: PHYSICIAN ASSISTANT

## 2020-02-14 PROCEDURE — 96374 THER/PROPH/DIAG INJ IV PUSH: CPT | Performed by: PHYSICIAN ASSISTANT

## 2020-02-14 PROCEDURE — 99284 EMERGENCY DEPT VISIT MOD MDM: CPT | Mod: Z6 | Performed by: PHYSICIAN ASSISTANT

## 2020-02-14 PROCEDURE — 25000132 ZZH RX MED GY IP 250 OP 250 PS 637: Performed by: PHYSICIAN ASSISTANT

## 2020-02-14 PROCEDURE — 80053 COMPREHEN METABOLIC PANEL: CPT | Performed by: PHYSICIAN ASSISTANT

## 2020-02-14 PROCEDURE — 96375 TX/PRO/DX INJ NEW DRUG ADDON: CPT | Performed by: PHYSICIAN ASSISTANT

## 2020-02-14 PROCEDURE — 25000128 H RX IP 250 OP 636: Performed by: PHYSICIAN ASSISTANT

## 2020-02-14 PROCEDURE — 85025 COMPLETE CBC W/AUTO DIFF WBC: CPT | Performed by: PHYSICIAN ASSISTANT

## 2020-02-14 PROCEDURE — 99284 EMERGENCY DEPT VISIT MOD MDM: CPT | Mod: 25 | Performed by: PHYSICIAN ASSISTANT

## 2020-02-14 RX ORDER — KETOROLAC TROMETHAMINE 30 MG/ML
30 INJECTION, SOLUTION INTRAMUSCULAR; INTRAVENOUS ONCE
Status: COMPLETED | OUTPATIENT
Start: 2020-02-14 | End: 2020-02-14

## 2020-02-14 RX ORDER — METHYLPREDNISOLONE SODIUM SUCCINATE 125 MG/2ML
125 INJECTION, POWDER, LYOPHILIZED, FOR SOLUTION INTRAMUSCULAR; INTRAVENOUS ONCE
Status: COMPLETED | OUTPATIENT
Start: 2020-02-14 | End: 2020-02-14

## 2020-02-14 RX ORDER — RIZATRIPTAN BENZOATE 10 MG/1
TABLET ORAL
Qty: 30 TABLET | Refills: 0 | Status: SHIPPED | OUTPATIENT
Start: 2020-02-14 | End: 2020-03-24

## 2020-02-14 RX ORDER — MAGNESIUM OXIDE 400 MG/1
800 TABLET ORAL DAILY
Status: DISCONTINUED | OUTPATIENT
Start: 2020-02-14 | End: 2020-02-14 | Stop reason: HOSPADM

## 2020-02-14 RX ORDER — SODIUM CHLORIDE 9 MG/ML
1000 INJECTION, SOLUTION INTRAVENOUS CONTINUOUS
Status: DISCONTINUED | OUTPATIENT
Start: 2020-02-14 | End: 2020-02-14 | Stop reason: HOSPADM

## 2020-02-14 RX ORDER — DIPHENHYDRAMINE HYDROCHLORIDE 50 MG/ML
25 INJECTION INTRAMUSCULAR; INTRAVENOUS ONCE
Status: COMPLETED | OUTPATIENT
Start: 2020-02-14 | End: 2020-02-14

## 2020-02-14 RX ORDER — ONDANSETRON 2 MG/ML
4 INJECTION INTRAMUSCULAR; INTRAVENOUS EVERY 30 MIN PRN
Status: DISCONTINUED | OUTPATIENT
Start: 2020-02-14 | End: 2020-02-14 | Stop reason: HOSPADM

## 2020-02-14 RX ORDER — RIZATRIPTAN BENZOATE 10 MG/1
10 TABLET, ORALLY DISINTEGRATING ORAL
Status: COMPLETED | OUTPATIENT
Start: 2020-02-14 | End: 2020-02-14

## 2020-02-14 RX ADMIN — ONDANSETRON 4 MG: 2 INJECTION INTRAMUSCULAR; INTRAVENOUS at 18:35

## 2020-02-14 RX ADMIN — DIPHENHYDRAMINE HYDROCHLORIDE 25 MG: 50 INJECTION INTRAMUSCULAR; INTRAVENOUS at 18:36

## 2020-02-14 RX ADMIN — SODIUM CHLORIDE 1000 ML: 9 INJECTION, SOLUTION INTRAVENOUS at 18:21

## 2020-02-14 RX ADMIN — Medication 800 MG: at 18:35

## 2020-02-14 RX ADMIN — RIZATRIPTAN BENZOATE 10 MG: 10 TABLET, ORALLY DISINTEGRATING ORAL at 20:07

## 2020-02-14 RX ADMIN — KETOROLAC TROMETHAMINE 30 MG: 30 INJECTION, SOLUTION INTRAMUSCULAR at 18:36

## 2020-02-14 RX ADMIN — METHYLPREDNISOLONE SODIUM SUCCINATE 125 MG: 125 INJECTION, POWDER, FOR SOLUTION INTRAMUSCULAR; INTRAVENOUS at 18:36

## 2020-02-14 ASSESSMENT — ENCOUNTER SYMPTOMS
STRIDOR: 0
TROUBLE SWALLOWING: 0
WHEEZING: 0
FATIGUE: 0
FEVER: 0
WEAKNESS: 0
ABDOMINAL PAIN: 0
APPETITE CHANGE: 0
BACK PAIN: 0
FACIAL ASYMMETRY: 0
CONSTIPATION: 0
TREMORS: 0
HEADACHES: 1
RHINORRHEA: 0
SORE THROAT: 0
ACTIVITY CHANGE: 0
CHEST TIGHTNESS: 0
SEIZURES: 0
EYE PAIN: 0
DYSURIA: 0
NAUSEA: 0
FACIAL SWELLING: 0
DIZZINESS: 0
COUGH: 0
SPEECH DIFFICULTY: 0
NECK STIFFNESS: 0
COLOR CHANGE: 0
LIGHT-HEADEDNESS: 0
VOMITING: 0
DIARRHEA: 0
NECK PAIN: 0
FREQUENCY: 0
SHORTNESS OF BREATH: 0

## 2020-02-14 ASSESSMENT — MIFFLIN-ST. JEOR: SCORE: 1457.1
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
## 2020-02-14 NOTE — ED AVS SNAPSHOT
[facility]  [address]  [place] [address]  Phone:  [phone]  Fax:  [phone]                                    Nyasia Raya   MRN: [id number]    Department:  [facility]   Date of Visit:  2/14/2020           After Visit Summary Signature Page    I have received my discharge instructions, and my questions have been answered. I have discussed any challenges I see with this plan with the nurse or doctor.    ..........................................................................................................................................  Patient/Patient Representative Signature      ..........................................................................................................................................  Patient Representative Print Name and Relationship to Patient    ..................................................               ................................................  Date                                   Time    ..........................................................................................................................................  Reviewed by Signature/Title    ...................................................              ..............................................  Date                                               Time          22EPIC Rev 08/18

## 2020-02-15 NOTE — ED NOTES
"Patient state her \"overall\" pain is getting better but not has a localized pain on her right hip.  "
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
## 2020-02-25 ENCOUNTER — PATIENT OUTREACH (OUTPATIENT)
Dept: CARE COORDINATION | Facility: OTHER | Age: 30
End: 2020-02-25
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
## 2020-02-28 NOTE — PROGRESS NOTES
Clinic Care Coordination Contact  Care Team Conversations    Received voicemail from patient.  Contacted patient and scheduled with  for 3/3/20.  Patient did not set a return to work date and wants to see  first.   Encouraged patient to call with any further questions or concerns prior to appointment.     Lauren Mitchell, RN-BSN  Care Coordination, Behavioral Health

## 2020-03-03 ENCOUNTER — OFFICE VISIT (OUTPATIENT)
Dept: PSYCHIATRY | Facility: OTHER | Age: 30
End: 2020-03-03
Attending: PSYCHIATRY & NEUROLOGY
Payer: COMMERCIAL

## 2020-03-03 VITALS
WEIGHT: 180 LBS | HEART RATE: 121 BPM | BODY MASS INDEX: 36.29 KG/M2 | SYSTOLIC BLOOD PRESSURE: 112 MMHG | HEIGHT: 59 IN | DIASTOLIC BLOOD PRESSURE: 74 MMHG | OXYGEN SATURATION: 98 % | TEMPERATURE: 98.2 F

## 2020-03-03 DIAGNOSIS — F31.81 BIPOLAR 2 DISORDER (H): Primary | ICD-10-CM

## 2020-03-03 DIAGNOSIS — F90.2 ATTENTION DEFICIT HYPERACTIVITY DISORDER (ADHD), COMBINED TYPE: ICD-10-CM

## 2020-03-03 PROCEDURE — 99214 OFFICE O/P EST MOD 30 MIN: CPT | Performed by: PSYCHIATRY & NEUROLOGY

## 2020-03-03 RX ORDER — DEXTROAMPHETAMINE SACCHARATE, AMPHETAMINE ASPARTATE MONOHYDRATE, DEXTROAMPHETAMINE SULFATE AND AMPHETAMINE SULFATE 7.5; 7.5; 7.5; 7.5 MG/1; MG/1; MG/1; MG/1
30 CAPSULE, EXTENDED RELEASE ORAL 2 TIMES DAILY
Qty: 60 CAPSULE | Refills: 0 | Status: CANCELLED | OUTPATIENT
Start: 2020-04-01

## 2020-03-03 RX ORDER — LAMOTRIGINE 25 MG/1
TABLET ORAL
Qty: 60 TABLET | Refills: 1 | Status: SHIPPED | OUTPATIENT
Start: 2020-03-03 | End: 2020-04-10

## 2020-03-03 ASSESSMENT — ANXIETY QUESTIONNAIRES
GAD7 TOTAL SCORE: 17
1. FEELING NERVOUS, ANXIOUS, OR ON EDGE: NEARLY EVERY DAY
2. NOT BEING ABLE TO STOP OR CONTROL WORRYING: NEARLY EVERY DAY
6. BECOMING EASILY ANNOYED OR IRRITABLE: NEARLY EVERY DAY
5. BEING SO RESTLESS THAT IT IS HARD TO SIT STILL: SEVERAL DAYS
7. FEELING AFRAID AS IF SOMETHING AWFUL MIGHT HAPPEN: MORE THAN HALF THE DAYS
3. WORRYING TOO MUCH ABOUT DIFFERENT THINGS: NEARLY EVERY DAY

## 2020-03-03 ASSESSMENT — MIFFLIN-ST. JEOR: SCORE: 1452.1

## 2020-03-03 ASSESSMENT — PAIN SCALES - GENERAL: PAINLEVEL: NO PAIN (0)

## 2020-03-03 ASSESSMENT — PATIENT HEALTH QUESTIONNAIRE - PHQ9
5. POOR APPETITE OR OVEREATING: MORE THAN HALF THE DAYS
SUM OF ALL RESPONSES TO PHQ QUESTIONS 1-9: 17
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
## 2020-03-03 NOTE — NURSING NOTE
"Chief Complaint   Patient presents with     RECHECK     Anxiety, ADHD.  Patient c/o depression.       Initial /74 (BP Location: Right arm, Patient Position: Sitting, Cuff Size: Adult Large)   Pulse 121   Temp 98.2  F (36.8  C) (Tympanic)   Ht 1.499 m (4' 11\")   Wt 81.6 kg (180 lb)   LMP  (LMP Unknown)   SpO2 98%   BMI 36.36 kg/m   Estimated body mass index is 36.36 kg/m  as calculated from the following:    Height as of this encounter: 1.499 m (4' 11\").    Weight as of this encounter: 81.6 kg (180 lb).  Medication Reconciliation: complete  LARRY LYN LPN    "

## 2020-03-04 ASSESSMENT — ANXIETY QUESTIONNAIRES: GAD7 TOTAL SCORE: 17

## 2020-03-10 ENCOUNTER — ALLIED HEALTH/NURSE VISIT (OUTPATIENT)
Dept: OBGYN | Facility: OTHER | Age: 30
End: 2020-03-10
Attending: FAMILY MEDICINE
Payer: COMMERCIAL

## 2020-03-10 VITALS — WEIGHT: 190.8 LBS | BODY MASS INDEX: 38.54 KG/M2

## 2020-03-10 DIAGNOSIS — N97.0 ANOVULATION: Primary | ICD-10-CM

## 2020-03-10 DIAGNOSIS — Z32.00 POSSIBLE PREGNANCY, NOT YET CONFIRMED: ICD-10-CM

## 2020-03-10 DIAGNOSIS — O36.80X0 ENCOUNTER TO DETERMINE FETAL VIABILITY OF PREGNANCY, SINGLE OR UNSPECIFIED FETUS: ICD-10-CM

## 2020-03-10 LAB — HCG UR QL: POSITIVE

## 2020-03-10 PROCEDURE — 99211 OFF/OP EST MAY X REQ PHY/QHP: CPT

## 2020-03-10 PROCEDURE — 81025 URINE PREGNANCY TEST: CPT | Mod: ZL | Performed by: OBSTETRICS & GYNECOLOGY

## 2020-03-10 RX ORDER — PRENATAL VIT/IRON FUM/FOLIC AC 27MG-0.8MG
1 TABLET ORAL DAILY
COMMUNITY
End: 2020-04-08

## 2020-03-10 ASSESSMENT — ANXIETY QUESTIONNAIRES
IF YOU CHECKED OFF ANY PROBLEMS ON THIS QUESTIONNAIRE, HOW DIFFICULT HAVE THESE PROBLEMS MADE IT FOR YOU TO DO YOUR WORK, TAKE CARE OF THINGS AT HOME, OR GET ALONG WITH OTHER PEOPLE: SOMEWHAT DIFFICULT
GAD7 TOTAL SCORE: 8
3. WORRYING TOO MUCH ABOUT DIFFERENT THINGS: SEVERAL DAYS
1. FEELING NERVOUS, ANXIOUS, OR ON EDGE: SEVERAL DAYS
7. FEELING AFRAID AS IF SOMETHING AWFUL MIGHT HAPPEN: NOT AT ALL
5. BEING SO RESTLESS THAT IT IS HARD TO SIT STILL: SEVERAL DAYS
2. NOT BEING ABLE TO STOP OR CONTROL WORRYING: SEVERAL DAYS
6. BECOMING EASILY ANNOYED OR IRRITABLE: NEARLY EVERY DAY

## 2020-03-10 ASSESSMENT — PATIENT HEALTH QUESTIONNAIRE - PHQ9
SUM OF ALL RESPONSES TO PHQ QUESTIONS 1-9: 0
5. POOR APPETITE OR OVEREATING: SEVERAL DAYS

## 2020-03-10 ASSESSMENT — PAIN SCALES - GENERAL: PAINLEVEL: NO PAIN (0)

## 2020-03-10 NOTE — PROGRESS NOTES
HPI:    This is a 30 year old female patient,  who presents for Pregnancy confirmation visit. Patient reports positive pregnancy test at home.     Obstetrical history, OB Questionnaire, and OB Demographics updated to the best of this nurse's ability based on patient report. PHQ-9 depression screening and routine Domestic Abuse screening completed. OB Education packet provided and reviewed by this nurse. All immediate questions and concerns answered.    Last menstrual period is reported as Patient's last menstrual period was 2020 (within days). LE based on LMP is 11/3/2020.   Her cycles are irregular.  Her last menstrual period was abnormal.   Since her LMP, she has experienced  nausea, abdominal pain, headache and vaginal discharge.   She denies emesis, dysuria and vaginal bleeding.    Personal OB history includes: age of first pregnancy 23, surgical births 1, G  3 and P  1  Previous OB Provider: Dr. Mayorga   Previous Delivering [facility]: [facility]   Release of Records: NA     Current delivery plan: [facility]  Preferred OB Provider: [facility]  Current Primary Care Provider: None   Pediatrician: None     Additional History: PUPPs, macrosomia, family hx of heart defects, CF and sickle cell     Have you travelled during the pregnancy?No  Have your sexual partner(s) travelled during the pregnancy?No      HISTORY:   Planned Pregnancy: Yes  Marital Status: Single  Occupation: Call center [employer]   Living in Household: Significant Other and Children    Father of the baby is involved.   Family and father of baby is supportive of current pregnancy.  Past Medical History of Father of Baby:Possible diet controlled DM and sickle cell      Past History:  Her past medical history   Past Medical History:   Diagnosis Date     Asthma 5/10/2013     Insomnia 5/10/2013     Spondylolisthesis 5/10/2013     Tobacco abuse    .      She has a history of  prior  section    Since her last LMP she denies use of alcohol,  tobacco and street drugs.    Pap smear history: 2019 NIL     STD/STI history: No STD history and Chlamydia    STD/STI symptoms: None      Past medical, surgical, social and family history were reviewed and updated in EPIC.    Medications reviewed by this nurse. Current medication list:  Current Outpatient Medications   Medication Sig Dispense Refill     diphenhydrAMINE (BENADRYL) 25 MG tablet Take 3-4 tabs bedtime 120 tablet 5     lamoTRIgine (LAMICTAL) 25 MG tablet Take 1 tablet daily for 2 weeks then take 2 tablets daily 60 tablet 1     Prenatal Vit-Fe Fumarate-FA (PRENATAL MULTIVITAMIN W/IRON) 27-0.8 MG tablet Take 1 tablet by mouth daily       albuterol (PROVENTIL HFA) 108 (90 Base) MCG/ACT inhaler Inhale 2 puffs into the lungs       amphetamine-dextroamphetamine (ADDERALL XR) 30 MG 24 hr capsule Take 1 capsule (30 mg) by mouth 2 times daily (Patient not taking: Reported on 3/10/2020) 60 capsule 0     magnesium oxide (MAG-OX) 400 (241.3 Mg) MG tablet Take 1 tablet (400 mg) by mouth 2 times daily (Patient not taking: Reported on 3/10/2020) 30 tablet 0     melatonin 3 MG CAPS Take 1 capsule by mouth At Bedtime (Patient not taking: Reported on 3/10/2020) 30 capsule 5     rizatriptan (MAXALT) 10 MG tablet 10 mg p.o. at onset of headache.  May repeat again in 2 hours as needed.  Maximum is 30 mg of Maxalt daily (Patient not taking: Reported on 3/10/2020) 30 tablet 0     sertraline (ZOLOFT) 100 MG tablet Take 1/2 tablet daily for one week then increase to 1 tablet daily (Patient not taking: Reported on 3/10/2020) 30 tablet 3     The following medications were recommended to be discontinued due to Pregnancy Category D status: ibuprofen, Adderall, melatonin and rizatriptan.   Patient informed to contact her primary care provider as soon as possible to discuss a safer alternative.    Risk factors:  Moderate and moderately severe risks (consult with OB/Gyn)  Previous fetal or  demise: No  History of   delivery: No  History of heart disease Class I: No  Severe anemia, unresponsive to iron therapy: No  Pelvic mass or neoplasm: No  Previous : Yes  Hyper/hypothyroidism: No  History of postpartum hemorrhage requiring transfusion:No  History of Placenta Accreta: No    High Risk (Pregnancy managed by OB/Gyn)  Multiple pregnancy: No  Pre-gestational diabetes: No  Chronic Hypertension: No  Renal Failure: No  Heart disease, class II or greater: No  Rh Isoimmunization: No  Chronic active hepatitis: No  Convulsive disorder, poorly controlled: No  Isoimmune thrombocytopenia: No  Pre-term premature rupture of membranes: No  Lupus or other autoimmune disorder: No  Human Immunodeficiency Virus: No      ASSESSMENT/PLAN:       ICD-10-CM    1. Anovulation  N97.0    2. Encounter to determine fetal viability of pregnancy, single or unspecified fetus  O36.80X0 US OB < 14 Weeks Single   3. Possible pregnancy, not yet confirmed  Z32.00 HCG qualitative urine       30 year old , 6w0d of pregnancy with LE of Not found.    Urine pregnancy test completed during this visit, results were as noted above.     Per standing orders and scope of practice of this nurse, patient will have the following orders placed and completed prior to initial OB visit with the appropriate provider:    --early ultrasound for dating and viability ordered for approximately 6-7 weeks gestation based on LMP    --Quantitative Beta HCG and progesterone monitoring if indicated    Counseling given:     - Recommended weight gain for pregnancy: < 15 lbs.   BMI < 18.5  28-40 lbs   18.5 - 24.9 25-35   25 - 29.9 15-25   > 30  11-20      PLAN/PATIENT INSTRUCTIONS:    Follow up in 2 weeks.  Normal exercise.  Normal sexual activity.  Prenatal vitamins.  Anticipated weight gain.    follow-up appointment with Dr. Lama for pre-jorge care, take multivitamin or pre- vitamins, OB Education packet given.    Britta Toledo,  RN.................................................. 3/10/2020 10:13 AM

## 2020-03-11 ASSESSMENT — ANXIETY QUESTIONNAIRES: GAD7 TOTAL SCORE: 8

## 2020-03-24 ENCOUNTER — VIRTUAL VISIT (OUTPATIENT)
Dept: PSYCHIATRY | Facility: OTHER | Age: 30
End: 2020-03-24
Attending: PSYCHIATRY & NEUROLOGY
Payer: COMMERCIAL

## 2020-03-24 ENCOUNTER — PRENATAL OFFICE VISIT (OUTPATIENT)
Dept: OBGYN | Facility: OTHER | Age: 30
End: 2020-03-24
Attending: OBSTETRICS & GYNECOLOGY
Payer: COMMERCIAL

## 2020-03-24 ENCOUNTER — ANCILLARY PROCEDURE (OUTPATIENT)
Dept: ULTRASOUND IMAGING | Facility: OTHER | Age: 30
End: 2020-03-24
Attending: OBSTETRICS & GYNECOLOGY
Payer: COMMERCIAL

## 2020-03-24 VITALS
RESPIRATION RATE: 20 BRPM | HEART RATE: 103 BPM | HEIGHT: 60 IN | SYSTOLIC BLOOD PRESSURE: 136 MMHG | DIASTOLIC BLOOD PRESSURE: 70 MMHG | BODY MASS INDEX: 37.85 KG/M2 | WEIGHT: 192.8 LBS

## 2020-03-24 DIAGNOSIS — Z34.81 ENCOUNTER FOR SUPERVISION OF OTHER NORMAL PREGNANCY IN FIRST TRIMESTER: ICD-10-CM

## 2020-03-24 DIAGNOSIS — Z34.81 ENCOUNTER FOR SUPERVISION OF OTHER NORMAL PREGNANCY IN FIRST TRIMESTER: Primary | ICD-10-CM

## 2020-03-24 DIAGNOSIS — Z34.91 ENCOUNTER FOR SUPERVISION OF NORMAL PREGNANCY IN FIRST TRIMESTER, UNSPECIFIED GRAVIDITY: ICD-10-CM

## 2020-03-24 DIAGNOSIS — F31.81 BIPOLAR 2 DISORDER (H): Primary | ICD-10-CM

## 2020-03-24 LAB
ABO + RH BLD: NORMAL
ABO + RH BLD: NORMAL
ALBUMIN UR-MCNC: 10 MG/DL
APPEARANCE UR: CLEAR
BILIRUB UR QL STRIP: NEGATIVE
BLD GP AB SCN SERPL QL: NORMAL
BLOOD BANK CMNT PATIENT-IMP: NORMAL
C TRACH DNA SPEC QL PROBE+SIG AMP: NOT DETECTED
CAOX CRY #/AREA URNS HPF: ABNORMAL /HPF
COLOR UR AUTO: YELLOW
GLUCOSE UR STRIP-MCNC: NEGATIVE MG/DL
HGB UR QL STRIP: NEGATIVE
KETONES UR STRIP-MCNC: 5 MG/DL
LEUKOCYTE ESTERASE UR QL STRIP: NEGATIVE
MISCELLANEOUS TEST: NORMAL
MUCOUS THREADS #/AREA URNS LPF: PRESENT /LPF
N GONORRHOEA DNA SPEC QL PROBE+SIG AMP: NOT DETECTED
NITRATE UR QL: NEGATIVE
PH UR STRIP: 5 PH (ref 5–7)
PROGEST SERPL-MCNC: 7.9 NG/ML
RBC #/AREA URNS AUTO: 2 /HPF (ref 0–2)
SOURCE: ABNORMAL
SP GR UR STRIP: 1.04 (ref 1–1.03)
SPECIMEN EXP DATE BLD: NORMAL
SPECIMEN SOURCE: NORMAL
UROBILINOGEN UR STRIP-MCNC: NORMAL MG/DL (ref 0–2)
WBC #/AREA URNS AUTO: 2 /HPF (ref 0–5)

## 2020-03-24 PROCEDURE — 36415 COLL VENOUS BLD VENIPUNCTURE: CPT | Mod: ZL | Performed by: OBSTETRICS & GYNECOLOGY

## 2020-03-24 PROCEDURE — 87491 CHLMYD TRACH DNA AMP PROBE: CPT | Mod: ZL | Performed by: OBSTETRICS & GYNECOLOGY

## 2020-03-24 PROCEDURE — 81220 CFTR GENE COM VARIANTS: CPT | Performed by: OBSTETRICS & GYNECOLOGY

## 2020-03-24 PROCEDURE — 87591 N.GONORRHOEAE DNA AMP PROB: CPT | Mod: ZL | Performed by: OBSTETRICS & GYNECOLOGY

## 2020-03-24 PROCEDURE — 87086 URINE CULTURE/COLONY COUNT: CPT | Mod: ZL | Performed by: OBSTETRICS & GYNECOLOGY

## 2020-03-24 PROCEDURE — 86900 BLOOD TYPING SEROLOGIC ABO: CPT | Mod: ZL | Performed by: OBSTETRICS & GYNECOLOGY

## 2020-03-24 PROCEDURE — 86762 RUBELLA ANTIBODY: CPT | Mod: ZL | Performed by: OBSTETRICS & GYNECOLOGY

## 2020-03-24 PROCEDURE — 84999 UNLISTED CHEMISTRY PROCEDURE: CPT | Mod: ZL | Performed by: OBSTETRICS & GYNECOLOGY

## 2020-03-24 PROCEDURE — 99214 OFFICE O/P EST MOD 30 MIN: CPT | Mod: 25 | Performed by: OBSTETRICS & GYNECOLOGY

## 2020-03-24 PROCEDURE — 81329 SMN1 GENE DOS/DELETION ALYS: CPT | Mod: ZL | Performed by: OBSTETRICS & GYNECOLOGY

## 2020-03-24 PROCEDURE — 76801 OB US < 14 WKS SINGLE FETUS: CPT | Mod: 26 | Performed by: OBSTETRICS & GYNECOLOGY

## 2020-03-24 PROCEDURE — 99442 ZZC PHYSICIAN TELEPHONE EVALUATION 11-20 MIN: CPT | Performed by: PSYCHIATRY & NEUROLOGY

## 2020-03-24 PROCEDURE — 87340 HEPATITIS B SURFACE AG IA: CPT | Mod: ZL | Performed by: OBSTETRICS & GYNECOLOGY

## 2020-03-24 PROCEDURE — 86901 BLOOD TYPING SEROLOGIC RH(D): CPT | Mod: ZL | Performed by: OBSTETRICS & GYNECOLOGY

## 2020-03-24 PROCEDURE — 84144 ASSAY OF PROGESTERONE: CPT | Mod: ZL | Performed by: OBSTETRICS & GYNECOLOGY

## 2020-03-24 PROCEDURE — 86780 TREPONEMA PALLIDUM: CPT | Mod: ZL | Performed by: OBSTETRICS & GYNECOLOGY

## 2020-03-24 PROCEDURE — 86850 RBC ANTIBODY SCREEN: CPT | Mod: ZL | Performed by: OBSTETRICS & GYNECOLOGY

## 2020-03-24 PROCEDURE — 87389 HIV-1 AG W/HIV-1&-2 AB AG IA: CPT | Mod: ZL | Performed by: OBSTETRICS & GYNECOLOGY

## 2020-03-24 PROCEDURE — 81001 URINALYSIS AUTO W/SCOPE: CPT | Mod: ZL | Performed by: OBSTETRICS & GYNECOLOGY

## 2020-03-24 ASSESSMENT — ANXIETY QUESTIONNAIRES
6. BECOMING EASILY ANNOYED OR IRRITABLE: NEARLY EVERY DAY
2. NOT BEING ABLE TO STOP OR CONTROL WORRYING: NEARLY EVERY DAY
7. FEELING AFRAID AS IF SOMETHING AWFUL MIGHT HAPPEN: MORE THAN HALF THE DAYS
GAD7 TOTAL SCORE: 20
5. BEING SO RESTLESS THAT IT IS HARD TO SIT STILL: NEARLY EVERY DAY
3. WORRYING TOO MUCH ABOUT DIFFERENT THINGS: NEARLY EVERY DAY
1. FEELING NERVOUS, ANXIOUS, OR ON EDGE: NEARLY EVERY DAY

## 2020-03-24 ASSESSMENT — PATIENT HEALTH QUESTIONNAIRE - PHQ9
SUM OF ALL RESPONSES TO PHQ QUESTIONS 1-9: 18
5. POOR APPETITE OR OVEREATING: NEARLY EVERY DAY

## 2020-03-24 ASSESSMENT — PAIN SCALES - GENERAL: PAINLEVEL: NO PAIN (0)

## 2020-03-24 ASSESSMENT — MIFFLIN-ST. JEOR: SCORE: 1532.91

## 2020-03-24 NOTE — NURSING NOTE
"Patient's last menstrual period was 01/28/2020 (within days).   Patient here today with partner, Nu.  Pt reports spotting yesterday and a little bit of today.   Last OB told her this is common around week 12 due to \"baby detaching from wall.\" States spotting was brown.   Pt has \"terrible anxiety\", states she is also bipolar, taking meds for it and is wondering if she should be taking this med -? Nervous it will be effecting baby.   Pt states she is not wanting to gain weight, states she gained 30 pounds with new med and would like to speak with Dietician.   Medication Reconciliation: complete    Jorge Hawkins LPN  3/24/2020 1:27 PM  "

## 2020-03-24 NOTE — PROGRESS NOTES
"Nyasia Raya is a 30 year old female who is being evaluated via a telephone visit.      The patient has been notified of following (by REBECCA Beck LPN     \"We have found that certain health care needs can be provided without the need for a physical exam.  This service lets us provide the care you need with a short phone conversation.  If a prescription is necessary we can send it directly to your pharmacy.  If lab work is needed we can place an order for that and you can then stop by our lab to have the test done at a later time.    This telephone visit will be conducted via 3 way call with the you (the patient) , the physician/provider, and a me all on the line at the same time.  This allows your physician/provider to have the phone conversation with you while I will be taking notes for your medical record.  We will have full access to your [place] medical record during this entire phone call.    Since this is like an office visit,  will bill your insurance company for this service.  Please check with your medical insurance if this type of telephone/virtual is covered . You may be responsible for the cost of this service if insurance coverage is denied.  The typical cost is $30 (10min), $59(11-20min) and $85 (21-30min)     If during the course of the call the physician/provider feels a telephone visit is not appropriate, you will not be charged for this service\"    Consent has been obtained for this service by care team member: yes.  See the scanned image in the medical record.        Total time of call between patient and provider was 15 minutes     Jeanne Guerrero (MD signature)  ===================================================    I have reviewed the note as documented above.  This accurately captures the substance of my conversation with the patient,                "
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
## 2020-03-24 NOTE — PROGRESS NOTES
CC: New OB visit  HPI:  Nyasia Raya is  at 6w0d based on US today  She notes issues of anxiety, chronic bipolar depression currently semi-stable on lamictal. She has stopped her other meds including zoloft and adderall. She has a MHP she sees regularly in [facility]. She had spotting yesterday.    OB History    Para Term  AB Living   3 1 1 0 1 1   SAB TAB Ectopic Multiple Live Births   0 1 0 0 1      # Outcome Date GA Lbr Moiz/2nd Weight Sex Delivery Anes PTL Lv   3 Current            2 Term 01/15/14 41w4d  4.252 kg (9 lb 6 oz) M CS-LTranv TOPICAL, Spinal  TING      Birth Comments: none      Name: Wallace      Apgar1: 6  Apgar5: 8   1 TAB 2011 6w0d             Birth Comments: no comps     Last pap smear:   Lab Results   Component Value Date    PAP NIL 2019    PAP NIL 2018    PAP NIL 2015     Past Medical History:   Diagnosis Date     Asthma 5/10/2013     Insomnia 5/10/2013     Spondylolisthesis 5/10/2013     Tobacco abuse       has a past surgical history that includes  section (1/15/2014).    Social History     Tobacco Use     Smoking status: Former Smoker     Packs/day: 0.08     Types: Cigarettes     Last attempt to quit: 2020     Years since quittin.0     Smokeless tobacco: Never Used     Tobacco comment: Patient smokes 4 times per month.  Patient will wor,k on this through her place of employment (1-10-18)   Substance Use Topics     Alcohol use: No     Comment: signed up for quit plan and her workplace has programs     Drug use: No     Family History   Problem Relation Age of Onset     Diabetes Mother      Diabetes Father      Heart Disease Father 47        MI         Current Outpatient Medications   Medication     albuterol (PROVENTIL HFA) 108 (90 Base) MCG/ACT inhaler     diphenhydrAMINE (BENADRYL) 25 MG tablet     lamoTRIgine (LAMICTAL) 25 MG tablet     magnesium oxide (MAG-OX) 400 (241.3 Mg) MG tablet     melatonin 3 MG CAPS     Prenatal Vit-Fe  Fumarate-FA (PRENATAL MULTIVITAMIN W/IRON) 27-0.8 MG tablet     No current facility-administered medications for this visit.      No Known Allergies  Immunization History   Administered Date(s) Administered     DTAP (<7y) 1992, 09/06/1994, 09/13/1995, 06/24/1996     HPV 03/01/2007, 05/10/2007, 09/07/2007     HepA, Unspecified 1992     HepB 06/14/2004, 08/03/2004, 02/03/2005     Hib (PRP-T) 1992, 06/24/1996     Influenza (IIV3) PF 11/10/2006, 10/24/2008, 09/24/2013     MMR 09/06/1994, 08/27/2004, 08/27/2008     Pneumococcal 23 valent 07/08/2013     Poliovirus, inactivated (IPV) 1992, 09/06/1994, 09/13/1995, 06/24/1996     TD (ADULT, 7+) 08/27/2004     TDAP Vaccine (Boostrix) 04/18/2013, 10/15/2013           REVIEW OF SYSTEMS  General: negative  Resp: No shortness of breath, dyspnea on exertion, cough, or hemoptysis  CV: negative  GI: negative  : negative  Neurologic: negative  Psychiatric: excessive stress-, anxiety and depression  Hematologic: negative  Endocrine: negative    EXAM: LMP 01/28/2020 (Within Days)   Gen: NAD  CV: RRR with normal S1, S2, no GRM  Resp: CTA Bilaterally  Breasts: normal without suspicious masses, skin changes or axillary nodes.  Abdomen: NT, ND  Pelvic exam: normal vagina and vulva, normal cervix without lesions or tenderness, exam chaperoned by nurse.  Extremities: No TTP, no deformity  Neuro: CN II-XII intact grossly, moves all extremities  Psych: normal affect and mentation, anxious today.    Recent Results (from the past 24 hour(s))   US OB < 14 Weeks ( OB/GYN ONLY)    Narrative    Early OB US    Indication: uncertain dates    The [employer] Affiniti 50 W Ultrasound machine was used with the C9-4v probe.  With use of realtime 2-D and still images a  gamble intrauterine gestation is identified.    Gestation Sac diameter: 13.7 mm x 12.1 mm x 7.7 mm.        Right ovary: normal  Left ovary:  normal  Uterus: anteverted and normal shape and size    Cul-de-sac:  Normal without free fluid  Cervix: Normal without evidence of funneling    I/P:  intrauterine pregnancy at 6 weeks 0 days with US EDC of 2020 which is not concordant with menstrual date. Repeat US in 1-2 weeks for viability           I/P  (Z34.81) Encounter for supervision of other normal pregnancy in first trimester  (primary encounter diagnosis)  Comment:   Plan: ABO/Rh type and screen, Hepatitis B surface         antigen, HIV Antigen Antibody Combo, Rubella         Antibody IgG Quantitative, Treponema Abs w         Reflex to RPR and Titer, Urine Culture Aerobic         Bacterial, GC/Chlamydia by PCR - HI,, US OB <        14 Weeks ([facility] ONLY), C US OB 1ST         TRIMESTER MAT/FETAL, SINGLE GESTATION - [facility]            Recheck in 1-2 weeks for viability.    Discussed safety, nutrition, screening for cystic fibrosis, spina bifida, spinal muscular atrophy, quad screen, cffDNA screening as appropriate.  F/U scheduled, discussed call schedule rotation with [facility] and Dr. Trimble, general surgery.  Return visit in 1 month     Pregnancy risk factors include:  Bipolar with RANDI- Aura Guerrero for MHP, on lamictal  Prior  for FTP- Dr. Mayorga in [place].    Sadiq Lama MD FACOG  1:21 PM 3/24/2020

## 2020-03-25 ASSESSMENT — ANXIETY QUESTIONNAIRES: GAD7 TOTAL SCORE: 20

## 2020-03-26 LAB
BACTERIA SPEC CULT: NORMAL
HBV SURFACE AG SERPL QL IA: NONREACTIVE
HIV 1+2 AB+HIV1 P24 AG SERPL QL IA: NONREACTIVE
RUBV IGG SERPL IA-ACNC: 9 IU/ML
SPECIMEN SOURCE: NORMAL
T PALLIDUM AB SER QL: NONREACTIVE

## 2020-03-30 LAB
CFTR ALLELE 2 BLD/T QL: NEGATIVE
CFTR P.R117H+5T VAR BLD/T QL: NORMAL
CYSTIC FIBROSIS 165 VARIANT INTERP: NORMAL

## 2020-03-31 ENCOUNTER — HOSPITAL ENCOUNTER (OUTPATIENT)
Dept: ULTRASOUND IMAGING | Facility: OTHER | Age: 30
Discharge: HOME | End: 2020-03-31
Attending: OBSTETRICS & GYNECOLOGY | Admitting: OBSTETRICS & GYNECOLOGY
Payer: COMMERCIAL

## 2020-03-31 DIAGNOSIS — Z34.81 ENCOUNTER FOR SUPERVISION OF OTHER NORMAL PREGNANCY IN FIRST TRIMESTER: ICD-10-CM

## 2020-03-31 PROCEDURE — 76817 TRANSVAGINAL US OBSTETRIC: CPT

## 2020-04-01 DIAGNOSIS — Z34.81 ENCOUNTER FOR SUPERVISION OF OTHER NORMAL PREGNANCY IN FIRST TRIMESTER: Primary | ICD-10-CM

## 2020-04-02 LAB
RESULT: NORMAL
SEND OUTS MISC TEST CODE: NORMAL
SEND OUTS MISC TEST SPECIMEN: NORMAL
TEST NAME: NORMAL

## 2020-04-07 ENCOUNTER — HOSPITAL ENCOUNTER (OUTPATIENT)
Dept: ULTRASOUND IMAGING | Facility: OTHER | Age: 30
End: 2020-04-07
Attending: OBSTETRICS & GYNECOLOGY
Payer: COMMERCIAL

## 2020-04-07 ENCOUNTER — PRENATAL OFFICE VISIT (OUTPATIENT)
Dept: OBGYN | Facility: OTHER | Age: 30
End: 2020-04-07
Attending: OBSTETRICS & GYNECOLOGY
Payer: COMMERCIAL

## 2020-04-07 VITALS
SYSTOLIC BLOOD PRESSURE: 124 MMHG | DIASTOLIC BLOOD PRESSURE: 80 MMHG | BODY MASS INDEX: 37.35 KG/M2 | HEART RATE: 72 BPM | WEIGHT: 194 LBS

## 2020-04-07 DIAGNOSIS — O03.4 INCOMPLETE MISCARRIAGE: Primary | ICD-10-CM

## 2020-04-07 DIAGNOSIS — Z34.81 ENCOUNTER FOR SUPERVISION OF OTHER NORMAL PREGNANCY IN FIRST TRIMESTER: ICD-10-CM

## 2020-04-07 PROCEDURE — 99212 OFFICE O/P EST SF 10 MIN: CPT | Performed by: OBSTETRICS & GYNECOLOGY

## 2020-04-07 PROCEDURE — 76817 TRANSVAGINAL US OBSTETRIC: CPT

## 2020-04-07 ASSESSMENT — PAIN SCALES - GENERAL: PAINLEVEL: NO PAIN (0)

## 2020-04-07 NOTE — PROGRESS NOTES
Nyasia is here for follow-up.  Ultrasound today confirmed early miscarriage.  She states in the last day or 2 her bleeding is starting to increase.  No cramping yet or passage of tissue.  Blood type O+    Assessment: Early miscarriage    Plan: Miscarriage discussed in general with the patient and her significant other.  She is very willing for expectant management over the next week.  Discussed indications to be seen.  She does have a follow-up with Dr. Lama in 1 week.    All questions answered

## 2020-04-07 NOTE — NURSING NOTE
Chief Complaint   Patient presents with     Prenatal Care     follow up US 10w0d       Medication Reconciliation: completed   Latasha Rivera LPN  4/7/2020 1:16 PM

## 2020-04-08 ENCOUNTER — VIRTUAL VISIT (OUTPATIENT)
Dept: PSYCHIATRY | Facility: OTHER | Age: 30
End: 2020-04-08
Attending: PSYCHIATRY & NEUROLOGY
Payer: COMMERCIAL

## 2020-04-08 DIAGNOSIS — F41.1 GAD (GENERALIZED ANXIETY DISORDER): ICD-10-CM

## 2020-04-08 DIAGNOSIS — F90.2 ATTENTION DEFICIT HYPERACTIVITY DISORDER (ADHD), COMBINED TYPE: Primary | ICD-10-CM

## 2020-04-08 PROCEDURE — 99214 OFFICE O/P EST MOD 30 MIN: CPT | Mod: TEL | Performed by: PSYCHIATRY & NEUROLOGY

## 2020-04-08 RX ORDER — DEXTROAMPHETAMINE SACCHARATE, AMPHETAMINE ASPARTATE MONOHYDRATE, DEXTROAMPHETAMINE SULFATE AND AMPHETAMINE SULFATE 7.5; 7.5; 7.5; 7.5 MG/1; MG/1; MG/1; MG/1
30 CAPSULE, EXTENDED RELEASE ORAL 2 TIMES DAILY
Qty: 60 CAPSULE | Refills: 0 | Status: SHIPPED | OUTPATIENT
Start: 2020-04-08 | End: 2020-05-08

## 2020-04-08 RX ORDER — SERTRALINE HYDROCHLORIDE 100 MG/1
TABLET, FILM COATED ORAL
Qty: 30 TABLET | Refills: 4 | Status: SHIPPED | OUTPATIENT
Start: 2020-04-08 | End: 2020-08-19

## 2020-04-08 ASSESSMENT — PATIENT HEALTH QUESTIONNAIRE - PHQ9
SUM OF ALL RESPONSES TO PHQ QUESTIONS 1-9: 18
5. POOR APPETITE OR OVEREATING: NEARLY EVERY DAY

## 2020-04-08 ASSESSMENT — ANXIETY QUESTIONNAIRES
6. BECOMING EASILY ANNOYED OR IRRITABLE: NOT AT ALL
5. BEING SO RESTLESS THAT IT IS HARD TO SIT STILL: NEARLY EVERY DAY
3. WORRYING TOO MUCH ABOUT DIFFERENT THINGS: MORE THAN HALF THE DAYS
GAD7 TOTAL SCORE: 13
2. NOT BEING ABLE TO STOP OR CONTROL WORRYING: SEVERAL DAYS
1. FEELING NERVOUS, ANXIOUS, OR ON EDGE: NEARLY EVERY DAY
7. FEELING AFRAID AS IF SOMETHING AWFUL MIGHT HAPPEN: SEVERAL DAYS

## 2020-04-08 NOTE — NURSING NOTE
"Chief Complaint   Patient presents with     RECHECK     Telephone visit.       Initial There were no vitals taken for this visit. Estimated body mass index is 37.35 kg/m  as calculated from the following:    Height as of 3/24/20: 1.535 m (5' 0.43\").    Weight as of 4/7/20: 88 kg (194 lb).  Medication Reconciliation: complete  LARRY LYN LPN    "
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
## 2020-04-09 ENCOUNTER — TELEPHONE (OUTPATIENT)
Dept: PSYCHIATRY | Facility: OTHER | Age: 30
End: 2020-04-09

## 2020-04-09 ASSESSMENT — ANXIETY QUESTIONNAIRES: GAD7 TOTAL SCORE: 13

## 2020-04-09 NOTE — TELEPHONE ENCOUNTER
Received a PA from [facility] for Adderall XR 30 mg ER capsules. Submitted on CMM. Waiting for a response.

## 2020-04-10 DIAGNOSIS — F31.81 BIPOLAR 2 DISORDER (H): ICD-10-CM

## 2020-04-10 RX ORDER — LAMOTRIGINE 25 MG/1
50 TABLET ORAL DAILY
Qty: 60 TABLET | Refills: 11 | Status: SHIPPED | OUTPATIENT
Start: 2020-04-10 | End: 2020-06-09 | Stop reason: DRUGHIGH

## 2020-04-13 NOTE — TELEPHONE ENCOUNTER
Received an APPROVAL from [facility] for Adderall XR 30 mg ER capsules. Effective 04/09/2020 to 04/09/2021. Forms scanned to Epic.

## 2020-04-15 ENCOUNTER — OFFICE VISIT (OUTPATIENT)
Dept: OBGYN | Facility: OTHER | Age: 30
End: 2020-04-15
Attending: OBSTETRICS & GYNECOLOGY
Payer: COMMERCIAL

## 2020-04-15 VITALS
SYSTOLIC BLOOD PRESSURE: 122 MMHG | DIASTOLIC BLOOD PRESSURE: 70 MMHG | WEIGHT: 189 LBS | HEART RATE: 100 BPM | BODY MASS INDEX: 36.38 KG/M2

## 2020-04-15 DIAGNOSIS — O02.1 MISSED ABORTION: Primary | ICD-10-CM

## 2020-04-15 PROCEDURE — 99213 OFFICE O/P EST LOW 20 MIN: CPT | Performed by: OBSTETRICS & GYNECOLOGY

## 2020-04-15 ASSESSMENT — PAIN SCALES - GENERAL: PAINLEVEL: MODERATE PAIN (4)

## 2020-04-15 NOTE — NURSING NOTE
Chief Complaint   Patient presents with     RECHECK     Miscarriage        Medication Reconciliation: completed   Latasha Rivera LPN  4/15/2020 2:28 PM

## 2020-04-15 NOTE — PROGRESS NOTES
S: Failed early pregnancy. US failed to show fetal development beyond a six week size GS.  SHe is cramping and bleeding a small amount, but not in distress    /70 (BP Location: Right arm, Patient Position: Sitting, Cuff Size: Adult Large)   Pulse 100   Wt 85.7 kg (189 lb)   Breastfeeding No   BMI 36.38 kg/m     O pos BT    Recent Results (from the past 744 hour(s))   US OB < 14 Weeks ( OB/GYN ONLY)    Narrative    Early OB US    Indication: uncertain dates    The [employer] Affiniti 50 W Ultrasound machine was used with the C9-4v probe.  With use of realtime 2-D and still images a  gamble intrauterine gestation is identified.    Gestation Sac diameter: 13.7 mm x 12.1 mm x 7.7 mm.        Right ovary: normal  Left ovary:  normal  Uterus: anteverted and normal shape and size    Cul-de-sac: Normal without free fluid  Cervix: Normal without evidence of funneling    I/P:  intrauterine pregnancy at 6 weeks 0 days with US EDC of 11/17/2020 which is not concordant with menstrual date. Repeat US in 1-2 weeks for viability     US OB Transvaginal Only    Narrative    PROCEDURE: US OB TRANSVAGINAL ONLY 3/31/2020 1:45 PM    HISTORY: Encounter for supervision of other normal pregnancy in first  trimester    COMPARISONS: 3/24/2020.    TECHNIQUE: Transvaginal imaging.    FINDINGS: There is a gestational sac within uterus. Mean sac diameter  is 1.64 cm corresponding to a gestational age of 6 weeks 4 days. No  yolk sac, embryo or cardiac activity is identified.         Impression    IMPRESSION: Findings are suspicious for pregnancy failure given no  embryo 7 days after a scan that showed a gestational sac without a  yolk sac. Findings are not definitive for pregnancy failure. Recommend  follow-up in 7-10 days.    TELLY BERGER MD   US OB Transvaginal Only    Narrative    PROCEDURE: US OB TRANSVAGINAL ONLY 4/7/2020 1:47 PM    HISTORY: Encounter for supervision of other normal pregnancy in  first  trimester    COMPARISONS: 3/31/2020 and 3/24/2020.    TECHNIQUE: Transvaginal imaging only.    FINDINGS: There is a gestational sac within the uterus. Mean sac  diameter is 1.73 cm corresponding to a gestational age of 6 weeks 5  days. No yolk sac, embryo or cardiac activity is seen.         Impression    IMPRESSION: Findings are consistent with pregnancy failure given no  embryo with a heart beat 2 weeks after a scan that showed a  gestational sac without a yolk sac.    TELLY BERGER MD     I/p Missed ab/Failed early IUP  Discussed watchful waiting vs oral cytotec vs Suction D&C    She wishes to wait for SAB and will update us next week.    Sadiq Lama MD FACOG  2:53 PM 4/15/2020

## 2020-04-29 ENCOUNTER — OFFICE VISIT (OUTPATIENT)
Dept: OBGYN | Facility: OTHER | Age: 30
End: 2020-04-29
Attending: OBSTETRICS & GYNECOLOGY
Payer: COMMERCIAL

## 2020-04-29 VITALS
DIASTOLIC BLOOD PRESSURE: 80 MMHG | WEIGHT: 189 LBS | HEART RATE: 72 BPM | BODY MASS INDEX: 36.38 KG/M2 | SYSTOLIC BLOOD PRESSURE: 110 MMHG

## 2020-04-29 DIAGNOSIS — O03.4 INCOMPLETE MISCARRIAGE: Primary | ICD-10-CM

## 2020-04-29 PROCEDURE — 99212 OFFICE O/P EST SF 10 MIN: CPT | Performed by: OBSTETRICS & GYNECOLOGY

## 2020-04-29 ASSESSMENT — PAIN SCALES - GENERAL: PAINLEVEL: NO PAIN (0)

## 2020-04-29 NOTE — PROGRESS NOTES
Please refer to dictations from myself on 4/7/2020 and from Dr. Lama on 4/15/2020.  Has an incomplete miscarriage.  Continues to bleed daily with mild cramping.  Has passed a couple of clots but does not feel that she has passed any tissue.  Has gotten to the point where she would like to consider moving forward with a D&C if needed.  Type is O+.    Discussed the above situation with the patient.  We are going to arrange a follow-up ultrasound next Tuesday (5/5/2020) and have her see Dr. Lama back.  She would then like to consider a suction D&C if there is evidence of retained products of conception.  All questions answered

## 2020-04-30 ENCOUNTER — HOSPITAL ENCOUNTER (EMERGENCY)
Facility: OTHER | Age: 30
Discharge: HOME | End: 2020-04-30
Attending: PHYSICIAN ASSISTANT | Admitting: PHYSICIAN ASSISTANT
Payer: COMMERCIAL

## 2020-04-30 VITALS
DIASTOLIC BLOOD PRESSURE: 71 MMHG | HEART RATE: 106 BPM | SYSTOLIC BLOOD PRESSURE: 123 MMHG | TEMPERATURE: 98.8 F | OXYGEN SATURATION: 97 % | WEIGHT: 190 LBS | HEIGHT: 59 IN | BODY MASS INDEX: 38.3 KG/M2 | RESPIRATION RATE: 18 BRPM

## 2020-04-30 DIAGNOSIS — R10.9 ABDOMINAL CRAMPS: ICD-10-CM

## 2020-04-30 DIAGNOSIS — O03.4 INCOMPLETE MISCARRIAGE: ICD-10-CM

## 2020-04-30 LAB
BASOPHILS # BLD AUTO: 0.1 10E9/L (ref 0–0.2)
BASOPHILS NFR BLD AUTO: 0.4 %
DIFFERENTIAL METHOD BLD: ABNORMAL
EOSINOPHIL # BLD AUTO: 0.5 10E9/L (ref 0–0.7)
EOSINOPHIL NFR BLD AUTO: 3.9 %
ERYTHROCYTE [DISTWIDTH] IN BLOOD BY AUTOMATED COUNT: 12.6 % (ref 10–15)
HCT VFR BLD AUTO: 40.5 % (ref 35–47)
HGB BLD-MCNC: 13.3 G/DL (ref 11.7–15.7)
IMM GRANULOCYTES # BLD: 0.1 10E9/L (ref 0–0.4)
IMM GRANULOCYTES NFR BLD: 0.6 %
LYMPHOCYTES # BLD AUTO: 4.2 10E9/L (ref 0.8–5.3)
LYMPHOCYTES NFR BLD AUTO: 34.8 %
MCH RBC QN AUTO: 29.3 PG (ref 26.5–33)
MCHC RBC AUTO-ENTMCNC: 32.8 G/DL (ref 31.5–36.5)
MCV RBC AUTO: 89 FL (ref 78–100)
MONOCYTES # BLD AUTO: 0.7 10E9/L (ref 0–1.3)
MONOCYTES NFR BLD AUTO: 6 %
NEUTROPHILS # BLD AUTO: 6.5 10E9/L (ref 1.6–8.3)
NEUTROPHILS NFR BLD AUTO: 54.3 %
PLATELET # BLD AUTO: 325 10E9/L (ref 150–450)
RBC # BLD AUTO: 4.54 10E12/L (ref 3.8–5.2)
WBC # BLD AUTO: 11.9 10E9/L (ref 4–11)

## 2020-04-30 PROCEDURE — 25000132 ZZH RX MED GY IP 250 OP 250 PS 637: Performed by: PHYSICIAN ASSISTANT

## 2020-04-30 PROCEDURE — 85025 COMPLETE CBC W/AUTO DIFF WBC: CPT | Performed by: PHYSICIAN ASSISTANT

## 2020-04-30 PROCEDURE — 99283 EMERGENCY DEPT VISIT LOW MDM: CPT | Performed by: PHYSICIAN ASSISTANT

## 2020-04-30 PROCEDURE — 36415 COLL VENOUS BLD VENIPUNCTURE: CPT | Performed by: PHYSICIAN ASSISTANT

## 2020-04-30 PROCEDURE — 99283 EMERGENCY DEPT VISIT LOW MDM: CPT | Mod: Z6 | Performed by: PHYSICIAN ASSISTANT

## 2020-04-30 RX ORDER — MISOPROSTOL 100 UG/1
600 TABLET ORAL
Status: COMPLETED | OUTPATIENT
Start: 2020-04-30 | End: 2020-04-30

## 2020-04-30 RX ADMIN — MISOPROSTOL 600 MCG: 100 TABLET ORAL at 23:11

## 2020-04-30 ASSESSMENT — MIFFLIN-ST. JEOR: SCORE: 1497.46

## 2020-04-30 ASSESSMENT — ENCOUNTER SYMPTOMS
SHORTNESS OF BREATH: 0
CONFUSION: 0
BACK PAIN: 0
FEVER: 0
CHEST TIGHTNESS: 0
ADENOPATHY: 0
HEMATURIA: 0
WOUND: 0
BRUISES/BLEEDS EASILY: 0
CHILLS: 0
ABDOMINAL PAIN: 1

## 2020-04-30 NOTE — ED AVS SNAPSHOT
[facility]  [address]  [place] [address]  Phone:  [phone]  Fax:  [phone]                                    Nyasia Raya   MRN: [id number]    Department:  [facility]   Date of Visit:  4/30/2020           After Visit Summary Signature Page    I have received my discharge instructions, and my questions have been answered. I have discussed any challenges I see with this plan with the nurse or doctor.    ..........................................................................................................................................  Patient/Patient Representative Signature      ..........................................................................................................................................  Patient Representative Print Name and Relationship to Patient    ..................................................               ................................................  Date                                   Time    ..........................................................................................................................................  Reviewed by Signature/Title    ...................................................              ..............................................  Date                                               Time          22EPIC Rev 08/18

## 2020-05-01 NOTE — ED TRIAGE NOTES
Was pregnant and started bleeding in March and was seen by Dr. Lama. Was seen several times by different doctors and is scheduled for an ultrasound next Tuesday and may need a DNC. Yesterday had 2 very large clots in which she thought had tissue with a foul odor. Along with severe pain. Rates pain a 7 currently. Has not noted a fever. Only requiring a panty liner currently.

## 2020-05-01 NOTE — DISCHARGE INSTRUCTIONS
Get plenty of fluids and rest.  Take Tylenol and ibuprofen, heating pad, to help with discomfort.  Expect increased cramping and bleeding throughout the weekend.  You should return to the ED if you have bleeding greater than 2 pads per hour for 2 consecutive hours or if you are passing clots larger than golf ball size.  If there are any concerns I also encourage you to call and follow-up with OB/GYN tomorrow in the clinic, otherwise keep you already scheduled appointment next week.

## 2020-05-01 NOTE — ED PROVIDER NOTES
History     Chief Complaint   Patient presents with     Vaginal Bleeding     HPI  Nyasia Raya is a 30 year old female who presents to the ED today with a complaint of cramping and vaginal bleeding.  Patient has a known incomplete miscarriage and was recently seen by Dr. Chino Trimble in clinic yesterday.  She has a follow-up ultrasound and possible D&C next week.  She reports having passed 2 clots last evening and slightly increased cramping type pains throughout her abdomen.  She does not report any increased bleeding from what has been occurring recently, she describes it as less than a menstrual period Bleeding and is only requiring her to wear a panty liner. She denies sob, fever, lightheadedness.    Allergies:  No Known Allergies    Problem List:    Patient Active Problem List    Diagnosis Date Noted     Attention deficit hyperactivity disorder (ADHD), combined type 06/12/2019     Priority: Medium     Patient is followed by  for ongoing prescription of stimulants.  All refills should be approved by this provider, or covering partner.    Medication(s): Adderall 30 mg.   Maximum quantity per month: 60  Clinic visit frequency required: Q 3 months     Controlled substance agreement on file: Yes       Date(s): 6.12.19  Neuropsych evaluation for ADD completed:  Managed by     [facility] website verification:  done on 6.12.19  [email or web address]           NO SHOW 08/02/2016     Priority: Medium     No showed Dr. Mayorga 8/2/16  No showed Dr. Mayorga 1/24/18       Encounter for routine gynecological examination 06/08/2015     Priority: Medium     Problem list name updated by automated process. Provider to review       Smoker 06/08/2015     Priority: Medium     Almost stopped...trying       Family planning 03/05/2014     Priority: Medium     Obesity 01/15/2014     Priority: Medium     Night terrors, adult 08/05/2013     Priority: Medium     Family history of congenital heart  defect 2013     Priority: Medium     Brother       Insomnia 05/10/2013     Priority: Medium     Spondylolisthesis 05/10/2013     Priority: Medium     Congenital spondylolisthesis 2010     Priority: Medium     Overview:   Followed by [facility] for ongoing back pain       Asthma 2009     Priority: Medium     Needs asthma action plan          Past Medical History:    Past Medical History:   Diagnosis Date     Asthma 5/10/2013     Insomnia 5/10/2013     Spondylolisthesis 5/10/2013     Tobacco abuse        Past Surgical History:    Past Surgical History:   Procedure Laterality Date      SECTION  1/15/2014    Procedure:  SECTION;;  Surgeon: Leena Mayorga MD;  Location: [facility] OR       Family History:    Family History   Problem Relation Age of Onset     Diabetes Mother      Diabetes Father      Heart Disease Father 47        MI       Social History:  Marital Status:  Single [1]  Social History     Tobacco Use     Smoking status: Former Smoker     Packs/day: 0.08     Types: Cigarettes     Last attempt to quit: 2020     Years since quittin.1     Smokeless tobacco: Never Used     Tobacco comment: Patient will work on this through her place of employment (1-10-18)   Substance Use Topics     Alcohol use: No     Comment: signed up for quit plan and her workplace has programs     Drug use: No        Medications:    amphetamine-dextroamphetamine (ADDERALL XR) 30 MG 24 hr capsule  diphenhydrAMINE (BENADRYL) 25 MG tablet  lamoTRIgine (LAMICTAL) 25 MG tablet  melatonin 3 MG CAPS  sertraline (ZOLOFT) 100 MG tablet  albuterol (PROVENTIL HFA) 108 (90 Base) MCG/ACT inhaler          Review of Systems   Constitutional: Negative for chills and fever.   HENT: Negative for congestion.    Eyes: Negative for visual disturbance.   Respiratory: Negative for chest tightness and shortness of breath.    Cardiovascular: Negative for chest pain.   Gastrointestinal: Positive for abdominal pain.  "  Genitourinary: Positive for vaginal bleeding. Negative for hematuria.   Musculoskeletal: Negative for back pain.   Skin: Negative for rash and wound.   Neurological: Negative for syncope.   Hematological: Negative for adenopathy. Does not bruise/bleed easily.   Psychiatric/Behavioral: Negative for confusion.       Physical Exam   BP: 129/67  Pulse: 115  Temp: 98.8  F (37.1  C)  Resp: 18  Height: 149.9 cm (4' 11\")  Weight: 86.2 kg (190 lb)  SpO2: 96 %      Physical Exam  Constitutional:       General: She is not in acute distress.     Appearance: She is well-developed. She is not diaphoretic.   HENT:      Head: Normocephalic and atraumatic.   Eyes:      General: No scleral icterus.     Conjunctiva/sclera: Conjunctivae normal.   Neck:      Musculoskeletal: Neck supple.   Cardiovascular:      Rate and Rhythm: Regular rhythm. Tachycardia present.   Pulmonary:      Effort: Pulmonary effort is normal.      Breath sounds: Normal breath sounds.   Abdominal:      Palpations: Abdomen is soft.      Tenderness: There is abdominal tenderness.      Comments: General lower abd tenderness without guarding/rebound.   Musculoskeletal:         General: No deformity.   Lymphadenopathy:      Cervical: No cervical adenopathy.   Skin:     General: Skin is warm and dry.      Findings: No rash.   Neurological:      Mental Status: She is alert and oriented to person, place, and time. Mental status is at baseline.   Psychiatric:         Mood and Affect: Mood normal.         Behavior: Behavior normal.         ED Course        Procedures               Critical Care time:  none               Results for orders placed or performed during the hospital encounter of 04/30/20 (from the past 24 hour(s))   CBC with platelets differential   Result Value Ref Range    WBC 11.9 (H) 4.0 - 11.0 10e9/L    RBC Count 4.54 3.8 - 5.2 10e12/L    Hemoglobin 13.3 11.7 - 15.7 g/dL    Hematocrit 40.5 35.0 - 47.0 %    MCV 89 78 - 100 fl    MCH 29.3 26.5 - 33.0 pg    " MCHC 32.8 31.5 - 36.5 g/dL    RDW 12.6 10.0 - 15.0 %    Platelet Count 325 150 - 450 10e9/L    Diff Method Automated Method     % Neutrophils 54.3 %    % Lymphocytes 34.8 %    % Monocytes 6.0 %    % Eosinophils 3.9 %    % Basophils 0.4 %    % Immature Granulocytes 0.6 %    Absolute Neutrophil 6.5 1.6 - 8.3 10e9/L    Absolute Lymphocytes 4.2 0.8 - 5.3 10e9/L    Absolute Monocytes 0.7 0.0 - 1.3 10e9/L    Absolute Eosinophils 0.5 0.0 - 0.7 10e9/L    Absolute Basophils 0.1 0.0 - 0.2 10e9/L    Abs Immature Granulocytes 0.1 0 - 0.4 10e9/L       Medications   misoprostol (CYTOTEC) tablet 600 mcg (has no administration in time range)       Assessments & Plan (with Medical Decision Making)   Nontoxic but slightly anxious. Heart is slightly fast, lungs clear, slight lower abdominal tenderness but without guarding/rebound. Afebrile.     Known incomplete miscarriage.     HGB 13.3, wbc 11.9.     Pt does not appear septic. Tachycardia most likely due to her anxiety, pain, adderall.     I discussed with Dr. DIEUDONNE Trimble. She recommends cytotec administration. Pt is told of risk such as inc bleeding/cramping, but would like to go ahead with the medication.     She will return if Bleeding >2pads/hr for 2 consecutive hours or passing clots > golf ball size.     She will also f/u with OB tomorrow or next week.     Strict return precautions are given to the pt, they will return if symptoms are worsening or concerning. The pt understands and agrees with the plan and they are discharged.     Jean Carlos Mullins PA-C        I have reviewed the nursing notes.    I have reviewed the findings, diagnosis, plan and need for follow up with the patient.       Current Discharge Medication List          Final diagnoses:   Incomplete miscarriage   Abdominal cramps       4/30/2020   [facility]     Jean Carlos Mullins PA  04/30/20 [id number]

## 2020-05-08 ENCOUNTER — VIRTUAL VISIT (OUTPATIENT)
Dept: PSYCHIATRY | Facility: OTHER | Age: 30
End: 2020-05-08
Attending: PSYCHIATRY & NEUROLOGY
Payer: COMMERCIAL

## 2020-05-08 DIAGNOSIS — F90.2 ATTENTION DEFICIT HYPERACTIVITY DISORDER (ADHD), COMBINED TYPE: ICD-10-CM

## 2020-05-08 PROCEDURE — 99213 OFFICE O/P EST LOW 20 MIN: CPT | Mod: 95 | Performed by: PSYCHIATRY & NEUROLOGY

## 2020-05-08 RX ORDER — DEXTROAMPHETAMINE SACCHARATE, AMPHETAMINE ASPARTATE MONOHYDRATE, DEXTROAMPHETAMINE SULFATE AND AMPHETAMINE SULFATE 7.5; 7.5; 7.5; 7.5 MG/1; MG/1; MG/1; MG/1
30 CAPSULE, EXTENDED RELEASE ORAL 2 TIMES DAILY
Qty: 60 CAPSULE | Refills: 0 | Status: SHIPPED | OUTPATIENT
Start: 2020-05-08 | End: 2020-06-09 | Stop reason: DRUGHIGH

## 2020-05-08 ASSESSMENT — PAIN SCALES - GENERAL: PAINLEVEL: NO PAIN (0)

## 2020-05-08 ASSESSMENT — ANXIETY QUESTIONNAIRES
2. NOT BEING ABLE TO STOP OR CONTROL WORRYING: NEARLY EVERY DAY
1. FEELING NERVOUS, ANXIOUS, OR ON EDGE: NEARLY EVERY DAY
7. FEELING AFRAID AS IF SOMETHING AWFUL MIGHT HAPPEN: SEVERAL DAYS
GAD7 TOTAL SCORE: 18
3. WORRYING TOO MUCH ABOUT DIFFERENT THINGS: NEARLY EVERY DAY
5. BEING SO RESTLESS THAT IT IS HARD TO SIT STILL: NEARLY EVERY DAY
6. BECOMING EASILY ANNOYED OR IRRITABLE: MORE THAN HALF THE DAYS

## 2020-05-08 ASSESSMENT — PATIENT HEALTH QUESTIONNAIRE - PHQ9
5. POOR APPETITE OR OVEREATING: NEARLY EVERY DAY
SUM OF ALL RESPONSES TO PHQ QUESTIONS 1-9: 17

## 2020-05-08 NOTE — NURSING NOTE
"Chief Complaint   Patient presents with     RECHECK     Telephone visit.       Initial There were no vitals taken for this visit. Estimated body mass index is 38.38 kg/m  as calculated from the following:    Height as of 4/30/20: 1.499 m (4' 11\").    Weight as of 4/30/20: 86.2 kg (190 lb).  Medication Reconciliation: complete  LARRY LYN LPN    "
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
## 2020-05-09 ASSESSMENT — ANXIETY QUESTIONNAIRES: GAD7 TOTAL SCORE: 18

## 2020-06-01 ENCOUNTER — TELEPHONE (OUTPATIENT)
Dept: PSYCHIATRY | Facility: OTHER | Age: 30
End: 2020-06-01

## 2020-06-01 NOTE — TELEPHONE ENCOUNTER
Patient is calling to request an extension of the return to work date.  Current date to return to work is on 6-8-20.  She would like to extend this date out to 7-8-20.  Will wait for paperwork.  Thank you

## 2020-06-09 ENCOUNTER — VIRTUAL VISIT (OUTPATIENT)
Dept: PSYCHIATRY | Facility: OTHER | Age: 30
End: 2020-06-09
Attending: PSYCHIATRY & NEUROLOGY
Payer: COMMERCIAL

## 2020-06-09 DIAGNOSIS — F90.2 ATTENTION DEFICIT HYPERACTIVITY DISORDER (ADHD), COMBINED TYPE: ICD-10-CM

## 2020-06-09 DIAGNOSIS — F31.30 BIPOLAR I DISORDER, MOST RECENT EPISODE DEPRESSED (H): Primary | ICD-10-CM

## 2020-06-09 PROCEDURE — 99214 OFFICE O/P EST MOD 30 MIN: CPT | Mod: 95 | Performed by: PSYCHIATRY & NEUROLOGY

## 2020-06-09 RX ORDER — DEXTROAMPHETAMINE SACCHARATE, AMPHETAMINE ASPARTATE MONOHYDRATE, DEXTROAMPHETAMINE SULFATE AND AMPHETAMINE SULFATE 7.5; 7.5; 7.5; 7.5 MG/1; MG/1; MG/1; MG/1
30 CAPSULE, EXTENDED RELEASE ORAL 2 TIMES DAILY
Qty: 60 CAPSULE | Refills: 0 | Status: SHIPPED | OUTPATIENT
Start: 2020-06-09 | End: 2020-07-20

## 2020-06-09 RX ORDER — LAMOTRIGINE 100 MG/1
100 TABLET ORAL DAILY
Qty: 30 TABLET | Refills: 3 | Status: SHIPPED | OUTPATIENT
Start: 2020-06-09 | End: 2020-08-19

## 2020-06-09 RX ORDER — DEXTROAMPHETAMINE SACCHARATE, AMPHETAMINE ASPARTATE MONOHYDRATE, DEXTROAMPHETAMINE SULFATE AND AMPHETAMINE SULFATE 7.5; 7.5; 7.5; 7.5 MG/1; MG/1; MG/1; MG/1
30 CAPSULE, EXTENDED RELEASE ORAL 2 TIMES DAILY
Qty: 60 CAPSULE | Refills: 0 | Status: SHIPPED | OUTPATIENT
Start: 2020-07-07 | End: 2020-07-20

## 2020-06-09 ASSESSMENT — PATIENT HEALTH QUESTIONNAIRE - PHQ9
5. POOR APPETITE OR OVEREATING: NEARLY EVERY DAY
SUM OF ALL RESPONSES TO PHQ QUESTIONS 1-9: 18

## 2020-06-09 ASSESSMENT — ANXIETY QUESTIONNAIRES
5. BEING SO RESTLESS THAT IT IS HARD TO SIT STILL: SEVERAL DAYS
1. FEELING NERVOUS, ANXIOUS, OR ON EDGE: NEARLY EVERY DAY
GAD7 TOTAL SCORE: 15
7. FEELING AFRAID AS IF SOMETHING AWFUL MIGHT HAPPEN: SEVERAL DAYS
2. NOT BEING ABLE TO STOP OR CONTROL WORRYING: NEARLY EVERY DAY
6. BECOMING EASILY ANNOYED OR IRRITABLE: SEVERAL DAYS
3. WORRYING TOO MUCH ABOUT DIFFERENT THINGS: NEARLY EVERY DAY

## 2020-06-09 ASSESSMENT — PAIN SCALES - GENERAL: PAINLEVEL: MODERATE PAIN (4)
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
## 2020-06-09 NOTE — NURSING NOTE
"Chief Complaint   Patient presents with     RECHECK     Telephone visit.  Discuss Lamictal       Initial There were no vitals taken for this visit. Estimated body mass index is 38.38 kg/m  as calculated from the following:    Height as of 4/30/20: 1.499 m (4' 11\").    Weight as of 4/30/20: 86.2 kg (190 lb).  Medication Reconciliation: complete  LARRY LYN LPN    "

## 2020-06-10 ASSESSMENT — ANXIETY QUESTIONNAIRES: GAD7 TOTAL SCORE: 15

## 2020-07-20 ENCOUNTER — OFFICE VISIT (OUTPATIENT)
Dept: PSYCHIATRY | Facility: OTHER | Age: 30
End: 2020-07-20
Attending: PSYCHIATRY & NEUROLOGY
Payer: COMMERCIAL

## 2020-07-20 VITALS
WEIGHT: 180 LBS | DIASTOLIC BLOOD PRESSURE: 84 MMHG | SYSTOLIC BLOOD PRESSURE: 104 MMHG | TEMPERATURE: 98.8 F | OXYGEN SATURATION: 98 % | HEART RATE: 101 BPM | BODY MASS INDEX: 36.29 KG/M2 | HEIGHT: 59 IN

## 2020-07-20 DIAGNOSIS — F41.1 GAD (GENERALIZED ANXIETY DISORDER): Primary | ICD-10-CM

## 2020-07-20 DIAGNOSIS — F90.2 ATTENTION DEFICIT HYPERACTIVITY DISORDER (ADHD), COMBINED TYPE: ICD-10-CM

## 2020-07-20 PROCEDURE — 99214 OFFICE O/P EST MOD 30 MIN: CPT | Performed by: PSYCHIATRY & NEUROLOGY

## 2020-07-20 RX ORDER — DEXTROAMPHETAMINE SACCHARATE, AMPHETAMINE ASPARTATE MONOHYDRATE, DEXTROAMPHETAMINE SULFATE AND AMPHETAMINE SULFATE 7.5; 7.5; 7.5; 7.5 MG/1; MG/1; MG/1; MG/1
30 CAPSULE, EXTENDED RELEASE ORAL 2 TIMES DAILY
Qty: 60 CAPSULE | Refills: 0 | Status: SHIPPED | OUTPATIENT
Start: 2020-08-04 | End: 2020-09-21 | Stop reason: ALTCHOICE

## 2020-07-20 RX ORDER — QUETIAPINE FUMARATE 25 MG/1
TABLET, FILM COATED ORAL
Qty: 30 TABLET | Refills: 4 | Status: SHIPPED | OUTPATIENT
Start: 2020-07-20 | End: 2020-12-07

## 2020-07-20 ASSESSMENT — ANXIETY QUESTIONNAIRES
IF YOU CHECKED OFF ANY PROBLEMS ON THIS QUESTIONNAIRE, HOW DIFFICULT HAVE THESE PROBLEMS MADE IT FOR YOU TO DO YOUR WORK, TAKE CARE OF THINGS AT HOME, OR GET ALONG WITH OTHER PEOPLE: VERY DIFFICULT
6. BECOMING EASILY ANNOYED OR IRRITABLE: SEVERAL DAYS
2. NOT BEING ABLE TO STOP OR CONTROL WORRYING: NEARLY EVERY DAY
3. WORRYING TOO MUCH ABOUT DIFFERENT THINGS: NEARLY EVERY DAY
7. FEELING AFRAID AS IF SOMETHING AWFUL MIGHT HAPPEN: SEVERAL DAYS
1. FEELING NERVOUS, ANXIOUS, OR ON EDGE: NEARLY EVERY DAY
5. BEING SO RESTLESS THAT IT IS HARD TO SIT STILL: MORE THAN HALF THE DAYS
GAD7 TOTAL SCORE: 15

## 2020-07-20 ASSESSMENT — MIFFLIN-ST. JEOR: SCORE: 1452.1

## 2020-07-20 ASSESSMENT — PAIN SCALES - GENERAL: PAINLEVEL: NO PAIN (0)
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
## 2020-07-20 NOTE — NURSING NOTE
"Chief Complaint   Patient presents with     RECHECK     Mental health.  Discuss anxiety.  Patient feels the increase in Lamictal is working well.       Initial /84 (BP Location: Right arm, Patient Position: Sitting, Cuff Size: Adult Regular)   Pulse 101   Temp 98.8  F (37.1  C) (Tympanic)   Ht 1.499 m (4' 11\")   Wt 81.6 kg (180 lb)   SpO2 98%   BMI 36.36 kg/m   Estimated body mass index is 36.36 kg/m  as calculated from the following:    Height as of this encounter: 1.499 m (4' 11\").    Weight as of this encounter: 81.6 kg (180 lb).  Medication Reconciliation: complete  LARRY LYN LPN    "

## 2020-07-21 ASSESSMENT — ANXIETY QUESTIONNAIRES: GAD7 TOTAL SCORE: 15

## 2020-08-19 ENCOUNTER — OFFICE VISIT (OUTPATIENT)
Dept: PSYCHIATRY | Facility: OTHER | Age: 30
End: 2020-08-19
Attending: PSYCHIATRY & NEUROLOGY
Payer: COMMERCIAL

## 2020-08-19 VITALS
SYSTOLIC BLOOD PRESSURE: 102 MMHG | TEMPERATURE: 97.2 F | HEART RATE: 88 BPM | DIASTOLIC BLOOD PRESSURE: 86 MMHG | BODY MASS INDEX: 36.29 KG/M2 | WEIGHT: 180 LBS | HEIGHT: 59 IN | OXYGEN SATURATION: 97 %

## 2020-08-19 DIAGNOSIS — F31.30 BIPOLAR I DISORDER, MOST RECENT EPISODE DEPRESSED (H): ICD-10-CM

## 2020-08-19 DIAGNOSIS — F41.1 GAD (GENERALIZED ANXIETY DISORDER): ICD-10-CM

## 2020-08-19 DIAGNOSIS — F90.2 ADHD (ATTENTION DEFICIT HYPERACTIVITY DISORDER), COMBINED TYPE: Primary | ICD-10-CM

## 2020-08-19 PROCEDURE — 99214 OFFICE O/P EST MOD 30 MIN: CPT | Performed by: PSYCHIATRY & NEUROLOGY

## 2020-08-19 RX ORDER — DEXTROAMPHETAMINE SACCHARATE, AMPHETAMINE ASPARTATE, DEXTROAMPHETAMINE SULFATE AND AMPHETAMINE SULFATE 5; 5; 5; 5 MG/1; MG/1; MG/1; MG/1
20 TABLET ORAL 3 TIMES DAILY
Qty: 90 TABLET | Refills: 0 | Status: SHIPPED | OUTPATIENT
Start: 2020-08-19 | End: 2020-09-15

## 2020-08-19 RX ORDER — LAMOTRIGINE 100 MG/1
100 TABLET ORAL DAILY
Qty: 30 TABLET | Refills: 3 | Status: SHIPPED | OUTPATIENT
Start: 2020-08-19 | End: 2020-12-07

## 2020-08-19 RX ORDER — SERTRALINE HYDROCHLORIDE 100 MG/1
TABLET, FILM COATED ORAL
Qty: 30 TABLET | Refills: 4 | Status: SHIPPED | OUTPATIENT
Start: 2020-08-19 | End: 2020-12-07

## 2020-08-19 ASSESSMENT — PATIENT HEALTH QUESTIONNAIRE - PHQ9
SUM OF ALL RESPONSES TO PHQ QUESTIONS 1-9: 10
5. POOR APPETITE OR OVEREATING: NEARLY EVERY DAY

## 2020-08-19 ASSESSMENT — ANXIETY QUESTIONNAIRES
1. FEELING NERVOUS, ANXIOUS, OR ON EDGE: NEARLY EVERY DAY
IF YOU CHECKED OFF ANY PROBLEMS ON THIS QUESTIONNAIRE, HOW DIFFICULT HAVE THESE PROBLEMS MADE IT FOR YOU TO DO YOUR WORK, TAKE CARE OF THINGS AT HOME, OR GET ALONG WITH OTHER PEOPLE: VERY DIFFICULT
3. WORRYING TOO MUCH ABOUT DIFFERENT THINGS: NEARLY EVERY DAY
GAD7 TOTAL SCORE: 20
6. BECOMING EASILY ANNOYED OR IRRITABLE: NEARLY EVERY DAY
2. NOT BEING ABLE TO STOP OR CONTROL WORRYING: NEARLY EVERY DAY
7. FEELING AFRAID AS IF SOMETHING AWFUL MIGHT HAPPEN: MORE THAN HALF THE DAYS
5. BEING SO RESTLESS THAT IT IS HARD TO SIT STILL: NEARLY EVERY DAY

## 2020-08-19 ASSESSMENT — PAIN SCALES - GENERAL: PAINLEVEL: NO PAIN (0)

## 2020-08-19 ASSESSMENT — MIFFLIN-ST. JEOR: SCORE: 1452.1

## 2020-08-19 NOTE — NURSING NOTE
"Chief Complaint   Patient presents with     RECHECK     Anxiety.  Patient reports Seroquel is helping symptoms.  No panic attacks.       Initial /86 (BP Location: Right arm, Patient Position: Sitting, Cuff Size: Adult Regular)   Pulse 88   Temp 97.2  F (36.2  C) (Tympanic)   Ht 1.499 m (4' 11\")   Wt 81.6 kg (180 lb)   SpO2 97%   BMI 36.36 kg/m   Estimated body mass index is 36.36 kg/m  as calculated from the following:    Height as of this encounter: 1.499 m (4' 11\").    Weight as of this encounter: 81.6 kg (180 lb).  Medication Reconciliation: complete  LARRY LYN LPN    "
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
## 2020-08-20 ASSESSMENT — ANXIETY QUESTIONNAIRES: GAD7 TOTAL SCORE: 20

## 2020-09-15 DIAGNOSIS — F90.2 ADHD (ATTENTION DEFICIT HYPERACTIVITY DISORDER), COMBINED TYPE: ICD-10-CM

## 2020-09-15 NOTE — TELEPHONE ENCOUNTER
Adderall       Last Written Prescription Date:  8/19/2020  Last Fill Quantity: 90,   # refills: 0  Last Office Visit: 8/19/2020  Future Office visit:    Next 5 appointments (look out 90 days)    Sep 21, 2020 11:40 AM CDT  (Arrive by 11:25 AM)  Return Visit with Jeanne Guerrero MD  [facility] ([facility] ) [address] [place] [id number] [phone]

## 2020-09-16 RX ORDER — DEXTROAMPHETAMINE SACCHARATE, AMPHETAMINE ASPARTATE, DEXTROAMPHETAMINE SULFATE AND AMPHETAMINE SULFATE 5; 5; 5; 5 MG/1; MG/1; MG/1; MG/1
20 TABLET ORAL 3 TIMES DAILY
Qty: 90 TABLET | Refills: 0 | Status: SHIPPED | OUTPATIENT
Start: 2020-09-16 | End: 2020-09-21

## 2020-09-21 ENCOUNTER — VIRTUAL VISIT (OUTPATIENT)
Dept: PSYCHIATRY | Facility: OTHER | Age: 30
End: 2020-09-21
Attending: PSYCHIATRY & NEUROLOGY
Payer: COMMERCIAL

## 2020-09-21 DIAGNOSIS — F90.2 ADHD (ATTENTION DEFICIT HYPERACTIVITY DISORDER), COMBINED TYPE: ICD-10-CM

## 2020-09-21 PROCEDURE — 99214 OFFICE O/P EST MOD 30 MIN: CPT | Mod: 95 | Performed by: PSYCHIATRY & NEUROLOGY

## 2020-09-21 RX ORDER — DEXTROAMPHETAMINE SACCHARATE, AMPHETAMINE ASPARTATE, DEXTROAMPHETAMINE SULFATE AND AMPHETAMINE SULFATE 5; 5; 5; 5 MG/1; MG/1; MG/1; MG/1
20 TABLET ORAL 3 TIMES DAILY
Qty: 90 TABLET | Refills: 0 | Status: SHIPPED | OUTPATIENT
Start: 2020-10-14 | End: 2020-11-12

## 2020-09-21 ASSESSMENT — PAIN SCALES - GENERAL: PAINLEVEL: MODERATE PAIN (4)

## 2020-09-21 ASSESSMENT — ANXIETY QUESTIONNAIRES
2. NOT BEING ABLE TO STOP OR CONTROL WORRYING: NEARLY EVERY DAY
5. BEING SO RESTLESS THAT IT IS HARD TO SIT STILL: MORE THAN HALF THE DAYS
1. FEELING NERVOUS, ANXIOUS, OR ON EDGE: NEARLY EVERY DAY
7. FEELING AFRAID AS IF SOMETHING AWFUL MIGHT HAPPEN: SEVERAL DAYS
6. BECOMING EASILY ANNOYED OR IRRITABLE: SEVERAL DAYS
GAD7 TOTAL SCORE: 15
3. WORRYING TOO MUCH ABOUT DIFFERENT THINGS: NEARLY EVERY DAY

## 2020-09-21 ASSESSMENT — PATIENT HEALTH QUESTIONNAIRE - PHQ9
5. POOR APPETITE OR OVEREATING: MORE THAN HALF THE DAYS
SUM OF ALL RESPONSES TO PHQ QUESTIONS 1-9: 14

## 2020-09-21 NOTE — NURSING NOTE
"Chief Complaint   Patient presents with     RECHECK     Telephone visit       Initial There were no vitals taken for this visit. Estimated body mass index is 36.36 kg/m  as calculated from the following:    Height as of 8/19/20: 1.499 m (4' 11\").    Weight as of 8/19/20: 81.6 kg (180 lb).  Medication Reconciliation: complete  LARRY LYN LPN    "

## 2020-09-21 NOTE — PROGRESS NOTES
"Nyasia Raya is a 30 year old female who is being evaluated via a billable telephone visit.      The patient has been notified of following:     \"This telephone visit will be conducted via a call between you and your physician/provider. We have found that certain health care needs can be provided without the need for a physical exam.  This service lets us provide the care you need with a short phone conversation.  If a prescription is necessary we can send it directly to your pharmacy.  If lab work is needed we can place an order for that and you can then stop by our lab to have the test done at a later time.    Telephone visits are billed at different rates depending on your insurance coverage. During this emergency period, for some insurers they may be billed the same as an in-person visit.  Please reach out to your insurance provider with any questions.    If during the course of the call the physician/provider feels a telephone visit is not appropriate, you will not be charged for this service.\"    Patient has given verbal consent for Telephone visit?  Yes    What phone number would you like to be contacted at? [phone]    How would you like to obtain your AVS? Aramis    Phone call duration: 28 minutes          "
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
## 2020-09-22 ASSESSMENT — ANXIETY QUESTIONNAIRES: GAD7 TOTAL SCORE: 15

## 2020-10-23 ENCOUNTER — VIRTUAL VISIT (OUTPATIENT)
Dept: PSYCHIATRY | Facility: OTHER | Age: 30
End: 2020-10-23
Attending: PSYCHIATRY & NEUROLOGY
Payer: COMMERCIAL

## 2020-10-23 DIAGNOSIS — F90.2 ADHD (ATTENTION DEFICIT HYPERACTIVITY DISORDER), COMBINED TYPE: Primary | ICD-10-CM

## 2020-10-23 PROCEDURE — 99213 OFFICE O/P EST LOW 20 MIN: CPT | Mod: 95 | Performed by: PSYCHIATRY & NEUROLOGY

## 2020-10-23 RX ORDER — DEXTROAMPHETAMINE SACCHARATE, AMPHETAMINE ASPARTATE, DEXTROAMPHETAMINE SULFATE AND AMPHETAMINE SULFATE 5; 5; 5; 5 MG/1; MG/1; MG/1; MG/1
20 TABLET ORAL 2 TIMES DAILY
Qty: 60 TABLET | Refills: 0 | Status: SHIPPED | OUTPATIENT
Start: 2020-11-11 | End: 2020-11-12 | Stop reason: DRUGHIGH

## 2020-10-23 ASSESSMENT — ANXIETY QUESTIONNAIRES
1. FEELING NERVOUS, ANXIOUS, OR ON EDGE: NEARLY EVERY DAY
2. NOT BEING ABLE TO STOP OR CONTROL WORRYING: NEARLY EVERY DAY
6. BECOMING EASILY ANNOYED OR IRRITABLE: NEARLY EVERY DAY
4. TROUBLE RELAXING: MORE THAN HALF THE DAYS
5. BEING SO RESTLESS THAT IT IS HARD TO SIT STILL: NEARLY EVERY DAY
7. FEELING AFRAID AS IF SOMETHING AWFUL MIGHT HAPPEN: SEVERAL DAYS
IF YOU CHECKED OFF ANY PROBLEMS ON THIS QUESTIONNAIRE, HOW DIFFICULT HAVE THESE PROBLEMS MADE IT FOR YOU TO DO YOUR WORK, TAKE CARE OF THINGS AT HOME, OR GET ALONG WITH OTHER PEOPLE: EXTREMELY DIFFICULT
3. WORRYING TOO MUCH ABOUT DIFFERENT THINGS: NEARLY EVERY DAY
GAD7 TOTAL SCORE: 18

## 2020-10-23 ASSESSMENT — PATIENT HEALTH QUESTIONNAIRE - PHQ9: SUM OF ALL RESPONSES TO PHQ QUESTIONS 1-9: 11

## 2020-10-23 ASSESSMENT — PAIN SCALES - GENERAL: PAINLEVEL: NO PAIN (0)
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
## 2020-10-24 ASSESSMENT — ANXIETY QUESTIONNAIRES: GAD7 TOTAL SCORE: 18

## 2020-11-12 ENCOUNTER — TELEPHONE (OUTPATIENT)
Dept: PSYCHIATRY | Facility: OTHER | Age: 30
End: 2020-11-12

## 2020-11-12 DIAGNOSIS — F90.2 ADHD (ATTENTION DEFICIT HYPERACTIVITY DISORDER), COMBINED TYPE: Primary | ICD-10-CM

## 2020-11-12 RX ORDER — DEXTROAMPHETAMINE SACCHARATE, AMPHETAMINE ASPARTATE, DEXTROAMPHETAMINE SULFATE AND AMPHETAMINE SULFATE 7.5; 7.5; 7.5; 7.5 MG/1; MG/1; MG/1; MG/1
30 TABLET ORAL 2 TIMES DAILY
Qty: 60 TABLET | Refills: 0 | Status: SHIPPED | OUTPATIENT
Start: 2020-11-12 | End: 2020-12-07

## 2020-11-12 RX ORDER — DEXTROAMPHETAMINE SACCHARATE, AMPHETAMINE ASPARTATE, DEXTROAMPHETAMINE SULFATE AND AMPHETAMINE SULFATE 7.5; 7.5; 7.5; 7.5 MG/1; MG/1; MG/1; MG/1
30 TABLET ORAL 2 TIMES DAILY
Qty: 60 TABLET | Refills: 0 | Status: SHIPPED | OUTPATIENT
Start: 2020-12-10 | End: 2021-01-08

## 2020-11-12 NOTE — TELEPHONE ENCOUNTER
LM for patient on ans mach that requested medication dosage was changed for the next time she picks up.  Gave call back # for questions.

## 2020-11-12 NOTE — TELEPHONE ENCOUNTER
No problem. She last filled mid-October so when she goes to pick it up this next time I changed to 30 mg IR twice daily.

## 2020-11-14 ENCOUNTER — HEALTH MAINTENANCE LETTER (OUTPATIENT)
Age: 30
End: 2020-11-14

## 2020-12-07 ENCOUNTER — VIRTUAL VISIT (OUTPATIENT)
Dept: PSYCHIATRY | Facility: OTHER | Age: 30
End: 2020-12-07
Attending: PSYCHIATRY & NEUROLOGY
Payer: COMMERCIAL

## 2020-12-07 ENCOUNTER — TELEPHONE (OUTPATIENT)
Dept: PSYCHIATRY | Facility: OTHER | Age: 30
End: 2020-12-07

## 2020-12-07 DIAGNOSIS — F41.1 GAD (GENERALIZED ANXIETY DISORDER): ICD-10-CM

## 2020-12-07 DIAGNOSIS — F31.30 BIPOLAR I DISORDER, MOST RECENT EPISODE DEPRESSED (H): ICD-10-CM

## 2020-12-07 DIAGNOSIS — F90.2 ADHD (ATTENTION DEFICIT HYPERACTIVITY DISORDER), COMBINED TYPE: ICD-10-CM

## 2020-12-07 PROCEDURE — 99214 OFFICE O/P EST MOD 30 MIN: CPT | Mod: 95 | Performed by: PSYCHIATRY & NEUROLOGY

## 2020-12-07 RX ORDER — SERTRALINE HYDROCHLORIDE 100 MG/1
100 TABLET, FILM COATED ORAL DAILY
Qty: 30 TABLET | Refills: 11 | Status: SHIPPED | OUTPATIENT
Start: 2020-12-07 | End: 2021-02-08

## 2020-12-07 RX ORDER — QUETIAPINE FUMARATE 50 MG/1
TABLET, FILM COATED ORAL
Qty: 60 TABLET | Refills: 4 | Status: SHIPPED | OUTPATIENT
Start: 2020-12-07 | End: 2021-02-08

## 2020-12-07 RX ORDER — QUETIAPINE FUMARATE 25 MG/1
TABLET, FILM COATED ORAL
Qty: 30 TABLET | Refills: 4 | Status: SHIPPED | OUTPATIENT
Start: 2020-12-07 | End: 2021-02-08

## 2020-12-07 RX ORDER — DIPHENHYDRAMINE HCL 25 MG
50 TABLET ORAL
COMMUNITY
End: 2022-05-05

## 2020-12-07 RX ORDER — LAMOTRIGINE 100 MG/1
100 TABLET ORAL DAILY
Qty: 30 TABLET | Refills: 11 | Status: SHIPPED | OUTPATIENT
Start: 2020-12-07 | End: 2021-01-08 | Stop reason: DRUGHIGH

## 2020-12-07 ASSESSMENT — PATIENT HEALTH QUESTIONNAIRE - PHQ9
5. POOR APPETITE OR OVEREATING: NEARLY EVERY DAY
SUM OF ALL RESPONSES TO PHQ QUESTIONS 1-9: 23

## 2020-12-07 ASSESSMENT — ANXIETY QUESTIONNAIRES
7. FEELING AFRAID AS IF SOMETHING AWFUL MIGHT HAPPEN: NEARLY EVERY DAY
2. NOT BEING ABLE TO STOP OR CONTROL WORRYING: NEARLY EVERY DAY
5. BEING SO RESTLESS THAT IT IS HARD TO SIT STILL: SEVERAL DAYS
6. BECOMING EASILY ANNOYED OR IRRITABLE: MORE THAN HALF THE DAYS
GAD7 TOTAL SCORE: 16
3. WORRYING TOO MUCH ABOUT DIFFERENT THINGS: SEVERAL DAYS
1. FEELING NERVOUS, ANXIOUS, OR ON EDGE: NEARLY EVERY DAY
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
## 2020-12-07 NOTE — NURSING NOTE
"Chief Complaint   Patient presents with     RECHECK     Telephone visit.  Patient would like to address anxiety medications.  Discuss Xanax       Initial There were no vitals taken for this visit. Estimated body mass index is 36.36 kg/m  as calculated from the following:    Height as of 8/19/20: 1.499 m (4' 11\").    Weight as of 8/19/20: 81.6 kg (180 lb).  Medication Reconciliation: complete  LARRY LYN LPN    "

## 2020-12-08 ASSESSMENT — ANXIETY QUESTIONNAIRES: GAD7 TOTAL SCORE: 16

## 2021-01-08 ENCOUNTER — VIRTUAL VISIT (OUTPATIENT)
Dept: PSYCHIATRY | Facility: OTHER | Age: 31
End: 2021-01-08
Attending: PSYCHIATRY & NEUROLOGY
Payer: COMMERCIAL

## 2021-01-08 DIAGNOSIS — F90.2 ADHD (ATTENTION DEFICIT HYPERACTIVITY DISORDER), COMBINED TYPE: ICD-10-CM

## 2021-01-08 DIAGNOSIS — F31.30 BIPOLAR I DISORDER, MOST RECENT EPISODE DEPRESSED (H): Primary | ICD-10-CM

## 2021-01-08 PROCEDURE — 99214 OFFICE O/P EST MOD 30 MIN: CPT | Mod: 95 | Performed by: PSYCHIATRY & NEUROLOGY

## 2021-01-08 RX ORDER — DEXTROAMPHETAMINE SACCHARATE, AMPHETAMINE ASPARTATE, DEXTROAMPHETAMINE SULFATE AND AMPHETAMINE SULFATE 7.5; 7.5; 7.5; 7.5 MG/1; MG/1; MG/1; MG/1
30 TABLET ORAL 2 TIMES DAILY
Qty: 60 TABLET | Refills: 0 | Status: SHIPPED | OUTPATIENT
Start: 2021-01-28 | End: 2021-02-08

## 2021-01-08 RX ORDER — LAMOTRIGINE 150 MG/1
150 TABLET ORAL DAILY
Qty: 30 TABLET | Refills: 4 | Status: SHIPPED | OUTPATIENT
Start: 2021-01-08 | End: 2021-02-08

## 2021-01-08 ASSESSMENT — ANXIETY QUESTIONNAIRES
6. BECOMING EASILY ANNOYED OR IRRITABLE: MORE THAN HALF THE DAYS
5. BEING SO RESTLESS THAT IT IS HARD TO SIT STILL: MORE THAN HALF THE DAYS
3. WORRYING TOO MUCH ABOUT DIFFERENT THINGS: NEARLY EVERY DAY
7. FEELING AFRAID AS IF SOMETHING AWFUL MIGHT HAPPEN: SEVERAL DAYS
2. NOT BEING ABLE TO STOP OR CONTROL WORRYING: NEARLY EVERY DAY
1. FEELING NERVOUS, ANXIOUS, OR ON EDGE: NEARLY EVERY DAY
GAD7 TOTAL SCORE: 17

## 2021-01-08 ASSESSMENT — PAIN SCALES - GENERAL: PAINLEVEL: NO PAIN (0)

## 2021-01-08 ASSESSMENT — PATIENT HEALTH QUESTIONNAIRE - PHQ9
SUM OF ALL RESPONSES TO PHQ QUESTIONS 1-9: 17
5. POOR APPETITE OR OVEREATING: NEARLY EVERY DAY
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
## 2021-01-08 NOTE — NURSING NOTE
"Chief Complaint   Patient presents with     RECHECK     Telephone visit.  Discuss bipolar medications and return to work date       Initial There were no vitals taken for this visit. Estimated body mass index is 36.36 kg/m  as calculated from the following:    Height as of 8/19/20: 1.499 m (4' 11\").    Weight as of 8/19/20: 81.6 kg (180 lb).  Medication Reconciliation: complete  LARRY LYN LPN    "

## 2021-01-09 ASSESSMENT — ANXIETY QUESTIONNAIRES: GAD7 TOTAL SCORE: 17

## 2021-01-12 RX ORDER — LAMOTRIGINE 100 MG/1
100 TABLET ORAL DAILY
COMMUNITY
Start: 2020-12-31 | End: 2021-02-08

## 2021-01-14 ENCOUNTER — OFFICE VISIT (OUTPATIENT)
Dept: OBGYN | Facility: OTHER | Age: 31
End: 2021-01-14
Attending: OBSTETRICS & GYNECOLOGY
Payer: COMMERCIAL

## 2021-01-14 VITALS
WEIGHT: 187 LBS | BODY MASS INDEX: 37.7 KG/M2 | SYSTOLIC BLOOD PRESSURE: 112 MMHG | HEIGHT: 59 IN | DIASTOLIC BLOOD PRESSURE: 80 MMHG

## 2021-01-14 DIAGNOSIS — Z34.90 EARLY STAGE OF PREGNANCY: Primary | ICD-10-CM

## 2021-01-14 LAB
B-HCG SERPL-ACNC: 2788 IU/L
PROGEST SERPL-MCNC: 16 NG/ML

## 2021-01-14 PROCEDURE — 84702 CHORIONIC GONADOTROPIN TEST: CPT | Mod: ZL | Performed by: OBSTETRICS & GYNECOLOGY

## 2021-01-14 PROCEDURE — 99213 OFFICE O/P EST LOW 20 MIN: CPT | Performed by: OBSTETRICS & GYNECOLOGY

## 2021-01-14 PROCEDURE — 84144 ASSAY OF PROGESTERONE: CPT | Mod: ZL | Performed by: OBSTETRICS & GYNECOLOGY

## 2021-01-14 PROCEDURE — 36415 COLL VENOUS BLD VENIPUNCTURE: CPT | Mod: ZL | Performed by: OBSTETRICS & GYNECOLOGY

## 2021-01-14 ASSESSMENT — PAIN SCALES - GENERAL: PAINLEVEL: NO PAIN (0)

## 2021-01-14 ASSESSMENT — MIFFLIN-ST. JEOR: SCORE: 1483.86

## 2021-01-14 NOTE — NURSING NOTE
Chief Complaint   Patient presents with     Amenorrhea     Positive pregnancy test        Medication Reconciliation: completed   Latasha Rivera LPN  1/14/2021 1:32 PM

## 2021-01-14 NOTE — PROGRESS NOTES
"S: Patient presents with early pregnancy around 6 weeks by LMP in 'early Dec'. She had multiple pos home UPT's. She has had 2 first trimester SAB's and is anxious.    OB History    Para Term  AB Living   3 1 1 0 1 1   SAB TAB Ectopic Multiple Live Births   0 1 0 0 1      # Outcome Date GA Lbr Moiz/2nd Weight Sex Delivery Anes PTL Lv   3             2 Term 01/15/14 41w4d  4.252 kg (9 lb 6 oz) M CS-LTranv TOPICAL, Spinal  TING      Birth Comments: none      Name: Wallace      Apgar1: 6  Apgar5: 8   1 TAB  6w0d             Birth Comments: no comps     O: /80 (BP Location: Right arm, Patient Position: Sitting, Cuff Size: Adult Large)   Ht 1.499 m (4' 11\")   Wt 84.8 kg (187 lb)   LMP  (LMP Unknown)   Breastfeeding No   BMI 37.77 kg/m      NAD    I/p  (Z34.90) Early stage of pregnancy  (primary encounter diagnosis)  Comment:   Plan: HCG quantitative pregnancy, Progesterone, HCG         quantitative pregnancy          hcg and progesterone today  Repeat through ED in 48 hours.  WIll notify of results and plan for US when hcg is above [id number].    Sadiq Lama MD FACOG  2:26 PM 2021         "

## 2021-01-16 DIAGNOSIS — Z34.90 EARLY STAGE OF PREGNANCY: ICD-10-CM

## 2021-01-16 LAB — B-HCG SERPL-ACNC: 6187 IU/L

## 2021-01-16 PROCEDURE — 36415 COLL VENOUS BLD VENIPUNCTURE: CPT | Mod: ZL | Performed by: OBSTETRICS & GYNECOLOGY

## 2021-01-16 PROCEDURE — 84702 CHORIONIC GONADOTROPIN TEST: CPT | Mod: ZL | Performed by: OBSTETRICS & GYNECOLOGY

## 2021-01-18 ENCOUNTER — TELEPHONE (OUTPATIENT)
Dept: OBGYN | Facility: OTHER | Age: 31
End: 2021-01-18

## 2021-01-18 NOTE — TELEPHONE ENCOUNTER
Patient stated she was returnng a call.  Ok to leave detailed VM.      Britta Zapata on 1/18/2021 at 11:18 AM

## 2021-01-25 ENCOUNTER — PRENATAL OFFICE VISIT (OUTPATIENT)
Dept: OBGYN | Facility: OTHER | Age: 31
End: 2021-01-25
Attending: OBSTETRICS & GYNECOLOGY
Payer: COMMERCIAL

## 2021-01-25 ENCOUNTER — ANCILLARY PROCEDURE (OUTPATIENT)
Dept: ULTRASOUND IMAGING | Facility: OTHER | Age: 31
End: 2021-01-25
Attending: OBSTETRICS & GYNECOLOGY
Payer: COMMERCIAL

## 2021-01-25 VITALS
OXYGEN SATURATION: 99 % | HEART RATE: 110 BPM | HEIGHT: 61 IN | BODY MASS INDEX: 35.3 KG/M2 | SYSTOLIC BLOOD PRESSURE: 122 MMHG | WEIGHT: 187 LBS | DIASTOLIC BLOOD PRESSURE: 78 MMHG

## 2021-01-25 DIAGNOSIS — Z34.81 ENCOUNTER FOR SUPERVISION OF OTHER NORMAL PREGNANCY IN FIRST TRIMESTER: Primary | ICD-10-CM

## 2021-01-25 LAB
ABO + RH BLD: NORMAL
ABO + RH BLD: NORMAL
ALBUMIN UR-MCNC: 10 MG/DL
APPEARANCE UR: CLEAR
BACTERIA #/AREA URNS HPF: ABNORMAL /HPF
BASOPHILS # BLD AUTO: 0.1 10E9/L (ref 0–0.2)
BASOPHILS NFR BLD AUTO: 0.5 %
BILIRUB UR QL STRIP: NEGATIVE
BLD GP AB SCN SERPL QL: NORMAL
BLOOD BANK CMNT PATIENT-IMP: NORMAL
C TRACH DNA SPEC QL NAA+PROBE: NOT DETECTED
COLOR UR AUTO: YELLOW
DIFFERENTIAL METHOD BLD: NORMAL
EOSINOPHIL # BLD AUTO: 0.5 10E9/L (ref 0–0.7)
EOSINOPHIL NFR BLD AUTO: 5 %
ERYTHROCYTE [DISTWIDTH] IN BLOOD BY AUTOMATED COUNT: 12.8 % (ref 10–15)
GLUCOSE UR STRIP-MCNC: NEGATIVE MG/DL
HCT VFR BLD AUTO: 38.8 % (ref 35–47)
HGB BLD-MCNC: 13.2 G/DL (ref 11.7–15.7)
HGB UR QL STRIP: NEGATIVE
IMM GRANULOCYTES # BLD: 0.1 10E9/L (ref 0–0.4)
IMM GRANULOCYTES NFR BLD: 1.1 %
KETONES UR STRIP-MCNC: NEGATIVE MG/DL
LEUKOCYTE ESTERASE UR QL STRIP: NEGATIVE
LYMPHOCYTES # BLD AUTO: 2.6 10E9/L (ref 0.8–5.3)
LYMPHOCYTES NFR BLD AUTO: 25.3 %
MCH RBC QN AUTO: 29.7 PG (ref 26.5–33)
MCHC RBC AUTO-ENTMCNC: 34 G/DL (ref 31.5–36.5)
MCV RBC AUTO: 87 FL (ref 78–100)
MONOCYTES # BLD AUTO: 0.5 10E9/L (ref 0–1.3)
MONOCYTES NFR BLD AUTO: 5 %
MUCOUS THREADS #/AREA URNS LPF: PRESENT /LPF
N GONORRHOEA DNA SPEC QL NAA+PROBE: NOT DETECTED
NEUTROPHILS # BLD AUTO: 6.4 10E9/L (ref 1.6–8.3)
NEUTROPHILS NFR BLD AUTO: 63.1 %
NITRATE UR QL: NEGATIVE
PH UR STRIP: 5.5 PH (ref 5–7)
PLATELET # BLD AUTO: 294 10E9/L (ref 150–450)
RBC # BLD AUTO: 4.44 10E12/L (ref 3.8–5.2)
RBC #/AREA URNS AUTO: 0 /HPF (ref 0–2)
SOURCE: ABNORMAL
SP GR UR STRIP: 1.04 (ref 1–1.03)
SPECIMEN EXP DATE BLD: NORMAL
SPECIMEN SOURCE: NORMAL
T PALLIDUM AB SER QL: NONREACTIVE
UROBILINOGEN UR STRIP-MCNC: NORMAL MG/DL (ref 0–2)
WBC # BLD AUTO: 10.1 10E9/L (ref 4–11)
WBC #/AREA URNS AUTO: 2 /HPF (ref 0–5)

## 2021-01-25 PROCEDURE — 85025 COMPLETE CBC W/AUTO DIFF WBC: CPT | Mod: ZL | Performed by: OBSTETRICS & GYNECOLOGY

## 2021-01-25 PROCEDURE — 87086 URINE CULTURE/COLONY COUNT: CPT | Mod: ZL | Performed by: OBSTETRICS & GYNECOLOGY

## 2021-01-25 PROCEDURE — 90686 IIV4 VACC NO PRSV 0.5 ML IM: CPT | Performed by: OBSTETRICS & GYNECOLOGY

## 2021-01-25 PROCEDURE — 81001 URINALYSIS AUTO W/SCOPE: CPT | Mod: ZL | Performed by: OBSTETRICS & GYNECOLOGY

## 2021-01-25 PROCEDURE — 86901 BLOOD TYPING SEROLOGIC RH(D): CPT | Mod: ZL | Performed by: OBSTETRICS & GYNECOLOGY

## 2021-01-25 PROCEDURE — 90471 IMMUNIZATION ADMIN: CPT | Performed by: OBSTETRICS & GYNECOLOGY

## 2021-01-25 PROCEDURE — 86762 RUBELLA ANTIBODY: CPT | Mod: ZL | Performed by: OBSTETRICS & GYNECOLOGY

## 2021-01-25 PROCEDURE — 86850 RBC ANTIBODY SCREEN: CPT | Mod: ZL | Performed by: OBSTETRICS & GYNECOLOGY

## 2021-01-25 PROCEDURE — 87340 HEPATITIS B SURFACE AG IA: CPT | Mod: ZL | Performed by: OBSTETRICS & GYNECOLOGY

## 2021-01-25 PROCEDURE — 87389 HIV-1 AG W/HIV-1&-2 AB AG IA: CPT | Mod: ZL | Performed by: OBSTETRICS & GYNECOLOGY

## 2021-01-25 PROCEDURE — 36415 COLL VENOUS BLD VENIPUNCTURE: CPT | Mod: ZL | Performed by: OBSTETRICS & GYNECOLOGY

## 2021-01-25 PROCEDURE — 86900 BLOOD TYPING SEROLOGIC ABO: CPT | Mod: ZL | Performed by: OBSTETRICS & GYNECOLOGY

## 2021-01-25 PROCEDURE — 76801 OB US < 14 WKS SINGLE FETUS: CPT | Performed by: OBSTETRICS & GYNECOLOGY

## 2021-01-25 PROCEDURE — 87591 N.GONORRHOEAE DNA AMP PROB: CPT | Mod: ZL | Performed by: OBSTETRICS & GYNECOLOGY

## 2021-01-25 PROCEDURE — 86780 TREPONEMA PALLIDUM: CPT | Mod: ZL | Performed by: OBSTETRICS & GYNECOLOGY

## 2021-01-25 PROCEDURE — 99207 PR OB VISIT-NO CHARGE - GICH ONLY: CPT | Mod: 25 | Performed by: OBSTETRICS & GYNECOLOGY

## 2021-01-25 PROCEDURE — 87491 CHLMYD TRACH DNA AMP PROBE: CPT | Mod: ZL | Performed by: OBSTETRICS & GYNECOLOGY

## 2021-01-25 ASSESSMENT — MIFFLIN-ST. JEOR: SCORE: 1512.86

## 2021-01-25 ASSESSMENT — PAIN SCALES - GENERAL: PAINLEVEL: NO PAIN (1)

## 2021-01-25 NOTE — PROGRESS NOTES
CC: New OB visit  HPI:  Nyasia Raya is  at 6w5d based on US today.  She notes issues of fatigue, nausea, breast tenderness.  She had an emergent  for fetal distress in [place] with her first baby.  She had chicken pox as a child  Denies STI's, remote history of chlamydia  She is requesting a dietitian consult to help prevent excessive weight gain as with her first baby.  Father is from [facility]    OB History    Para Term  AB Living   4 1 1 0 1 1   SAB TAB Ectopic Multiple Live Births   0 1 0 0 1      # Outcome Date GA Lbr Moiz/2nd Weight Sex Delivery Anes PTL Lv   4 Current            3 Term 01/15/14 41w4d  4.252 kg (9 lb 6 oz) M CS-LTranv TOPICAL, Spinal  TING      Birth Comments: none      Name: Wallace      Apgar1: 6  Apgar5: 8   2 TAB 2011 6w0d             Birth Comments: no comps   1 AB 20 7w3d    SAB        STI: (denies HSV, Hep C, Hep B, HIV, Syphilis, Chlamydia, Gonorrhea)  Last pap smear:   Lab Results   Component Value Date    PAP NIL 2019    PAP NIL 2018    PAP NIL 2015     Chickenpox history: immune  Past Medical History:   Diagnosis Date     Asthma 5/10/2013     Insomnia 5/10/2013     Spondylolisthesis 5/10/2013     Tobacco abuse       has a past surgical history that includes  section (1/15/2014).    Social History     Tobacco Use     Smoking status: Current Every Day Smoker     Packs/day: 0.08     Smokeless tobacco: Never Used     Tobacco comment: Ecig... pt wotking on stopping   Substance Use Topics     Alcohol use: No     Comment: signed up for quit plan and her workplace has programs     Drug use: No     Family History   Problem Relation Age of Onset     Diabetes Mother      Diabetes Father      Heart Disease Father 47        MI         Current Outpatient Medications   Medication     albuterol (PROVENTIL HFA) 108 (90 Base) MCG/ACT inhaler     [START ON 2021] amphetamine-dextroamphetamine (ADDERALL) 30 MG tablet     diphenhydrAMINE  "(BENADRYL) 25 MG tablet     lamoTRIgine (LAMICTAL) 100 MG tablet     lamoTRIgine (LAMICTAL) 150 MG tablet     QUEtiapine (SEROQUEL) 25 MG tablet     QUEtiapine (SEROQUEL) 50 MG tablet     sertraline (ZOLOFT) 100 MG tablet     No current facility-administered medications for this visit.      No Known Allergies  Immunization History   Administered Date(s) Administered     DTAP (<7y) 1992, 09/06/1994, 09/13/1995, 06/24/1996     HPV 03/01/2007, 05/10/2007, 09/07/2007     HepA, Unspecified 1992     HepB 06/14/2004, 08/03/2004, 02/03/2005     Hib (PRP-T) 1992, 06/24/1996     Influenza (IIV3) PF 11/10/2006, 10/24/2008, 09/24/2013     Influenza Vaccine IM > 6 months Valent IIV4 01/25/2021     MMR 09/06/1994, 08/27/2004, 08/27/2008     Pneumococcal 23 valent 07/08/2013     Poliovirus, inactivated (IPV) 1992, 09/06/1994, 09/13/1995, 06/24/1996     TD (ADULT, 7+) 08/27/2004     TDAP Vaccine (Boostrix) 04/18/2013, 10/15/2013           REVIEW OF SYSTEMS  General: negative  Skin: negative  Resp: No shortness of breath, dyspnea on exertion, cough, or hemoptysis  CV: negative  GI: negative  : negative  Musculoskeletal: negative  Neurologic: negative  Psychiatric: negative  Hematologic: negative  Endocrine: negative    EXAM: /78 (BP Location: Right arm, Patient Position: Sitting, Cuff Size: Adult Large)   Pulse 110   Ht 1.545 m (5' 0.83\")   Wt 84.8 kg (187 lb)   LMP 12/04/2020   SpO2 99%   BMI 35.53 kg/m    Gen: NAD  CV: RRR with normal S1, S2, no GRM  Resp: CTA Bilaterally  Neck: supple without thyromegaly mass or noduels  Breasts: symmetric without palpable masses or nodules, neg for axillary or supraclavicular nodes  Extremities: No TTP, no deformity  Neuro: CN II-XII intact grossly, moves all extremities  Psych: normal affect and mentation.    Recent Results (from the past 24 hour(s))   US OB < 14 Weeks ([facility] ONLY)    Narrative    Early OB US    Indication: dates and viability    The " [employer] Affiniti 50 W Ultrasound machine was used with the C6-2 probe.  With use of realtime 2-D and still images a viable gamble intrauterine gestation is identified.    CRL: 0.73 cm at 6 weeks 5 days with EDC by US of 9/15/21  Yolk Sac: 3.3 mm      Fetal cardiac activity: 135 bpm by use of m-mode doppler    Right ovary: no cyst or mass, normal  Left ovary: normal  Uterus: anteverted and normal shape and size    Cul-de-sac: Normal without free fluid  Cervix: Normal without evidence of funneling    I/P: Viable gamble intrauterine pregnancy at 6 weeks 5 days with US EDC of 9/15/21.           I/P  (Z34.81) Encounter for supervision of other normal pregnancy in first trimester  (primary encounter diagnosis)  Comment:   Plan: ABO/Rh type and screen, Hepatitis B surface         antigen, HIV Antigen Antibody Combo, Rubella         Antibody IgG Quantitative, Treponema Abs w         Reflex to RPR and Titer, Urine Culture Aerobic         Bacterial, CBC with platelets differential,         GC/Chlamydia by PCR - HI,, UA reflex to         Microscopic, US OB < 14 Weeks ( OB/GYN ONLY),        C US OB 1ST TRIMESTER MAT/FETAL, SINGLE         GESTATION - GICH, -IMM- FLU VAC PRESRV FREE         QUAD SPLIT VIR > 6 MONTHS IM, DIETITIAN         FOLLOW-UP, CANCELED: CBC with platelets              Discussed safety, nutrition, screening for cystic fibrosis, spina bifida, spinal muscular atrophy, quad screen, cffDNA screening as appropriate.  F/U scheduled, discussed call schedule rotation.  Return visit in 1 month     Pregnancy risk factors include:  Prior   Plans repeat  FOB from [facility]  Mild intermittent asthma    Sadiq Lama MD FACOG  11:02 AM 2021

## 2021-01-26 ENCOUNTER — ALLIED HEALTH/NURSE VISIT (OUTPATIENT)
Dept: FAMILY MEDICINE | Facility: OTHER | Age: 31
End: 2021-01-26
Attending: FAMILY MEDICINE
Payer: COMMERCIAL

## 2021-01-26 DIAGNOSIS — J02.9 SORE THROAT: Primary | ICD-10-CM

## 2021-01-26 DIAGNOSIS — R05.9 COUGH: ICD-10-CM

## 2021-01-26 LAB
HBV SURFACE AG SERPL QL IA: NONREACTIVE
HIV 1+2 AB+HIV1 P24 AG SERPL QL IA: NONREACTIVE
RUBV IGG SERPL IA-ACNC: 7 IU/ML

## 2021-01-26 PROCEDURE — U0003 INFECTIOUS AGENT DETECTION BY NUCLEIC ACID (DNA OR RNA); SEVERE ACUTE RESPIRATORY SYNDROME CORONAVIRUS 2 (SARS-COV-2) (CORONAVIRUS DISEASE [COVID-19]), AMPLIFIED PROBE TECHNIQUE, MAKING USE OF HIGH THROUGHPUT TECHNOLOGIES AS DESCRIBED BY CMS-2020-01-R: HCPCS | Mod: ZL | Performed by: FAMILY MEDICINE

## 2021-01-26 PROCEDURE — C9803 HOPD COVID-19 SPEC COLLECT: HCPCS

## 2021-01-26 PROCEDURE — U0005 INFEC AGEN DETEC AMPLI PROBE: HCPCS | Mod: ZL | Performed by: FAMILY MEDICINE

## 2021-01-27 LAB
BACTERIA SPEC CULT: NORMAL
SARS-COV-2 RNA RESP QL NAA+PROBE: NOT DETECTED
SPECIMEN SOURCE: NORMAL
SPECIMEN SOURCE: NORMAL

## 2021-02-08 ENCOUNTER — VIRTUAL VISIT (OUTPATIENT)
Dept: PSYCHIATRY | Facility: OTHER | Age: 31
End: 2021-02-08
Attending: PSYCHIATRY & NEUROLOGY
Payer: COMMERCIAL

## 2021-02-08 ENCOUNTER — TELEPHONE (OUTPATIENT)
Dept: OBGYN | Facility: OTHER | Age: 31
End: 2021-02-08

## 2021-02-08 DIAGNOSIS — F41.1 GAD (GENERALIZED ANXIETY DISORDER): ICD-10-CM

## 2021-02-08 PROCEDURE — 99214 OFFICE O/P EST MOD 30 MIN: CPT | Mod: 95 | Performed by: PSYCHIATRY & NEUROLOGY

## 2021-02-08 RX ORDER — SERTRALINE HYDROCHLORIDE 100 MG/1
100 TABLET, FILM COATED ORAL DAILY
Qty: 30 TABLET | Refills: 11 | Status: SHIPPED | OUTPATIENT
Start: 2021-03-08 | End: 2021-03-29

## 2021-02-08 RX ORDER — PRENATAL VIT/IRON FUM/FOLIC AC 27MG-0.8MG
1 TABLET ORAL DAILY
COMMUNITY
End: 2021-10-25

## 2021-02-08 ASSESSMENT — ANXIETY QUESTIONNAIRES
GAD7 TOTAL SCORE: 17
3. WORRYING TOO MUCH ABOUT DIFFERENT THINGS: NEARLY EVERY DAY
7. FEELING AFRAID AS IF SOMETHING AWFUL MIGHT HAPPEN: SEVERAL DAYS
6. BECOMING EASILY ANNOYED OR IRRITABLE: NEARLY EVERY DAY
5. BEING SO RESTLESS THAT IT IS HARD TO SIT STILL: SEVERAL DAYS
1. FEELING NERVOUS, ANXIOUS, OR ON EDGE: NEARLY EVERY DAY
2. NOT BEING ABLE TO STOP OR CONTROL WORRYING: NEARLY EVERY DAY

## 2021-02-08 ASSESSMENT — PATIENT HEALTH QUESTIONNAIRE - PHQ9
5. POOR APPETITE OR OVEREATING: NEARLY EVERY DAY
SUM OF ALL RESPONSES TO PHQ QUESTIONS 1-9: 16

## 2021-02-08 ASSESSMENT — PAIN SCALES - GENERAL: PAINLEVEL: MODERATE PAIN (4)
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
## 2021-02-08 NOTE — NURSING NOTE
"Chief Complaint   Patient presents with     RECHECK     Telephone visit.  Medication review.  Patient is pregnant       Initial LMP 12/04/2020  Estimated body mass index is 35.53 kg/m  as calculated from the following:    Height as of 1/25/21: 1.545 m (5' 0.83\").    Weight as of 1/25/21: 84.8 kg (187 lb).  Medication Reconciliation: complete  LARRY LYN LPN    "

## 2021-02-08 NOTE — TELEPHONE ENCOUNTER
"Patient called, confirmed her last name and birth date.   Pt states that she started spotting this morning and says it is dark red \"maroon/brownish\" blood. Patient has a history of miscarriages and is concerned. She denies pain right now but states she has had cramping all throughout this pregnancy. She was not sure of when her next appointment was and was informed it is the 23rd of February.   Pt was informed that an RN would call her back and triage her.   Pt gave permission for a detailed message to be left if she did not answer the call.     Jorge Hawkins LPN on 2/8/2021 at 12:52 PM       "

## 2021-02-08 NOTE — TELEPHONE ENCOUNTER
Returned patients call. Patient stated she has had some bleeding the last few days. Nothing bright red and having some cramping. Patient requesting to be seen and transferred to the  to have her be seen. No further questions or concerns.   Candace Trimble RN on 2/8/2021 at 1:36 PM

## 2021-02-09 ENCOUNTER — PRENATAL OFFICE VISIT (OUTPATIENT)
Dept: OBGYN | Facility: OTHER | Age: 31
End: 2021-02-09
Attending: OBSTETRICS & GYNECOLOGY
Payer: COMMERCIAL

## 2021-02-09 VITALS
SYSTOLIC BLOOD PRESSURE: 106 MMHG | DIASTOLIC BLOOD PRESSURE: 76 MMHG | HEART RATE: 90 BPM | WEIGHT: 186 LBS | OXYGEN SATURATION: 99 % | BODY MASS INDEX: 35.34 KG/M2

## 2021-02-09 DIAGNOSIS — O20.0 THREATENED ABORTION: Primary | ICD-10-CM

## 2021-02-09 PROCEDURE — 99207 PR OB VISIT-NO CHARGE - GICH ONLY: CPT | Performed by: OBSTETRICS & GYNECOLOGY

## 2021-02-09 ASSESSMENT — PAIN SCALES - GENERAL: PAINLEVEL: NO PAIN (0)

## 2021-02-09 ASSESSMENT — ANXIETY QUESTIONNAIRES: GAD7 TOTAL SCORE: 17

## 2021-02-09 NOTE — PROGRESS NOTES
CC: Recheck OB visit at 8w6d    HPI: Nyasia Raya presents for a routine OB visit now at 8w6d  She has concern of spotting the last few days.    OB History    Para Term  AB Living   4 1 1 0 2 1   SAB TAB Ectopic Multiple Live Births   0 1 0 0 1      # Outcome Date GA Lbr Moiz/2nd Weight Sex Delivery Anes PTL Lv   4 Current            3 AB 20 7w3d    SAB      2 Term 01/15/14 41w4d  4.252 kg (9 lb 6 oz) M CS-LTranv TOPICAL, Spinal  TING      Birth Comments: none      Name: Wallace      Apgar1: 6  Apgar5: 8   1 TAB  6w0d             Birth Comments: no comps     Current Outpatient Medications   Medication     Prenatal Vit-Fe Fumarate-FA (PRENATAL MULTIVITAMIN W/IRON) 27-0.8 MG tablet     [START ON 3/8/2021] sertraline (ZOLOFT) 100 MG tablet     albuterol (PROVENTIL HFA) 108 (90 Base) MCG/ACT inhaler     diphenhydrAMINE (BENADRYL) 25 MG tablet     melatonin 5 MG tablet     No current facility-administered medications for this visit.          O: /76 (BP Location: Right arm, Patient Position: Sitting, Cuff Size: Adult Large)   Pulse 90   Wt 84.4 kg (186 lb)   LMP 2020   SpO2 99%   BMI 35.34 kg/m    Body mass index is 35.34 kg/m .  See OB flow sheet  EXAM:  NAD  US shows viable IUP c/w 8w6d, FHR in normal range with doppler      No results found for any visits on 21.    A/P: Threatened ab, viable IUP    Recheck in 4 weeks      Problem List:   Pregnancy risk factors include:  Prior   Plans repeat  FOB from [facility]  Mild intermittent asthma    Sadiq Lama MD FACOG  1:12 PM 2021

## 2021-02-09 NOTE — NURSING NOTE
Patient here for spotting in early pregnancy. She states she started spotting yesterday and notices it every other time she wipes. She said last night it was dark in color, no it is lighter. Pt has a history of miscarriages and is concerned, looking for reassurance.         Medication Reconciliation: amy Hawkins, RACHAEL  2/9/2021 1:02 PM

## 2021-03-08 ENCOUNTER — VIRTUAL VISIT (OUTPATIENT)
Dept: PSYCHIATRY | Facility: OTHER | Age: 31
End: 2021-03-08
Attending: PSYCHIATRY & NEUROLOGY
Payer: COMMERCIAL

## 2021-03-08 DIAGNOSIS — F31.81 BIPOLAR 2 DISORDER (H): Primary | ICD-10-CM

## 2021-03-08 PROCEDURE — 99214 OFFICE O/P EST MOD 30 MIN: CPT | Mod: 95 | Performed by: PSYCHIATRY & NEUROLOGY

## 2021-03-08 ASSESSMENT — ANXIETY QUESTIONNAIRES
3. WORRYING TOO MUCH ABOUT DIFFERENT THINGS: MORE THAN HALF THE DAYS
1. FEELING NERVOUS, ANXIOUS, OR ON EDGE: NEARLY EVERY DAY
GAD7 TOTAL SCORE: 14
2. NOT BEING ABLE TO STOP OR CONTROL WORRYING: SEVERAL DAYS
6. BECOMING EASILY ANNOYED OR IRRITABLE: NEARLY EVERY DAY
7. FEELING AFRAID AS IF SOMETHING AWFUL MIGHT HAPPEN: SEVERAL DAYS
5. BEING SO RESTLESS THAT IT IS HARD TO SIT STILL: MORE THAN HALF THE DAYS

## 2021-03-08 ASSESSMENT — PATIENT HEALTH QUESTIONNAIRE - PHQ9
5. POOR APPETITE OR OVEREATING: MORE THAN HALF THE DAYS
SUM OF ALL RESPONSES TO PHQ QUESTIONS 1-9: 13

## 2021-03-08 ASSESSMENT — PAIN SCALES - GENERAL: PAINLEVEL: NO PAIN (0)
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
## 2021-03-08 NOTE — NURSING NOTE
"Chief Complaint   Patient presents with     RECHECK     Telephone visit.  Patient has not been taking Zoloft.  Wants to discuss resuming this medication.  Discuss return to work date.  Anxiety, sleep issues       Initial LMP 12/04/2020  Estimated body mass index is 35.34 kg/m  as calculated from the following:    Height as of 1/25/21: 1.545 m (5' 0.83\").    Weight as of 2/9/21: 84.4 kg (186 lb).  Medication Reconciliation: complete  LARRY LYN LPN    "

## 2021-03-09 ENCOUNTER — PRENATAL OFFICE VISIT (OUTPATIENT)
Dept: OBGYN | Facility: OTHER | Age: 31
End: 2021-03-09
Attending: OBSTETRICS & GYNECOLOGY
Payer: COMMERCIAL

## 2021-03-09 VITALS
HEART RATE: 98 BPM | DIASTOLIC BLOOD PRESSURE: 60 MMHG | BODY MASS INDEX: 35.46 KG/M2 | WEIGHT: 186.6 LBS | OXYGEN SATURATION: 98 % | SYSTOLIC BLOOD PRESSURE: 100 MMHG

## 2021-03-09 DIAGNOSIS — O21.0 HYPEREMESIS GRAVIDARUM: Primary | ICD-10-CM

## 2021-03-09 PROCEDURE — 99207 PR OB VISIT-NO CHARGE - GICH ONLY: CPT | Performed by: OBSTETRICS & GYNECOLOGY

## 2021-03-09 RX ORDER — DOXYLAMINE SUCCINATE AND PYRIDOXINE HYDROCHLORIDE, DELAYED RELEASE TABLETS 10 MG/10 MG 10; 10 MG/1; MG/1
1 TABLET, DELAYED RELEASE ORAL 3 TIMES DAILY PRN
Qty: 30 TABLET | Refills: 3 | Status: SHIPPED | OUTPATIENT
Start: 2021-03-09 | End: 2021-03-29

## 2021-03-09 ASSESSMENT — ANXIETY QUESTIONNAIRES: GAD7 TOTAL SCORE: 14

## 2021-03-09 ASSESSMENT — PAIN SCALES - GENERAL: PAINLEVEL: NO PAIN (0)

## 2021-03-09 NOTE — NURSING NOTE
Patient here for prenatal care, states she still notices pains on her left since that come and go randomly, but there is no spotting.   Pt states she has also been feeling nauseous all the time.       Medication Reconciliation: complete    Jorge Hawkins LPN  3/9/2021 12:48 PM

## 2021-03-23 ENCOUNTER — TELEPHONE (OUTPATIENT)
Dept: OBGYN | Facility: OTHER | Age: 31
End: 2021-03-23

## 2021-03-23 ENCOUNTER — TELEPHONE (OUTPATIENT)
Dept: FAMILY MEDICINE | Facility: OTHER | Age: 31
End: 2021-03-23

## 2021-03-23 NOTE — TELEPHONE ENCOUNTER
Patient name and date of birth verified. After verification, patient was informed [facility] results were not yet in, but that she would be called as soon as we receive the results.     Jorge Hawkins LPN 3/23/2021 1:06 PM

## 2021-03-23 NOTE — TELEPHONE ENCOUNTER
Patient looking for genetic results from testing done on 3/9/21.     Nahed Solano on 3/23/2021 at 12:25 PM

## 2021-03-29 ENCOUNTER — VIRTUAL VISIT (OUTPATIENT)
Dept: PSYCHIATRY | Facility: OTHER | Age: 31
End: 2021-03-29
Attending: PSYCHIATRY & NEUROLOGY
Payer: COMMERCIAL

## 2021-03-29 DIAGNOSIS — F41.1 GAD (GENERALIZED ANXIETY DISORDER): ICD-10-CM

## 2021-03-29 PROCEDURE — 99214 OFFICE O/P EST MOD 30 MIN: CPT | Mod: TEL | Performed by: PSYCHIATRY & NEUROLOGY

## 2021-03-29 RX ORDER — SERTRALINE HYDROCHLORIDE 100 MG/1
100 TABLET, FILM COATED ORAL DAILY
Qty: 30 TABLET | Refills: 11 | Status: SHIPPED | OUTPATIENT
Start: 2021-03-29 | End: 2022-01-24

## 2021-03-29 ASSESSMENT — PATIENT HEALTH QUESTIONNAIRE - PHQ9
SUM OF ALL RESPONSES TO PHQ QUESTIONS 1-9: 18
5. POOR APPETITE OR OVEREATING: MORE THAN HALF THE DAYS

## 2021-03-29 ASSESSMENT — ANXIETY QUESTIONNAIRES
6. BECOMING EASILY ANNOYED OR IRRITABLE: NEARLY EVERY DAY
GAD7 TOTAL SCORE: 18
7. FEELING AFRAID AS IF SOMETHING AWFUL MIGHT HAPPEN: NEARLY EVERY DAY
2. NOT BEING ABLE TO STOP OR CONTROL WORRYING: NEARLY EVERY DAY
5. BEING SO RESTLESS THAT IT IS HARD TO SIT STILL: MORE THAN HALF THE DAYS
1. FEELING NERVOUS, ANXIOUS, OR ON EDGE: NEARLY EVERY DAY
3. WORRYING TOO MUCH ABOUT DIFFERENT THINGS: MORE THAN HALF THE DAYS

## 2021-03-29 ASSESSMENT — PAIN SCALES - GENERAL: PAINLEVEL: NO PAIN (0)

## 2021-03-29 NOTE — NURSING NOTE
"Chief Complaint   Patient presents with     RECHECK     Telephone visit.       Initial LMP 12/04/2020  Estimated body mass index is 35.46 kg/m  as calculated from the following:    Height as of 1/25/21: 1.545 m (5' 0.83\").    Weight as of 3/9/21: 84.6 kg (186 lb 9.6 oz).  Medication Reconciliation: complete  LARRY LYN LPN    "
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
## 2021-03-30 ENCOUNTER — TELEPHONE (OUTPATIENT)
Dept: OBGYN | Facility: OTHER | Age: 31
End: 2021-03-30

## 2021-03-30 ASSESSMENT — ANXIETY QUESTIONNAIRES: GAD7 TOTAL SCORE: 18

## 2021-03-30 NOTE — TELEPHONE ENCOUNTER
Patient name and date of birth verified. After verification, patient was informed that her harmony results had not yet come in due to needing a prior authorization, which we received and faxed today.  Pt voiced understanding.     Jorge Hawkins LPN 3/30/2021 1:14 PM

## 2021-04-05 ENCOUNTER — PRENATAL OFFICE VISIT (OUTPATIENT)
Dept: OBGYN | Facility: OTHER | Age: 31
End: 2021-04-05
Attending: OBSTETRICS & GYNECOLOGY
Payer: COMMERCIAL

## 2021-04-05 VITALS
SYSTOLIC BLOOD PRESSURE: 122 MMHG | HEART RATE: 91 BPM | WEIGHT: 185.4 LBS | OXYGEN SATURATION: 97 % | BODY MASS INDEX: 35.23 KG/M2 | DIASTOLIC BLOOD PRESSURE: 80 MMHG

## 2021-04-05 DIAGNOSIS — Z34.90 NORMAL PREGNANCY, ANTEPARTUM: Primary | ICD-10-CM

## 2021-04-05 PROCEDURE — 99207 PR OB VISIT-NO CHARGE - GICH ONLY: CPT | Performed by: OBSTETRICS & GYNECOLOGY

## 2021-04-05 ASSESSMENT — PAIN SCALES - GENERAL: PAINLEVEL: NO PAIN (0)

## 2021-04-05 NOTE — PROGRESS NOTES
CC: Recheck OB visit at 16w5d    HPI: Nyasia Raya presents for a routine OB visit now at 16w5d  Concerns: None  Patient notices normal fetal movement, denies contractions, vaginal bleeding or leaking of fluid.    OB History    Para Term  AB Living   4 1 1 0 2 1   SAB TAB Ectopic Multiple Live Births   0 1 0 0 1      # Outcome Date GA Lbr Moiz/2nd Weight Sex Delivery Anes PTL Lv   4 Current            3 AB 20 7w3d    SAB      2 Term 01/15/14 41w4d  4.252 kg (9 lb 6 oz) M CS-LTranv TOPICAL, Spinal  TING      Birth Comments: none      Name: Wallace      Apgar1: 6  Apgar5: 8   1 TAB  6w0d             Birth Comments: no comps     Current Outpatient Medications   Medication     albuterol (PROVENTIL HFA) 108 (90 Base) MCG/ACT inhaler     diphenhydrAMINE (BENADRYL) 25 MG tablet     melatonin 5 MG tablet     Prenatal Vit-Fe Fumarate-FA (PRENATAL MULTIVITAMIN W/IRON) 27-0.8 MG tablet     sertraline (ZOLOFT) 100 MG tablet     No current facility-administered medications for this visit.          O: /80 (BP Location: Right arm, Patient Position: Sitting, Cuff Size: Adult Large)   Pulse 91   Wt 84.1 kg (185 lb 6.4 oz)   LMP 2020   SpO2 97%   BMI 35.23 kg/m    Body mass index is 35.23 kg/m .  See OB flow sheet  EXAM:  NAD    FHT:150 bpm    No results found for any visits on 21.    A/P: 16w5d gestation    Recheck in 4 weeks    Problem List:   Pregnancy risk factors include:  Prior   Plans repeat  FOB from [facility]  Mild intermittent asthma    Sadiq Lama MD FACOG  9:01 AM 2021

## 2021-04-05 NOTE — NURSING NOTE
Patient here for prenatal care, states she has noticed a consistent pain on her lower right side  that has been there since the beginning of her pregnancy.   Pt denies pain today.       Medication Reconciliation: amy Hawkins LPN  4/5/2021 8:52 AM

## 2021-04-09 ENCOUNTER — TELEPHONE (OUTPATIENT)
Dept: OBGYN | Facility: OTHER | Age: 31
End: 2021-04-09

## 2021-04-09 NOTE — TELEPHONE ENCOUNTER
Patient notified of normal [place] and fetal sex (male). All immediate questions were answered.   Rose Marcelino RN...........4/9/2021 3:52 PM

## 2021-05-03 ENCOUNTER — PRENATAL OFFICE VISIT (OUTPATIENT)
Dept: OBGYN | Facility: OTHER | Age: 31
End: 2021-05-03
Attending: OBSTETRICS & GYNECOLOGY
Payer: COMMERCIAL

## 2021-05-03 VITALS
BODY MASS INDEX: 35.67 KG/M2 | OXYGEN SATURATION: 98 % | SYSTOLIC BLOOD PRESSURE: 110 MMHG | HEART RATE: 110 BPM | DIASTOLIC BLOOD PRESSURE: 70 MMHG | WEIGHT: 187.7 LBS

## 2021-05-03 DIAGNOSIS — Z3A.20 20 WEEKS GESTATION OF PREGNANCY: Primary | ICD-10-CM

## 2021-05-03 PROCEDURE — 99207 PR OB VISIT-NO CHARGE - GICH ONLY: CPT | Performed by: OBSTETRICS & GYNECOLOGY

## 2021-05-03 PROCEDURE — 76805 OB US >/= 14 WKS SNGL FETUS: CPT | Performed by: OBSTETRICS & GYNECOLOGY

## 2021-05-03 ASSESSMENT — PAIN SCALES - GENERAL: PAINLEVEL: NO PAIN (0)

## 2021-05-03 NOTE — NURSING NOTE
Prenatal care, denies discharge or bloody show. Pt reports headaches every once and a while.   Medication Reconciliation: complete    Jorge Hawkins LPN  5/3/2021 1:43 PM

## 2021-05-03 NOTE — PROGRESS NOTES
CC: Recheck OB visit at 20w5d    HPI: Nyasia Raya presents for a routine OB visit now at 20w5d  Concerns: doing well  Patient notices normal fetal movement, denies contractions, vaginal bleeding or leaking of fluid.    OB History    Para Term  AB Living   4 1 1 0 2 1   SAB TAB Ectopic Multiple Live Births   0 1 0 0 1      # Outcome Date GA Lbr Moiz/2nd Weight Sex Delivery Anes PTL Lv   4 Current            3 AB 20 7w3d    SAB      2 Term 01/15/14 41w4d  4.252 kg (9 lb 6 oz) M CS-LTranv TOPICAL, Spinal  TING      Birth Comments: none      Name: Wallace      Apgar1: 6  Apgar5: 8   1 TAB  6w0d             Birth Comments: no comps     Current Outpatient Medications   Medication     albuterol (PROVENTIL HFA) 108 (90 Base) MCG/ACT inhaler     diphenhydrAMINE (BENADRYL) 25 MG tablet     melatonin 5 MG tablet     Prenatal Vit-Fe Fumarate-FA (PRENATAL MULTIVITAMIN W/IRON) 27-0.8 MG tablet     sertraline (ZOLOFT) 100 MG tablet     No current facility-administered medications for this visit.          O: /70 (BP Location: Right arm, Patient Position: Sitting, Cuff Size: Adult Large)   Pulse 110   Wt 85.1 kg (187 lb 11.2 oz)   LMP 2020   SpO2 98%   BMI 35.67 kg/m    Body mass index is 35.67 kg/m .  See OB flow sheet  EXAM:  NAD      Results for orders placed or performed in visit on 21    OB > 14 Weeks ( OB/GYN ONLY)     Status: None    Narrative    US OB > 14 weeks for Fetal Survey  Indication: Screening Exam    EDC: 9/15/21 20w5d    Fetal Number: 1  Fetal Heart Rate: 160 bpm    Presentation: breech  Placenta: posterior not low  Amniotic Fluid: Normal    Uterus: normal  Ovaries: normal  Cervix: normal 4 cm long, no funneling    Biometry:  BPD: 4.81 cm 20 weeks 4 days 42nd%ile  HC: 18.81 cm 21 weeks 1 days 60th%ile  AC: 17.54 cm 22 weeks 4 days 91st%ile  FL: 3.76 cm 22 weeks 1 days 83rd%ile    AGA by US: 21w5d  AGA established: 20w5d  EFW: 473 g 97th %ile compared to est  EDC    Head:   Profile normal  Face normal  Nose/Lips normal  LV normal size and shape  Cerebellum normal size and shape  CM normal size    Spine:  Long normal  Transverse C-SP normal  T-SP normal  Lumbosacral-SP normal    Chest:  Cardiac Views  RVOT: normal  LVOT: normal  4 chamber: normal  3VV: normal   bicaval view: not well seen  DA: normal  Aorta: normal    Abdomen:  Diaphragm: normal  Stomach: normal  Kidneys: bilateral pyelectasis at 4 mm  Bladder: normal  Abdominal Wall: normal  3VC: normal      Limbs:  Tib/fib: normal  Femurs: normal  Rad/Ulna: normal  Humeri: normal  Hands: normal  Feet: normal    Impression: No anomalies detected  Limitations of US discussed  F/U at 32 weeks for minimal pyelectasis         A/P: (Z3A.20) 20 weeks gestation of pregnancy  (primary encounter diagnosis)  Comment:   Plan: US OB > 14 Weeks ( OB/GYN ONLY), C US OB 2-3         TRIMESTER MAT/FETAL, SINGLE GESTATION - GICH              Recheck in 4 weeks      Problem List:   History of macrosomia,  for cat 2 tracing  Pregnancy risk factors include:  Prior   Plans repeat- 57% chance of succesful  by MFM calculator  FO[facility] from [facility]  Mild intermittent asthma  Bilateral mild renal pyelectasis in fetus (male)- recheck at 32 weeks    Sadiq Lama MD FACOG  2:24 PM 5/3/2021

## 2021-05-05 ENCOUNTER — VIRTUAL VISIT (OUTPATIENT)
Dept: PSYCHIATRY | Facility: OTHER | Age: 31
End: 2021-05-05
Attending: PSYCHIATRY & NEUROLOGY
Payer: COMMERCIAL

## 2021-05-05 DIAGNOSIS — F31.81 BIPOLAR 2 DISORDER (H): Primary | ICD-10-CM

## 2021-05-05 PROCEDURE — 99213 OFFICE O/P EST LOW 20 MIN: CPT | Mod: TEL | Performed by: PSYCHIATRY & NEUROLOGY

## 2021-05-05 ASSESSMENT — ANXIETY QUESTIONNAIRES
6. BECOMING EASILY ANNOYED OR IRRITABLE: NEARLY EVERY DAY
5. BEING SO RESTLESS THAT IT IS HARD TO SIT STILL: SEVERAL DAYS
GAD7 TOTAL SCORE: 12
3. WORRYING TOO MUCH ABOUT DIFFERENT THINGS: SEVERAL DAYS
7. FEELING AFRAID AS IF SOMETHING AWFUL MIGHT HAPPEN: MORE THAN HALF THE DAYS
2. NOT BEING ABLE TO STOP OR CONTROL WORRYING: MORE THAN HALF THE DAYS
1. FEELING NERVOUS, ANXIOUS, OR ON EDGE: MORE THAN HALF THE DAYS

## 2021-05-05 ASSESSMENT — PATIENT HEALTH QUESTIONNAIRE - PHQ9
SUM OF ALL RESPONSES TO PHQ QUESTIONS 1-9: 5
5. POOR APPETITE OR OVEREATING: SEVERAL DAYS

## 2021-05-05 ASSESSMENT — PAIN SCALES - GENERAL: PAINLEVEL: NO PAIN (0)
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
## 2021-05-05 NOTE — NURSING NOTE
"Chief Complaint   Patient presents with     RECHECK     Telephone visit.  Patient is not taking Zoloft.  Address returning to work       Initial LMP 12/04/2020  Estimated body mass index is 35.67 kg/m  as calculated from the following:    Height as of 1/25/21: 1.545 m (5' 0.83\").    Weight as of 5/3/21: 85.1 kg (187 lb 11.2 oz).  Medication Reconciliation: complete  LARRY LYN LPN    "

## 2021-05-06 ASSESSMENT — ANXIETY QUESTIONNAIRES: GAD7 TOTAL SCORE: 12

## 2021-06-14 ENCOUNTER — PRENATAL OFFICE VISIT (OUTPATIENT)
Dept: OBGYN | Facility: OTHER | Age: 31
End: 2021-06-14
Attending: OBSTETRICS & GYNECOLOGY
Payer: COMMERCIAL

## 2021-06-14 VITALS
RESPIRATION RATE: 20 BRPM | HEART RATE: 114 BPM | BODY MASS INDEX: 36.03 KG/M2 | DIASTOLIC BLOOD PRESSURE: 70 MMHG | SYSTOLIC BLOOD PRESSURE: 118 MMHG | WEIGHT: 189.6 LBS

## 2021-06-14 DIAGNOSIS — Z34.90 NORMAL PREGNANCY, ANTEPARTUM: Primary | ICD-10-CM

## 2021-06-14 LAB
ERYTHROCYTE [DISTWIDTH] IN BLOOD BY AUTOMATED COUNT: 12.9 % (ref 10–15)
GLUCOSE 1H P 50 G GLC PO SERPL-MCNC: 116 MG/DL (ref 60–129)
HCT VFR BLD AUTO: 31.8 % (ref 35–47)
HGB BLD-MCNC: 10.9 G/DL (ref 11.7–15.7)
MCH RBC QN AUTO: 31.2 PG (ref 26.5–33)
MCHC RBC AUTO-ENTMCNC: 34.3 G/DL (ref 31.5–36.5)
MCV RBC AUTO: 91 FL (ref 78–100)
PLATELET # BLD AUTO: 282 10E9/L (ref 150–450)
RBC # BLD AUTO: 3.49 10E12/L (ref 3.8–5.2)
WBC # BLD AUTO: 12.2 10E9/L (ref 4–11)

## 2021-06-14 PROCEDURE — 90715 TDAP VACCINE 7 YRS/> IM: CPT | Performed by: OBSTETRICS & GYNECOLOGY

## 2021-06-14 PROCEDURE — 86780 TREPONEMA PALLIDUM: CPT | Mod: ZL | Performed by: OBSTETRICS & GYNECOLOGY

## 2021-06-14 PROCEDURE — 82950 GLUCOSE TEST: CPT | Mod: ZL | Performed by: OBSTETRICS & GYNECOLOGY

## 2021-06-14 PROCEDURE — 99207 PR OB VISIT-NO CHARGE - GICH ONLY: CPT | Performed by: OBSTETRICS & GYNECOLOGY

## 2021-06-14 PROCEDURE — 85027 COMPLETE CBC AUTOMATED: CPT | Mod: ZL | Performed by: OBSTETRICS & GYNECOLOGY

## 2021-06-14 PROCEDURE — 36415 COLL VENOUS BLD VENIPUNCTURE: CPT | Mod: ZL | Performed by: OBSTETRICS & GYNECOLOGY

## 2021-06-14 PROCEDURE — 90471 IMMUNIZATION ADMIN: CPT | Performed by: OBSTETRICS & GYNECOLOGY

## 2021-06-14 ASSESSMENT — PAIN SCALES - GENERAL: PAINLEVEL: MODERATE PAIN (4)

## 2021-06-14 NOTE — PROGRESS NOTES
CC: Recheck OB visit at 26w5d    HPI: Nyasia Raya presents for a routine OB visit now at 26w5d  Concerns:   Patient notices normal fetal movement, denies contractions, vaginal bleeding or leaking of fluid.    OB History    Para Term  AB Living   4 1 1 0 2 1   SAB TAB Ectopic Multiple Live Births   0 1 0 0 1      # Outcome Date GA Lbr Moiz/2nd Weight Sex Delivery Anes PTL Lv   4 Current            3 AB 20 7w3d    SAB      2 Term 01/15/14 41w4d  4.252 kg (9 lb 6 oz) M CS-LTranv TOPICAL, Spinal  TING      Birth Comments: none      Name: Wallace      Apgar1: 6  Apgar5: 8   1 TAB  6w0d             Birth Comments: no comps     Current Outpatient Medications   Medication     diphenhydrAMINE (BENADRYL) 25 MG tablet     Prenatal Vit-Fe Fumarate-FA (PRENATAL MULTIVITAMIN W/IRON) 27-0.8 MG tablet     albuterol (PROVENTIL HFA) 108 (90 Base) MCG/ACT inhaler     melatonin 5 MG tablet     sertraline (ZOLOFT) 100 MG tablet     No current facility-administered medications for this visit.      O: /70 (BP Location: Right arm, Patient Position: Sitting, Cuff Size: Adult Regular)   Pulse 114   Resp 20   Wt 86 kg (189 lb 9.6 oz)   LMP 2020   BMI 36.03 kg/m    Body mass index is 36.03 kg/m .  See OB flow sheet  EXAM:  NAD  FH:  30 cm  FHT: 155 bpm    No results found for any visits on 21.    A/P: (Z34.90) Normal pregnancy, antepartum  (primary encounter diagnosis)  Comment:   Plan: Glucose tolerance, gest screen, 1 hour, CBC W         PLT No Diff, Treponema Ab w Reflex to RPR and         Titer, TDAP VACCINE (Adacel, Boostrix)          [[id number]]          Recheck in 4 weeks    Problem List:   History of macrosomia,  for cat 2 tracing  Pregnancy risk factors include:  Prior   Plans repeat- 57% chance of succesful  by MFM calculator  FOB from [facility]  Mild intermittent asthma  Bilateral mild renal pyelectasis in fetus (male)- recheck at 32 weeks    Sadiq Lama MD  FACOG  2:09 PM 6/14/2021

## 2021-06-14 NOTE — NURSING NOTE
"Chief Complaint   Patient presents with     Prenatal Care     26w 5d     Patient presents to clinic for prenatal care. C/o persisting right sided lower abdomen pains.     Initial /70 (BP Location: Right arm, Patient Position: Sitting, Cuff Size: Adult Regular)   Pulse 114   Resp 20   Wt 86 kg (189 lb 9.6 oz)   LMP 12/04/2020   BMI 36.03 kg/m   Estimated body mass index is 36.03 kg/m  as calculated from the following:    Height as of 1/25/21: 1.545 m (5' 0.83\").    Weight as of this encounter: 86 kg (189 lb 9.6 oz).  Medication Reconciliation: complete    TITI GILES, GALLON  "

## 2021-06-15 ENCOUNTER — OFFICE VISIT (OUTPATIENT)
Dept: PSYCHIATRY | Facility: OTHER | Age: 31
End: 2021-06-15
Attending: PSYCHIATRY & NEUROLOGY
Payer: COMMERCIAL

## 2021-06-15 VITALS
OXYGEN SATURATION: 98 % | BODY MASS INDEX: 35.91 KG/M2 | SYSTOLIC BLOOD PRESSURE: 120 MMHG | DIASTOLIC BLOOD PRESSURE: 68 MMHG | TEMPERATURE: 98.2 F | WEIGHT: 189 LBS | HEART RATE: 113 BPM

## 2021-06-15 DIAGNOSIS — F31.81 BIPOLAR 2 DISORDER (H): Primary | ICD-10-CM

## 2021-06-15 LAB — T PALLIDUM AB SER QL: NONREACTIVE

## 2021-06-15 PROCEDURE — 99213 OFFICE O/P EST LOW 20 MIN: CPT | Performed by: PSYCHIATRY & NEUROLOGY

## 2021-06-15 ASSESSMENT — ANXIETY QUESTIONNAIRES
1. FEELING NERVOUS, ANXIOUS, OR ON EDGE: NEARLY EVERY DAY
GAD7 TOTAL SCORE: 21
5. BEING SO RESTLESS THAT IT IS HARD TO SIT STILL: NEARLY EVERY DAY
3. WORRYING TOO MUCH ABOUT DIFFERENT THINGS: NEARLY EVERY DAY
2. NOT BEING ABLE TO STOP OR CONTROL WORRYING: NEARLY EVERY DAY
IF YOU CHECKED OFF ANY PROBLEMS ON THIS QUESTIONNAIRE, HOW DIFFICULT HAVE THESE PROBLEMS MADE IT FOR YOU TO DO YOUR WORK, TAKE CARE OF THINGS AT HOME, OR GET ALONG WITH OTHER PEOPLE: EXTREMELY DIFFICULT
7. FEELING AFRAID AS IF SOMETHING AWFUL MIGHT HAPPEN: NEARLY EVERY DAY
6. BECOMING EASILY ANNOYED OR IRRITABLE: NEARLY EVERY DAY

## 2021-06-15 ASSESSMENT — PATIENT HEALTH QUESTIONNAIRE - PHQ9
5. POOR APPETITE OR OVEREATING: NEARLY EVERY DAY
SUM OF ALL RESPONSES TO PHQ QUESTIONS 1-9: 11

## 2021-06-15 ASSESSMENT — PAIN SCALES - GENERAL: PAINLEVEL: SEVERE PAIN (6)

## 2021-06-15 NOTE — NURSING NOTE
"Chief Complaint   Patient presents with     RECHECK     Bipolar disorder, ADHD, anxiety.       Initial /68 (BP Location: Right arm, Patient Position: Sitting, Cuff Size: Adult Regular)   Pulse 113   Temp 98.2  F (36.8  C) (Tympanic)   Wt 85.7 kg (189 lb)   LMP 12/04/2020   SpO2 98%   BMI 35.91 kg/m   Estimated body mass index is 35.91 kg/m  as calculated from the following:    Height as of 1/25/21: 1.545 m (5' 0.83\").    Weight as of this encounter: 85.7 kg (189 lb).  Medication Reconciliation: complete  LARRY LYN LPN    "
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
## 2021-06-16 ASSESSMENT — ANXIETY QUESTIONNAIRES: GAD7 TOTAL SCORE: 21

## 2021-07-13 ENCOUNTER — PRENATAL OFFICE VISIT (OUTPATIENT)
Dept: OBGYN | Facility: OTHER | Age: 31
End: 2021-07-13
Attending: OBSTETRICS & GYNECOLOGY
Payer: COMMERCIAL

## 2021-07-13 VITALS
BODY MASS INDEX: 36.79 KG/M2 | SYSTOLIC BLOOD PRESSURE: 124 MMHG | HEART RATE: 131 BPM | RESPIRATION RATE: 18 BRPM | WEIGHT: 193.6 LBS | DIASTOLIC BLOOD PRESSURE: 80 MMHG

## 2021-07-13 DIAGNOSIS — Z34.83 NORMAL PREGNANCY IN MULTIGRAVIDA IN THIRD TRIMESTER: Primary | ICD-10-CM

## 2021-07-13 PROCEDURE — 99207 PR OB VISIT-NO CHARGE - GICH ONLY: CPT | Performed by: OBSTETRICS & GYNECOLOGY

## 2021-07-13 ASSESSMENT — PAIN SCALES - GENERAL: PAINLEVEL: MODERATE PAIN (4)

## 2021-07-13 NOTE — PROGRESS NOTES
CC: Recheck OB visit at 30w6d    HPI: Nyasia Raya presents for a routine OB visit now at 30w6d  Concerns:  Doing well. 6 lbs weight gain this pregnancy so far.  Patient notices normal fetal movement, denies contractions, vaginal bleeding or leaking of fluid.    OB History    Para Term  AB Living   4 1 1 0 2 1   SAB TAB Ectopic Multiple Live Births   0 1 0 0 1      # Outcome Date GA Lbr Moiz/2nd Weight Sex Delivery Anes PTL Lv   4 Current            3 AB 20 7w3d    SAB      2 Term 01/15/14 41w4d  4.252 kg (9 lb 6 oz) M CS-LTranv TOPICAL, Spinal  TING      Birth Comments: none      Name: Wallace      Apgar1: 6  Apgar5: 8   1 TAB  6w0d             Birth Comments: no comps     Current Outpatient Medications   Medication     diphenhydrAMINE (BENADRYL) 25 MG tablet     Prenatal Vit-Fe Fumarate-FA (PRENATAL MULTIVITAMIN W/IRON) 27-0.8 MG tablet     albuterol (PROVENTIL HFA) 108 (90 Base) MCG/ACT inhaler     melatonin 5 MG tablet     sertraline (ZOLOFT) 100 MG tablet     No current facility-administered medications for this visit.       O: /80 (BP Location: Right arm, Patient Position: Sitting, Cuff Size: Adult Regular)   Pulse 131   Resp 18   Wt 87.8 kg (193 lb 9.6 oz)   LMP 2020   BMI 36.79 kg/m    Body mass index is 36.79 kg/m .  See OB flow sheet  EXAM:  NAD  FH:31-32 cm  FHT: 145-155 bpm    No results found for any visits on 21.    A/P: 30w6d gestation    Recheck in 2 weeks  F/U US to check for progression or resolution of minimal pyelectasis noted at 20 weeks bilaterally    Problem List:   History of macrosomia,  for cat 2 tracing  Pregnancy risk factors include:  Prior   Plans repeat- 57% chance of succesful  by MFM calculator  FO[facility] from [facility]  Mild intermittent asthma  Bilateral mild renal pyelectasis in fetus (male)- recheck at 32 weeks    Sadiq Lama MD FACOG  3:30 PM 2021

## 2021-07-13 NOTE — NURSING NOTE
"Chief Complaint   Patient presents with     Prenatal Care     30w6d   Patient presents to clinic for prenatal care, c/o constipation and back pain that is worsening over last couple of weeks.    Initial /80 (BP Location: Right arm, Patient Position: Sitting, Cuff Size: Adult Regular)   Pulse 131   Resp 18   Wt 87.8 kg (193 lb 9.6 oz)   LMP 12/04/2020   BMI 36.79 kg/m   Estimated body mass index is 36.79 kg/m  as calculated from the following:    Height as of 1/25/21: 1.545 m (5' 0.83\").    Weight as of this encounter: 87.8 kg (193 lb 9.6 oz).  Medication Reconciliation: complete    TITI GILES, GALLON  "

## 2021-07-20 ENCOUNTER — OFFICE VISIT (OUTPATIENT)
Dept: PSYCHIATRY | Facility: OTHER | Age: 31
End: 2021-07-20
Attending: PSYCHIATRY & NEUROLOGY
Payer: COMMERCIAL

## 2021-07-20 VITALS
HEART RATE: 113 BPM | DIASTOLIC BLOOD PRESSURE: 70 MMHG | WEIGHT: 193 LBS | TEMPERATURE: 98 F | OXYGEN SATURATION: 98 % | BODY MASS INDEX: 36.67 KG/M2 | SYSTOLIC BLOOD PRESSURE: 96 MMHG

## 2021-07-20 DIAGNOSIS — F31.81 BIPOLAR 2 DISORDER (H): Primary | ICD-10-CM

## 2021-07-20 PROCEDURE — 99213 OFFICE O/P EST LOW 20 MIN: CPT | Performed by: PSYCHIATRY & NEUROLOGY

## 2021-07-20 ASSESSMENT — ANXIETY QUESTIONNAIRES
3. WORRYING TOO MUCH ABOUT DIFFERENT THINGS: NEARLY EVERY DAY
6. BECOMING EASILY ANNOYED OR IRRITABLE: NEARLY EVERY DAY
2. NOT BEING ABLE TO STOP OR CONTROL WORRYING: MORE THAN HALF THE DAYS
5. BEING SO RESTLESS THAT IT IS HARD TO SIT STILL: NEARLY EVERY DAY
7. FEELING AFRAID AS IF SOMETHING AWFUL MIGHT HAPPEN: MORE THAN HALF THE DAYS
GAD7 TOTAL SCORE: 18
IF YOU CHECKED OFF ANY PROBLEMS ON THIS QUESTIONNAIRE, HOW DIFFICULT HAVE THESE PROBLEMS MADE IT FOR YOU TO DO YOUR WORK, TAKE CARE OF THINGS AT HOME, OR GET ALONG WITH OTHER PEOPLE: VERY DIFFICULT
1. FEELING NERVOUS, ANXIOUS, OR ON EDGE: MORE THAN HALF THE DAYS

## 2021-07-20 ASSESSMENT — PAIN SCALES - GENERAL: PAINLEVEL: NO PAIN (0)

## 2021-07-20 ASSESSMENT — PATIENT HEALTH QUESTIONNAIRE - PHQ9
SUM OF ALL RESPONSES TO PHQ QUESTIONS 1-9: 12
5. POOR APPETITE OR OVEREATING: NEARLY EVERY DAY

## 2021-07-20 NOTE — NURSING NOTE
"Chief Complaint   Patient presents with     RECHECK     Bipolar disorder, ADHD, anxiety.  Discuss medications after delivery.       Initial BP 96/70 (BP Location: Right arm, Patient Position: Sitting, Cuff Size: Adult Large)   Pulse 113   Temp 98  F (36.7  C) (Tympanic)   Wt 87.5 kg (193 lb)   LMP 12/04/2020   SpO2 98%   BMI 36.67 kg/m   Estimated body mass index is 36.67 kg/m  as calculated from the following:    Height as of 1/25/21: 1.545 m (5' 0.83\").    Weight as of this encounter: 87.5 kg (193 lb).  Medication Reconciliation: complete  LARRY LYN LPN    "
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
## 2021-07-21 ASSESSMENT — ANXIETY QUESTIONNAIRES: GAD7 TOTAL SCORE: 18

## 2021-07-28 ENCOUNTER — HOSPITAL ENCOUNTER (OUTPATIENT)
Dept: ULTRASOUND IMAGING | Facility: OTHER | Age: 31
End: 2021-07-28
Attending: OBSTETRICS & GYNECOLOGY
Payer: COMMERCIAL

## 2021-07-28 ENCOUNTER — PRENATAL OFFICE VISIT (OUTPATIENT)
Dept: OBGYN | Facility: OTHER | Age: 31
End: 2021-07-28
Attending: OBSTETRICS & GYNECOLOGY
Payer: COMMERCIAL

## 2021-07-28 VITALS
BODY MASS INDEX: 36.86 KG/M2 | DIASTOLIC BLOOD PRESSURE: 60 MMHG | SYSTOLIC BLOOD PRESSURE: 108 MMHG | WEIGHT: 194 LBS | HEART RATE: 104 BPM

## 2021-07-28 DIAGNOSIS — Z98.891 HISTORY OF CESAREAN SECTION: ICD-10-CM

## 2021-07-28 DIAGNOSIS — Z34.83 NORMAL PREGNANCY IN MULTIGRAVIDA IN THIRD TRIMESTER: ICD-10-CM

## 2021-07-28 DIAGNOSIS — Z34.83 NORMAL PREGNANCY IN MULTIGRAVIDA IN THIRD TRIMESTER: Primary | ICD-10-CM

## 2021-07-28 DIAGNOSIS — Z01.812 PRE-PROCEDURE LAB EXAM: ICD-10-CM

## 2021-07-28 PROCEDURE — 76816 OB US FOLLOW-UP PER FETUS: CPT

## 2021-07-28 PROCEDURE — 99207 PR OB VISIT-NO CHARGE - GICH ONLY: CPT | Performed by: OBSTETRICS & GYNECOLOGY

## 2021-07-28 RX ORDER — CITRIC ACID/SODIUM CITRATE 334-500MG
30 SOLUTION, ORAL ORAL
Status: CANCELLED | OUTPATIENT
Start: 2021-07-28

## 2021-07-28 RX ORDER — LIDOCAINE 40 MG/G
CREAM TOPICAL
Status: CANCELLED | OUTPATIENT
Start: 2021-07-28

## 2021-07-28 RX ORDER — SODIUM CHLORIDE, SODIUM LACTATE, POTASSIUM CHLORIDE, CALCIUM CHLORIDE 600; 310; 30; 20 MG/100ML; MG/100ML; MG/100ML; MG/100ML
INJECTION, SOLUTION INTRAVENOUS CONTINUOUS
Status: CANCELLED | OUTPATIENT
Start: 2021-07-28

## 2021-07-28 RX ORDER — CEFAZOLIN SODIUM 2 G/100ML
2 INJECTION, SOLUTION INTRAVENOUS SEE ADMIN INSTRUCTIONS
Status: CANCELLED | OUTPATIENT
Start: 2021-07-28

## 2021-07-28 RX ORDER — OXYTOCIN 10 [USP'U]/ML
10 INJECTION, SOLUTION INTRAMUSCULAR; INTRAVENOUS
Status: CANCELLED | OUTPATIENT
Start: 2021-07-28

## 2021-07-28 RX ORDER — CARBOPROST TROMETHAMINE 250 UG/ML
250 INJECTION, SOLUTION INTRAMUSCULAR
Status: CANCELLED | OUTPATIENT
Start: 2021-07-28

## 2021-07-28 RX ORDER — OXYTOCIN/0.9 % SODIUM CHLORIDE 30/500 ML
340 PLASTIC BAG, INJECTION (ML) INTRAVENOUS CONTINUOUS PRN
Status: CANCELLED | OUTPATIENT
Start: 2021-07-28

## 2021-07-28 RX ORDER — TRANEXAMIC ACID 10 MG/ML
1 INJECTION, SOLUTION INTRAVENOUS EVERY 30 MIN PRN
Status: CANCELLED | OUTPATIENT
Start: 2021-07-28

## 2021-07-28 RX ORDER — CEFAZOLIN SODIUM 2 G/100ML
2 INJECTION, SOLUTION INTRAVENOUS
Status: CANCELLED | OUTPATIENT
Start: 2021-07-28

## 2021-07-28 RX ORDER — OXYTOCIN/0.9 % SODIUM CHLORIDE 30/500 ML
100-340 PLASTIC BAG, INJECTION (ML) INTRAVENOUS CONTINUOUS PRN
Status: CANCELLED | OUTPATIENT
Start: 2021-07-28

## 2021-07-28 RX ORDER — MISOPROSTOL 100 UG/1
400 TABLET ORAL
Status: CANCELLED | OUTPATIENT
Start: 2021-07-28

## 2021-07-28 RX ORDER — ACETAMINOPHEN 325 MG/1
975 TABLET ORAL ONCE
Status: CANCELLED | OUTPATIENT
Start: 2021-07-28 | End: 2021-07-28

## 2021-07-28 RX ORDER — METHYLERGONOVINE MALEATE 0.2 MG/ML
200 INJECTION INTRAVENOUS
Status: CANCELLED | OUTPATIENT
Start: 2021-07-28

## 2021-07-28 ASSESSMENT — PAIN SCALES - GENERAL: PAINLEVEL: MODERATE PAIN (5)

## 2021-07-28 NOTE — NURSING NOTE
"Date of Surgery: 21  Type of Surgery: Repeat  section   Surgeon: Dr. Geri Trimble and Baby Doc: On-Call     appropriate appointments were scheduled by the Unit [facility]  Patient was given surgical folder  which includes pre-operative bathing instructions related to the two packets of Hibiclens surgical prep provided.. Surgical forms were copied and kept for informative purposes. Originals were delivered to Day-surgery. Questions were answered to the best of this nurse's ability.        STOP BANG    Fever/Chills or other infectious symptoms in past month? no  >10 pound weight loss in the past 2 months? no  Health Care Directive on file? no  History of blood transfusions? no  Td up to date? yes  History of VRE/MRSA? no      Obstructive Sleep Apnea screening    Preoperative Evaluation: Obstructive Sleep Apnea screening    S: Snore -  Do you snore loudly? (louder than talking or loud enough to be heard through closed doors) No  T: Tired - Do you often feel tired, fatigued, or sleepy during the daytime?No  O: Observed - Has anyone ever observed you stop breathing during your sleep?No  P: Pressure - Do you have or are you being treated for high blood pressure?No  B: BMI - BMI greater than 35kg/m2?Yes  A: Age - Age over 50 years old?No  N: Neck - Neck circumference greater than 40 cm?No  G: Gender - Gender: Male?No    Total number of \"YES\" responses:  1    Scoring: Low risk of BRIAN 0-2  At Risk of BRIAN: >3 High Risk of BRIAN: 5-8      Total yes answers in BRIAN section:    Low risk 0-2  At risk 3-4  High risk 5-8    Rose Marcelino RN............. 2021 2:41 PM     "

## 2021-07-28 NOTE — NURSING NOTE
FOOD SECURITY SCREENING QUESTIONS  Hunger Vital Signs:  Within the past 12 months we worried whether our food would run out before we got money to buy more. Never  Within the past 12 months the food we bought just didn't last and we didn't have money to get more. Never  Latasha Rivera LPN 7/28/2021 2:19 PM    Chief Complaint   Patient presents with     Prenatal Care     33w0d     Offers no complaints   Latasha Rivera LPN........................7/28/2021  2:20 PM       Medication Reconciliation: completed   Latasha Rivera LPN  7/28/2021 2:19 PM

## 2021-07-28 NOTE — PROGRESS NOTES
Return OB Visit    S: Patient is feeling well. No ctx, VB or LOF. +FM    O: /60 (BP Location: Right arm, Patient Position: Sitting, Cuff Size: Adult Large)   Pulse 104   Wt 88 kg (194 lb)   LMP 2020   Breastfeeding No   BMI 36.86 kg/m    Gen: Well-appearing, NAD  See OB Flowsheet    A/P:  Nyasia Raya is a 31 year old  at 33w0d by 6w5d US, here for return OB visit.  Hx of macrosomia: growth US 2021 2410g (82%ile)  Hx of c/s: plans repeat- offered  with me or waiting until  for her primary OB since he is out of office that week. She tentatively wants  but will call tomorrow if she changes her mind  Asthma  B/l renal pyelectasis: normal on US 2021   Plans breastfeeding, Nexplanon    PNC: Rh positive, Rubella non-immune,   Genetics: normal [place]  Imaging: dating US at 6w5d  Immunizations: s/p Tdap  RTC 2 weeks with SPENCER Trimble MD  OB/GYN  2021 2:14 PM

## 2021-08-09 ENCOUNTER — PRENATAL OFFICE VISIT (OUTPATIENT)
Dept: OBGYN | Facility: OTHER | Age: 31
End: 2021-08-09
Attending: OBSTETRICS & GYNECOLOGY
Payer: COMMERCIAL

## 2021-08-09 VITALS
HEART RATE: 108 BPM | BODY MASS INDEX: 38.29 KG/M2 | DIASTOLIC BLOOD PRESSURE: 70 MMHG | RESPIRATION RATE: 18 BRPM | WEIGHT: 201.5 LBS | SYSTOLIC BLOOD PRESSURE: 110 MMHG

## 2021-08-09 DIAGNOSIS — Z34.90 NORMAL PREGNANCY, ANTEPARTUM: ICD-10-CM

## 2021-08-09 DIAGNOSIS — Z98.891 HISTORY OF CESAREAN SECTION: Primary | ICD-10-CM

## 2021-08-09 PROCEDURE — 99207 PR OB VISIT-NO CHARGE - GICH ONLY: CPT | Performed by: OBSTETRICS & GYNECOLOGY

## 2021-08-09 ASSESSMENT — PAIN SCALES - GENERAL: PAINLEVEL: MODERATE PAIN (4)

## 2021-08-09 NOTE — PROGRESS NOTES
CC: Recheck OB visit at 34w5d    HPI: Nyasia Raya presents for a routine OB visit now at 34w5d  Concerns: Some right wrist numbness tingling pain. Would like a wrist brace  Patient notices normal fetal movement, denies contractions, vaginal bleeding or leaking of fluid.    OB History    Para Term  AB Living   4 1 1 0 2 1   SAB TAB Ectopic Multiple Live Births   0 1 0 0 1      # Outcome Date GA Lbr Moiz/2nd Weight Sex Delivery Anes PTL Lv   4 Current            3 AB 20 7w3d    SAB      2 Term 01/15/14 41w4d  4.252 kg (9 lb 6 oz) M CS-LTranv TOPICAL, Spinal  TING      Birth Comments: none      Name: Wallace      Apgar1: 6  Apgar5: 8   1 TAB  6w0d             Birth Comments: no comps     Current Outpatient Medications   Medication     albuterol (PROVENTIL HFA) 108 (90 Base) MCG/ACT inhaler     diphenhydrAMINE (BENADRYL) 25 MG tablet     melatonin 5 MG tablet     Prenatal Vit-Fe Fumarate-FA (PRENATAL MULTIVITAMIN W/IRON) 27-0.8 MG tablet     sertraline (ZOLOFT) 100 MG tablet     No current facility-administered medications for this visit.         O: /70 (BP Location: Right arm, Patient Position: Sitting, Cuff Size: Adult Large)   Pulse 108   Resp 18   Wt 91.4 kg (201 lb 8 oz)   LMP 2020   BMI 38.29 kg/m    Body mass index is 38.29 kg/m .  See OB flow sheet  EXAM:  NAD  38 FH  FHT:145 bpm  Pos Phalen's test    No results found for any visits on 21.    A/P: 34w5d gestation    Recheck in 1-2 weeks    Problem List:   History of macrosomia,  for cat 2 tracing  Pregnancy risk factors include:  Prior   Plans repeat- 57% chance of succesful  by MFM calculator  FOB from [facility]  Mild intermittent asthma  Bilateral mild renal pyelectasis in fetus (male)- normal at 32 weeks       Sadiq Lama MD FACOG  3:42 PM 2021

## 2021-08-09 NOTE — NURSING NOTE
"Chief Complaint   Patient presents with     Prenatal Care     34w5d   Patient presents to clinic for prenatal care. C/o hands and arms hurting, tingling, numbness.    Initial /70 (BP Location: Right arm, Patient Position: Sitting, Cuff Size: Adult Large)   Pulse 108   Resp 18   Wt 91.4 kg (201 lb 8 oz)   LMP 12/04/2020   BMI 38.29 kg/m   Estimated body mass index is 38.29 kg/m  as calculated from the following:    Height as of 1/25/21: 1.545 m (5' 0.83\").    Weight as of this encounter: 91.4 kg (201 lb 8 oz).  Medication Reconciliation: complete    TITI GILES, GALLON  "

## 2021-08-17 ENCOUNTER — PRENATAL OFFICE VISIT (OUTPATIENT)
Dept: OBGYN | Facility: OTHER | Age: 31
End: 2021-08-17
Attending: OBSTETRICS & GYNECOLOGY
Payer: COMMERCIAL

## 2021-08-17 VITALS
SYSTOLIC BLOOD PRESSURE: 108 MMHG | HEART RATE: 100 BPM | WEIGHT: 202 LBS | DIASTOLIC BLOOD PRESSURE: 66 MMHG | BODY MASS INDEX: 38.38 KG/M2

## 2021-08-17 DIAGNOSIS — Z34.83 NORMAL PREGNANCY IN MULTIGRAVIDA IN THIRD TRIMESTER: Primary | ICD-10-CM

## 2021-08-17 PROCEDURE — 99207 PR OB VISIT-NO CHARGE - GICH ONLY: CPT | Performed by: OBSTETRICS & GYNECOLOGY

## 2021-08-17 ASSESSMENT — PAIN SCALES - GENERAL: PAINLEVEL: SEVERE PAIN (6)

## 2021-08-17 NOTE — NURSING NOTE
FOOD SECURITY SCREENING QUESTIONS  Hunger Vital Signs:  Within the past 12 months we worried whether our food would run out before we got money to buy more. Never  Within the past 12 months the food we bought just didn't last and we didn't have money to get more. Never  Latasha Rivera LPN 8/17/2021 3:32 PM    Chief Complaint   Patient presents with     Prenatal Care     35w6d     Offers no complaints   Latasha Rivera LPN........................8/17/2021  3:32 PM     Medication Reconciliation: completed   Latasha Rivera LPN  8/17/2021 3:32 PM

## 2021-08-17 NOTE — PROGRESS NOTES
Return OB Visit    S: Patient is uncomfortable and hot but doing well. Some BH ctx. No VB or LOF. +FM    O: /66 (BP Location: Right arm, Patient Position: Sitting, Cuff Size: Adult Large)   Pulse 100   Wt 91.6 kg (202 lb)   LMP 2020   Breastfeeding No   BMI 38.38 kg/m    Gen: Well-appearing, NAD  See OB Flowsheet    A/P:  Nyasia Raya is a 31 year old  at 35w6d by 6w5d US, here for return OB visit.  Hx of macrosomia: growth US 2021 2410g (82%ile)  Hx of c/s: plans repeat- scheduled   Asthma  B/l renal pyelectasis: normal on US 2021   Plans breastfeeding, Nexplanon  Bipolar, anxiety: therapy, no meds     PNC: Rh positive, Rubella non-immune,   Genetics: normal [place]  Imaging: dating US at 6w5d  Immunizations: s/p Tdap  RTC weekly with SPENCER Trimble MD  OB/GYN  2021 3:33 PM

## 2021-08-30 ENCOUNTER — ANESTHESIA EVENT (OUTPATIENT)
Dept: SURGERY | Facility: OTHER | Age: 31
End: 2021-08-30
Payer: COMMERCIAL

## 2021-08-30 ENCOUNTER — HOSPITAL ENCOUNTER (INPATIENT)
Facility: OTHER | Age: 31
LOS: 2 days | Discharge: HOME | End: 2021-09-01
Attending: OBSTETRICS & GYNECOLOGY | Admitting: OBSTETRICS & GYNECOLOGY
Payer: COMMERCIAL

## 2021-08-30 ENCOUNTER — ANESTHESIA (OUTPATIENT)
Dept: SURGERY | Facility: OTHER | Age: 31
End: 2021-08-30
Payer: COMMERCIAL

## 2021-08-30 DIAGNOSIS — O99.013 ANEMIA DURING PREGNANCY IN THIRD TRIMESTER: ICD-10-CM

## 2021-08-30 DIAGNOSIS — Z98.891 HISTORY OF CESAREAN SECTION: ICD-10-CM

## 2021-08-30 DIAGNOSIS — G89.18 POST-OP PAIN: Primary | ICD-10-CM

## 2021-08-30 PROBLEM — Z36.89 ENCOUNTER FOR TRIAGE IN PREGNANT PATIENT: Status: ACTIVE | Noted: 2021-08-30

## 2021-08-30 LAB
A1 MICROGLOB PLACENTAL VAG QL: POSITIVE
ABO/RH(D): NORMAL
ANTIBODY SCREEN: NEGATIVE
ERYTHROCYTE [DISTWIDTH] IN BLOOD BY AUTOMATED COUNT: 13.8 % (ref 10–15)
HCT VFR BLD AUTO: 30.4 % (ref 35–47)
HGB BLD-MCNC: 9.9 G/DL (ref 11.7–15.7)
MCH RBC QN AUTO: 28.4 PG (ref 26.5–33)
MCHC RBC AUTO-ENTMCNC: 32.6 G/DL (ref 31.5–36.5)
MCV RBC AUTO: 87 FL (ref 78–100)
PLATELET # BLD AUTO: 302 10E3/UL (ref 150–450)
RBC # BLD AUTO: 3.49 10E6/UL (ref 3.8–5.2)
SARS-COV-2 RNA RESP QL NAA+PROBE: NEGATIVE
SPECIMEN EXPIRATION DATE: NORMAL
T PALLIDUM AB SER QL: NONREACTIVE
WBC # BLD AUTO: 11.9 10E3/UL (ref 4–11)

## 2021-08-30 PROCEDURE — 88304 TISSUE EXAM BY PATHOLOGIST: CPT

## 2021-08-30 PROCEDURE — 64488 TAP BLOCK BI INJECTION: CPT | Mod: XU | Performed by: NURSE ANESTHETIST, CERTIFIED REGISTERED

## 2021-08-30 PROCEDURE — 84112 EVAL AMNIOTIC FLUID PROTEIN: CPT | Performed by: OBSTETRICS & GYNECOLOGY

## 2021-08-30 PROCEDURE — 85027 COMPLETE CBC AUTOMATED: CPT | Performed by: OBSTETRICS & GYNECOLOGY

## 2021-08-30 PROCEDURE — 36415 COLL VENOUS BLD VENIPUNCTURE: CPT | Performed by: OBSTETRICS & GYNECOLOGY

## 2021-08-30 PROCEDURE — 250N000009 HC RX 250: Performed by: NURSE ANESTHETIST, CERTIFIED REGISTERED

## 2021-08-30 PROCEDURE — 250N000011 HC RX IP 250 OP 636: Performed by: OBSTETRICS & GYNECOLOGY

## 2021-08-30 PROCEDURE — 258N000003 HC RX IP 258 OP 636: Performed by: NURSE ANESTHETIST, CERTIFIED REGISTERED

## 2021-08-30 PROCEDURE — 59510 CESAREAN DELIVERY: CPT | Performed by: NURSE ANESTHETIST, CERTIFIED REGISTERED

## 2021-08-30 PROCEDURE — 360N000076 HC SURGERY LEVEL 3, PER MIN: Performed by: OBSTETRICS & GYNECOLOGY

## 2021-08-30 PROCEDURE — 59510 CESAREAN DELIVERY: CPT | Performed by: OBSTETRICS & GYNECOLOGY

## 2021-08-30 PROCEDURE — 710N000010 HC RECOVERY PHASE 1, LEVEL 2, PER MIN: Performed by: OBSTETRICS & GYNECOLOGY

## 2021-08-30 PROCEDURE — 120N000001 HC R&B MED SURG/OB

## 2021-08-30 PROCEDURE — 370N000017 HC ANESTHESIA TECHNICAL FEE, PER MIN: Performed by: OBSTETRICS & GYNECOLOGY

## 2021-08-30 PROCEDURE — 250N000013 HC RX MED GY IP 250 OP 250 PS 637: Performed by: OBSTETRICS & GYNECOLOGY

## 2021-08-30 PROCEDURE — 86900 BLOOD TYPING SEROLOGIC ABO: CPT | Performed by: OBSTETRICS & GYNECOLOGY

## 2021-08-30 PROCEDURE — 86780 TREPONEMA PALLIDUM: CPT | Performed by: OBSTETRICS & GYNECOLOGY

## 2021-08-30 PROCEDURE — 250N000011 HC RX IP 250 OP 636: Performed by: NURSE ANESTHETIST, CERTIFIED REGISTERED

## 2021-08-30 PROCEDURE — 272N000001 HC OR GENERAL SUPPLY STERILE: Performed by: OBSTETRICS & GYNECOLOGY

## 2021-08-30 PROCEDURE — 258N000003 HC RX IP 258 OP 636: Performed by: OBSTETRICS & GYNECOLOGY

## 2021-08-30 PROCEDURE — U0005 INFEC AGEN DETEC AMPLI PROBE: HCPCS | Performed by: OBSTETRICS & GYNECOLOGY

## 2021-08-30 RX ORDER — CEFAZOLIN SODIUM 2 G/100ML
2 INJECTION, SOLUTION INTRAVENOUS SEE ADMIN INSTRUCTIONS
Status: DISCONTINUED | OUTPATIENT
Start: 2021-08-30 | End: 2021-08-30 | Stop reason: HOSPADM

## 2021-08-30 RX ORDER — MISOPROSTOL 100 UG/1
400 TABLET ORAL
Status: DISCONTINUED | OUTPATIENT
Start: 2021-08-30 | End: 2021-08-30 | Stop reason: HOSPADM

## 2021-08-30 RX ORDER — ONDANSETRON 2 MG/ML
4 INJECTION INTRAMUSCULAR; INTRAVENOUS EVERY 30 MIN PRN
Status: DISCONTINUED | OUTPATIENT
Start: 2021-08-30 | End: 2021-08-30 | Stop reason: HOSPADM

## 2021-08-30 RX ORDER — BUPIVACAINE HYDROCHLORIDE 7.5 MG/ML
INJECTION, SOLUTION INTRASPINAL PRN
Status: DISCONTINUED | OUTPATIENT
Start: 2021-08-30 | End: 2021-08-30

## 2021-08-30 RX ORDER — FENTANYL CITRATE 50 UG/ML
50 INJECTION, SOLUTION INTRAMUSCULAR; INTRAVENOUS EVERY 5 MIN PRN
Status: DISCONTINUED | OUTPATIENT
Start: 2021-08-30 | End: 2021-08-30 | Stop reason: HOSPADM

## 2021-08-30 RX ORDER — CARBOPROST TROMETHAMINE 250 UG/ML
250 INJECTION, SOLUTION INTRAMUSCULAR
Status: DISCONTINUED | OUTPATIENT
Start: 2021-08-30 | End: 2021-09-01 | Stop reason: HOSPADM

## 2021-08-30 RX ORDER — TRANEXAMIC ACID 10 MG/ML
1 INJECTION, SOLUTION INTRAVENOUS EVERY 30 MIN PRN
Status: DISCONTINUED | OUTPATIENT
Start: 2021-08-30 | End: 2021-08-30 | Stop reason: HOSPADM

## 2021-08-30 RX ORDER — AMOXICILLIN 250 MG
2 CAPSULE ORAL 2 TIMES DAILY
Status: DISCONTINUED | OUTPATIENT
Start: 2021-08-30 | End: 2021-09-01 | Stop reason: HOSPADM

## 2021-08-30 RX ORDER — HYDROCORTISONE 2.5 %
CREAM (GRAM) TOPICAL 3 TIMES DAILY PRN
Status: DISCONTINUED | OUTPATIENT
Start: 2021-08-30 | End: 2021-09-01 | Stop reason: HOSPADM

## 2021-08-30 RX ORDER — ONDANSETRON 2 MG/ML
4 INJECTION INTRAMUSCULAR; INTRAVENOUS EVERY 6 HOURS PRN
Status: DISCONTINUED | OUTPATIENT
Start: 2021-08-30 | End: 2021-09-01 | Stop reason: HOSPADM

## 2021-08-30 RX ORDER — METOCLOPRAMIDE 10 MG/1
10 TABLET ORAL EVERY 6 HOURS PRN
Status: DISCONTINUED | OUTPATIENT
Start: 2021-08-30 | End: 2021-09-01 | Stop reason: HOSPADM

## 2021-08-30 RX ORDER — CITRIC ACID/SODIUM CITRATE 334-500MG
30 SOLUTION, ORAL ORAL
Status: DISCONTINUED | OUTPATIENT
Start: 2021-08-30 | End: 2021-08-30 | Stop reason: HOSPADM

## 2021-08-30 RX ORDER — OXYTOCIN 10 [USP'U]/ML
10 INJECTION, SOLUTION INTRAMUSCULAR; INTRAVENOUS
Status: DISCONTINUED | OUTPATIENT
Start: 2021-08-30 | End: 2021-09-01 | Stop reason: HOSPADM

## 2021-08-30 RX ORDER — OXYTOCIN/0.9 % SODIUM CHLORIDE 30/500 ML
100-340 PLASTIC BAG, INJECTION (ML) INTRAVENOUS CONTINUOUS PRN
Status: DISCONTINUED | OUTPATIENT
Start: 2021-08-30 | End: 2021-09-01 | Stop reason: HOSPADM

## 2021-08-30 RX ORDER — CARBOPROST TROMETHAMINE 250 UG/ML
250 INJECTION, SOLUTION INTRAMUSCULAR
Status: DISCONTINUED | OUTPATIENT
Start: 2021-08-30 | End: 2021-08-30 | Stop reason: HOSPADM

## 2021-08-30 RX ORDER — SODIUM CHLORIDE, SODIUM LACTATE, POTASSIUM CHLORIDE, CALCIUM CHLORIDE 600; 310; 30; 20 MG/100ML; MG/100ML; MG/100ML; MG/100ML
INJECTION, SOLUTION INTRAVENOUS CONTINUOUS PRN
Status: DISCONTINUED | OUTPATIENT
Start: 2021-08-30 | End: 2021-08-30

## 2021-08-30 RX ORDER — NALOXONE HYDROCHLORIDE 0.4 MG/ML
0.4 INJECTION, SOLUTION INTRAMUSCULAR; INTRAVENOUS; SUBCUTANEOUS
Status: DISCONTINUED | OUTPATIENT
Start: 2021-08-30 | End: 2021-09-01 | Stop reason: HOSPADM

## 2021-08-30 RX ORDER — DEXTROSE, SODIUM CHLORIDE, SODIUM LACTATE, POTASSIUM CHLORIDE, AND CALCIUM CHLORIDE 5; .6; .31; .03; .02 G/100ML; G/100ML; G/100ML; G/100ML; G/100ML
INJECTION, SOLUTION INTRAVENOUS CONTINUOUS
Status: DISCONTINUED | OUTPATIENT
Start: 2021-08-30 | End: 2021-09-01 | Stop reason: HOSPADM

## 2021-08-30 RX ORDER — MODIFIED LANOLIN
OINTMENT (GRAM) TOPICAL
Status: DISCONTINUED | OUTPATIENT
Start: 2021-08-30 | End: 2021-09-01 | Stop reason: HOSPADM

## 2021-08-30 RX ORDER — NALOXONE HYDROCHLORIDE 0.4 MG/ML
0.2 INJECTION, SOLUTION INTRAMUSCULAR; INTRAVENOUS; SUBCUTANEOUS
Status: DISCONTINUED | OUTPATIENT
Start: 2021-08-30 | End: 2021-09-01 | Stop reason: HOSPADM

## 2021-08-30 RX ORDER — METHYLERGONOVINE MALEATE 0.2 MG/ML
200 INJECTION INTRAVENOUS
Status: DISCONTINUED | OUTPATIENT
Start: 2021-08-30 | End: 2021-09-01 | Stop reason: HOSPADM

## 2021-08-30 RX ORDER — AMOXICILLIN 250 MG
1 CAPSULE ORAL 2 TIMES DAILY
Status: DISCONTINUED | OUTPATIENT
Start: 2021-08-30 | End: 2021-09-01 | Stop reason: HOSPADM

## 2021-08-30 RX ORDER — SODIUM CHLORIDE, SODIUM LACTATE, POTASSIUM CHLORIDE, CALCIUM CHLORIDE 600; 310; 30; 20 MG/100ML; MG/100ML; MG/100ML; MG/100ML
INJECTION, SOLUTION INTRAVENOUS CONTINUOUS
Status: DISCONTINUED | OUTPATIENT
Start: 2021-08-30 | End: 2021-08-30 | Stop reason: HOSPADM

## 2021-08-30 RX ORDER — DIPHENHYDRAMINE HYDROCHLORIDE 50 MG/ML
25 INJECTION INTRAMUSCULAR; INTRAVENOUS EVERY 6 HOURS PRN
Status: DISCONTINUED | OUTPATIENT
Start: 2021-08-30 | End: 2021-08-30 | Stop reason: HOSPADM

## 2021-08-30 RX ORDER — KETOROLAC TROMETHAMINE 30 MG/ML
30 INJECTION, SOLUTION INTRAMUSCULAR; INTRAVENOUS EVERY 6 HOURS
Status: COMPLETED | OUTPATIENT
Start: 2021-08-30 | End: 2021-08-30

## 2021-08-30 RX ORDER — ONDANSETRON 4 MG/1
4 TABLET, ORALLY DISINTEGRATING ORAL EVERY 30 MIN PRN
Status: DISCONTINUED | OUTPATIENT
Start: 2021-08-30 | End: 2021-08-30 | Stop reason: HOSPADM

## 2021-08-30 RX ORDER — PROCHLORPERAZINE MALEATE 10 MG
10 TABLET ORAL EVERY 6 HOURS PRN
Status: DISCONTINUED | OUTPATIENT
Start: 2021-08-30 | End: 2021-09-01 | Stop reason: HOSPADM

## 2021-08-30 RX ORDER — PRENATAL VIT/IRON FUM/FOLIC AC 27MG-0.8MG
1 TABLET ORAL DAILY
Status: DISCONTINUED | OUTPATIENT
Start: 2021-08-30 | End: 2021-09-01 | Stop reason: HOSPADM

## 2021-08-30 RX ORDER — ACETAMINOPHEN 325 MG/1
975 TABLET ORAL EVERY 6 HOURS
Status: DISCONTINUED | OUTPATIENT
Start: 2021-08-30 | End: 2021-09-01 | Stop reason: HOSPADM

## 2021-08-30 RX ORDER — LIDOCAINE HYDROCHLORIDE 10 MG/ML
INJECTION, SOLUTION INFILTRATION; PERINEURAL PRN
Status: DISCONTINUED | OUTPATIENT
Start: 2021-08-30 | End: 2021-08-30

## 2021-08-30 RX ORDER — HYDROMORPHONE HYDROCHLORIDE 1 MG/ML
0.4 INJECTION, SOLUTION INTRAMUSCULAR; INTRAVENOUS; SUBCUTANEOUS EVERY 5 MIN PRN
Status: DISCONTINUED | OUTPATIENT
Start: 2021-08-30 | End: 2021-08-30 | Stop reason: HOSPADM

## 2021-08-30 RX ORDER — ONDANSETRON 2 MG/ML
INJECTION INTRAMUSCULAR; INTRAVENOUS PRN
Status: DISCONTINUED | OUTPATIENT
Start: 2021-08-30 | End: 2021-08-30

## 2021-08-30 RX ORDER — OXYTOCIN 10 [USP'U]/ML
10 INJECTION, SOLUTION INTRAMUSCULAR; INTRAVENOUS
Status: DISCONTINUED | OUTPATIENT
Start: 2021-08-30 | End: 2021-08-30 | Stop reason: HOSPADM

## 2021-08-30 RX ORDER — SIMETHICONE 80 MG
80 TABLET,CHEWABLE ORAL 4 TIMES DAILY PRN
Status: DISCONTINUED | OUTPATIENT
Start: 2021-08-30 | End: 2021-09-01 | Stop reason: HOSPADM

## 2021-08-30 RX ORDER — ONDANSETRON 4 MG/1
4 TABLET, ORALLY DISINTEGRATING ORAL EVERY 6 HOURS PRN
Status: DISCONTINUED | OUTPATIENT
Start: 2021-08-30 | End: 2021-09-01 | Stop reason: HOSPADM

## 2021-08-30 RX ORDER — OXYTOCIN/0.9 % SODIUM CHLORIDE 30/500 ML
PLASTIC BAG, INJECTION (ML) INTRAVENOUS CONTINUOUS PRN
Status: DISCONTINUED | OUTPATIENT
Start: 2021-08-30 | End: 2021-08-30

## 2021-08-30 RX ORDER — OXYCODONE HYDROCHLORIDE 5 MG/1
5 TABLET ORAL EVERY 4 HOURS PRN
Status: DISCONTINUED | OUTPATIENT
Start: 2021-08-30 | End: 2021-09-01 | Stop reason: HOSPADM

## 2021-08-30 RX ORDER — DIPHENHYDRAMINE HCL 25 MG
25 CAPSULE ORAL EVERY 6 HOURS PRN
Status: DISCONTINUED | OUTPATIENT
Start: 2021-08-30 | End: 2021-08-30 | Stop reason: HOSPADM

## 2021-08-30 RX ORDER — MORPHINE SULFATE 0.5 MG/ML
INJECTION, SOLUTION EPIDURAL; INTRATHECAL; INTRAVENOUS PRN
Status: DISCONTINUED | OUTPATIENT
Start: 2021-08-30 | End: 2021-08-30

## 2021-08-30 RX ORDER — BISACODYL 10 MG
10 SUPPOSITORY, RECTAL RECTAL DAILY PRN
Status: DISCONTINUED | OUTPATIENT
Start: 2021-09-01 | End: 2021-09-01 | Stop reason: HOSPADM

## 2021-08-30 RX ORDER — METHYLERGONOVINE MALEATE 0.2 MG/ML
200 INJECTION INTRAVENOUS
Status: DISCONTINUED | OUTPATIENT
Start: 2021-08-30 | End: 2021-08-30 | Stop reason: HOSPADM

## 2021-08-30 RX ORDER — LIDOCAINE 40 MG/G
CREAM TOPICAL
Status: DISCONTINUED | OUTPATIENT
Start: 2021-08-30 | End: 2021-09-01 | Stop reason: HOSPADM

## 2021-08-30 RX ORDER — OXYTOCIN/0.9 % SODIUM CHLORIDE 30/500 ML
340 PLASTIC BAG, INJECTION (ML) INTRAVENOUS CONTINUOUS PRN
Status: DISCONTINUED | OUTPATIENT
Start: 2021-08-30 | End: 2021-09-01 | Stop reason: HOSPADM

## 2021-08-30 RX ORDER — OXYTOCIN/0.9 % SODIUM CHLORIDE 30/500 ML
340 PLASTIC BAG, INJECTION (ML) INTRAVENOUS CONTINUOUS PRN
Status: DISCONTINUED | OUTPATIENT
Start: 2021-08-30 | End: 2021-08-30 | Stop reason: HOSPADM

## 2021-08-30 RX ORDER — MISOPROSTOL 100 UG/1
400 TABLET ORAL
Status: DISCONTINUED | OUTPATIENT
Start: 2021-08-30 | End: 2021-09-01 | Stop reason: HOSPADM

## 2021-08-30 RX ORDER — PROCHLORPERAZINE 25 MG
25 SUPPOSITORY, RECTAL RECTAL EVERY 12 HOURS PRN
Status: DISCONTINUED | OUTPATIENT
Start: 2021-08-30 | End: 2021-09-01 | Stop reason: HOSPADM

## 2021-08-30 RX ORDER — CEFAZOLIN SODIUM 2 G/100ML
2 INJECTION, SOLUTION INTRAVENOUS
Status: DISCONTINUED | OUTPATIENT
Start: 2021-08-30 | End: 2021-08-30 | Stop reason: HOSPADM

## 2021-08-30 RX ORDER — ACETAMINOPHEN 325 MG/1
975 TABLET ORAL ONCE
Status: DISCONTINUED | OUTPATIENT
Start: 2021-08-30 | End: 2021-08-30 | Stop reason: HOSPADM

## 2021-08-30 RX ORDER — METOCLOPRAMIDE HYDROCHLORIDE 5 MG/ML
10 INJECTION INTRAMUSCULAR; INTRAVENOUS EVERY 6 HOURS PRN
Status: DISCONTINUED | OUTPATIENT
Start: 2021-08-30 | End: 2021-09-01 | Stop reason: HOSPADM

## 2021-08-30 RX ORDER — OXYCODONE HYDROCHLORIDE 5 MG/1
5 TABLET ORAL EVERY 4 HOURS PRN
Status: DISCONTINUED | OUTPATIENT
Start: 2021-08-30 | End: 2021-08-30 | Stop reason: HOSPADM

## 2021-08-30 RX ORDER — LIDOCAINE 40 MG/G
CREAM TOPICAL
Status: DISCONTINUED | OUTPATIENT
Start: 2021-08-30 | End: 2021-08-30 | Stop reason: HOSPADM

## 2021-08-30 RX ORDER — TRANEXAMIC ACID 10 MG/ML
1 INJECTION, SOLUTION INTRAVENOUS EVERY 30 MIN PRN
Status: DISCONTINUED | OUTPATIENT
Start: 2021-08-30 | End: 2021-09-01 | Stop reason: HOSPADM

## 2021-08-30 RX ORDER — IBUPROFEN 400 MG/1
800 TABLET, FILM COATED ORAL EVERY 6 HOURS
Status: DISCONTINUED | OUTPATIENT
Start: 2021-08-31 | End: 2021-09-01 | Stop reason: HOSPADM

## 2021-08-30 RX ADMIN — FENTANYL CITRATE 50 MCG: 50 INJECTION, SOLUTION INTRAMUSCULAR; INTRAVENOUS at 08:11

## 2021-08-30 RX ADMIN — KETOROLAC TROMETHAMINE 30 MG: 30 INJECTION, SOLUTION INTRAMUSCULAR; INTRAVENOUS at 12:03

## 2021-08-30 RX ADMIN — PHENYLEPHRINE HYDROCHLORIDE 200 MCG: 10 INJECTION INTRAVENOUS at 06:21

## 2021-08-30 RX ADMIN — DOCUSATE SODIUM 50 MG AND SENNOSIDES 8.6 MG 2 TABLET: 8.6; 5 TABLET, FILM COATED ORAL at 21:24

## 2021-08-30 RX ADMIN — Medication 340 ML/HR: at 06:36

## 2021-08-30 RX ADMIN — PHENYLEPHRINE HYDROCHLORIDE 0.5 MCG/KG/MIN: 10 INJECTION INTRAVENOUS at 06:21

## 2021-08-30 RX ADMIN — KETOROLAC TROMETHAMINE 30 MG: 30 INJECTION, SOLUTION INTRAMUSCULAR; INTRAVENOUS at 23:52

## 2021-08-30 RX ADMIN — ONDANSETRON HYDROCHLORIDE 4 MG: 2 SOLUTION INTRAMUSCULAR; INTRAVENOUS at 06:36

## 2021-08-30 RX ADMIN — OXYCODONE HYDROCHLORIDE 5 MG: 5 TABLET ORAL at 21:27

## 2021-08-30 RX ADMIN — FENTANYL CITRATE 50 MCG: 50 INJECTION, SOLUTION INTRAMUSCULAR; INTRAVENOUS at 07:41

## 2021-08-30 RX ADMIN — SODIUM CHLORIDE, POTASSIUM CHLORIDE, SODIUM LACTATE AND CALCIUM CHLORIDE 1000 ML: 600; 310; 30; 20 INJECTION, SOLUTION INTRAVENOUS at 05:07

## 2021-08-30 RX ADMIN — KETOROLAC TROMETHAMINE 30 MG: 30 INJECTION, SOLUTION INTRAMUSCULAR; INTRAVENOUS at 18:27

## 2021-08-30 RX ADMIN — BUPIVACAINE HYDROCHLORIDE 1.7 ML: 7.5 INJECTION, SOLUTION INTRASPINAL at 06:18

## 2021-08-30 RX ADMIN — LIDOCAINE HYDROCHLORIDE 3 ML: 10 INJECTION, SOLUTION INFILTRATION; PERINEURAL at 06:18

## 2021-08-30 RX ADMIN — ACETAMINOPHEN 975 MG: 325 TABLET, FILM COATED ORAL at 15:22

## 2021-08-30 RX ADMIN — MORPHINE SULFATE 0.1 MG: 0.5 INJECTION, SOLUTION EPIDURAL; INTRATHECAL; INTRAVENOUS at 06:18

## 2021-08-30 RX ADMIN — ACETAMINOPHEN 975 MG: 325 TABLET, FILM COATED ORAL at 09:13

## 2021-08-30 RX ADMIN — ACETAMINOPHEN 975 MG: 325 TABLET, FILM COATED ORAL at 21:24

## 2021-08-30 RX ADMIN — SODIUM CHLORIDE, SODIUM LACTATE, POTASSIUM CHLORIDE, AND CALCIUM CHLORIDE: 600; 310; 30; 20 INJECTION, SOLUTION INTRAVENOUS at 06:10

## 2021-08-30 RX ADMIN — OXYCODONE HYDROCHLORIDE 5 MG: 5 TABLET ORAL at 15:22

## 2021-08-30 ASSESSMENT — LIFESTYLE VARIABLES: TOBACCO_USE: 1

## 2021-08-30 NOTE — ANESTHESIA PROCEDURE NOTES
Intrathecal injection Procedure Note  Pre-Procedure   Staff -        CRNA: Damien Ignacio APRN CRNA       Performed By: CRNA       Location: OR       Procedure Start/Stop Times: 2021 6:15 AM and 2021 6:18 AM       Pre-Anesthestic Checklist: patient identified, IV checked, risks and benefits discussed, informed consent, monitors and equipment checked, pre-op evaluation, at physician/surgeon's request and post-op pain management  Timeout:       Correct Patient: Yes        Correct Procedure: Yes        Correct Site: Yes        Correct Position: Yes   Procedure Documentation  Procedure: intrathecal injection       Diagnosis:        Patient Position: sitting       Patient Prep/Sterile Barriers: sterile gloves, mask       Skin prep: Chloraprep       Insertion Site: L3-4. (midline approach).       Needle Gauge: 25.        Needle Length (Inches): 3.5        Spinal Needle Type: Debby tip       Introducer used       Introducer: 20 G       # of attempts: 1 and  # of redirects:  1    Assessment/Narrative         Paresthesias: No.       CSF fluid: clear.    Comments:  No complications encountered. Pt tolerated procedure well.

## 2021-08-30 NOTE — PROGRESS NOTES
Patient arrived to [facility] with complaints of ROCKY and decreased fetal movement since 0300. EUM/EFM applied. Amnisure collected, results pending.

## 2021-08-30 NOTE — ANESTHESIA CARE TRANSFER NOTE
Patient: Nyasia Raya    Procedure(s):   SECTION    Diagnosis: Complication of pregnancy in third trimester [O26.93]  Diagnosis Additional Information: No value filed.    Anesthesia Type:   Spinal     Note:    Oropharynx: oropharynx clear of all foreign objects and spontaneously breathing  Level of Consciousness: awake  Oxygen Supplementation: room air    Independent Airway: airway patency satisfactory and stable  Dentition: dentition unchanged  Vital Signs Stable: post-procedure vital signs reviewed and stable  Report to RN Given: handoff report given  Patient transferred to: Labor and Delivery    Handoff Report: Identifed the Patient, Identified the Reponsible Provider, Reviewed the pertinent medical history, Discussed the surgical course, Reviewed Intra-OP anesthesia mangement and issues during anesthesia, Set expectations for post-procedure period and Allowed opportunity for questions and acknowledgement of understanding      Vitals:  Vitals Value Taken Time   /69 21 0730   Temp     Pulse 75 21 0732   Resp 9 21 0732   SpO2 100 % 21 0728   Vitals shown include unvalidated device data.    Electronically Signed By: KING Whalen CRNA  2021  7:33 AM

## 2021-08-30 NOTE — H&P
[facility]    History and Physical  Obstetrics and Gynecology     Date of Admission:  2021    Assessment & Plan   Nyasia Raya is a 31 year old female who presents with PROM with history of  section planning repeat.  ASSESSMENT:   IUP @ 37w5d ruptured with no labor.  NST reactive.  Category  I    PLAN:   Admit - see IP orders    Sadiq Lama    History of Present Illness   Nyasia Raya is a 31 year old female  37w5d  Estimated Date of Delivery: Sep 15, 2021 is calculated from Patient's last menstrual period was 2020. is [facility]  ruptured with no labor.    PRENATAL COURSE  Prenatal course was essentially uncomplicated      Recent Labs   Lab Test 21  1116   ABO O   RH Pos   AS Neg     Rhogam not indicated   Recent Labs   Lab Test 21  1117   HEPBANG Nonreactive   HIAGAB Nonreactive   RUQIGG 7       Past Medical History    I have reviewed this patient's medical history and updated it with pertinent information if needed.   Past Medical History:   Diagnosis Date     Asthma 5/10/2013     Insomnia 5/10/2013     Spondylolisthesis 5/10/2013     Tobacco abuse        Past Surgical History   I have reviewed this patient's surgical history and updated it with pertinent information if needed.  Past Surgical History:   Procedure Laterality Date      SECTION  1/15/2014    Procedure:  SECTION;;  Surgeon: Leena Mayorga MD;  Location: [facility] OR       Prior to Admission Medications   Prior to Admission Medications   Prescriptions Last Dose Informant Patient Reported? Taking?   Prenatal Vit-Fe Fumarate-FA (PRENATAL MULTIVITAMIN W/IRON) 27-0.8 MG tablet Past Week at Unknown time  Yes Yes   Sig: Take 1 tablet by mouth daily   albuterol (PROVENTIL HFA) 108 (90 Base) MCG/ACT inhaler Past Month at Unknown time  Yes Yes   Sig: Inhale 2 puffs into the lungs    diphenhydrAMINE (BENADRYL) 25 MG tablet 2021 at 2200  Yes Yes   Sig: Take 50  mg by mouth nightly as needed for itching or allergies   melatonin 5 MG tablet Past Week at Unknown time  Yes Yes   Sig: Take 5 mg by mouth nightly as needed for sleep    sertraline (ZOLOFT) 100 MG tablet More than a month at Unknown time  No No   Sig: Take 1 tablet (100 mg) by mouth daily We are increasing dose from 50      Facility-Administered Medications: None     Allergies   No Known Allergies    Social History   I have reviewed this patient's social history and updated it with pertinent information if needed. Nyasia Raya  reports that she quit smoking about 8 months ago. She smoked 0.08 packs per day. She has never used smokeless tobacco. She reports that she does not drink alcohol and does not use drugs.    Family History   I have reviewed this patient's family history and updated it with pertinent information if needed.   Family History   Problem Relation Age of Onset     Diabetes Mother      Diabetes Father      Heart Disease Father 47        MI       Immunization History   Immunizations are up to date    Physical Exam                      Vital Signs with Ranges       Abdomen: gravid, single vertex fetus, non-tender, EFW 7 lbs 0    Fetal Heart Tones: 140 baseline, moderate variablility, + accels, no decels and Category I  TOCO:   Frequency none  Constitutional: healthy, alert, active and no distress   Respiratory: No increased work of breathing, good air exchange, clear to auscultation bilaterally, no crackles or wheezing  Cardiovascular: Normal apical impulse, regular rate and rhythm, normal S1 and S2, no S3 or S4, and no murmur noted  Skin/Extremites: no rashes and no lesions  Neurologic: A&O x 3

## 2021-08-30 NOTE — ANESTHESIA PROCEDURE NOTES
TAP Procedure Note  Pre-Procedure   Staff -        CRNA: Damien Ignacio APRN CRNA       Performed By: CRNA       Location: OR       Procedure Start/Stop Times: 2021 7:10 AM and 2021 7:15 AM       Pre-Anesthestic Checklist: patient identified, IV checked, site marked, risks and benefits discussed, informed consent, monitors and equipment checked, pre-op evaluation, at physician/surgeon's request and post-op pain management  Timeout:       Correct Patient: Yes        Correct Procedure: Yes        Correct Site: Yes        Correct Position: Yes        Correct Laterality: Yes        Site Marked: Yes  Procedure Documentation  Procedure: TAP       Diagnosis: POST-OPERATIVE PAIN CONTROL-       Laterality: bilateral       Patient Position: supine       Patient Prep/Sterile Barriers: sterile gloves, mask       Skin prep: Chloraprep       Needle Type: insulated and short bevel       Needle Gauge: 21.        Needle Length (Inches): 6        Ultrasound guided       1. Ultrasound was used to identify targeted nerve, plexus, vascular marker, or fascial plane and place a needle adjacent to it in real-time.       2. Ultrasound was used to visualize the spread of anesthetic in close proximity to the above referenced structure.       3. A permanent image is entered into the patient's record.       4. The visualized anatomic structures appeared normal.       5. There were no apparent abnormal pathologic findings.    Assessment/Narrative         The placement was negative for: blood aspirated, painful injection and site bleeding       Paresthesias: No.     Bolus given via needle..        Secured via.        Insertion/Infusion Method: Single Shot       Complications: none       Injection made incrementally with aspirations every 5 mL.

## 2021-08-30 NOTE — OR NURSING
Justin fairchild on pitocin drip in /240/100 per hour. Placed on pump in PACU at 100/hr.  Mary Coffey RN on 8/30/2021 at 7:40 AM

## 2021-08-30 NOTE — ANESTHESIA PREPROCEDURE EVALUATION
Anesthesia Pre-Procedure Evaluation    Patient: Nyasia Raya   MRN: [id number] : 1990        Preoperative Diagnosis: Complication of pregnancy in third trimester [O26.93]   Procedure : Procedure(s):   SECTION     Past Medical History:   Diagnosis Date     Asthma 5/10/2013     Insomnia 5/10/2013     Spondylolisthesis 5/10/2013     Tobacco abuse       Past Surgical History:   Procedure Laterality Date      SECTION  1/15/2014    Procedure:  SECTION;;  Surgeon: Leena Mayorga MD;  Location: [facility] OR      No Known Allergies   Social History     Tobacco Use     Smoking status: Former Smoker     Packs/day: 0.08     Quit date: 2020     Years since quittin.6     Smokeless tobacco: Never Used   Substance Use Topics     Alcohol use: No     Comment: signed up for quit plan and her workplace has programs      Wt Readings from Last 1 Encounters:   21 91.6 kg (202 lb)        Anesthesia Evaluation   Pt has had prior anesthetic.     No history of anesthetic complications       ROS/MED HX  ENT/Pulmonary:     (+) tobacco use, Current use, Intermittent, asthma Treatment: Inhaler prn,      Neurologic:       Cardiovascular:  - neg cardiovascular ROS     METS/Exercise Tolerance: >4 METS    Hematologic:  - neg hematologic  ROS     Musculoskeletal: Comment: Spondylolisthesis - neg musculoskeletal ROS     GI/Hepatic:       Renal/Genitourinary:       Endo:     (+) Obesity,     Psychiatric/Substance Use: Comment: ADHD    (+) psychiatric history     Infectious Disease:  - neg infectious disease ROS     Malignancy:  - neg malignancy ROS     Other:      (+) Possibly pregnant, ,         Physical Exam    Airway      Comment: Multiple external piercings.     Mallampati: I   TM distance: > 3 FB   Neck ROM: full   Mouth opening: > 3 cm    Respiratory Devices and Support         Dental  no notable dental history         Cardiovascular   cardiovascular exam normal       Rhythm and rate: regular and  normal     Pulmonary   pulmonary exam normal        breath sounds clear to auscultation           OUTSIDE LABS:  CBC:   Lab Results   Component Value Date    WBC 11.9 (H) 08/30/2021    WBC 12.2 (H) 06/14/2021    HGB 9.9 (L) 08/30/2021    HGB 10.9 (L) 06/14/2021    HCT 30.4 (L) 08/30/2021    HCT 31.8 (L) 06/14/2021     08/30/2021     06/14/2021     BMP:   Lab Results   Component Value Date     02/14/2020     03/26/2019    POTASSIUM 3.7 02/14/2020    POTASSIUM 4.0 03/26/2019    CHLORIDE 105 02/14/2020    CHLORIDE 108 03/26/2019    CO2 25 02/14/2020    CO2 28 03/26/2019    BUN 11 02/14/2020    BUN 6 (L) 03/26/2019    CR 0.76 02/14/2020    CR 0.67 03/26/2019    GLC 95 02/14/2020    GLC 80 03/26/2019     COAGS: No results found for: PTT, INR, FIBR  POC:   Lab Results   Component Value Date    HCG Positive (A) 03/10/2020    HCGS (A) 03/05/2014     Canceled, Test credited  CANCEL PER TISHA PENN TO DO QUANT HCG     HEPATIC:   Lab Results   Component Value Date    ALBUMIN 4.0 02/14/2020    PROTTOTAL 6.3 (L) 02/14/2020    ALT 8 02/14/2020    AST 9 (L) 02/14/2020    ALKPHOS 91 02/14/2020    BILITOTAL 0.4 02/14/2020     OTHER:   Lab Results   Component Value Date    LACT 0.8 03/26/2019    A1C 5.0 06/19/2019    JYOTSNA 8.8 02/14/2020    MAG 1.7 (L) 02/14/2020    TSH 0.41 06/19/2019       Anesthesia Plan    ASA Status:  2   NPO Status:  NPO Appropriate    Anesthesia Type: Spinal (with ITN).              Consents    Anesthesia Plan(s) and associated risks, benefits, and realistic alternatives discussed. Questions answered and patient/representative(s) expressed understanding.     - Discussed with:  Patient      - Extended Intubation/Ventilatory Support Discussed: No.      - Patient is DNR/DNI Status: No    Use of blood products discussed: No .     Postoperative Care    Pain management: IV analgesics, Peripheral nerve block (Single Shot) (TAP block - bilateral ).   PONV prophylaxis: Ondansetron (or other 5HT-3)      Comments:                KING Whalen CRNA

## 2021-08-30 NOTE — L&D DELIVERY NOTE
OB  Delivery Note      Nyasia Raya MRN# [id number]   Age: 31 year old YOB: 1990       GA: 37w5d  GP:   Labor Complications:    EBL:   mL  Delivery QBL:    Delivery Type: , Low Transverse   ROM to Delivery Time: rupture date or rupture time have not been documented     1 Minute 5 Minute 10 Minute   Apgar Totals: 8   9        Personel Present: TELLY LECHUGA      Details    Pre-Op Diagnosis: 1. Intrauterine pregnancy at 37w5d  2. PROM   Post-Op Diagnosis: 1. Same  2. Liveborn male  3. Left paratubal cyst   Indications:   PROM with prior    Procedure:   , Low Transverse  via   Pfannenstiel incision   Anesthesia:  Spinal     Informed Consent:  The risks, benefits, complications, and alternatives were discussed with the patient. The patient understood that the risks of  section include, but are not limited to: injury to nearby structures or organs, infection, blood loss and possible need for transfusion, and potential need for more surgery including hysterectomy. The patient stated understanding and desired to proceed. All questions were answered. The site of surgery was properly noted and marked. The patient was identified as Nyasia Raya and the procedure verified as a  delivery. A Time Out was held and the above information confirmed.    Procedure Details:  DESCRIPTION OF PROCEDURE:     The patient was transferred to the OR where spinal anesthesia was accomplished.  A calle catheter was inserted and clear urine was noted.  The abdomen was scrubbed prepped and draped inthe usual manner.  A hard stop timeout was accomplished.  A transverse Pfannenstiel incision was made and sharp and electrocautery dissection carried down to the fascial layer.  The Fascia was opened in the midline andextended bluntly bilaterally.  The rectus muscles were  in the midline bluntly.  The peritoneum was bluntly divided and the Magy ring  retractor was placed.  A bladder flap was made sharply.  The lower uterinesegment was incised sharply in a low transverse fashion and the amniotic sac ruptured bluntly with a finger.  Clear fluid was noted and the incision extended bluntly.  The fetal head was deliverd in the LOT position.  The infant received nasal and oropharyngeal suction with the bulb suction. The shoulders delivered atraumatically followed by the remainder of the baby.   The cord wasclamped and cut and the infant handed to the  nurse.   evaluation was accomplished by Dr. Radha Garcia at my request due to urgent  and known risks of respiratory complications for.  The placenta was manually removed and the uterus wiped clear of clots and debris. Pitocin was added to the IVinfusion and the uterus was noted to firm-up nicely. The tubes and ovaries appeared normal. The uterine incision was closed in a double layer of #1 Chromic in a running fashion. Hemostasis was adequate.  The abdomenirrigated clear of clots and debris.  The peritoneum and the rectus  muscles were approximated with  3 0 vicryl.   The fascia was closed with 0 PDS.  The subcutaneous layer was irrigated and made hemastatic withelectrocautery.  It was closed in a single layer of 3 0 vicryl. The skin was closed with Insorb staples.  The wound was dressed with steri-strips and a pressure dressing.  Counts were correct times 2.  The patient andnewborn were transferred to the recovery room in stable condition.    Sadiq Lama MD FACOG  7:01 AM 2021     Dorota Male-Nyasia [[id number]]    Delivery/Placenta Date and Time    Delivery Date: 21 Delivery Time:  6:34 AM    Other personnel present at delivery:  Provider Role   Radha Garcia MD          Apgars     1 Minute 5 Minute 10 Minute 15 Minute 20 Minute   Skin color: 1  1       Heart rate: 2  2       Reflex irritability: 2  2       Muscle tone: 2  2       Respiratory effort: 1  2       Total: 8  9        Apgars assigned by: BARRIE ADAM,RN     Cord    Vessels: 3 Vessels    Cord Complications: None               Cord Blood Disposition: Lab    Gases Sent?: No    Delayed cord clamping?: Yes    Cord Clamping Delay (seconds): 31-60 seconds    Stem cell collection?: No       [place] Resuscitation    Methods: Suctioning   Care at Delivery: Infant male born 2021 at 0634 by  via repeat . Infant brought to warmer, APGARs 8&9. Infant brought to mother. Delee for 7mL at 16 minutes of life.     Delivery (Maternal) (Provider to Complete) ([id number])       Blood Loss  Mother: Nyasia Raya #[id number]   Start of Mother's Information    Delivery Blood Loss  21 0630 - 21 0658    None           End of Mother's Information  Mother: Nyasia Raya #[id number]          Delivery - Provider to Complete ([id number])    Delivery Type (Choose the 1 that will go to the Birth History): , Low Transverse                    Priority: ASAP    Specifics: Repeat   Indications for Repeat: Labor/SROM/Planned Repeat   Other personnel:  Provider Role   Radha Garcia MD                 Placenta    Removal: Manual Removal  Disposition: Hospital disposal                  Sadiq Lama MD

## 2021-08-30 NOTE — PLAN OF CARE
Nyasia Raya is doing well after her  section. Her dressing is still in place and does not have visible drainage on it. Fundus firm, 1 below U. Light flow noted and no clots. Pt is breast feeding well, using good technique. Pt has had moderate amounts of pain that are well managed with oral analgesics. Pt had TAP block. Pt is ambulating appropriately in room. Pt has voided large amount since calle removed.

## 2021-08-30 NOTE — OR NURSING
PACU Transfer Note    Nyasia Raya was transferred to [facility] via bed.  Equipment used for transport:  none.  Accompanied by:  staff  Prescriptions were: none    PACU Respiratory Event Documentation     1) Episodes of Apnea greater than or equal to 10 seconds: 0    2) Bradypnea - less than 8 breaths per minute: 0    3) Pain score on 0 to 10 scale: minimal    4) Pain-sedation mismatch (yes or no): no    5) Repeated 02 desaturation less than 90% (yes or no): no    Anesthesia notified? (yes or no): na    Any of the above events occuring repeatedly in separate 30 minute intervals may be considered recurrent PACU respiratory events.    Patient stable and meets phase 1 discharge criteria for transport from PACU.

## 2021-08-30 NOTE — PROGRESS NOTES
RT present for . No RT interventions needed for baby. Released at 5 minute APGAR by MD. Yulia Nevarez, RT

## 2021-08-30 NOTE — OP NOTE
OB  Delivery Note        Nyasia Raya MRN# [id number]   Age: 31 year old YOB: 1990         GA: 37w5d  GP:   Labor Complications:    EBL:   mL  Delivery QBL:    Delivery Type: , Low Transverse   ROM to Delivery Time: rupture date or rupture time have not been documented       1 Minute 5 Minute 10 Minute   Apgar Totals: 8   9        Personel Present: TELLY LECHUGA       Details     Pre-Op Diagnosis: 1. Intrauterine pregnancy at 37w5d  2. PROM   Post-Op Diagnosis: 1. Same  2. Liveborn male  3. Left paratubal cyst   Indications:   PROM with prior    Procedure:   , Low Transverse  via   Pfannenstiel incision   Anesthesia:  Spinal      Informed Consent:  The risks, benefits, complications, and alternatives were discussed with the patient. The patient understood that the risks of  section include, but are not limited to: injury to nearby structures or organs, infection, blood loss and possible need for transfusion, and potential need for more surgery including hysterectomy. The patient stated understanding and desired to proceed. All questions were answered. The site of surgery was properly noted and marked. The patient was identified as Nyasia Raya and the procedure verified as a  delivery. A Time Out was held and the above information confirmed.     Procedure Details:  DESCRIPTION OF PROCEDURE:     The patient was transferred to the OR where spinal anesthesia was accomplished.  A calle catheter was inserted and clear urine was noted.  The abdomen was scrubbed prepped and draped inthe usual manner.  A hard stop timeout was accomplished.  A transverse Pfannenstiel incision was made and sharp and electrocautery dissection carried down to the fascial layer.  The Fascia was opened in the midline andextended bluntly bilaterally.  The rectus muscles were  in the midline bluntly.  The peritoneum was bluntly divided and the Magy  ring retractor was placed.  A bladder flap was made sharply.  The lower uterinesegment was incised sharply in a low transverse fashion and the amniotic sac ruptured bluntly with a finger.  Clear fluid was noted and the incision extended bluntly.  The fetal head was deliverd in the LOT position.  The infant received nasal and oropharyngeal suction with the bulb suction. The shoulders delivered atraumatically followed by the remainder of the baby.   The cord wasclamped and cut and the infant handed to the  nurse.   evaluation was accomplished by Dr. Radha Garcia at my request due to urgent  and known risks of respiratory complications for.  The placenta was manually removed and the uterus wiped clear of clots and debris. Pitocin was added to the IVinfusion and the uterus was noted to firm-up nicely. The tubes and ovaries appeared normal. The uterine incision was closed in a double layer of #1 Chromic in a running fashion. Hemostasis was adequate.  The abdomenirrigated clear of clots and debris.  The peritoneum and the rectus  muscles were approximated with  3 0 vicryl.   The fascia was closed with 0 PDS.  The subcutaneous layer was irrigated and made hemastatic withelectrocautery.  It was closed in a single layer of 3 0 vicryl. The skin was closed with Insorb staples.  The wound was dressed with steri-strips and a pressure dressing.  Counts were correct times 2.  The patient andnewborn were transferred to the recovery room in stable condition.     Sadiq Lama MD FACOG  7:01 AM 2021                  Dorota Male-Nyasia [[id number]]                Delivery/Placenta Date and Time                Delivery Date: 21 Delivery Time:  6:34 AM                Other personnel present at delivery:  Provider Role   Radha Garcia MD                               Apgars      1 Minute 5 Minute 10 Minute 15 Minute 20 Minute   Skin color: 1  1          Heart rate: 2  2          Reflex irritability: 2   2          Muscle tone: 2  2          Respiratory effort: 1  2          Total: 8  9          Apgars assigned by: BARRIE ADAM RN              Cord    Vessels: 3 Vessels     Cord Complications: None                Cord Blood Disposition: Lab     Gases Sent?: No     Delayed cord clamping?: Yes     Cord Clamping Delay (seconds): 31-60 seconds     Stem cell collection?: No          Resuscitation    Methods: Suctioning  [facility] at Delivery: Infant male born 2019 at 0634 by  via repeat . Infant brought to warmer, APGARs 8&9. Infant brought to mother. Delee for 7mL at 16 minutes of life.                    Delivery (Maternal) (Provider to Complete) ([id number])                      Blood Loss  Mother: Nyasia Raya #[id number]          Start of Mother's Information    Delivery Blood Loss  21 0630 - 21 0658            None                              End of Mother's Information  Mother: Nyasia Raya #[id number]                     Delivery - Provider to Complete ([id number])    Delivery Type (Choose the 1 that will go to the Birth History): , Low Transverse                        Priority: ASAP    Specifics: Repeat   Indications for Repeat: Labor/SROM/Planned Repeat   Other personnel:  Provider Role   Radha Garcia MD                    Placenta    Removal: Manual Removal  Disposition: Hospital disposal                      Sadiq Lama MD

## 2021-08-30 NOTE — ANESTHESIA POSTPROCEDURE EVALUATION
Patient: Nyasia Raya    Procedure(s):   SECTION    Diagnosis:Complication of pregnancy in third trimester [O26.93]  Diagnosis Additional Information: No value filed.    Anesthesia Type:  Spinal    Note:  Disposition: Inpatient   Postop Pain Control: Uneventful            Sign Out: Well controlled pain   PONV: No   Neuro/Psych: Uneventful            Sign Out: Acceptable/Baseline neuro status   Airway/Respiratory: Uneventful            Sign Out: Acceptable/Baseline resp. status   CV/Hemodynamics:    Other NRE: NONE   DID A NON-ROUTINE EVENT OCCUR? No           Last vitals:  Vitals Value Taken Time   /60 21 0800   Temp 98.3  F (36.8  C) 21 0800   Pulse 87 21 0800   Resp 18 21 0800   SpO2 100 % 21 0808       Electronically Signed By: KING Kirkland CRNA  2021  10:16 AM

## 2021-08-31 LAB — HGB BLD-MCNC: 9 G/DL (ref 11.7–15.7)

## 2021-08-31 PROCEDURE — 250N000011 HC RX IP 250 OP 636: Performed by: OBSTETRICS & GYNECOLOGY

## 2021-08-31 PROCEDURE — 250N000013 HC RX MED GY IP 250 OP 250 PS 637: Performed by: OBSTETRICS & GYNECOLOGY

## 2021-08-31 PROCEDURE — 85018 HEMOGLOBIN: CPT | Performed by: OBSTETRICS & GYNECOLOGY

## 2021-08-31 PROCEDURE — 90707 MMR VACCINE SC: CPT | Performed by: OBSTETRICS & GYNECOLOGY

## 2021-08-31 PROCEDURE — 90471 IMMUNIZATION ADMIN: CPT | Performed by: OBSTETRICS & GYNECOLOGY

## 2021-08-31 PROCEDURE — 120N000001 HC R&B MED SURG/OB

## 2021-08-31 PROCEDURE — 36415 COLL VENOUS BLD VENIPUNCTURE: CPT | Performed by: OBSTETRICS & GYNECOLOGY

## 2021-08-31 PROCEDURE — 99207 PR NO CHARGE LOS: CPT | Performed by: OBSTETRICS & GYNECOLOGY

## 2021-08-31 RX ORDER — DIPHENHYDRAMINE HCL 25 MG
25 CAPSULE ORAL
Status: DISCONTINUED | OUTPATIENT
Start: 2021-08-31 | End: 2021-09-01 | Stop reason: HOSPADM

## 2021-08-31 RX ADMIN — IBUPROFEN 800 MG: 400 TABLET ORAL at 12:01

## 2021-08-31 RX ADMIN — IBUPROFEN 800 MG: 400 TABLET ORAL at 18:28

## 2021-08-31 RX ADMIN — OXYCODONE HYDROCHLORIDE 5 MG: 5 TABLET ORAL at 18:53

## 2021-08-31 RX ADMIN — DOCUSATE SODIUM 50 MG AND SENNOSIDES 8.6 MG 2 TABLET: 8.6; 5 TABLET, FILM COATED ORAL at 21:03

## 2021-08-31 RX ADMIN — ACETAMINOPHEN 975 MG: 325 TABLET, FILM COATED ORAL at 16:15

## 2021-08-31 RX ADMIN — OXYCODONE HYDROCHLORIDE 5 MG: 5 TABLET ORAL at 05:01

## 2021-08-31 RX ADMIN — MEASLES, MUMPS, AND RUBELLA VIRUS VACCINE LIVE 0.5 ML: 1000; 12500; 1000 INJECTION, POWDER, LYOPHILIZED, FOR SUSPENSION SUBCUTANEOUS at 12:01

## 2021-08-31 RX ADMIN — ACETAMINOPHEN 975 MG: 325 TABLET, FILM COATED ORAL at 21:03

## 2021-08-31 RX ADMIN — DOCUSATE SODIUM 50 MG AND SENNOSIDES 8.6 MG 1 TABLET: 8.6; 5 TABLET, FILM COATED ORAL at 12:01

## 2021-08-31 RX ADMIN — OXYCODONE HYDROCHLORIDE 5 MG: 5 TABLET ORAL at 12:06

## 2021-08-31 RX ADMIN — ACETAMINOPHEN 975 MG: 325 TABLET, FILM COATED ORAL at 08:06

## 2021-08-31 RX ADMIN — ACETAMINOPHEN 975 MG: 325 TABLET, FILM COATED ORAL at 02:58

## 2021-08-31 RX ADMIN — IBUPROFEN 800 MG: 400 TABLET ORAL at 05:46

## 2021-08-31 NOTE — PLAN OF CARE
/59   Pulse 86   Temp 97  F (36.1  C) (Temporal)   Resp 18   LMP 12/04/2020   SpO2 96%   Breastfeeding Unknown     Patient up independently. Voiding without difficulty. Dressing CDI. Fundus firm, bleeding light. Patient noted quarter size clot at the beginning of shift. C/o pain at incision site, scheduled and PRN medications given, ice pack given. Patient getting baby to breast independently. Denies further needs at this time.

## 2021-08-31 NOTE — PROGRESS NOTES
[facility]    Obstetrics Post-Op / Progress Note    Assessment & Plan   Assessment:  -1 Day Post-Op  Procedure(s):   SECTION    Doing well.  Clean wound without signs of infection.  No excessive bleeding    Plan:  Ambulation encouraged  Pain control measures as needed    Sadiq Baher     Interval History   Doing well.  Pain is well-controlled.  No fevers.  No history of wound drainage, warmth or significant erythema.  Good appetite.  Denies chest pain, shortness of breath, nausea or vomiting.  Ambulatory.  Breastfeeding well.    Medications     dextrose 5% lactated ringers       oxytocin in 0.9% NaCl 100 mL/hr (21 0808)     oxytocin in 0.9% NaCl         acetaminophen  975 mg Oral Q6H     ibuprofen  800 mg Oral Q6H     Measles, Mumps & Rubella Vac  0.5 mL Subcutaneous Once     prenatal multivitamin w/iron  1 tablet Oral Daily     senna-docusate  1 tablet Oral BID    Or     senna-docusate  2 tablet Oral BID     sertraline  100 mg Oral Daily     sodium chloride (PF)  3 mL Intracatheter Q8H     sodium chloride (PF)  3 mL Intracatheter Q8H     Tdap (tetanus-diptheria-acell pertussis)  0.5 mL Intramuscular Once       Physical Exam   Temp: 97  F (36.1  C) Temp src: Temporal BP: 110/59 Pulse: 86   Resp: 18 SpO2: 96 % O2 Device: None (Room air)    There were no vitals filed for this visit.  Vital Signs with Ranges  Temp:  [97  F (36.1  C)-98.3  F (36.8  C)] 97  F (36.1  C)  Pulse:  [69-87] 86  Resp:  [15-18] 18  BP: ()/(51-80) 110/59  SpO2:  [88 %-100 %] 96 %  I/O last 3 completed shifts:  In: 1000 [I.V.:1000]  Out: 322 [Urine:425]    Uterine fundus is firm, non-tender and at the level of the umbilicus  Incision C/D/I  Extremities Non-tender    Data   Recent Labs   Lab Test 21  0454 21  1116   ABO  --  O   RH  --  Pos   AS Negative Neg     Recent Labs   Lab Test 21  0454 21  1457   HGB 9.9* 10.9*     Recent Labs   Lab Test 21  1117   RUQIGG 7

## 2021-09-01 VITALS
DIASTOLIC BLOOD PRESSURE: 57 MMHG | SYSTOLIC BLOOD PRESSURE: 120 MMHG | OXYGEN SATURATION: 99 % | RESPIRATION RATE: 18 BRPM | TEMPERATURE: 97 F | HEART RATE: 85 BPM

## 2021-09-01 LAB
PATH REPORT.COMMENTS IMP SPEC: NORMAL
PATH REPORT.FINAL DX SPEC: NORMAL
PHOTO IMAGE: NORMAL

## 2021-09-01 PROCEDURE — 999N000157 HC STATISTIC RCP TIME EA 10 MIN

## 2021-09-01 PROCEDURE — 99207 PR NO CHARGE LOS: CPT | Performed by: OBSTETRICS & GYNECOLOGY

## 2021-09-01 PROCEDURE — 250N000013 HC RX MED GY IP 250 OP 250 PS 637: Performed by: OBSTETRICS & GYNECOLOGY

## 2021-09-01 RX ORDER — FERROUS SULFATE 325(65) MG
325 TABLET, DELAYED RELEASE (ENTERIC COATED) ORAL DAILY
Qty: 30 TABLET | Refills: 0 | Status: SHIPPED | OUTPATIENT
Start: 2021-09-01 | End: 2021-10-25

## 2021-09-01 RX ORDER — IBUPROFEN 600 MG/1
600 TABLET, FILM COATED ORAL EVERY 6 HOURS PRN
Qty: 30 TABLET | Refills: 0 | Status: SHIPPED | OUTPATIENT
Start: 2021-09-01 | End: 2021-11-17

## 2021-09-01 RX ORDER — AMOXICILLIN 250 MG
1 CAPSULE ORAL 2 TIMES DAILY PRN
Qty: 20 TABLET | Refills: 0 | Status: SHIPPED | OUTPATIENT
Start: 2021-09-01 | End: 2021-10-25

## 2021-09-01 RX ORDER — OXYCODONE HYDROCHLORIDE 5 MG/1
5 TABLET ORAL EVERY 4 HOURS PRN
Qty: 20 TABLET | Refills: 0 | Status: SHIPPED | OUTPATIENT
Start: 2021-09-01 | End: 2021-10-25

## 2021-09-01 RX ADMIN — IBUPROFEN 800 MG: 400 TABLET ORAL at 06:30

## 2021-09-01 RX ADMIN — OXYCODONE HYDROCHLORIDE 5 MG: 5 TABLET ORAL at 11:02

## 2021-09-01 RX ADMIN — OXYCODONE HYDROCHLORIDE 5 MG: 5 TABLET ORAL at 00:29

## 2021-09-01 RX ADMIN — SERTRALINE HYDROCHLORIDE 100 MG: 50 TABLET ORAL at 11:01

## 2021-09-01 RX ADMIN — IBUPROFEN 800 MG: 400 TABLET ORAL at 00:29

## 2021-09-01 RX ADMIN — PRENATAL VIT W/ FE FUMARATE-FA TAB 27-0.8 MG 1 TABLET: 27-0.8 TAB at 11:01

## 2021-09-01 RX ADMIN — ACETAMINOPHEN 975 MG: 325 TABLET, FILM COATED ORAL at 08:50

## 2021-09-01 RX ADMIN — OXYCODONE HYDROCHLORIDE 5 MG: 5 TABLET ORAL at 06:32

## 2021-09-01 RX ADMIN — DOCUSATE SODIUM 50 MG AND SENNOSIDES 8.6 MG 2 TABLET: 8.6; 5 TABLET, FILM COATED ORAL at 11:01

## 2021-09-01 RX ADMIN — ACETAMINOPHEN 975 MG: 325 TABLET, FILM COATED ORAL at 03:09

## 2021-09-01 NOTE — PROGRESS NOTES
[facility]    Obstetrics Post-Op / Progress Note    Assessment & Plan   Assessment:  -2 Days Post-Op  Procedure(s):   SECTION    Doing well.    Plan:  Ambulation encouraged  Pain control measures as needed  Anticipate discharge tomorrow    Sadiq Lama     Interval History   Doing well.  Pain is well-controlled.  No fevers.  No history of wound drainage, warmth or significant erythema.  Good appetite.  Denies chest pain, shortness of breath, nausea or vomiting.  Ambulatory.  Breastfeeding well.    Medications     dextrose 5% lactated ringers       oxytocin in 0.9% NaCl 100 mL/hr (21 0808)     oxytocin in 0.9% NaCl         acetaminophen  975 mg Oral Q6H     ibuprofen  800 mg Oral Q6H     prenatal multivitamin w/iron  1 tablet Oral Daily     senna-docusate  1 tablet Oral BID    Or     senna-docusate  2 tablet Oral BID     sertraline  100 mg Oral Daily     Tdap (tetanus-diptheria-acell pertussis)  0.5 mL Intramuscular Once       Physical Exam   Temp: 97  F (36.1  C) Temp src: Temporal BP: 120/57 Pulse: 85   Resp: 18 SpO2: 99 % O2 Device: None (Room air)    There were no vitals filed for this visit.  Vital Signs with Ranges  Temp:  [97  F (36.1  C)-97.1  F (36.2  C)] 97  F (36.1  C)  Pulse:  [80-85] 85  Resp:  [16-18] 18  BP: (120-127)/(57-80) 120/57  SpO2:  [98 %-99 %] 99 %  No intake/output data recorded.    Uterine fundus is firm, non-tender and at the level of the umbilicus  Incision C/D/I  Extremities Non-tender    Data   Recent Labs   Lab Test 21  0454 21  1116   ABO  --  O   RH  --  Pos   AS Negative Neg     Recent Labs   Lab Test 21  0711 21  0454   HGB 9.0* 9.9*     Recent Labs   Lab Test 21  1117   RUQIGG 7

## 2021-09-01 NOTE — DISCHARGE SUMMARY
[facility]    Discharge Summary  Obstetrics    Date of Admission:  2021  Date of Discharge:  2021  Discharging Provider: Sadiq Lama    Discharge Diagnoses   Complication of pregnancy in third trimester [O26.93]    Patient Active Problem List   Diagnosis     Insomnia     Asthma     Spondylolisthesis     Family history of congenital heart defect     Night terrors, adult     Obesity     Family planning     Encounter for routine gynecological examination     Smoker     NO SHOW     Attention deficit hyperactivity disorder (ADHD), combined type     Congenital spondylolisthesis     History of  section     Encounter for triage in pregnant patient       Procedure/Surgery Information   Procedure: Procedure(s):   SECTION   Surgeon(s): Surgeon(s) and Role:     * Sadiq Lama MD - Primary     History of Present Illness   Nyasia Raya is a 31 year old female who presented with PROM with history of  and had an uncomplicated repeat  section.    Hospital Course   The patient's hospital course was unremarkable.  She recovered as anticipated and experienced no post-operative complications.  On discharge, her pain was well controlled. Vaginal bleeding is similar to peak menstrual flow.  Voiding without difficulty.  Ambulating well and tolerating a normal diet.  No fever or significant wound drainage.  Breastfeeding well.  Infant is stable.  She was discharged on post-partum day #3.  She did have surgical postop acute blood loss anemia and anemia of pregnancy treated with oral iron.    Post-partum hemoglobin:   Hemoglobin   Date Value Ref Range Status   2021 9.0 (L) 11.7 - 15.7 g/dL Final   2021 10.9 (L) 11.7 - 15.7 g/dL Final       Sadiq Lama MD    Discharge Disposition   Discharged to home   Condition at discharge: Stable    Pending Results   Final pathology results: No pathology submitted    Unresulted Labs Ordered in the Past 30 Days of this  Admission     No orders found from 2021 to 2021.          Primary Care Physician   Nhnug Ibarra    Consultations This Hospital Stay   HOME CARE POST PARTUM/ IP CONSULT  LACTATION IP CONSULT    Discharge Orders      Activity    Activity as tolerated     Reason for your hospital stay    Maternity care     Follow Up    Follow up with provider in 2 weeks and 6 weeks for post-delivery checks     Breast pump    Breast Pump Documentation:  Manual/Electric Pump: To support adequate breast milk production and nutrition for infant.     I, the undersigned, certify that the above prescribed supplies are medically necessary for this patient and is both reasonable and necessary in reference to accepted standards of medical and necessary in reference to accepted standards of medical practice in the treatment of this patient's condition and is not prescribed as a convenience.     Diet    Resume previous diet     Discharge Medications   Current Discharge Medication List      START taking these medications    Details   ferrous sulfate (FE TABS) 325 (65 Fe) MG EC tablet Take 1 tablet (325 mg) by mouth daily  Qty: 30 tablet, Refills: 0    Associated Diagnoses: Anemia during pregnancy in third trimester      ibuprofen (ADVIL/MOTRIN) 600 MG tablet Take 1 tablet (600 mg) by mouth every 6 hours as needed for moderate pain  Qty: 30 tablet, Refills: 0    Associated Diagnoses: Post-op pain      oxyCODONE (ROXICODONE) 5 MG tablet Take 1 tablet (5 mg) by mouth every 4 hours as needed for moderate to severe pain  Qty: 20 tablet, Refills: 0    Associated Diagnoses: Post-op pain      senna-docusate (SENOKOT-S/PERICOLACE) 8.6-50 MG tablet Take 1 tablet by mouth 2 times daily as needed for constipation  Qty: 20 tablet, Refills: 0    Associated Diagnoses: Post-op pain         CONTINUE these medications which have NOT CHANGED    Details   albuterol (PROVENTIL HFA) 108 (90 Base) MCG/ACT inhaler Inhale 2 puffs into the lungs        diphenhydrAMINE (BENADRYL) 25 MG tablet Take 50 mg by mouth nightly as needed for itching or allergies      melatonin 5 MG tablet Take 5 mg by mouth nightly as needed for sleep       Prenatal Vit-Fe Fumarate-FA (PRENATAL MULTIVITAMIN W/IRON) 27-0.8 MG tablet Take 1 tablet by mouth daily      sertraline (ZOLOFT) 100 MG tablet Take 1 tablet (100 mg) by mouth daily We are increasing dose from 50  Qty: 30 tablet, Refills: 11    Associated Diagnoses: RANDI (generalized anxiety disorder)           Allergies   No Known Allergies

## 2021-09-01 NOTE — PLAN OF CARE
Patient up independently in room, voids without difficulty. Fundus firm, bleeding is light and reports a couple of dime-sized clots this shift. Incisional pain well controlled with ibuprofen, tylenol and oxycodone. Incision open to air, WNL. Patient getting infant to breast independently.    /80 (BP Location: Left arm)   Pulse 80   Temp 97.1  F (36.2  C) (Tympanic)   Resp 16   LMP 12/04/2020   SpO2 98%   Breastfeeding Unknown     Humaira Leary RN on 9/1/2021 at 4:45 AM

## 2021-09-01 NOTE — PROGRESS NOTES
Incentive Spirometry education completed.  Pt goal 2500 mls.  Pt achieved 2750 mls.  Pt instructed to perform 10/hr while awake with at least one deep breath and cough per hour until able to perform baseline activity.  RT will follow and re-assess as need.      Valencia Uriostegui, RT on 9/1/2021 at 9:02 AM

## 2021-09-01 NOTE — PROGRESS NOTES
Written and verbal discharge instructions given to patient. Patient states and signs she understands all information provided. Follow-up appointments scheduled for patient

## 2021-09-12 ENCOUNTER — HEALTH MAINTENANCE LETTER (OUTPATIENT)
Age: 31
End: 2021-09-12

## 2021-09-13 ENCOUNTER — MEDICAL CORRESPONDENCE (OUTPATIENT)
Dept: HEALTH INFORMATION MANAGEMENT | Facility: OTHER | Age: 31
End: 2021-09-13

## 2021-09-14 ENCOUNTER — OFFICE VISIT (OUTPATIENT)
Dept: OBGYN | Facility: OTHER | Age: 31
End: 2021-09-14
Attending: OBSTETRICS & GYNECOLOGY
Payer: COMMERCIAL

## 2021-09-14 VITALS
BODY MASS INDEX: 34.77 KG/M2 | WEIGHT: 183 LBS | HEART RATE: 88 BPM | DIASTOLIC BLOOD PRESSURE: 60 MMHG | SYSTOLIC BLOOD PRESSURE: 104 MMHG

## 2021-09-14 DIAGNOSIS — Z98.891 S/P CESAREAN SECTION: Primary | ICD-10-CM

## 2021-09-14 PROCEDURE — 99024 POSTOP FOLLOW-UP VISIT: CPT | Performed by: OBSTETRICS & GYNECOLOGY

## 2021-09-14 ASSESSMENT — PAIN SCALES - GENERAL: PAINLEVEL: MILD PAIN (2)

## 2021-09-14 NOTE — NURSING NOTE
Chief Complaint   Patient presents with     Surgical Followup           Does have some pain on the RLQ but otherwise doing well.     Latasha Rivera LPN........................2021  3:38 PM     Medication Reconciliation: completed   Latasha Rivera LPN  2021 3:37 PM

## 2021-09-14 NOTE — PROGRESS NOTES
Postoperative Visit  2021    S: Nyasia Raya is a 31 year old  here for post-operative visit following RLTCS on 21.  She reports feeling well. Bowels and bladder back to normal. Had some incisional discharge, resolved after using left over chlorhexidine soap. Breastfeeding going well.     O:   /60 (BP Location: Right arm, Patient Position: Sitting, Cuff Size: Adult Large)   Pulse 88   Wt 83 kg (183 lb)   LMP 2020   Breastfeeding No   BMI 34.77 kg/m    Gen:  Well-appearing, NAD  Abd: soft, nondistended, nontender. Incision c/d/i    A/P:   MsGunnar Raya is a 31 year old  two weeks s/p RLTCS. Doing well, no concerns  RTC 4 weeks for PP visit    Adina Trimble MD  OB/GYN  2021 3:51 PM

## 2021-09-20 ENCOUNTER — VIRTUAL VISIT (OUTPATIENT)
Dept: PSYCHIATRY | Facility: OTHER | Age: 31
End: 2021-09-20
Attending: PSYCHIATRY & NEUROLOGY
Payer: COMMERCIAL

## 2021-09-20 DIAGNOSIS — F31.81 BIPOLAR 2 DISORDER (H): Primary | ICD-10-CM

## 2021-09-20 PROCEDURE — 99213 OFFICE O/P EST LOW 20 MIN: CPT | Mod: TEL | Performed by: PSYCHIATRY & NEUROLOGY

## 2021-09-20 ASSESSMENT — PAIN SCALES - GENERAL: PAINLEVEL: NO PAIN (0)

## 2021-09-20 NOTE — NURSING NOTE
"Chief Complaint   Patient presents with     RECHECK     Telephone visit       Initial LMP 12/04/2020  Estimated body mass index is 34.77 kg/m  as calculated from the following:    Height as of 1/25/21: 1.545 m (5' 0.83\").    Weight as of 9/14/21: 83 kg (183 lb).  Medication Reconciliation: complete  LARRY LYN LPN    "
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
## 2021-10-25 ENCOUNTER — VIRTUAL VISIT (OUTPATIENT)
Dept: PSYCHIATRY | Facility: OTHER | Age: 31
End: 2021-10-25
Attending: PSYCHIATRY & NEUROLOGY
Payer: COMMERCIAL

## 2021-10-25 DIAGNOSIS — F41.1 GAD (GENERALIZED ANXIETY DISORDER): Primary | ICD-10-CM

## 2021-10-25 PROCEDURE — 99214 OFFICE O/P EST MOD 30 MIN: CPT | Mod: TEL | Performed by: PSYCHIATRY & NEUROLOGY

## 2021-10-25 ASSESSMENT — PAIN SCALES - GENERAL: PAINLEVEL: NO PAIN (0)

## 2021-10-25 ASSESSMENT — PATIENT HEALTH QUESTIONNAIRE - PHQ9
SUM OF ALL RESPONSES TO PHQ QUESTIONS 1-9: 16
5. POOR APPETITE OR OVEREATING: MORE THAN HALF THE DAYS

## 2021-10-25 ASSESSMENT — ANXIETY QUESTIONNAIRES
7. FEELING AFRAID AS IF SOMETHING AWFUL MIGHT HAPPEN: MORE THAN HALF THE DAYS
2. NOT BEING ABLE TO STOP OR CONTROL WORRYING: NEARLY EVERY DAY
GAD7 TOTAL SCORE: 18
3. WORRYING TOO MUCH ABOUT DIFFERENT THINGS: NEARLY EVERY DAY
6. BECOMING EASILY ANNOYED OR IRRITABLE: NEARLY EVERY DAY
5. BEING SO RESTLESS THAT IT IS HARD TO SIT STILL: MORE THAN HALF THE DAYS
1. FEELING NERVOUS, ANXIOUS, OR ON EDGE: NEARLY EVERY DAY
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
## 2021-10-25 NOTE — NURSING NOTE
"Chief Complaint   Patient presents with     RECHECK     Telephone visit.  Bipolar disorder, ADHD, anxiety.  Medication review.  Patient would like to re-start medications.       Initial LMP 12/04/2020  Estimated body mass index is 34.77 kg/m  as calculated from the following:    Height as of 1/25/21: 1.545 m (5' 0.83\").    Weight as of 9/14/21: 83 kg (183 lb).  Medication Reconciliation: complete  LARRY LYN LPN    "

## 2021-10-26 ASSESSMENT — ANXIETY QUESTIONNAIRES: GAD7 TOTAL SCORE: 18

## 2021-10-29 ENCOUNTER — OFFICE VISIT (OUTPATIENT)
Dept: FAMILY MEDICINE | Facility: OTHER | Age: 31
End: 2021-10-29
Attending: FAMILY MEDICINE
Payer: COMMERCIAL

## 2021-10-29 VITALS
DIASTOLIC BLOOD PRESSURE: 68 MMHG | RESPIRATION RATE: 18 BRPM | SYSTOLIC BLOOD PRESSURE: 108 MMHG | BODY MASS INDEX: 35.04 KG/M2 | HEART RATE: 86 BPM | OXYGEN SATURATION: 97 % | TEMPERATURE: 98.6 F | WEIGHT: 184.4 LBS

## 2021-10-29 DIAGNOSIS — L98.9 SKIN LESION: Primary | ICD-10-CM

## 2021-10-29 PROCEDURE — 99213 OFFICE O/P EST LOW 20 MIN: CPT | Performed by: FAMILY MEDICINE

## 2021-10-29 ASSESSMENT — ANXIETY QUESTIONNAIRES
1. FEELING NERVOUS, ANXIOUS, OR ON EDGE: NEARLY EVERY DAY
2. NOT BEING ABLE TO STOP OR CONTROL WORRYING: NEARLY EVERY DAY
6. BECOMING EASILY ANNOYED OR IRRITABLE: NEARLY EVERY DAY
GAD7 TOTAL SCORE: 19
3. WORRYING TOO MUCH ABOUT DIFFERENT THINGS: MORE THAN HALF THE DAYS
GAD7 TOTAL SCORE: 19
8. IF YOU CHECKED OFF ANY PROBLEMS, HOW DIFFICULT HAVE THESE MADE IT FOR YOU TO DO YOUR WORK, TAKE CARE OF THINGS AT HOME, OR GET ALONG WITH OTHER PEOPLE?: EXTREMELY DIFFICULT
7. FEELING AFRAID AS IF SOMETHING AWFUL MIGHT HAPPEN: MORE THAN HALF THE DAYS
7. FEELING AFRAID AS IF SOMETHING AWFUL MIGHT HAPPEN: MORE THAN HALF THE DAYS
5. BEING SO RESTLESS THAT IT IS HARD TO SIT STILL: NEARLY EVERY DAY
GAD7 TOTAL SCORE: 19
4. TROUBLE RELAXING: NEARLY EVERY DAY

## 2021-10-29 ASSESSMENT — PATIENT HEALTH QUESTIONNAIRE - PHQ9
SUM OF ALL RESPONSES TO PHQ QUESTIONS 1-9: 12
10. IF YOU CHECKED OFF ANY PROBLEMS, HOW DIFFICULT HAVE THESE PROBLEMS MADE IT FOR YOU TO DO YOUR WORK, TAKE CARE OF THINGS AT HOME, OR GET ALONG WITH OTHER PEOPLE: VERY DIFFICULT
SUM OF ALL RESPONSES TO PHQ QUESTIONS 1-9: 12

## 2021-10-29 ASSESSMENT — PAIN SCALES - GENERAL: PAINLEVEL: NO PAIN (0)

## 2021-10-29 NOTE — NURSING NOTE
"Patient here to have a lump on nose checked out. Noticed lump after she gave birth to last child. Sometimes seems to increase in size.   Latasha Olvera LPN ..........10/29/2021 11:02 AM   Chief Complaint   Patient presents with     Mass     on nose       Initial /68 (BP Location: Right arm, Patient Position: Sitting, Cuff Size: Adult Regular)   Pulse 86   Temp 98.6  F (37  C) (Tympanic)   Resp 18   Wt 83.6 kg (184 lb 6.4 oz)   LMP 12/04/2020   SpO2 97%   BMI 35.04 kg/m   Estimated body mass index is 35.04 kg/m  as calculated from the following:    Height as of 1/25/21: 1.545 m (5' 0.83\").    Weight as of this encounter: 83.6 kg (184 lb 6.4 oz).  Medication Reconciliation: complete    Latasha Olvera LPN    Advance Care Directive reviewed    "

## 2021-10-30 ASSESSMENT — ASTHMA QUESTIONNAIRES: ACT_TOTALSCORE: 22

## 2021-10-30 ASSESSMENT — PATIENT HEALTH QUESTIONNAIRE - PHQ9: SUM OF ALL RESPONSES TO PHQ QUESTIONS 1-9: 12

## 2021-10-30 ASSESSMENT — ANXIETY QUESTIONNAIRES: GAD7 TOTAL SCORE: 19

## 2021-10-30 NOTE — PROGRESS NOTES
SUBJECTIVE:   Nyasia Raya is a 31 year old female who presents to clinic today for the following health issues:    Patient presents today due to a lesion that has been on her nose for the past 5 years or so.  She noticed it after she gave birth to her last child.  She just recently had another baby.  It has been coming and going since that time.  She will try to remove it, but she is never really been able to get the base off.  It does sometimes bleed easily.        OBJECTIVE:     /68 (BP Location: Right arm, Patient Position: Sitting, Cuff Size: Adult Regular)   Pulse 86   Temp 98.6  F (37  C) (Tympanic)   Resp 18   Wt 83.6 kg (184 lb 6.4 oz)   LMP 12/04/2020   SpO2 97%   BMI 35.04 kg/m    Body mass index is 35.04 kg/m .  Physical Exam  Constitutional:       Appearance: She is well-developed.   HENT:      Right Ear: External ear normal.      Left Ear: External ear normal.   Eyes:      General: No scleral icterus.     Conjunctiva/sclera: Conjunctivae normal.   Cardiovascular:      Rate and Rhythm: Normal rate.   Pulmonary:      Effort: Pulmonary effort is normal. No respiratory distress.   Skin:     Findings: No rash.      Comments: Red pedunculated lesion originating from between the nares.   Neurological:      Mental Status: She is alert.           ASSESSMENT/PLAN:           ICD-10-CM    1. Skin lesion  L98.9 Adult General Surg Referral     Patient has a recurrent skin lesion on her nose, concerning for possible pyogenic granuloma.  Patient has her kids with today, so excision is not practical.  Did discuss that sometimes these can bleed, and still be recurrent after excision.  Patient is referred to surgery for this procedure.      Radha Garcia MD  [facility] AND [facility]

## 2021-11-17 ENCOUNTER — OFFICE VISIT (OUTPATIENT)
Dept: SURGERY | Facility: OTHER | Age: 31
End: 2021-11-17
Attending: FAMILY MEDICINE
Payer: COMMERCIAL

## 2021-11-17 VITALS
TEMPERATURE: 97.8 F | HEART RATE: 95 BPM | DIASTOLIC BLOOD PRESSURE: 64 MMHG | SYSTOLIC BLOOD PRESSURE: 110 MMHG | OXYGEN SATURATION: 98 % | WEIGHT: 186.8 LBS | RESPIRATION RATE: 18 BRPM | BODY MASS INDEX: 35.5 KG/M2

## 2021-11-17 DIAGNOSIS — L98.9 SKIN LESION: Primary | ICD-10-CM

## 2021-11-17 PROCEDURE — 11440 EXC FACE-MM B9+MARG 0.5 CM/<: CPT | Performed by: SURGERY

## 2021-11-17 PROCEDURE — 88305 TISSUE EXAM BY PATHOLOGIST: CPT

## 2021-11-17 ASSESSMENT — PAIN SCALES - GENERAL: PAINLEVEL: NO PAIN (0)

## 2021-11-17 NOTE — NURSING NOTE
"Chief Complaint   Patient presents with     Derm Problem     lesion on tip of nose       Initial /64 (BP Location: Left arm, Patient Position: Sitting, Cuff Size: Adult Large)   Pulse 95   Temp 97.8  F (36.6  C) (Tympanic)   Resp 18   Wt 84.7 kg (186 lb 12.8 oz)   LMP 12/04/2020   SpO2 98%   Breastfeeding Yes   BMI 35.50 kg/m   Estimated body mass index is 35.5 kg/m  as calculated from the following:    Height as of 1/25/21: 1.545 m (5' 0.83\").    Weight as of this encounter: 84.7 kg (186 lb 12.8 oz).  Medication Reconciliation: complete    Ching Guillen LPN       TIMEOUT  Universal Protocol    A. Pre-procedure verification complete     1-relevant information / documentation available, reviewed and properly matched to the patient; 2-consent accurate and complete, 3-equipment and supplies available    B. Site marking complete     Site marked if not in continuous attendance with patient    C. TIME OUT completed     Time Out was conducted just prior to starting procedure to verify the eight required elements: 1-patient identity, 2-consent accurate and complete, 3-position, 4-correct side/site marked (if applicable), 5-procedure, 6-relevant images / results properly labeled and displayed (if applicable), 7-antibiotics / irrigation fluids (if applicable), 8-safety precautions.  "

## 2021-11-17 NOTE — NURSING NOTE
"Chief Complaint   Patient presents with     Derm Problem     lesion on tip of nose       Initial /64 (BP Location: Left arm, Patient Position: Sitting, Cuff Size: Adult Large)   Pulse 95   Temp 97.8  F (36.6  C) (Tympanic)   Resp 18   Wt 84.7 kg (186 lb 12.8 oz)   LMP 12/04/2020   SpO2 98%   Breastfeeding Yes   BMI 35.50 kg/m   Estimated body mass index is 35.5 kg/m  as calculated from the following:    Height as of 1/25/21: 1.545 m (5' 0.83\").    Weight as of this encounter: 84.7 kg (186 lb 12.8 oz).  Medication Reconciliation: complete    Ching Guillen LPN  "

## 2021-11-17 NOTE — PROGRESS NOTES
Procedure Note  Pre/Post Operative Diagnosis:   0.3 cm ( with margin ) skin lesion at tip of nose    Procedure:    Tangential excision    Surgeon: Juice Mishra MD [facility]    Local Anesthesia: 1% lidocaine with0.25%Marcaine with epinephrine    Indication for the procedure:    This is a 31 year old female patient referred by Radha Garcia  with skin lesion at tip of nose.   After explaining the risks to include bleeding, infection, recurrence or need for re-excision,and scarring the patient wished to proceed.    Procedure:   The area was prepped and draped in usual sterile fashion with ChloraPrep . After, adequate local anesthesia,a tangential skin incision was made to encompass the lesion.  The skin was cauterized with battery operated cautery.   Plan:   Patient will followup if there any problems with the wound including redness or drainage.

## 2021-11-19 LAB
PATH REPORT.COMMENTS IMP SPEC: NORMAL
PATH REPORT.FINAL DX SPEC: NORMAL
PHOTO IMAGE: NORMAL

## 2021-11-24 ENCOUNTER — VIRTUAL VISIT (OUTPATIENT)
Dept: PSYCHIATRY | Facility: OTHER | Age: 31
End: 2021-11-24
Attending: PSYCHIATRY & NEUROLOGY
Payer: COMMERCIAL

## 2021-11-24 DIAGNOSIS — F90.2 ADHD (ATTENTION DEFICIT HYPERACTIVITY DISORDER), COMBINED TYPE: Primary | ICD-10-CM

## 2021-11-24 PROCEDURE — 99213 OFFICE O/P EST LOW 20 MIN: CPT | Mod: TEL | Performed by: PSYCHIATRY & NEUROLOGY

## 2021-11-24 RX ORDER — DEXTROAMPHETAMINE SACCHARATE, AMPHETAMINE ASPARTATE, DEXTROAMPHETAMINE SULFATE AND AMPHETAMINE SULFATE 2.5; 2.5; 2.5; 2.5 MG/1; MG/1; MG/1; MG/1
10 TABLET ORAL 2 TIMES DAILY
Qty: 60 TABLET | Refills: 0 | Status: SHIPPED | OUTPATIENT
Start: 2021-11-24 | End: 2021-12-20

## 2021-11-24 ASSESSMENT — PAIN SCALES - GENERAL: PAINLEVEL: NO PAIN (0)

## 2021-11-24 ASSESSMENT — ANXIETY QUESTIONNAIRES
2. NOT BEING ABLE TO STOP OR CONTROL WORRYING: NEARLY EVERY DAY
GAD7 TOTAL SCORE: 15
6. BECOMING EASILY ANNOYED OR IRRITABLE: MORE THAN HALF THE DAYS
7. FEELING AFRAID AS IF SOMETHING AWFUL MIGHT HAPPEN: MORE THAN HALF THE DAYS
3. WORRYING TOO MUCH ABOUT DIFFERENT THINGS: SEVERAL DAYS
1. FEELING NERVOUS, ANXIOUS, OR ON EDGE: NEARLY EVERY DAY
5. BEING SO RESTLESS THAT IT IS HARD TO SIT STILL: SEVERAL DAYS

## 2021-11-24 ASSESSMENT — PATIENT HEALTH QUESTIONNAIRE - PHQ9: 5. POOR APPETITE OR OVEREATING: NEARLY EVERY DAY
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
## 2021-11-24 NOTE — NURSING NOTE
"Chief Complaint   Patient presents with     RECHECK     Telephone visit.  Medication review.  Patient has not started taking Zoloft, would like to discuss.  Also discuss returning to work and going back on ADHD medications       Initial There were no vitals taken for this visit. Estimated body mass index is 35.5 kg/m  as calculated from the following:    Height as of 1/25/21: 1.545 m (5' 0.83\").    Weight as of 11/17/21: 84.7 kg (186 lb 12.8 oz).  Medication Reconciliation: complete  LARRY LYN LPN    "

## 2021-11-25 ASSESSMENT — PATIENT HEALTH QUESTIONNAIRE - PHQ9: SUM OF ALL RESPONSES TO PHQ QUESTIONS 1-9: 17

## 2021-11-25 ASSESSMENT — ANXIETY QUESTIONNAIRES: GAD7 TOTAL SCORE: 15

## 2021-12-03 ENCOUNTER — MEDICAL CORRESPONDENCE (OUTPATIENT)
Dept: HEALTH INFORMATION MANAGEMENT | Facility: OTHER | Age: 31
End: 2021-12-03
Payer: COMMERCIAL

## 2021-12-20 DIAGNOSIS — F90.2 ADHD (ATTENTION DEFICIT HYPERACTIVITY DISORDER), COMBINED TYPE: ICD-10-CM

## 2021-12-20 RX ORDER — DEXTROAMPHETAMINE SACCHARATE, AMPHETAMINE ASPARTATE, DEXTROAMPHETAMINE SULFATE AND AMPHETAMINE SULFATE 2.5; 2.5; 2.5; 2.5 MG/1; MG/1; MG/1; MG/1
TABLET ORAL
Qty: 60 TABLET | Refills: 0 | Status: SHIPPED | OUTPATIENT
Start: 2021-12-20 | End: 2022-01-19 | Stop reason: DRUGHIGH

## 2021-12-20 NOTE — TELEPHONE ENCOUNTER
ADDERALL      Last Written Prescription Date:  11-24-21  Last Fill Quantity: 60,   # refills: 0  Last Office Visit: 11-24-21 VIRTUAL  Future Office visit:       Routing refill request to provider for review/approval because:  Drug not on the [facility], [facility] or [facility] refill protocol or controlled substance

## 2021-12-28 ENCOUNTER — VIRTUAL VISIT (OUTPATIENT)
Dept: PSYCHIATRY | Facility: OTHER | Age: 31
End: 2021-12-28
Attending: PSYCHIATRY & NEUROLOGY
Payer: COMMERCIAL

## 2021-12-28 DIAGNOSIS — F90.2 ADHD (ATTENTION DEFICIT HYPERACTIVITY DISORDER), COMBINED TYPE: Primary | ICD-10-CM

## 2021-12-28 PROCEDURE — 99214 OFFICE O/P EST MOD 30 MIN: CPT | Mod: TEL | Performed by: PSYCHIATRY & NEUROLOGY

## 2021-12-28 RX ORDER — DEXTROAMPHETAMINE SACCHARATE, AMPHETAMINE ASPARTATE, DEXTROAMPHETAMINE SULFATE AND AMPHETAMINE SULFATE 5; 5; 5; 5 MG/1; MG/1; MG/1; MG/1
20 TABLET ORAL 2 TIMES DAILY
Qty: 60 TABLET | Refills: 0 | Status: SHIPPED | OUTPATIENT
Start: 2021-12-28 | End: 2022-01-24 | Stop reason: DRUGHIGH

## 2021-12-28 ASSESSMENT — ANXIETY QUESTIONNAIRES
1. FEELING NERVOUS, ANXIOUS, OR ON EDGE: SEVERAL DAYS
GAD7 TOTAL SCORE: 10
2. NOT BEING ABLE TO STOP OR CONTROL WORRYING: SEVERAL DAYS
6. BECOMING EASILY ANNOYED OR IRRITABLE: MORE THAN HALF THE DAYS
7. FEELING AFRAID AS IF SOMETHING AWFUL MIGHT HAPPEN: SEVERAL DAYS
5. BEING SO RESTLESS THAT IT IS HARD TO SIT STILL: MORE THAN HALF THE DAYS
3. WORRYING TOO MUCH ABOUT DIFFERENT THINGS: SEVERAL DAYS

## 2021-12-28 ASSESSMENT — PATIENT HEALTH QUESTIONNAIRE - PHQ9
5. POOR APPETITE OR OVEREATING: MORE THAN HALF THE DAYS
SUM OF ALL RESPONSES TO PHQ QUESTIONS 1-9: 15

## 2021-12-28 ASSESSMENT — PAIN SCALES - GENERAL: PAINLEVEL: NO PAIN (0)
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
## 2021-12-28 NOTE — NURSING NOTE
"Chief Complaint   Patient presents with     RECHECK     Telephone visit.  ADHD, anxiety, Bipolar disorder.  Discuss Adderall  and going back to work       Initial There were no vitals taken for this visit. Estimated body mass index is 35.5 kg/m  as calculated from the following:    Height as of 1/25/21: 1.545 m (5' 0.83\").    Weight as of 11/17/21: 84.7 kg (186 lb 12.8 oz).  Medication Reconciliation: complete  LARRY LYN LPN    "

## 2021-12-29 ASSESSMENT — ANXIETY QUESTIONNAIRES: GAD7 TOTAL SCORE: 10

## 2022-01-02 ENCOUNTER — HEALTH MAINTENANCE LETTER (OUTPATIENT)
Age: 32
End: 2022-01-02

## 2022-01-12 ENCOUNTER — TELEPHONE (OUTPATIENT)
Dept: PSYCHIATRY | Facility: OTHER | Age: 32
End: 2022-01-12

## 2022-01-12 NOTE — TELEPHONE ENCOUNTER
Patient is requesting us to fax paperwork to [employer] with a return to work date and an estimate of return to work full duty.  Return to work date is 1-17-22 and estimated return to work full duty is 4-15-22.  Fax # is [phone].  Thank you

## 2022-01-24 ENCOUNTER — VIRTUAL VISIT (OUTPATIENT)
Dept: PSYCHIATRY | Facility: OTHER | Age: 32
End: 2022-01-24
Attending: PSYCHIATRY & NEUROLOGY
Payer: COMMERCIAL

## 2022-01-24 DIAGNOSIS — F41.1 GAD (GENERALIZED ANXIETY DISORDER): ICD-10-CM

## 2022-01-24 DIAGNOSIS — F90.2 ADHD (ATTENTION DEFICIT HYPERACTIVITY DISORDER), COMBINED TYPE: Primary | ICD-10-CM

## 2022-01-24 PROCEDURE — 99213 OFFICE O/P EST LOW 20 MIN: CPT | Mod: 95 | Performed by: PSYCHIATRY & NEUROLOGY

## 2022-01-24 RX ORDER — SERTRALINE HYDROCHLORIDE 100 MG/1
100 TABLET, FILM COATED ORAL DAILY
Qty: 30 TABLET | Refills: 11 | Status: SHIPPED | OUTPATIENT
Start: 2022-01-24 | End: 2022-05-05

## 2022-01-24 RX ORDER — DEXTROAMPHETAMINE SACCHARATE, AMPHETAMINE ASPARTATE, DEXTROAMPHETAMINE SULFATE AND AMPHETAMINE SULFATE 7.5; 7.5; 7.5; 7.5 MG/1; MG/1; MG/1; MG/1
30 TABLET ORAL 2 TIMES DAILY
Qty: 60 TABLET | Refills: 0 | Status: SHIPPED | OUTPATIENT
Start: 2022-01-24 | End: 2022-02-21

## 2022-01-24 ASSESSMENT — ANXIETY QUESTIONNAIRES
2. NOT BEING ABLE TO STOP OR CONTROL WORRYING: MORE THAN HALF THE DAYS
1. FEELING NERVOUS, ANXIOUS, OR ON EDGE: NEARLY EVERY DAY
GAD7 TOTAL SCORE: 14
6. BECOMING EASILY ANNOYED OR IRRITABLE: NEARLY EVERY DAY
7. FEELING AFRAID AS IF SOMETHING AWFUL MIGHT HAPPEN: SEVERAL DAYS
3. WORRYING TOO MUCH ABOUT DIFFERENT THINGS: SEVERAL DAYS
5. BEING SO RESTLESS THAT IT IS HARD TO SIT STILL: SEVERAL DAYS

## 2022-01-24 ASSESSMENT — PATIENT HEALTH QUESTIONNAIRE - PHQ9
5. POOR APPETITE OR OVEREATING: NEARLY EVERY DAY
SUM OF ALL RESPONSES TO PHQ QUESTIONS 1-9: 8

## 2022-01-24 ASSESSMENT — PAIN SCALES - GENERAL: PAINLEVEL: NO PAIN (0)
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
## 2022-01-24 NOTE — NURSING NOTE
"Chief Complaint   Patient presents with     RECHECK     Telephone visit.  ADHD, anxiety.  Medication review.       Initial There were no vitals taken for this visit. Estimated body mass index is 35.5 kg/m  as calculated from the following:    Height as of 1/25/21: 1.545 m (5' 0.83\").    Weight as of 11/17/21: 84.7 kg (186 lb 12.8 oz).  Medication Reconciliation: complete  LARRY LYN LPN    "

## 2022-01-25 ASSESSMENT — ANXIETY QUESTIONNAIRES: GAD7 TOTAL SCORE: 14

## 2022-02-21 ENCOUNTER — VIRTUAL VISIT (OUTPATIENT)
Dept: PSYCHIATRY | Facility: OTHER | Age: 32
End: 2022-02-21
Attending: PSYCHIATRY & NEUROLOGY

## 2022-02-21 DIAGNOSIS — F90.2 ADHD (ATTENTION DEFICIT HYPERACTIVITY DISORDER), COMBINED TYPE: ICD-10-CM

## 2022-02-21 PROCEDURE — 99213 OFFICE O/P EST LOW 20 MIN: CPT | Mod: 95 | Performed by: PSYCHIATRY & NEUROLOGY

## 2022-02-21 RX ORDER — DEXTROAMPHETAMINE SACCHARATE, AMPHETAMINE ASPARTATE, DEXTROAMPHETAMINE SULFATE AND AMPHETAMINE SULFATE 7.5; 7.5; 7.5; 7.5 MG/1; MG/1; MG/1; MG/1
30 TABLET ORAL 2 TIMES DAILY
Qty: 60 TABLET | Refills: 0 | Status: SHIPPED | OUTPATIENT
Start: 2022-02-21 | End: 2022-03-17

## 2022-02-21 ASSESSMENT — ANXIETY QUESTIONNAIRES
GAD7 TOTAL SCORE: 11
2. NOT BEING ABLE TO STOP OR CONTROL WORRYING: MORE THAN HALF THE DAYS
5. BEING SO RESTLESS THAT IT IS HARD TO SIT STILL: MORE THAN HALF THE DAYS
7. FEELING AFRAID AS IF SOMETHING AWFUL MIGHT HAPPEN: SEVERAL DAYS
1. FEELING NERVOUS, ANXIOUS, OR ON EDGE: MORE THAN HALF THE DAYS
6. BECOMING EASILY ANNOYED OR IRRITABLE: NEARLY EVERY DAY
3. WORRYING TOO MUCH ABOUT DIFFERENT THINGS: NOT AT ALL

## 2022-02-21 ASSESSMENT — PAIN SCALES - GENERAL: PAINLEVEL: NO PAIN (0)

## 2022-02-21 ASSESSMENT — PATIENT HEALTH QUESTIONNAIRE - PHQ9
5. POOR APPETITE OR OVEREATING: SEVERAL DAYS
SUM OF ALL RESPONSES TO PHQ QUESTIONS 1-9: 10

## 2022-02-21 NOTE — NURSING NOTE
"Chief Complaint   Patient presents with     RECHECK     Telephone visit.  ADHD, anxiety.       Initial There were no vitals taken for this visit. Estimated body mass index is 35.5 kg/m  as calculated from the following:    Height as of 1/25/21: 1.545 m (5' 0.83\").    Weight as of 11/17/21: 84.7 kg (186 lb 12.8 oz).  Medication Reconciliation: complete  LARRY LYN LPN    "
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
## 2022-02-22 ASSESSMENT — ANXIETY QUESTIONNAIRES: GAD7 TOTAL SCORE: 11

## 2022-02-25 ENCOUNTER — PATIENT OUTREACH (OUTPATIENT)
Dept: CARE COORDINATION | Facility: OTHER | Age: 32
End: 2022-02-25

## 2022-02-25 NOTE — PROGRESS NOTES
Clinic Care Coordination Contact  Care Team Conversations    Received a VM from patient regarding return to work forms. Forms were received but have not been completed yet due to Dr Guerrero being out of the office. Will work on completing forms and call patient back on Monday.      2/28/2022 10:29 AM  Spoke with Dr Guerrero,. Unfortunately this paperwork will need to wait until Dr Guerrero returns. Spoke with patient and updated her on this. Patient would like new return to work date to be 3/14. Will update patient next week when Dr Guerrero is back.       Fifi Carrera, RN-BSN  Behavioral Health Care Coordinator  [phone] option 7

## 2022-03-17 DIAGNOSIS — F90.2 ADHD (ATTENTION DEFICIT HYPERACTIVITY DISORDER), COMBINED TYPE: ICD-10-CM

## 2022-03-17 RX ORDER — DEXTROAMPHETAMINE SACCHARATE, AMPHETAMINE ASPARTATE, DEXTROAMPHETAMINE SULFATE AND AMPHETAMINE SULFATE 7.5; 7.5; 7.5; 7.5 MG/1; MG/1; MG/1; MG/1
30 TABLET ORAL 2 TIMES DAILY
Qty: 60 TABLET | Refills: 0 | Status: SHIPPED | OUTPATIENT
Start: 2022-03-17 | End: 2022-05-05

## 2022-03-17 NOTE — TELEPHONE ENCOUNTER
amphetamine-dextroamphetamine (ADDERALL) 30 MG tablet  Per , last filled 2/21/22 #60  Last office visit 2/21/2022

## 2022-04-04 ENCOUNTER — VIRTUAL VISIT (OUTPATIENT)
Dept: OBGYN | Facility: OTHER | Age: 32
End: 2022-04-04
Attending: OBSTETRICS & GYNECOLOGY

## 2022-04-04 VITALS — WEIGHT: 180 LBS | HEIGHT: 59 IN | BODY MASS INDEX: 36.29 KG/M2

## 2022-04-04 DIAGNOSIS — Z32.01 PREGNANCY TEST POSITIVE: ICD-10-CM

## 2022-04-04 DIAGNOSIS — Z34.90 EARLY STAGE OF PREGNANCY: Primary | ICD-10-CM

## 2022-04-04 PROCEDURE — 99207 PR OB VISIT-NO CHARGE - GICH ONLY: CPT

## 2022-04-04 RX ORDER — PRENATAL VIT/IRON FUM/FOLIC AC 27MG-0.8MG
1 TABLET ORAL DAILY
COMMUNITY
End: 2022-12-06

## 2022-04-04 ASSESSMENT — PATIENT HEALTH QUESTIONNAIRE - PHQ9: SUM OF ALL RESPONSES TO PHQ QUESTIONS 1-9: 3

## 2022-04-04 NOTE — PROGRESS NOTES
Verbal consent obtained for telephone visit. Total length of call: 20 min    HPI:    This is a 32 year old female patient,  who was called today for OB Intake visit. Patient reports positive pregnancy test at home.     Obstetrical history and OB Demographics updated to the best of this nurse's ability based on patient report. PHQ-9 depression screening and routine Domestic Abuse screening completed. All immediate questions and concerns answered. States she is seeing psychiatry for this and her ADHD and will contact them to wean off of her medications as she has done with prior pregnancies.      FOOD SECURITY SCREENING QUESTIONS:    The next two questions are to help us understand your food security.  If you are feeling you need any assistance in this area, we have resources available to support you today.    Hunger Vital Signs:  Within the past 12 months we worried whether our food would run out before we got money to buy more. Never  Within the past 12 months the food we bought just didn't last and we didn't have money to get more. Never    Last menstrual period is reported as No LMP recorded (within days). Patient is pregnant. LE based on LMP is Estimated Date of Delivery: Dec 1, 2022.  Her cycles are regular.  Her last menstrual period was normal.   Since her LMP, she has experienced  nausea and fatigue.       OBSTETRIC HISTORY:    OB History    Para Term  AB Living   5 2 2 0 2 2   SAB IAB Ectopic Multiple Live Births   0 1 0 0 2      # Outcome Date GA Lbr Moiz/2nd Weight Sex Delivery Anes PTL Lv   5 Current            4 Term 21 37w5d  3.7 kg (8 lb 2.5 oz) M CS-LTranv Spinal N TING      Name: Yulisa CABALLERO      Apgar1: 8  Apgar5: 9   3 AB 20 7w3d    SAB      2 Term 01/15/14 41w4d  4.252 kg (9 lb 6 oz) M CS-LTranv TOPICAL, Spinal  TING      Birth Comments: none      Name: Wallace      Apgar1: 6  Apgar5: 8   1 IAB  6w0d             Birth Comments: no comps       Age of  first pregnancy: 22  Previous OB Provider: Daina  Previous Delivering Clinic: [facility]  Release of Records: NA    Current delivery plan: [facility]  Preferred OB Provider: Daina  Current Primary Care Provider: Radha  Pediatrician: Radha    Additional History: Depression    Have you travelled during the pregnancy?No  Have your sexual partner(s) travelled during the pregnancy?No      HISTORY:   Planned Pregnancy: No  Marital Status: Single  Occupation: Call center suprivisor  Living in Household: Spouse and Children    Father of the baby is involved.   Family and father of baby are supportive of current pregnancy.  Past Medical History of Father of Baby:No significant medical history but thinks he may have sickle cell    Past History:  Her past medical history   Past Medical History:   Diagnosis Date     Asthma 5/10/2013     Depressive disorder      Insomnia 5/10/2013     Spondylolisthesis 5/10/2013     Tobacco abuse    .      Her past surgical history:   Past Surgical History:   Procedure Laterality Date      SECTION  1/15/2014    Procedure:  SECTION;;  Surgeon: Leena Mayorga MD;  Location: [facility] OR      SECTION N/A 2021    Procedure:  SECTION;  Surgeon: Sadiq Lama MD;  Location:  OR       She has a history of  prior  section and uncomplicated previous pregnancy and delivery    Since her last LMP she denies use of alcohol, tobacco and street drugs.    Pap smear history: YES - updated in Problem List and Health Maintenance accordingly    STD/STI history: No STD history    STD/STI symptoms: no noticeable symptoms     Past medical, surgical, social and family history were reviewed and updated in EPIC.    Medications reviewed by this nurse. Current medication list:  Current Outpatient Medications   Medication Sig Dispense Refill     albuterol (PROVENTIL HFA) 108 (90 Base) MCG/ACT inhaler Inhale 2 puffs into the lungs        amphetamine-dextroamphetamine (ADDERALL) 30 MG tablet  TAKE 1 TABLET (30 MG) BY MOUTH 2 TIMES DAILY 60 tablet 0     diphenhydrAMINE (BENADRYL) 25 MG tablet Take 50 mg by mouth nightly as needed for itching or allergies        Prenatal Vit-Fe Fumarate-FA (PRENATAL MULTIVITAMIN W/IRON) 27-0.8 MG tablet Take 1 tablet by mouth daily       sertraline (ZOLOFT) 100 MG tablet Take 1 tablet (100 mg) by mouth daily 30 tablet 11     The following medications were recommended to be discontinued due to Pregnancy Category D status: NA  Patient informed to contact her primary care provider as soon as possible to discuss a safer alternative.    Risk factors:  Moderate and moderately severe risks (consult with OB/Gyn)  Previous fetal or  demise: Yes  History of  delivery: No  History of heart disease Class I: No  Severe anemia, unresponsive to iron therapy: No  Pelvic mass or neoplasm: No  Previous : Yes  Hyper/hypothyroidism: No  History of postpartum hemorrhage requiring transfusion:No  History of Placenta Accreta: No    High Risk (Pregnancy managed by OB/Gyn)  Multiple pregnancy: No  Pre-gestational diabetes: No  Chronic Hypertension: No  Renal Failure: No  Heart disease, class II or greater: No  Rh Isoimmunization: No  Chronic active hepatitis: No  Convulsive disorder, poorly controlled: No  Isoimmune thrombocytopenia: No  Pre-term premature rupture of membranes: No  Lupus or other autoimmune disorder: No  Human Immunodeficiency Virus: No      ASSESSMENT/PLAN:     No diagnosis found.    32 year old , 5w4d of pregnancy with LE of 2022, Alternate LE Entry    Per standing orders and scope of practice of this nurse, patient will have the following orders placed and completed prior to initial OB visit with the appropriate provider:    --early ultrasound for dating and viability ordered for 6+ weeks gestation based on LMP    --Quantitative Beta HCG and progesterone monitoring if indicated    Counseling given:     - Recommended weight gain for  pregnancy: < 15 lbs.   BMI < 18.5  28-40 lbs   18.5 - 24.9 25-35   25 - 29.9 15-25   > 30  < 15       PLAN/PATIENT INSTRUCTIONS:    Normal exercise.  Normal sexual activity.  Prenatal vitamins.  Anticipated weight gain.    follow-up appointment with Dr. Lama for pre- care and take multivitamin or pre-jorge vitamins    Candace Trimble RN.................................................. 2022 8:36 AM

## 2022-04-15 ENCOUNTER — TELEPHONE (OUTPATIENT)
Dept: FAMILY MEDICINE | Facility: OTHER | Age: 32
End: 2022-04-15
Payer: COMMERCIAL

## 2022-04-15 ENCOUNTER — VIRTUAL VISIT (OUTPATIENT)
Dept: PSYCHIATRY | Facility: OTHER | Age: 32
End: 2022-04-15
Attending: PSYCHIATRY & NEUROLOGY
Payer: COMMERCIAL

## 2022-04-15 DIAGNOSIS — F90.2 ADHD (ATTENTION DEFICIT HYPERACTIVITY DISORDER), COMBINED TYPE: Primary | ICD-10-CM

## 2022-04-15 PROCEDURE — 99213 OFFICE O/P EST LOW 20 MIN: CPT | Mod: 95 | Performed by: PSYCHIATRY & NEUROLOGY

## 2022-04-15 RX ORDER — DEXTROAMPHETAMINE SACCHARATE, AMPHETAMINE ASPARTATE, DEXTROAMPHETAMINE SULFATE AND AMPHETAMINE SULFATE 2.5; 2.5; 2.5; 2.5 MG/1; MG/1; MG/1; MG/1
TABLET ORAL
Qty: 24 TABLET | Refills: 0 | Status: SHIPPED | OUTPATIENT
Start: 2022-04-15 | End: 2022-05-05

## 2022-04-15 ASSESSMENT — PAIN SCALES - GENERAL: PAINLEVEL: NO PAIN (0)
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
## 2022-04-15 NOTE — NURSING NOTE
"Chief Complaint   Patient presents with     RECHECK     Telephone visit.  ADHD, anxiety, bipolar disorder.  Medication review.       Initial LMP 02/24/2022 (Exact Date)  Estimated body mass index is 36.36 kg/m  as calculated from the following:    Height as of 4/4/22: 1.499 m (4' 11\").    Weight as of 4/4/22: 81.6 kg (180 lb).  Medication Reconciliation: complete  LARRY LYN LPN    "

## 2022-05-05 ENCOUNTER — PRENATAL OFFICE VISIT (OUTPATIENT)
Dept: OBGYN | Facility: OTHER | Age: 32
End: 2022-05-05
Attending: OBSTETRICS & GYNECOLOGY
Payer: COMMERCIAL

## 2022-05-05 VITALS
HEART RATE: 120 BPM | BODY MASS INDEX: 37.37 KG/M2 | SYSTOLIC BLOOD PRESSURE: 130 MMHG | WEIGHT: 185 LBS | DIASTOLIC BLOOD PRESSURE: 74 MMHG

## 2022-05-05 DIAGNOSIS — Z34.90 EARLY STAGE OF PREGNANCY: Primary | ICD-10-CM

## 2022-05-05 LAB
ABO/RH(D): NORMAL
ALBUMIN UR-MCNC: 20 MG/DL
ANTIBODY SCREEN: NEGATIVE
APPEARANCE UR: CLEAR
BASOPHILS # BLD AUTO: 0 10E3/UL (ref 0–0.2)
BASOPHILS NFR BLD AUTO: 0 %
BILIRUB UR QL STRIP: NEGATIVE
C TRACH DNA SPEC QL PROBE+SIG AMP: NEGATIVE
COLOR UR AUTO: YELLOW
EOSINOPHIL # BLD AUTO: 0.4 10E3/UL (ref 0–0.7)
EOSINOPHIL NFR BLD AUTO: 4 %
ERYTHROCYTE [DISTWIDTH] IN BLOOD BY AUTOMATED COUNT: 13.6 % (ref 10–15)
GLUCOSE UR STRIP-MCNC: NEGATIVE MG/DL
HCT VFR BLD AUTO: 35.5 % (ref 35–47)
HGB BLD-MCNC: 11.6 G/DL (ref 11.7–15.7)
HGB UR QL STRIP: NEGATIVE
HYALINE CASTS: 1 /LPF
IMM GRANULOCYTES # BLD: 0.1 10E3/UL
IMM GRANULOCYTES NFR BLD: 1 %
KETONES UR STRIP-MCNC: ABNORMAL MG/DL
LEUKOCYTE ESTERASE UR QL STRIP: NEGATIVE
LYMPHOCYTES # BLD AUTO: 2.5 10E3/UL (ref 0.8–5.3)
LYMPHOCYTES NFR BLD AUTO: 25 %
MCH RBC QN AUTO: 26.7 PG (ref 26.5–33)
MCHC RBC AUTO-ENTMCNC: 32.7 G/DL (ref 31.5–36.5)
MCV RBC AUTO: 82 FL (ref 78–100)
MONOCYTES # BLD AUTO: 0.5 10E3/UL (ref 0–1.3)
MONOCYTES NFR BLD AUTO: 5 %
MUCOUS THREADS #/AREA URNS LPF: PRESENT /LPF
N GONORRHOEA DNA SPEC QL NAA+PROBE: NEGATIVE
NEUTROPHILS # BLD AUTO: 6.6 10E3/UL (ref 1.6–8.3)
NEUTROPHILS NFR BLD AUTO: 65 %
NITRATE UR QL: NEGATIVE
NRBC # BLD AUTO: 0 10E3/UL
NRBC BLD AUTO-RTO: 0 /100
PH UR STRIP: 6 [PH] (ref 5–9)
PLATELET # BLD AUTO: 306 10E3/UL (ref 150–450)
RBC # BLD AUTO: 4.34 10E6/UL (ref 3.8–5.2)
RBC URINE: <1 /HPF
SP GR UR STRIP: 1.03 (ref 1–1.03)
SPECIMEN EXPIRATION DATE: NORMAL
UROBILINOGEN UR STRIP-MCNC: NORMAL MG/DL
WBC # BLD AUTO: 10 10E3/UL (ref 4–11)
WBC URINE: <1 /HPF

## 2022-05-05 PROCEDURE — 99207 PR OB VISIT-NO CHARGE - GICH ONLY: CPT | Performed by: OBSTETRICS & GYNECOLOGY

## 2022-05-05 PROCEDURE — 87591 N.GONORRHOEAE DNA AMP PROB: CPT | Mod: ZL | Performed by: OBSTETRICS & GYNECOLOGY

## 2022-05-05 PROCEDURE — 87086 URINE CULTURE/COLONY COUNT: CPT | Mod: ZL | Performed by: OBSTETRICS & GYNECOLOGY

## 2022-05-05 PROCEDURE — 86762 RUBELLA ANTIBODY: CPT | Mod: ZL | Performed by: OBSTETRICS & GYNECOLOGY

## 2022-05-05 PROCEDURE — 81001 URINALYSIS AUTO W/SCOPE: CPT | Mod: ZL | Performed by: OBSTETRICS & GYNECOLOGY

## 2022-05-05 PROCEDURE — 76815 OB US LIMITED FETUS(S): CPT | Performed by: OBSTETRICS & GYNECOLOGY

## 2022-05-05 PROCEDURE — 85025 COMPLETE CBC W/AUTO DIFF WBC: CPT | Mod: ZL | Performed by: OBSTETRICS & GYNECOLOGY

## 2022-05-05 PROCEDURE — 87389 HIV-1 AG W/HIV-1&-2 AB AG IA: CPT | Mod: ZL | Performed by: OBSTETRICS & GYNECOLOGY

## 2022-05-05 PROCEDURE — 86803 HEPATITIS C AB TEST: CPT | Mod: ZL | Performed by: OBSTETRICS & GYNECOLOGY

## 2022-05-05 PROCEDURE — 86780 TREPONEMA PALLIDUM: CPT | Mod: ZL | Performed by: OBSTETRICS & GYNECOLOGY

## 2022-05-05 PROCEDURE — 36415 COLL VENOUS BLD VENIPUNCTURE: CPT | Mod: ZL | Performed by: OBSTETRICS & GYNECOLOGY

## 2022-05-05 PROCEDURE — 86901 BLOOD TYPING SEROLOGIC RH(D): CPT | Mod: ZL | Performed by: OBSTETRICS & GYNECOLOGY

## 2022-05-05 PROCEDURE — 87340 HEPATITIS B SURFACE AG IA: CPT | Mod: ZL | Performed by: OBSTETRICS & GYNECOLOGY

## 2022-05-05 PROCEDURE — 86850 RBC ANTIBODY SCREEN: CPT | Mod: ZL | Performed by: OBSTETRICS & GYNECOLOGY

## 2022-05-05 ASSESSMENT — PAIN SCALES - GENERAL: PAINLEVEL: NO PAIN (0)

## 2022-05-05 NOTE — PROGRESS NOTES
CC: New OB visit  HPI:  Nyasia Raya is  at 10w0d based on Patient's last menstrual period was 2022 (exact date)./first trimester US corroborates.  She notes issues of nausea and fatigue    OB History    Para Term  AB Living   5 2 2 0 2 2   SAB IAB Ectopic Multiple Live Births   0 1 0 0 2      # Outcome Date GA Lbr Moiz/2nd Weight Sex Delivery Anes PTL Lv   5 Current            4 Term 21 37w5d  3.7 kg (8 lb 2.5 oz) M CS-LTranv Spinal N TING      Name: Yulisa RAYA      Apgar1: 8  Apgar5: 9   3 AB 20 7w3d    SAB      2 Term 01/15/14 41w4d  4.252 kg (9 lb 6 oz) M CS-LTranv TOPICAL, Spinal  TING      Birth Comments: none      Name: Wallace      Apgar1: 6  Apgar5: 8   1 IAB  6w0d             Birth Comments: no comps     STI: (denies HSV, Hep C, Hep B, HIV, Syphilis, Chlamydia, Gonorrhea)  Last pap smear:   Lab Results   Component Value Date    PAP NIL 2019    PAP NIL 2018    PAP NIL 2015       Past Medical History:   Diagnosis Date     Asthma 5/10/2013     Insomnia 5/10/2013     Spondylolisthesis 5/10/2013     Tobacco abuse       has a past surgical history that includes  section (1/15/2014) and  section (N/A, 2021).    Social History     Tobacco Use     Smoking status: Current Every Day Smoker     Packs/day: 0.08     Last attempt to quit: 2020     Years since quittin.3     Smokeless tobacco: Never Used     Tobacco comment: E-cig   Vaping Use     Vaping Use: Some days     Last attempt to quit: 2021     Substances: Nicotine     Devices: Refillable tank   Substance Use Topics     Alcohol use: No     Comment: signed up for quit plan and her workplace has programs     Drug use: No     Family History   Problem Relation Age of Onset     Diabetes Mother      Diabetes Father      Heart Disease Father 47        MI         Current Outpatient Medications   Medication     Prenatal Vit-Fe Fumarate-FA (PRENATAL MULTIVITAMIN  W/IRON) 27-0.8 MG tablet     sertraline (ZOLOFT) 100 MG tablet     No current facility-administered medications for this visit.     No Known Allergies  Immunization History   Administered Date(s) Administered     DTAP (<7y) 1992, 09/06/1994, 09/13/1995, 06/24/1996     HPV 03/01/2007, 05/10/2007, 09/07/2007     HepA, Unspecified 1992     HepB 06/14/2004, 08/03/2004, 02/03/2005     Hib (PRP-T) 1992, 06/24/1996     Influenza (IIV3) PF 11/10/2006, 10/24/2008, 09/24/2013     Influenza Vaccine IM > 6 months Valent IIV4 (Alfuria,Fluzone) 01/25/2021     MMR 09/06/1994, 08/27/2004, 08/27/2008, 08/31/2021     Pneumococcal 23 valent 07/08/2013     Poliovirus, inactivated (IPV) 1992, 09/06/1994, 09/13/1995, 06/24/1996     TD (ADULT, 7+) 08/27/2004     TDAP Vaccine (Boostrix) 04/18/2013, 10/15/2013     Tdap (Adacel,Boostrix) 06/14/2021       REVIEW OF SYSTEMS  General: negative  Skin: negative  Resp: No shortness of breath, dyspnea on exertion, cough, or hemoptysis  CV: negative  GI: negative  : negative    EXAM: /74   Pulse 120   Wt 83.9 kg (185 lb)   LMP 02/24/2022 (Exact Date)   BMI 37.37 kg/m    Gen: NAD  CV: RRR with normal S1, S2, no GRM  Resp: CTA Bilaterally  Neck: supple without thyromegaly mass or noduels  Extremities: No TTP, no deformity  Neuro: CN II-XII intact grossly, moves all extremities  Psych: normal affect and mentation.    Recent Results (from the past 24 hour(s))   US OB < 14 Weeks ( OB/GYN ONLY)    Narrative    Early OB US    Indication: dates and viability    The [employer] Affiniti 50 W Ultrasound machine was used with the C6-2 probe.  With use of realtime 2-D and still images a viable gamble intrauterine gestation is identified.    CRL: 3.64 cm at 10 weeks 4 days with EDC by US of 11/27/22    Fetal cardiac activity: 150's bpm by use of pw-mode doppler    Right ovary: CL cyst  Left ovary: normal  Uterus: anteverted and normal shape and size    Cul-de-sac: Normal  without free fluid  Cervix: Normal without evidence of funneling    I/P: Viable gamble intrauterine pregnancy at 10 weeks 4 days with US EDC of 22 which is concordant with menstrual date of 22.       I/P  (Z34.90) Early stage of pregnancy  (primary encounter diagnosis)  Comment:   Plan: US OB < 14 Weeks ( OB/GYN ONLY), C US OB 1ST         TRIMESTER MAT/FETAL, SINGLE GESTATION - GICH              Discussed safety, nutrition, screening for cystic fibrosis, spina bifida, spinal muscular atrophy, quad screen, cffDNA screening as appropriate.  F/U scheduled, discussed call schedule rotation.  Return visit in 1 month     Pregnancy risk factors include:   x 2  EDC 22  Repeat  with tubal    Sadiq PONCHO Lama MD FACOG  2:04 PM 2022

## 2022-05-05 NOTE — NURSING NOTE
"Chief Complaint   Patient presents with     Prenatal Care     10 weeks        Initial /74   Pulse 120   Wt 83.9 kg (185 lb)   LMP 02/24/2022 (Exact Date)   BMI 37.37 kg/m   Estimated body mass index is 37.37 kg/m  as calculated from the following:    Height as of 4/4/22: 1.499 m (4' 11\").    Weight as of this encounter: 83.9 kg (185 lb).  Medication Reconciliation: complete    FOOD SECURITY SCREENING QUESTIONS  Hunger Vital Signs:  Within the past 12 months we worried whether our food would run out before we got money to buy more. Never  Within the past 12 months the food we bought just didn't last and we didn't have money to get more. Never  Idalia Turk LPN 5/5/2022 1:36 PM             Idalia Turk LPN  "

## 2022-05-06 LAB
HBV SURFACE AG SERPL QL IA: NONREACTIVE
HCV AB SERPL QL IA: NONREACTIVE
HIV 1+2 AB+HIV1 P24 AG SERPL QL IA: NONREACTIVE
RUBV IGG SERPL QL IA: 3.72 INDEX
RUBV IGG SERPL QL IA: POSITIVE
T PALLIDUM AB SER QL: NONREACTIVE

## 2022-05-07 LAB — BACTERIA UR CULT: NO GROWTH

## 2022-05-13 ENCOUNTER — TELEPHONE (OUTPATIENT)
Dept: OBGYN | Facility: OTHER | Age: 32
End: 2022-05-13
Payer: COMMERCIAL

## 2022-05-13 LAB — SCANNED LAB RESULT: NORMAL

## 2022-06-22 ENCOUNTER — TELEPHONE (OUTPATIENT)
Dept: OBGYN | Facility: OTHER | Age: 32
End: 2022-06-22

## 2022-06-22 ENCOUNTER — PRENATAL OFFICE VISIT (OUTPATIENT)
Dept: OBGYN | Facility: OTHER | Age: 32
End: 2022-06-22
Attending: OBSTETRICS & GYNECOLOGY
Payer: COMMERCIAL

## 2022-06-22 VITALS — DIASTOLIC BLOOD PRESSURE: 74 MMHG | WEIGHT: 181.2 LBS | SYSTOLIC BLOOD PRESSURE: 122 MMHG | BODY MASS INDEX: 36.6 KG/M2

## 2022-06-22 DIAGNOSIS — Z34.82 ENCOUNTER FOR SUPERVISION OF OTHER NORMAL PREGNANCY, SECOND TRIMESTER: ICD-10-CM

## 2022-06-22 DIAGNOSIS — O26.859 SPOTTING IN PREGNANCY: Primary | ICD-10-CM

## 2022-06-22 PROCEDURE — 99207 PR OB VISIT-NO CHARGE - GICH ONLY: CPT | Performed by: OBSTETRICS & GYNECOLOGY

## 2022-06-22 RX ORDER — ALBUTEROL SULFATE 90 UG/1
2 AEROSOL, METERED RESPIRATORY (INHALATION) EVERY 6 HOURS
COMMUNITY

## 2022-06-22 NOTE — NURSING NOTE
Patient has and worsening cramping and light spotting this week. She is here for confirmation of viability.    Rose Marcelino RN...................6/22/2022 2:18 PM

## 2022-06-22 NOTE — TELEPHONE ENCOUNTER
Patient called and stated she missed her appointment scheduled for 6/2 with Dr. Lama. She said she is having cramping and light spotting and has not felt the baby move in a while, she is around 20 weeks. She scheduled an appointment for 7/5 but would like a call back from a nurse to see if she can be worked in sooner.    Norma Mayberry on 6/22/2022 at 10:10 AM

## 2022-06-22 NOTE — PROGRESS NOTES
Return OB Visit    S: Patient presents today with concern for cramping and spotting. She has been having intermittent cramping this whole pregnancy but worse in the past week. 2 days ago, she started having spotting that lasted into the next day, now resolved. No recent intercourse. No FM yet.    O: /74 (BP Location: Right arm, Patient Position: Sitting, Cuff Size: Adult Regular)   Wt 82.2 kg (181 lb 3.2 oz)   LMP 2022 (Exact Date)   Breastfeeding No   BMI 36.60 kg/m    Gen: Well-appearing, NAD  See OB Flowsheet    A/P:  Nyasia Raya is a 32 year old  at 16w6d by LMP c/w 10w4d US, here for spotting, cramping  Spotting: now resolved. reassuring doptones. Rh positive.     RTC 2 weeks with SPENCER, anatomy survey ord'd    Adina Trimble MD FACOG  OB/GYN  2022 2:16 PM

## 2022-06-22 NOTE — TELEPHONE ENCOUNTER
Patient has had cramping, worse this week. Also had some spotting Monday and Tuesday this week. No strenuous activity or intercourse prior.    Has been doing some yard work lately, but nothing extreme. Patient is seeking reassurance d/t history of pregnancy loss and would like an appointment today. She can come in at 2 pm and is agreeable to seeing Dr. Adina Trimble MD.    Rose Marcelino RN...................6/22/2022 11:10 AM

## 2022-07-05 ENCOUNTER — PRENATAL OFFICE VISIT (OUTPATIENT)
Dept: OBGYN | Facility: OTHER | Age: 32
End: 2022-07-05
Attending: OBSTETRICS & GYNECOLOGY
Payer: COMMERCIAL

## 2022-07-05 VITALS
WEIGHT: 181 LBS | BODY MASS INDEX: 36.56 KG/M2 | DIASTOLIC BLOOD PRESSURE: 72 MMHG | HEART RATE: 98 BPM | SYSTOLIC BLOOD PRESSURE: 124 MMHG

## 2022-07-05 DIAGNOSIS — Z34.90 NORMAL PREGNANCY, ANTEPARTUM: Primary | ICD-10-CM

## 2022-07-05 PROCEDURE — 99207 PR OB VISIT-NO CHARGE - GICH ONLY: CPT | Performed by: OBSTETRICS & GYNECOLOGY

## 2022-07-05 ASSESSMENT — PAIN SCALES - GENERAL: PAINLEVEL: SEVERE PAIN (7)

## 2022-07-05 NOTE — PROGRESS NOTES
CC: Recheck OB visit at 18w5d    HPI: Nyasia Raya presents for a routine OB visit now at 18w5d  Concerns:  Some RLQ pain with activity.  Patient notices normal fetal movement, denies contractions, vaginal bleeding or leaking of fluid.    OB History    Para Term  AB Living   5 2 2 0 2 2   SAB IAB Ectopic Multiple Live Births   0 1 0 0 2      # Outcome Date GA Lbr Moiz/2nd Weight Sex Delivery Anes PTL Lv   5 Current            4 Term 21 37w5d  3.7 kg (8 lb 2.5 oz) M CS-LTranv Spinal N TING      Name: Yulisa RAYA      Apgar1: 8  Apgar5: 9   3 AB 20 7w3d    SAB      2 Term 01/15/14 41w4d  4.252 kg (9 lb 6 oz) M CS-LTranv TOPICAL, Spinal  TING      Birth Comments: none      Name: Wallace      Apgar1: 6  Apgar5: 8   1 IAB  6w0d             Birth Comments: no comps     Current Outpatient Medications   Medication     albuterol (PROAIR HFA/PROVENTIL HFA/VENTOLIN HFA) 108 (90 Base) MCG/ACT inhaler     Prenatal Vit-Fe Fumarate-FA (PRENATAL MULTIVITAMIN W/IRON) 27-0.8 MG tablet     No current facility-administered medications for this visit.         O: /72   Pulse 98   Wt 82.1 kg (181 lb)   LMP 2022 (Exact Date)   BMI 36.56 kg/m    Body mass index is 36.56 kg/m .  See OB flow sheet  EXAM:  NAD    FHT:145 bpm    No results found for any visits on 22.    A/P: 18w5d gestation    Recheck in 4 weeks  GCT next month    Problem List:     Pregnancy risk factors include:   x 2  EDC 22  Repeat    Nexplanon pp    Sadiq Lama MD FACOG  8:20 AM 2022

## 2022-07-05 NOTE — NURSING NOTE
"Chief Complaint   Patient presents with     Prenatal Care     18 weeks 5 days     Pt presents to clinic today for prenatal care 18 weeks 5 day. Patient c/o leg cramps and right sided lower abdomen pain .   Initial /72   Pulse 98   Wt 82.1 kg (181 lb)   LMP 02/24/2022 (Exact Date)   BMI 36.56 kg/m   Estimated body mass index is 36.56 kg/m  as calculated from the following:    Height as of 4/4/22: 1.499 m (4' 11\").    Weight as of this encounter: 82.1 kg (181 lb).  Medication Reconciliation: complete        Idalia Turk LPN  "

## 2022-07-06 ENCOUNTER — HOSPITAL ENCOUNTER (OUTPATIENT)
Dept: ULTRASOUND IMAGING | Facility: OTHER | Age: 32
Discharge: HOME | End: 2022-07-06
Attending: OBSTETRICS & GYNECOLOGY | Admitting: OBSTETRICS & GYNECOLOGY
Payer: COMMERCIAL

## 2022-07-06 DIAGNOSIS — Z34.82 ENCOUNTER FOR SUPERVISION OF OTHER NORMAL PREGNANCY, SECOND TRIMESTER: ICD-10-CM

## 2022-07-06 PROCEDURE — 76805 OB US >/= 14 WKS SNGL FETUS: CPT

## 2022-08-12 ENCOUNTER — TELEPHONE (OUTPATIENT)
Dept: FAMILY MEDICINE | Facility: OTHER | Age: 32
End: 2022-08-12

## 2022-08-12 ENCOUNTER — PRENATAL OFFICE VISIT (OUTPATIENT)
Dept: OBGYN | Facility: OTHER | Age: 32
End: 2022-08-12
Attending: OBSTETRICS & GYNECOLOGY
Payer: COMMERCIAL

## 2022-08-12 VITALS
SYSTOLIC BLOOD PRESSURE: 112 MMHG | BODY MASS INDEX: 37.57 KG/M2 | HEART RATE: 100 BPM | DIASTOLIC BLOOD PRESSURE: 64 MMHG | WEIGHT: 186 LBS

## 2022-08-12 DIAGNOSIS — O99.012 ANEMIA DURING PREGNANCY IN SECOND TRIMESTER: ICD-10-CM

## 2022-08-12 DIAGNOSIS — Z34.90 NORMAL PREGNANCY, ANTEPARTUM: Primary | ICD-10-CM

## 2022-08-12 LAB
ERYTHROCYTE [DISTWIDTH] IN BLOOD BY AUTOMATED COUNT: 15 % (ref 10–15)
GLUCOSE 1H P 50 G GLC PO SERPL-MCNC: 133 MG/DL (ref 70–129)
HCT VFR BLD AUTO: 29.9 % (ref 35–47)
HGB BLD-MCNC: 9.9 G/DL (ref 11.7–15.7)
MCH RBC QN AUTO: 28.4 PG (ref 26.5–33)
MCHC RBC AUTO-ENTMCNC: 33.1 G/DL (ref 31.5–36.5)
MCV RBC AUTO: 86 FL (ref 78–100)
PLATELET # BLD AUTO: 298 10E3/UL (ref 150–450)
RBC # BLD AUTO: 3.49 10E6/UL (ref 3.8–5.2)
WBC # BLD AUTO: 11.5 10E3/UL (ref 4–11)

## 2022-08-12 PROCEDURE — 86780 TREPONEMA PALLIDUM: CPT | Mod: ZL | Performed by: OBSTETRICS & GYNECOLOGY

## 2022-08-12 PROCEDURE — 36415 COLL VENOUS BLD VENIPUNCTURE: CPT | Mod: ZL | Performed by: OBSTETRICS & GYNECOLOGY

## 2022-08-12 PROCEDURE — 99207 PR OB VISIT-NO CHARGE - GICH ONLY: CPT | Performed by: OBSTETRICS & GYNECOLOGY

## 2022-08-12 PROCEDURE — 82950 GLUCOSE TEST: CPT | Mod: ZL | Performed by: OBSTETRICS & GYNECOLOGY

## 2022-08-12 PROCEDURE — 85014 HEMATOCRIT: CPT | Mod: ZL | Performed by: OBSTETRICS & GYNECOLOGY

## 2022-08-12 RX ORDER — FERROUS SULFATE 325(65) MG
325 TABLET ORAL
Qty: 90 TABLET | Refills: 1 | Status: SHIPPED | OUTPATIENT
Start: 2022-08-12 | End: 2022-12-06

## 2022-08-12 NOTE — TELEPHONE ENCOUNTER
Patient was called and left a message of results and prescription details. Was told to call back with any questions.   Helen Colon RN on 8/12/2022 at 1:19 PM

## 2022-08-12 NOTE — NURSING NOTE
Chief Complaint   Patient presents with     Prenatal Care     24w1d       Medication Reconciliation: complete   Offers no complaints      Latasha Rivera LPN........................8/12/2022  10:58 AM

## 2022-08-12 NOTE — PROGRESS NOTES
Return OB Visit    S: Patient is feeling well. No ctx, VB or LOF. +FM. Starting to get carpal tunnel symptoms in her right wrist, also had in previous pregnancies. Would like a brace.    O: /64 (BP Location: Right arm, Patient Position: Sitting, Cuff Size: Adult Large)   Pulse 100   Wt 84.4 kg (186 lb)   LMP 2022 (Exact Date)   Breastfeeding No   BMI 37.57 kg/m    Gen: Well-appearing, NAD  See OB Flowsheet    A/P:  Nyasia Raya is a 32 year old  at 24w1d by LMP c/w 10w4d US, here for return OB visit.  Hx of  section x2, plans repeat  Anemia: Rx sent for iron  Plans Nexplanon PP  Carpal tunnel: brace provided    PNC: Rh positive, Rubella immune,   Genetics: low risk aneuploidy screening  Imaging: dating US at 10w4d, normal anatomy survey  Immunizations: none  RTC 4 weeks with DAB    Adina Trimble MD FACOG  OB/GYN  2022 11:05 AM

## 2022-08-12 NOTE — TELEPHONE ENCOUNTER
Reason for call: Request for results.    Name of test or procedure: Gabrieleose    Date of test or procedure: 08/12/2022    Location of test or procedure: [facility]    Preferred method for responding to this message: Telephone Call    Phone number patient can be reached at: Cell number on file:    Telephone Information:   Mobile [phone]   Mobile Not on file.       If we can't reach you directly, may we leave a detailed response at the number you provided?Yes     Patient can't and didn't want to try my chart-would like a call on her results-Ok to leave detailed #'s of test if unable to answer

## 2022-08-13 LAB — T PALLIDUM AB SER QL: NONREACTIVE

## 2022-09-08 ENCOUNTER — PRENATAL OFFICE VISIT (OUTPATIENT)
Dept: OBGYN | Facility: OTHER | Age: 32
End: 2022-09-08
Attending: OBSTETRICS & GYNECOLOGY
Payer: COMMERCIAL

## 2022-09-08 VITALS
SYSTOLIC BLOOD PRESSURE: 120 MMHG | DIASTOLIC BLOOD PRESSURE: 74 MMHG | HEART RATE: 109 BPM | BODY MASS INDEX: 38.17 KG/M2 | WEIGHT: 189 LBS

## 2022-09-08 DIAGNOSIS — U07.1 INFECTION DUE TO 2019 NOVEL CORONAVIRUS: ICD-10-CM

## 2022-09-08 DIAGNOSIS — Z34.90 NORMAL PREGNANCY, ANTEPARTUM: Primary | ICD-10-CM

## 2022-09-08 PROCEDURE — 99207 PR OB VISIT-NO CHARGE - GICH ONLY: CPT | Performed by: OBSTETRICS & GYNECOLOGY

## 2022-09-08 PROCEDURE — 90715 TDAP VACCINE 7 YRS/> IM: CPT | Performed by: OBSTETRICS & GYNECOLOGY

## 2022-09-08 PROCEDURE — 90471 IMMUNIZATION ADMIN: CPT | Performed by: OBSTETRICS & GYNECOLOGY

## 2022-09-08 ASSESSMENT — PAIN SCALES - GENERAL: PAINLEVEL: NO PAIN (0)

## 2022-09-08 NOTE — NURSING NOTE
"Chief Complaint   Patient presents with     Prenatal Care     28 weeks     Patient presents for prenatal check up with no concerns.   Initial /74   Pulse 109   Wt 85.7 kg (189 lb)   LMP 02/24/2022 (Exact Date)   BMI 38.17 kg/m   Estimated body mass index is 38.17 kg/m  as calculated from the following:    Height as of 4/4/22: 1.499 m (4' 11\").    Weight as of this encounter: 85.7 kg (189 lb).  Medication Reconciliation: complete          Idalia Turk LPN  "

## 2022-09-08 NOTE — PROGRESS NOTES
CC: Recheck OB visit at 28w0d    HPI: Nyasia Raya presents for a routine OB visit now at 28w0d  Concerns:   Patient notices normal fetal movement, denies contractions, vaginal bleeding or leaking of fluid.    OB History    Para Term  AB Living   5 2 2 0 2 2   SAB IAB Ectopic Multiple Live Births   0 1 0 0 2      # Outcome Date GA Lbr Moiz/2nd Weight Sex Delivery Anes PTL Lv   5 Current            4 Term 21 37w5d  3.7 kg (8 lb 2.5 oz) M CS-LTranv Spinal N TING      Name: Yulisa RAYA      Apgar1: 8  Apgar5: 9   3 AB 20 7w3d    SAB      2 Term 01/15/14 41w4d  4.252 kg (9 lb 6 oz) M CS-LTranv TOPICAL, Spinal  TING      Birth Comments: none      Name: Wallace      Apgar1: 6  Apgar5: 8   1 IAB  6w0d             Birth Comments: no comps     Current Outpatient Medications   Medication     albuterol (PROAIR HFA/PROVENTIL HFA/VENTOLIN HFA) 108 (90 Base) MCG/ACT inhaler     ferrous sulfate (FEROSUL) 325 (65 Fe) MG tablet     Prenatal Vit-Fe Fumarate-FA (PRENATAL MULTIVITAMIN W/IRON) 27-0.8 MG tablet     No current facility-administered medications for this visit.       O: /74   Pulse 109   Wt 85.7 kg (189 lb)   LMP 2022 (Exact Date)   BMI 38.17 kg/m    Body mass index is 38.17 kg/m .  See OB flow sheet  EXAM:  NAD  FH:30 cm  FHT: 155 bpm    No results found for any visits on 22.    A/P: (Z34.90) Normal pregnancy, antepartum  (primary encounter diagnosis)  Comment:   Plan: TDAP VACCINE (Adacel, Boostrix)  [[id number]]          Recheck in 4 weeks  Schedule  at next visit.    Problem List:     Pregnancy risk factors include:   x 2  EDC 22  Repeat    Nexplanon pp    Sadiq Lama MD FACOG  2:48 PM 2022

## 2022-10-06 ENCOUNTER — OFFICE VISIT (OUTPATIENT)
Dept: FAMILY MEDICINE | Facility: OTHER | Age: 32
End: 2022-10-06

## 2022-10-06 ENCOUNTER — HOSPITAL ENCOUNTER (OUTPATIENT)
Dept: ULTRASOUND IMAGING | Facility: OTHER | Age: 32
Discharge: HOME | End: 2022-10-06
Attending: OBSTETRICS & GYNECOLOGY
Payer: COMMERCIAL

## 2022-10-06 ENCOUNTER — PRENATAL OFFICE VISIT (OUTPATIENT)
Dept: OBGYN | Facility: OTHER | Age: 32
End: 2022-10-06
Attending: OBSTETRICS & GYNECOLOGY
Payer: COMMERCIAL

## 2022-10-06 VITALS
SYSTOLIC BLOOD PRESSURE: 120 MMHG | BODY MASS INDEX: 38.58 KG/M2 | WEIGHT: 191 LBS | DIASTOLIC BLOOD PRESSURE: 80 MMHG | OXYGEN SATURATION: 98 % | HEART RATE: 106 BPM

## 2022-10-06 DIAGNOSIS — Z34.90 NORMAL PREGNANCY, ANTEPARTUM: Primary | ICD-10-CM

## 2022-10-06 DIAGNOSIS — U07.1 INFECTION DUE TO 2019 NOVEL CORONAVIRUS: ICD-10-CM

## 2022-10-06 DIAGNOSIS — Z98.891 HISTORY OF C-SECTION: Primary | ICD-10-CM

## 2022-10-06 PROCEDURE — 76816 OB US FOLLOW-UP PER FETUS: CPT

## 2022-10-06 PROCEDURE — 99207 PR OB VISIT-NO CHARGE - GICH ONLY: CPT | Performed by: OBSTETRICS & GYNECOLOGY

## 2022-10-06 RX ORDER — MISOPROSTOL 100 UG/1
400 TABLET ORAL
Status: CANCELLED | OUTPATIENT
Start: 2022-10-06

## 2022-10-06 RX ORDER — OXYTOCIN/0.9 % SODIUM CHLORIDE 30/500 ML
100-340 PLASTIC BAG, INJECTION (ML) INTRAVENOUS CONTINUOUS PRN
Status: CANCELLED | OUTPATIENT
Start: 2022-10-06

## 2022-10-06 RX ORDER — METHYLERGONOVINE MALEATE 0.2 MG/ML
200 INJECTION INTRAVENOUS
Status: CANCELLED | OUTPATIENT
Start: 2022-10-06

## 2022-10-06 RX ORDER — CARBOPROST TROMETHAMINE 250 UG/ML
250 INJECTION, SOLUTION INTRAMUSCULAR
Status: CANCELLED | OUTPATIENT
Start: 2022-10-06

## 2022-10-06 RX ORDER — OXYTOCIN 10 [USP'U]/ML
10 INJECTION, SOLUTION INTRAMUSCULAR; INTRAVENOUS
Status: CANCELLED | OUTPATIENT
Start: 2022-10-06

## 2022-10-06 RX ORDER — ACETAMINOPHEN 325 MG/1
975 TABLET ORAL ONCE
Status: CANCELLED | OUTPATIENT
Start: 2022-10-06 | End: 2022-10-06

## 2022-10-06 RX ORDER — TRANEXAMIC ACID 10 MG/ML
1 INJECTION, SOLUTION INTRAVENOUS EVERY 30 MIN PRN
Status: CANCELLED | OUTPATIENT
Start: 2022-10-06

## 2022-10-06 RX ORDER — CITRIC ACID/SODIUM CITRATE 334-500MG
30 SOLUTION, ORAL ORAL
Status: CANCELLED | OUTPATIENT
Start: 2022-10-06

## 2022-10-06 RX ORDER — CEFAZOLIN SODIUM 2 G/100ML
2 INJECTION, SOLUTION INTRAVENOUS
Status: CANCELLED | OUTPATIENT
Start: 2022-10-06

## 2022-10-06 RX ORDER — SODIUM CHLORIDE, SODIUM LACTATE, POTASSIUM CHLORIDE, CALCIUM CHLORIDE 600; 310; 30; 20 MG/100ML; MG/100ML; MG/100ML; MG/100ML
INJECTION, SOLUTION INTRAVENOUS CONTINUOUS
Status: CANCELLED | OUTPATIENT
Start: 2022-10-06

## 2022-10-06 RX ORDER — LIDOCAINE 40 MG/G
CREAM TOPICAL
Status: CANCELLED | OUTPATIENT
Start: 2022-10-06

## 2022-10-06 RX ORDER — OXYTOCIN/0.9 % SODIUM CHLORIDE 30/500 ML
340 PLASTIC BAG, INJECTION (ML) INTRAVENOUS CONTINUOUS PRN
Status: CANCELLED | OUTPATIENT
Start: 2022-10-06

## 2022-10-06 RX ORDER — CEFAZOLIN SODIUM 2 G/100ML
2 INJECTION, SOLUTION INTRAVENOUS SEE ADMIN INSTRUCTIONS
Status: CANCELLED | OUTPATIENT
Start: 2022-10-06

## 2022-10-06 ASSESSMENT — PAIN SCALES - GENERAL: PAINLEVEL: MILD PAIN (2)

## 2022-10-06 NOTE — PROGRESS NOTES
CC: Recheck OB visit at 32w0d    HPI: Nyasia Raya presents for a routine OB visit now at 32w0d  Concerns: None, US for growth today pending.  Patient notices normal fetal movement, denies contractions, vaginal bleeding or leaking of fluid.    OB History    Para Term  AB Living   5 2 2 0 2 2   SAB IAB Ectopic Multiple Live Births   0 1 0 0 2      # Outcome Date GA Lbr Moiz/2nd Weight Sex Delivery Anes PTL Lv   5 Current            4 Term 21 37w5d  3.7 kg (8 lb 2.5 oz) M CS-LTranv Spinal N TING      Name: Yulisa RAYA      Apgar1: 8  Apgar5: 9   3 AB 20 7w3d    SAB      2 Term 01/15/14 41w4d  4.252 kg (9 lb 6 oz) M CS-LTranv TOPICAL, Spinal  TING      Birth Comments: none      Name: Wallace      Apgar1: 6  Apgar5: 8   1 IAB  6w0d             Birth Comments: no comps     Current Outpatient Medications   Medication     albuterol (PROAIR HFA/PROVENTIL HFA/VENTOLIN HFA) 108 (90 Base) MCG/ACT inhaler     ferrous sulfate (FEROSUL) 325 (65 Fe) MG tablet     Prenatal Vit-Fe Fumarate-FA (PRENATAL MULTIVITAMIN W/IRON) 27-0.8 MG tablet     No current facility-administered medications for this visit.         O: /80 (BP Location: Right arm, Patient Position: Sitting, Cuff Size: Adult Regular)   Pulse 106   Wt 86.6 kg (191 lb)   LMP 2022 (Exact Date)   SpO2 98%   BMI 38.58 kg/m    Body mass index is 38.58 kg/m .  See OB flow sheet  EXAM:  NAD    FHT:140 bpm    No results found for any visits on 10/06/22.    A/P: 32w0d    Recheck in 2 weeks    Problem List:     Pregnancy risk factors include:   x 2  EDC 22  Repeat - 22  Nexplanon pp    Sadiq Lama MD FACOG  2:51 PM 10/6/2022

## 2022-10-06 NOTE — PROGRESS NOTES
"Date of Surgery: 11/28/22  Type of Surgery: csection  Surgeon: Dr. Sadiq Lama     Patient was given surgical folder . Surgical forms were copied and kept for informative purposes. Originals were delivered to Day-surgery. Questions were answered to the best of this nurse's ability.        STOP BANG    Fever/Chills or other infectious symptoms in past month? no  >10 pound weight loss in the past 2 months? no  Health Care Directive on file? no  History of blood transfusions? no  Td up to date? yes  History of VRE/MRSA? no      Obstructive Sleep Apnea screening    Preoperative Evaluation: Obstructive Sleep Apnea screening    S: Snore -  Do you snore loudly? (louder than talking or loud enough to be heard through closed doors) No  T: Tired - Do you often feel tired, fatigued, or sleepy during the daytime?No  O: Observed - Has anyone ever observed you stop breathing during your sleep?No  P: Pressure - Do you have or are you being treated for high blood pressure?No  B: BMI - BMI greater than 35kg/m2?Yes  A: Age - Age over 50 years old?No  N: Neck - Neck circumference greater than 40 cm?No  G: Gender - Gender: Male?No    Total number of \"YES\" responses:  1    Scoring: Low risk of BRIAN 0-2  At Risk of BRIAN: >3 High Risk of BRIAN: 5-8      Total yes answers in BRIAN section:    Low risk 0-2  At risk 3-4  High risk 5-8    Corinne R Thayer, RN............. 10/6/2022 3:31 PM   "

## 2022-10-06 NOTE — NURSING NOTE
"Chief Complaint   Patient presents with     Prenatal Care     32w       Initial /80 (BP Location: Right arm, Patient Position: Sitting, Cuff Size: Adult Regular)   Pulse 106   Wt 86.6 kg (191 lb)   LMP 02/24/2022 (Exact Date)   SpO2 98%   BMI 38.58 kg/m   Estimated body mass index is 38.58 kg/m  as calculated from the following:    Height as of 4/4/22: 1.499 m (4' 11\").    Weight as of this encounter: 86.6 kg (191 lb).        Medication Reconciliation: complete    FOOD SECURITY SCREENING QUESTIONS:      The next two questions are to help us understand your food security.  If you are feeling you need any assistance in this area, we have resources available to support you today.    Hunger Vital Signs:  Within the past 12 months we worried whether our food would run out before we got money to buy more. Never  Within the past 12 months the food we bought just didn't last and we didn't have money to get more. Never    Sera Zapata LPN....................  10/6/2022   2:34 PM      "

## 2022-10-07 NOTE — PROGRESS NOTES
CC: Recheck OB visit at 32w1d    HPI: Nyasia Raya presents for a routine OB visit now at 32w1d  Concerns: Desires to schedule her  today for .  Patient notices normal fetal movement, denies contractions, vaginal bleeding or leaking of fluid.    OB History    Para Term  AB Living   5 2 2 0 2 2   SAB IAB Ectopic Multiple Live Births   0 1 0 0 2      # Outcome Date GA Lbr Moiz/2nd Weight Sex Delivery Anes PTL Lv   5 Current            4 Term 21 37w5d  3.7 kg (8 lb 2.5 oz) M CS-LTranv Spinal N TING      Name: Yulisa RAYA      Apgar1: 8  Apgar5: 9   3 AB 20 7w3d    SAB      2 Term 01/15/14 41w4d  4.252 kg (9 lb 6 oz) M CS-LTranv TOPICAL, Spinal  TING      Birth Comments: none      Name: Wallace      Apgar1: 6  Apgar5: 8   1 IAB  6w0d             Birth Comments: no comps     Current Outpatient Medications   Medication     albuterol (PROAIR HFA/PROVENTIL HFA/VENTOLIN HFA) 108 (90 Base) MCG/ACT inhaler     ferrous sulfate (FEROSUL) 325 (65 Fe) MG tablet     Prenatal Vit-Fe Fumarate-FA (PRENATAL MULTIVITAMIN W/IRON) 27-0.8 MG tablet     No current facility-administered medications for this visit.         O: LMP 2022 (Exact Date)   There is no height or weight on file to calculate BMI.  See OB flow sheet  EXAM:  NAD  No results found for this or any previous visit (from the past 24 hour(s)).  FH 32  FHT's 140's    No results found for any visits on 10/06/22.    A/P: 32w1d gestation    Recheck in 2 weeks      Problem List:     Pregnancy risk factors include:   x 2  EDC 22  Repeat    Nexplanon pp    Sadiq Lama MD FACOG  8:21 AM 10/7/2022

## 2022-10-17 ENCOUNTER — NURSE TRIAGE (OUTPATIENT)
Dept: OBGYN | Facility: OTHER | Age: 32
End: 2022-10-17

## 2022-10-17 ENCOUNTER — HOSPITAL ENCOUNTER (OUTPATIENT)
Facility: OTHER | Age: 32
Discharge: HOME | End: 2022-10-17
Attending: STUDENT IN AN ORGANIZED HEALTH CARE EDUCATION/TRAINING PROGRAM | Admitting: STUDENT IN AN ORGANIZED HEALTH CARE EDUCATION/TRAINING PROGRAM
Payer: COMMERCIAL

## 2022-10-17 VITALS
RESPIRATION RATE: 16 BRPM | SYSTOLIC BLOOD PRESSURE: 142 MMHG | DIASTOLIC BLOOD PRESSURE: 67 MMHG | OXYGEN SATURATION: 98 % | TEMPERATURE: 97.9 F

## 2022-10-17 LAB
ALBUMIN UR-MCNC: 30 MG/DL
APPEARANCE UR: CLEAR
BILIRUB UR QL STRIP: NEGATIVE
CLUE CELLS: ABNORMAL
COLOR UR AUTO: YELLOW
ERYTHROCYTE [DISTWIDTH] IN BLOOD BY AUTOMATED COUNT: 14.7 % (ref 10–15)
FIBRINOGEN PPP-MCNC: 537 MG/DL (ref 170–490)
GLUCOSE UR STRIP-MCNC: NEGATIVE MG/DL
HCT VFR BLD AUTO: 29.1 % (ref 35–47)
HGB BLD-MCNC: 9.6 G/DL (ref 11.7–15.7)
HGB UR QL STRIP: NEGATIVE
HYALINE CASTS: 3 /LPF
KETONES UR STRIP-MCNC: 10 MG/DL
LEUKOCYTE ESTERASE UR QL STRIP: NEGATIVE
MCH RBC QN AUTO: 27.4 PG (ref 26.5–33)
MCHC RBC AUTO-ENTMCNC: 33 G/DL (ref 31.5–36.5)
MCV RBC AUTO: 83 FL (ref 78–100)
MUCOUS THREADS #/AREA URNS LPF: PRESENT /LPF
NITRATE UR QL: NEGATIVE
PH UR STRIP: 6.5 [PH] (ref 5–9)
PLATELET # BLD AUTO: 329 10E3/UL (ref 150–450)
RBC # BLD AUTO: 3.5 10E6/UL (ref 3.8–5.2)
RBC URINE: 1 /HPF
SP GR UR STRIP: 1.02 (ref 1–1.03)
SQUAMOUS EPITHELIAL: 1 /HPF
TRICHOMONAS, WET PREP: ABNORMAL
UROBILINOGEN UR STRIP-MCNC: 2 MG/DL
WBC # BLD AUTO: 10.6 10E3/UL (ref 4–11)
WBC URINE: 2 /HPF
WBC'S/HIGH POWER FIELD, WET PREP: ABNORMAL
YEAST, WET PREP: ABNORMAL

## 2022-10-17 PROCEDURE — G0463 HOSPITAL OUTPT CLINIC VISIT: HCPCS | Mod: 25

## 2022-10-17 PROCEDURE — 85027 COMPLETE CBC AUTOMATED: CPT | Performed by: STUDENT IN AN ORGANIZED HEALTH CARE EDUCATION/TRAINING PROGRAM

## 2022-10-17 PROCEDURE — 96361 HYDRATE IV INFUSION ADD-ON: CPT

## 2022-10-17 PROCEDURE — 81003 URINALYSIS AUTO W/O SCOPE: CPT | Performed by: STUDENT IN AN ORGANIZED HEALTH CARE EDUCATION/TRAINING PROGRAM

## 2022-10-17 PROCEDURE — 85384 FIBRINOGEN ACTIVITY: CPT | Performed by: STUDENT IN AN ORGANIZED HEALTH CARE EDUCATION/TRAINING PROGRAM

## 2022-10-17 PROCEDURE — 36415 COLL VENOUS BLD VENIPUNCTURE: CPT | Performed by: STUDENT IN AN ORGANIZED HEALTH CARE EDUCATION/TRAINING PROGRAM

## 2022-10-17 PROCEDURE — 87210 SMEAR WET MOUNT SALINE/INK: CPT | Performed by: STUDENT IN AN ORGANIZED HEALTH CARE EDUCATION/TRAINING PROGRAM

## 2022-10-17 PROCEDURE — 96360 HYDRATION IV INFUSION INIT: CPT

## 2022-10-17 PROCEDURE — 258N000003 HC RX IP 258 OP 636: Performed by: STUDENT IN AN ORGANIZED HEALTH CARE EDUCATION/TRAINING PROGRAM

## 2022-10-17 RX ORDER — LIDOCAINE 40 MG/G
CREAM TOPICAL
Status: DISCONTINUED | OUTPATIENT
Start: 2022-10-17 | End: 2022-10-18 | Stop reason: HOSPADM

## 2022-10-17 RX ADMIN — SODIUM CHLORIDE, POTASSIUM CHLORIDE, SODIUM LACTATE AND CALCIUM CHLORIDE 2000 ML: 600; 310; 30; 20 INJECTION, SOLUTION INTRAVENOUS at 17:41

## 2022-10-17 ASSESSMENT — ACTIVITIES OF DAILY LIVING (ADL)
ADLS_ACUITY_SCORE: 37

## 2022-10-17 NOTE — TELEPHONE ENCOUNTER
S-(situation): abdominal cramping    B-(background): Started today at 12:30pm but back pain started a couple of days ago    A-(assessment): Patient states that she has been experiencing abdominal cramping since 12:30pm today. It starts in her lower abdomen and radiates to her back and then she experiences the cramping in her upper abdomen. It happens about every hour and lasts more than 90 seconds and she rates the cramping at 7/10. She also had some blurry vision this morning and has had a headache. She denies any vaginal bleeding or leaking of fluid. She also denies n/v, diarrhea, or fever. She still feels baby moving but mostly when she is experiencing the cramping. Movement improved the cramping. She is currently 33w4d pregnant.     R-(recommendations): Per protocol recommended that patient be further evaluated in L&D. Gave care advice as well per protocol. Patient verbalized her understanding and agreed with plan.     Dylon Rose RN, BSN  ....................  10/17/2022   3:19 PM      Reason for Disposition    MODERATE-SEVERE abdominal pain (e.g., interferes with normal activities, awakens from sleep)    Additional Information    Negative: Passed out (i.e., fainted, collapsed and was not responding)    Negative: Shock suspected (e.g., cold/pale/clammy skin, too weak to stand, low BP, rapid pulse)    Negative: Difficult to awaken or acting confused (e.g., disoriented, slurred speech)    Negative: SEVERE abdominal pain (e.g., excruciating), constant, and present > 1 hour    Negative: SEVERE vaginal bleeding (e.g., continuous red blood from vagina, large blood clots)    Negative: Sounds like a life-threatening emergency to the triager    Negative: Having contractions or other symptoms of labor (such as vaginal pressure) and pregnant 37 or more weeks (i.e., term pregnancy)    Negative: Having contractions or other symptoms of labor (such as vaginal pressure) and < 37 weeks pregnant (i.e., )    Negative:  "Abdominal pain and pregnant < 20 weeks    Negative: Vomiting red blood or black (coffee ground) material    Answer Assessment - Initial Assessment Questions  1. LOCATION: \"Where does it hurt?\"       Upper and lower abdomen, and back pain    2. RADIATION: \"Does the pain shoot anywhere else?\" (e.g., chest, back)      Into her back    3. ONSET: \"When did the pain begin?\" (Minutes, hours or days ago)       Around 12:30pm today    4. ONSET: \"Gradual or sudden onset?\"      Sudden    5. PATTERN: \"Does the pain come and go, or has it been constant since it started?\"       Comes and goes    6. SEVERITY: \"How bad is the pain?\" \"What does it keep you from doing?\"  (e.g., Scale 1-10; mild, moderate, or severe)    - MILD (1-3): doesn't interfere with normal activities, abdomen soft and not tender to touch     - MODERATE (4-7): interferes with normal activities or awakens from sleep, abdomen tender to touch     - SEVERE (8-10): excruciating pain, doubled over, unable to do any normal activities      7/10    7. RECURRENT SYMPTOM: \"Have you ever had this type of stomach pain before?\" If Yes, ask: \"When was the last time?\" and \"What happened that time?\"       No    8. CAUSE: \"What do you think is causing the stomach pain?      Unsure, possible aura lomeli or labor    9. RELIEVING/AGGRAVATING FACTORS: \"What makes it better or worse?\" (e.g., antacids, bowel movement, movement)      Movement makes it better    10. FETAL MOVEMENT: \"Has the baby's movement decreased or changed significantly from normal?\"        Baby is active during her cramping episodes    11. OTHER SYMPTOMS: \"Do you have any other symptoms?\" (e.g., back pain, diarrhea, fever, urination pain, vaginal discharge, vomiting)        Back pain and vaginal discharge (non-bloody)    12. LE: \"What date are you expecting to deliver?\"        12/1/2022    Protocols used: PREGNANCY - ABDOMINAL PAIN GREATER THAN 20 WEEKS EGA-A-OH    "

## 2022-10-17 NOTE — PROGRESS NOTES
33w4d patient here with complaints of continuous pain over her abdomen and her back. She states sometimes it gets worse but that is positional. EFM/toco applied. Category 1 tracing, patient anjali every 2-6 minutes. UA sent. Will notify MD. Shantal Moralez RN on 10/17/2022 at 4:51 PM

## 2022-10-18 ASSESSMENT — ACTIVITIES OF DAILY LIVING (ADL)
ADLS_ACUITY_SCORE: 37

## 2022-10-18 NOTE — PROGRESS NOTES
Pt discharged to home ambulatory to waiting vehicle.  Denies feeling any ctx or back pain at this time  Reports active fetal movement.  Denies any bleeding or LOF.  Pt verbalizes understanding of all discharge instructions including pre term labor instructions.

## 2022-10-20 ENCOUNTER — PRENATAL OFFICE VISIT (OUTPATIENT)
Dept: OBGYN | Facility: OTHER | Age: 32
End: 2022-10-20
Attending: STUDENT IN AN ORGANIZED HEALTH CARE EDUCATION/TRAINING PROGRAM
Payer: COMMERCIAL

## 2022-10-20 VITALS
BODY MASS INDEX: 39.45 KG/M2 | SYSTOLIC BLOOD PRESSURE: 132 MMHG | HEART RATE: 102 BPM | DIASTOLIC BLOOD PRESSURE: 72 MMHG | WEIGHT: 195.3 LBS

## 2022-10-20 DIAGNOSIS — Z98.891 HISTORY OF C-SECTION: Primary | ICD-10-CM

## 2022-10-20 PROCEDURE — 99207 PR OB VISIT-NO CHARGE - GICH ONLY: CPT | Performed by: STUDENT IN AN ORGANIZED HEALTH CARE EDUCATION/TRAINING PROGRAM

## 2022-10-20 NOTE — PROGRESS NOTES
Return OB Visit    S: Ms. Raya is feeling well today. She has no acute concerns. Denies leaking of fluid, vaginal bleeding, painful contractions. Notes fetal movements. She was seen earlier this week for concern of  contractions which resolved.    O:   /72   Pulse 102   Wt 88.6 kg (195 lb 4.8 oz)   LMP 2022 (Exact Date)   BMI 39.45 kg/m      Gen: Well-appearing, NAD    FH 35 cm   bpm    Assessment:  Ms. Nyasia Raya is a 32 year old yo  here for an OB follow up visit. She is currently 34w0d. This pregnancy is complicated by history of 2 C-sections.    Plan:  # Routine Prenatal Care  -- Dating: LMP=10 week US LE: 2022  -- PNLs:    O+, Ab screen neg   RPR nr   Hep B S Ag neg   Hep C  neg   Rubella immune   HIV neg     -- Genetic Screening: NIPT low risk XY  -- Anatomy US: 92%ile EFW, normal anatomy  -- Immunizations: Tdap   -- 3rd TM labs including CBC, RPR: Hgb 9.9, Plt 298, RPR nr  -- 1 HR GTT: passed  -- GBS: Planned for 36 weeks  -- Postpartum Planning: breastfeeding, Nexplanon  -- Delivery Planning: RCS desired   -- Return to clinic in 1 weeks for OB follow up visit    # History of 2 prior CS  -- Planned repeat at 39 weeks    # Anemia in second trimester  --  Hgb 9.9: has been on iron  -- consider recheck around 36 weeks      Dian Hanna MD  OB/GYN  10/20/2022 3:22 PM

## 2022-10-25 ENCOUNTER — PRENATAL OFFICE VISIT (OUTPATIENT)
Dept: OBGYN | Facility: OTHER | Age: 32
End: 2022-10-25
Attending: STUDENT IN AN ORGANIZED HEALTH CARE EDUCATION/TRAINING PROGRAM
Payer: COMMERCIAL

## 2022-10-25 VITALS
BODY MASS INDEX: 39.2 KG/M2 | WEIGHT: 194.1 LBS | DIASTOLIC BLOOD PRESSURE: 78 MMHG | SYSTOLIC BLOOD PRESSURE: 122 MMHG | HEART RATE: 98 BPM

## 2022-10-25 DIAGNOSIS — Z34.93 THIRD TRIMESTER PREGNANCY: ICD-10-CM

## 2022-10-25 DIAGNOSIS — Z98.891 HISTORY OF C-SECTION: ICD-10-CM

## 2022-10-25 DIAGNOSIS — Z23 NEED FOR INFLUENZA VACCINATION: Primary | ICD-10-CM

## 2022-10-25 DIAGNOSIS — O99.012 ANEMIA DURING PREGNANCY IN SECOND TRIMESTER: ICD-10-CM

## 2022-10-25 LAB
ERYTHROCYTE [DISTWIDTH] IN BLOOD BY AUTOMATED COUNT: 14.6 % (ref 10–15)
HCT VFR BLD AUTO: 31.7 % (ref 35–47)
HGB BLD-MCNC: 10.2 G/DL (ref 11.7–15.7)
HOLD SPECIMEN: NORMAL
MCH RBC QN AUTO: 26.5 PG (ref 26.5–33)
MCHC RBC AUTO-ENTMCNC: 32.2 G/DL (ref 31.5–36.5)
MCV RBC AUTO: 82 FL (ref 78–100)
PLATELET # BLD AUTO: 346 10E3/UL (ref 150–450)
RBC # BLD AUTO: 3.85 10E6/UL (ref 3.8–5.2)
WBC # BLD AUTO: 11 10E3/UL (ref 4–11)

## 2022-10-25 PROCEDURE — 90471 IMMUNIZATION ADMIN: CPT | Performed by: STUDENT IN AN ORGANIZED HEALTH CARE EDUCATION/TRAINING PROGRAM

## 2022-10-25 PROCEDURE — 90686 IIV4 VACC NO PRSV 0.5 ML IM: CPT | Performed by: STUDENT IN AN ORGANIZED HEALTH CARE EDUCATION/TRAINING PROGRAM

## 2022-10-25 PROCEDURE — 85027 COMPLETE CBC AUTOMATED: CPT | Mod: ZL | Performed by: STUDENT IN AN ORGANIZED HEALTH CARE EDUCATION/TRAINING PROGRAM

## 2022-10-25 PROCEDURE — 99207 PR OB VISIT-NO CHARGE - GICH ONLY: CPT | Performed by: STUDENT IN AN ORGANIZED HEALTH CARE EDUCATION/TRAINING PROGRAM

## 2022-10-25 PROCEDURE — 36415 COLL VENOUS BLD VENIPUNCTURE: CPT | Mod: ZL | Performed by: STUDENT IN AN ORGANIZED HEALTH CARE EDUCATION/TRAINING PROGRAM

## 2022-10-25 NOTE — NURSING NOTE
Pt presents to clinic today for prenatal care 34w5d. Pt denies any bleeding, or leakage of fluid at this time. States baby Is moving good has noticed increase in pelvic pressure/back pain.      Medication Reconciliation: complete  Mee Mccarty LPN

## 2022-10-25 NOTE — PROGRESS NOTES
Return OB Visit    S: Ms. Raya is feeling well today. She has no acute concerns. Denies leaking of fluid, vaginal bleeding, painful contractions. Notes fetal movements.    O: /78   Pulse 98   Wt 88 kg (194 lb 1.6 oz)   LMP 2022 (Exact Date)   BMI 39.20 kg/m    Gen: Well-appearing, NAD    FH 36 cm   bpm    Assessment:  Ms. Nyasia Raya is a 32 year old yo  here for an OB follow up visit. She is currently 34w5d. This pregnancy is complicated by history of 2 C-sections.     Plan:  # Routine Prenatal Care  -- Dating: LMP=10 week US LE: 2022  -- PNLs:                O+, Ab screen neg               RPR nr               Hep B S Ag neg               Hep C  neg               Rubella immune               HIV neg                 -- Genetic Screening: NIPT low risk XY  -- Anatomy US: 92%ile EFW, normal anatomy  -- Immunizations: Tdap   -- 3rd TM labs including CBC, RPR: Hgb 9.9, Plt 298, RPR nr  -- 1 HR GTT: passed  -- GBS: Planned for 36 weeks  -- Postpartum Planning: breastfeeding, Nexplanon  -- Delivery Planning: RCS desired   -- Return to clinic in 1 weeks for OB follow up visit     # History of 2 prior CS  -- Planned repeat at 39 weeks     # Anemia in second trimester  --  Hgb 9.9: has been on iron  -- consider recheck around 36 weeks: ordered today    Dian Hanna MD  OB/GYN  10/25/2022 2:25 PM

## 2022-11-03 ENCOUNTER — PRENATAL OFFICE VISIT (OUTPATIENT)
Dept: OBGYN | Facility: OTHER | Age: 32
End: 2022-11-03
Attending: STUDENT IN AN ORGANIZED HEALTH CARE EDUCATION/TRAINING PROGRAM
Payer: COMMERCIAL

## 2022-11-03 VITALS
BODY MASS INDEX: 39.59 KG/M2 | DIASTOLIC BLOOD PRESSURE: 70 MMHG | HEART RATE: 112 BPM | WEIGHT: 196 LBS | SYSTOLIC BLOOD PRESSURE: 120 MMHG

## 2022-11-03 DIAGNOSIS — Z34.90 NORMAL PREGNANCY, ANTEPARTUM: Primary | ICD-10-CM

## 2022-11-03 PROCEDURE — 87653 STREP B DNA AMP PROBE: CPT | Mod: ZL | Performed by: OBSTETRICS & GYNECOLOGY

## 2022-11-03 PROCEDURE — 99207 PR OB VISIT-NO CHARGE - GICH ONLY: CPT | Performed by: OBSTETRICS & GYNECOLOGY

## 2022-11-03 ASSESSMENT — PAIN SCALES - GENERAL: PAINLEVEL: MODERATE PAIN (4)

## 2022-11-03 NOTE — NURSING NOTE
"Chief Complaint   Patient presents with     Prenatal Care     36 week   Pt presents to clinic today for prenatal care 36 week. Pt denies any bleeding, or leakage of fluid at this time. States baby is moving good. Patient c/o contractions.     Initial /70   Pulse 112   Wt 88.9 kg (196 lb)   LMP 02/24/2022 (Exact Date)   BMI 39.59 kg/m   Estimated body mass index is 39.59 kg/m  as calculated from the following:    Height as of 4/4/22: 1.499 m (4' 11\").    Weight as of this encounter: 88.9 kg (196 lb).  Medication Reconciliation: complete        Idalia Turk, RACHAEL  "

## 2022-11-03 NOTE — PROGRESS NOTES
CC: Recheck OB visit at 36w0d    HPI: Nyasia Raya presents for a routine OB visit now at 36w0d  Concerns: None  Patient notices normal fetal movement, denies contractions, vaginal bleeding or leaking of fluid.    OB History    Para Term  AB Living   5 2 2 0 2 2   SAB IAB Ectopic Multiple Live Births   0 1 0 0 2      # Outcome Date GA Lbr Moiz/2nd Weight Sex Delivery Anes PTL Lv   5 Current            4 Term 21 37w5d  3.7 kg (8 lb 2.5 oz) M CS-LTranv Spinal N TING      Name: Yulisa RAYA      Apgar1: 8  Apgar5: 9   3 AB 20 7w3d    SAB      2 Term 01/15/14 41w4d  4.252 kg (9 lb 6 oz) M CS-LTranv TOPICAL, Spinal  TING      Birth Comments: none      Name: Wallace      Apgar1: 6  Apgar5: 8   1 IAB  6w0d             Birth Comments: no comps     Current Outpatient Medications   Medication     albuterol (PROAIR HFA/PROVENTIL HFA/VENTOLIN HFA) 108 (90 Base) MCG/ACT inhaler     ferrous sulfate (FEROSUL) 325 (65 Fe) MG tablet     Prenatal Vit-Fe Fumarate-FA (PRENATAL MULTIVITAMIN W/IRON) 27-0.8 MG tablet     No current facility-administered medications for this visit.     O: /70   Pulse 112   Wt 88.9 kg (196 lb)   LMP 2022 (Exact Date)   BMI 39.59 kg/m    Body mass index is 39.59 kg/m .  See OB flow sheet  EXAM:  NAD  FH:36 cm  FHT:145 bpm  Cx LTC  gbs collected    No results found for any visits on 22.    A/P:   (Z34.90) Encounter for supervision of normal pregnancy  Comment:   Plan: Strep, Group B by PCR          Recheck in 1 week    Problem List:   Pregnancy risk factors include:   x 2  EDC 22  Repeat    Nexplanon pp    Sadiq Lama MD FACOG  3:17 PM 11/3/2022

## 2022-11-05 LAB — GP B STREP DNA SPEC QL NAA+PROBE: NEGATIVE

## 2022-11-10 ENCOUNTER — PRENATAL OFFICE VISIT (OUTPATIENT)
Dept: OBGYN | Facility: OTHER | Age: 32
End: 2022-11-10
Attending: STUDENT IN AN ORGANIZED HEALTH CARE EDUCATION/TRAINING PROGRAM
Payer: COMMERCIAL

## 2022-11-10 VITALS
SYSTOLIC BLOOD PRESSURE: 122 MMHG | WEIGHT: 195 LBS | DIASTOLIC BLOOD PRESSURE: 70 MMHG | BODY MASS INDEX: 39.39 KG/M2 | HEART RATE: 90 BPM

## 2022-11-10 DIAGNOSIS — Z98.891 HISTORY OF C-SECTION: Primary | ICD-10-CM

## 2022-11-10 PROCEDURE — 99207 PR OB VISIT-NO CHARGE - GICH ONLY: CPT | Performed by: OBSTETRICS & GYNECOLOGY

## 2022-11-10 ASSESSMENT — PAIN SCALES - GENERAL: PAINLEVEL: MODERATE PAIN (4)

## 2022-11-10 NOTE — NURSING NOTE
"Chief Complaint   Patient presents with     Prenatal Care     37 week   Pt presents to clinic today for prenatal care 37 week. Pt denies any bleeding, or leakage of fluid at this time. States baby is moving good. Patient c/0 contractions.     Initial /70   Pulse 90   Wt 88.5 kg (195 lb)   LMP 02/24/2022 (Exact Date)   BMI 39.39 kg/m   Estimated body mass index is 39.39 kg/m  as calculated from the following:    Height as of 4/4/22: 1.499 m (4' 11\").    Weight as of this encounter: 88.5 kg (195 lb).  Medication Reconciliation: complete          Idalia Turk, RACHAEL  "

## 2022-11-10 NOTE — LETTER
[facility] AND \[facility]\""  [address]  [place] [address]  [phone]          November 10, 2022    RE:  Nyasia Raya                                                                                                                                                       [address]  [place] [address]            To whom it may concern:    Nyasia Raya is under my professional care for complications of pregnancy.    Patient may need to reduce hours or be off work until after delivery due to complications associated with her pregnancy.      Sincerely,          Sadiq Lama MD FACOG  3:08 PM 11/10/2022

## 2022-11-10 NOTE — PROGRESS NOTES
CC: Recheck OB visit at 37w0d    HPI: Nyasia Raya presents for a routine OB visit now at 37w0d  Concerns: cramps especially in the car, and contracts when up at work.  Baby is active. Contractions cease when she can lay down.    OB History    Para Term  AB Living   5 2 2 0 2 2   SAB IAB Ectopic Multiple Live Births   0 1 0 0 2      # Outcome Date GA Lbr Moiz/2nd Weight Sex Delivery Anes PTL Lv   5 Current            4 Term 21 37w5d  3.7 kg (8 lb 2.5 oz) M CS-LTranv Spinal N TING      Name: Yulisa RAYA      Apgar1: 8  Apgar5: 9   3 AB 20 7w3d    SAB      2 Term 01/15/14 41w4d  4.252 kg (9 lb 6 oz) M CS-LTranv TOPICAL, Spinal  TING      Birth Comments: none      Name: Wallace      Apgar1: 6  Apgar5: 8   1 IAB  6w0d             Birth Comments: no comps     Current Outpatient Medications   Medication     albuterol (PROAIR HFA/PROVENTIL HFA/VENTOLIN HFA) 108 (90 Base) MCG/ACT inhaler     ferrous sulfate (FEROSUL) 325 (65 Fe) MG tablet     Prenatal Vit-Fe Fumarate-FA (PRENATAL MULTIVITAMIN W/IRON) 27-0.8 MG tablet     No current facility-administered medications for this visit.         O: /70   Pulse 90   Wt 88.5 kg (195 lb)   LMP 2022 (Exact Date)   BMI 39.39 kg/m    Body mass index is 39.39 kg/m .  See OB flow sheet  EXAM:  NAD    FHT:155 bpm    No results found for any visits on 11/10/22.    A/P: 37w0d     Recheck in 1 week  Provided letter for reduced hours, more breaks or be off until after baby if needed.    Problem List:   Pregnancy risk factors include:   x 2  EDC 22  Repeat    Nexplanon pp    Sadiq Lama MD FACOG  3:03 PM 11/10/2022

## 2022-11-15 ENCOUNTER — PRENATAL OFFICE VISIT (OUTPATIENT)
Dept: OBGYN | Facility: OTHER | Age: 32
End: 2022-11-15
Attending: OBSTETRICS & GYNECOLOGY
Payer: COMMERCIAL

## 2022-11-15 VITALS
HEART RATE: 114 BPM | BODY MASS INDEX: 39.79 KG/M2 | WEIGHT: 197 LBS | DIASTOLIC BLOOD PRESSURE: 70 MMHG | SYSTOLIC BLOOD PRESSURE: 118 MMHG

## 2022-11-15 DIAGNOSIS — Z98.891 HISTORY OF C-SECTION: Primary | ICD-10-CM

## 2022-11-15 PROCEDURE — 99207 PR OB VISIT-NO CHARGE - GICH ONLY: CPT | Performed by: OBSTETRICS & GYNECOLOGY

## 2022-11-15 NOTE — NURSING NOTE
"Chief Complaint   Patient presents with     Prenatal Care     37 week 5 days   Pt presents to clinic today for prenatal care 37 week 5 days. Pt denies any bleeding, or leakage of fluid at this time. States baby is moving good. Patient denies contractions.     Initial /70   Pulse 114   Wt 89.4 kg (197 lb)   LMP 02/24/2022 (Exact Date)   BMI 39.79 kg/m   Estimated body mass index is 39.79 kg/m  as calculated from the following:    Height as of 4/4/22: 1.499 m (4' 11\").    Weight as of this encounter: 89.4 kg (197 lb).  Medication Reconciliation: complete        Idalia Turk LPN  "

## 2022-11-15 NOTE — PROGRESS NOTES
CC: Recheck OB visit at 37w5d    HPI: Nyasia Raya presents for a routine OB visit now at 37w5d  Concerns: better when at home resting with regards to her contractions.  Patient notices normal fetal movement, denies contractions, vaginal bleeding or leaking of fluid.    OB History    Para Term  AB Living   5 2 2 0 2 2   SAB IAB Ectopic Multiple Live Births   0 1 0 0 2      # Outcome Date GA Lbr Moiz/2nd Weight Sex Delivery Anes PTL Lv   5 Current            4 Term 21 37w5d  3.7 kg (8 lb 2.5 oz) M CS-LTranv Spinal N TING      Name: Yulisa RAYA      Apgar1: 8  Apgar5: 9   3 AB 20 7w3d    SAB      2 Term 01/15/14 41w4d  4.252 kg (9 lb 6 oz) M CS-LTranv TOPICAL, Spinal  TING      Birth Comments: none      Name: Wallace      Apgar1: 6  Apgar5: 8   1 IAB  6w0d             Birth Comments: no comps     Current Outpatient Medications   Medication     albuterol (PROAIR HFA/PROVENTIL HFA/VENTOLIN HFA) 108 (90 Base) MCG/ACT inhaler     ferrous sulfate (FEROSUL) 325 (65 Fe) MG tablet     Prenatal Vit-Fe Fumarate-FA (PRENATAL MULTIVITAMIN W/IRON) 27-0.8 MG tablet     No current facility-administered medications for this visit.         O: /70   Pulse 114   Wt 89.4 kg (197 lb)   LMP 2022 (Exact Date)   BMI 39.79 kg/m    Body mass index is 39.79 kg/m .  See OB flow sheet  EXAM:  NAD  S=D  FHT:150 bpm    No results found for any visits on 11/15/22.    A/P: 37w5d gestation    Recheck in 1 week    Problem List:   Pregnancy risk factors include:   x 2  EDC 22  Repeat    Nexplanon pp    Sadiq Lama MD FACOG  1:03 PM 11/15/2022

## 2022-11-21 ENCOUNTER — TELEPHONE (OUTPATIENT)
Dept: OBGYN | Facility: OTHER | Age: 32
End: 2022-11-21

## 2022-11-22 ENCOUNTER — HOSPITAL ENCOUNTER (OUTPATIENT)
Facility: OTHER | Age: 32
LOS: 1 days | Discharge: HOME | End: 2022-11-22
Attending: OBSTETRICS & GYNECOLOGY | Admitting: STUDENT IN AN ORGANIZED HEALTH CARE EDUCATION/TRAINING PROGRAM
Payer: COMMERCIAL

## 2022-11-22 VITALS
OXYGEN SATURATION: 98 % | RESPIRATION RATE: 18 BRPM | HEART RATE: 90 BPM | SYSTOLIC BLOOD PRESSURE: 120 MMHG | TEMPERATURE: 97.9 F | DIASTOLIC BLOOD PRESSURE: 67 MMHG

## 2022-11-22 PROCEDURE — 250N000013 HC RX MED GY IP 250 OP 250 PS 637: Performed by: STUDENT IN AN ORGANIZED HEALTH CARE EDUCATION/TRAINING PROGRAM

## 2022-11-22 PROCEDURE — G0463 HOSPITAL OUTPT CLINIC VISIT: HCPCS | Mod: 25

## 2022-11-22 RX ORDER — HYDROXYZINE HYDROCHLORIDE 25 MG/1
100 TABLET, FILM COATED ORAL EVERY 6 HOURS PRN
Status: DISCONTINUED | OUTPATIENT
Start: 2022-11-22 | End: 2022-11-22 | Stop reason: HOSPADM

## 2022-11-22 RX ORDER — HYDROXYZINE HYDROCHLORIDE 25 MG/1
50 TABLET, FILM COATED ORAL EVERY 6 HOURS PRN
Status: DISCONTINUED | OUTPATIENT
Start: 2022-11-22 | End: 2022-11-22 | Stop reason: HOSPADM

## 2022-11-22 RX ORDER — LIDOCAINE 40 MG/G
CREAM TOPICAL
Status: DISCONTINUED | OUTPATIENT
Start: 2022-11-22 | End: 2022-11-22 | Stop reason: HOSPADM

## 2022-11-22 RX ADMIN — HYDROXYZINE HYDROCHLORIDE 100 MG: 25 TABLET, FILM COATED ORAL at 03:16

## 2022-11-22 ASSESSMENT — ACTIVITIES OF DAILY LIVING (ADL)
DRESSING/BATHING_DIFFICULTY: NO
WALKING_OR_CLIMBING_STAIRS_DIFFICULTY: NO
TOILETING_ISSUES: NO
FALL_HISTORY_WITHIN_LAST_SIX_MONTHS: YES
DIFFICULTY_EATING/SWALLOWING: NO
NUMBER_OF_TIMES_PATIENT_HAS_FALLEN_WITHIN_LAST_SIX_MONTHS: 1
CHANGE_IN_FUNCTIONAL_STATUS_SINCE_ONSET_OF_CURRENT_ILLNESS/INJURY: NO
WEAR_GLASSES_OR_BLIND: NO
CONCENTRATING,_REMEMBERING_OR_MAKING_DECISIONS_DIFFICULTY: NO
ADLS_ACUITY_SCORE: 20
DOING_ERRANDS_INDEPENDENTLY_DIFFICULTY: NO

## 2022-11-22 NOTE — TELEPHONE ENCOUNTER
Triage Phone Note     Bharati:    Onset: 8 pm    Duration: 1-2 minutes    Frequency: every 15-20 minutes   Bleeding: none     Leaking fluids: none     Baby Moving: yes      Complications with current/previous pregnancies:  scheduled for 22.     Denies urinary symptoms at this time. Denies recent intercourse. Pt advised is welcome to come in for evaluation. Pt request early comfort measures at home at this time. Pt will call within the hour if things do not improve or get worse.

## 2022-11-22 NOTE — DISCHARGE INSTRUCTIONS
Obstetric Triage Discharge Instructions      Labor    While in bed, lie on your side    Go to the bathroom frequently, every on to two hours.   Drink Plenty of fluids - drink at least eight 8-ounce glasses of water each day   Call the hospital if contractions begin again     High Blood Pressure in Pregnancy (Gestational Hypertension)    Call the hospital if you notice:     Blurred vision     Headaches     Pain in your upper belly     Sudden Increase in swelling     While in bed, lie on your left side to improve oxygen supply to your uterus     Vaginal bleeding    Watch for bleeding   Call the hospital about any bleeding     Discharge    Activity:     Normal:    Increased fluid intake.    Follow-up with your provider    Resume medications     Please call with any questions before returning to the hospital. We are available 24 hours a day, 7 days a week. [phone]

## 2022-11-22 NOTE — PROGRESS NOTES
Pt presents to [facility] for r/o labor. States contractions started at 2000. Pt denies LOF, VB, recent intercourse, urinary symptoms, change in vaginal discharge, and reports fetal movement. VS WNL. Cervical exam completed. Pt reports contraction pain is 2/10 on pain scale and is able to walk and talk through them; palpate mild.     0233 Acoustic stimulation completed.     Reactive NST obtained, category 1 tracing at 0238.     0310: Provider notified of Pt status, cervical exam, assessments. Pt okay to discharge home at this time with medications.

## 2022-11-23 ENCOUNTER — PRENATAL OFFICE VISIT (OUTPATIENT)
Dept: OBGYN | Facility: OTHER | Age: 32
End: 2022-11-23
Attending: STUDENT IN AN ORGANIZED HEALTH CARE EDUCATION/TRAINING PROGRAM
Payer: COMMERCIAL

## 2022-11-23 VITALS
DIASTOLIC BLOOD PRESSURE: 84 MMHG | HEART RATE: 114 BPM | BODY MASS INDEX: 39.79 KG/M2 | SYSTOLIC BLOOD PRESSURE: 128 MMHG | WEIGHT: 197 LBS

## 2022-11-23 DIAGNOSIS — Z98.891 HISTORY OF C-SECTION: Primary | ICD-10-CM

## 2022-11-23 PROCEDURE — 99207 PR OB VISIT-NO CHARGE - GICH ONLY: CPT | Performed by: STUDENT IN AN ORGANIZED HEALTH CARE EDUCATION/TRAINING PROGRAM

## 2022-11-23 NOTE — PROGRESS NOTES
Return OB Visit    S: Ms. Raya is feeling well today. She has no acute concerns. Denies leaking of fluid, vaginal bleeding, painful contractions. Notes fetal movements. She has her  scheduled for Monday    O: /84   Pulse 114   Wt 89.4 kg (197 lb)   LMP 2022 (Exact Date)   BMI 39.79 kg/m    Gen: Well-appearing, NAD    FH 39 cm  NST reactive today    Assessment:  Ms. Nyasia Raya is a 32 year old yo  here for an OB follow up visit. She is currently 38w6d. This pregnancy is complicated by history of 2 C-sections.     Plan:  # Routine Prenatal Care  -- Dating: LMP=10 week US LE: 2022  -- PNLs:                O+, Ab screen neg               RPR nr               Hep B S Ag neg               Hep C  neg               Rubella immune               HIV neg                 -- Genetic Screening: NIPT low risk XY  -- Anatomy US: 92%ile EFW, normal anatomy  -- Immunizations: Tdap   -- 3rd TM labs including CBC, RPR: Hgb 9.9, Plt 298, RPR nr  -- 1 HR GTT: passed  -- GBS: negative  -- Postpartum Planning: breastfeeding, Nexplanon  -- Delivery Planning: RCS planned for Monday     # History of 2 prior CS  -- Planned repeat at 39 weeks     # Anemia in second trimester  --  Hgb 9.9: has been on iron, 10.2 repeat    Dian Hanna MD  OB/GYN  2022 3:59 PM

## 2022-11-23 NOTE — NURSING NOTE
Pt presents to clinic today for prenatal care 38w6d. Pt denies any contractions, bleeding, or leakage of fluid at this time. States baby is moving good but has slowed down has noticed some contractions.       Medication Reconciliation: complete  Mee Mccarty LPN

## 2022-11-25 ENCOUNTER — HOSPITAL ENCOUNTER (INPATIENT)
Facility: OTHER | Age: 32
LOS: 3 days | Discharge: HOME | End: 2022-11-28
Attending: OBSTETRICS & GYNECOLOGY | Admitting: OBSTETRICS & GYNECOLOGY
Payer: COMMERCIAL

## 2022-11-25 DIAGNOSIS — Z98.891 S/P CESAREAN SECTION: Primary | ICD-10-CM

## 2022-11-25 DIAGNOSIS — Z98.891 HISTORY OF C-SECTION: ICD-10-CM

## 2022-11-25 DIAGNOSIS — Z98.891 HISTORY OF CESAREAN SECTION: ICD-10-CM

## 2022-11-25 LAB
ABO/RH(D): NORMAL
ANTIBODY SCREEN: NEGATIVE
ERYTHROCYTE [DISTWIDTH] IN BLOOD BY AUTOMATED COUNT: 15.4 % (ref 10–15)
HCT VFR BLD AUTO: 29.2 % (ref 35–47)
HGB BLD-MCNC: 9.5 G/DL (ref 11.7–15.7)
MCH RBC QN AUTO: 26 PG (ref 26.5–33)
MCHC RBC AUTO-ENTMCNC: 32.5 G/DL (ref 31.5–36.5)
MCV RBC AUTO: 80 FL (ref 78–100)
PLATELET # BLD AUTO: 304 10E3/UL (ref 150–450)
RBC # BLD AUTO: 3.66 10E6/UL (ref 3.8–5.2)
SPECIMEN EXPIRATION DATE: NORMAL
WBC # BLD AUTO: 10.8 10E3/UL (ref 4–11)

## 2022-11-25 PROCEDURE — 86780 TREPONEMA PALLIDUM: CPT | Performed by: OBSTETRICS & GYNECOLOGY

## 2022-11-25 PROCEDURE — 36415 COLL VENOUS BLD VENIPUNCTURE: CPT | Performed by: OBSTETRICS & GYNECOLOGY

## 2022-11-25 PROCEDURE — 85027 COMPLETE CBC AUTOMATED: CPT | Performed by: OBSTETRICS & GYNECOLOGY

## 2022-11-25 PROCEDURE — 87637 SARSCOV2&INF A&B&RSV AMP PRB: CPT | Performed by: OBSTETRICS & GYNECOLOGY

## 2022-11-25 PROCEDURE — 120N000001 HC R&B MED SURG/OB

## 2022-11-25 PROCEDURE — 86850 RBC ANTIBODY SCREEN: CPT | Performed by: OBSTETRICS & GYNECOLOGY

## 2022-11-25 PROCEDURE — 86901 BLOOD TYPING SEROLOGIC RH(D): CPT | Performed by: OBSTETRICS & GYNECOLOGY

## 2022-11-25 RX ORDER — METHYLERGONOVINE MALEATE 0.2 MG/ML
200 INJECTION INTRAVENOUS
Status: DISCONTINUED | OUTPATIENT
Start: 2022-11-25 | End: 2022-11-26 | Stop reason: HOSPADM

## 2022-11-25 RX ORDER — SODIUM CHLORIDE, SODIUM LACTATE, POTASSIUM CHLORIDE, CALCIUM CHLORIDE 600; 310; 30; 20 MG/100ML; MG/100ML; MG/100ML; MG/100ML
INJECTION, SOLUTION INTRAVENOUS CONTINUOUS
Status: DISCONTINUED | OUTPATIENT
Start: 2022-11-26 | End: 2022-11-26 | Stop reason: HOSPADM

## 2022-11-25 RX ORDER — CEFAZOLIN SODIUM/WATER 2 G/20 ML
2 SYRINGE (ML) INTRAVENOUS
Status: DISCONTINUED | OUTPATIENT
Start: 2022-11-25 | End: 2022-11-26 | Stop reason: HOSPADM

## 2022-11-25 RX ORDER — CARBOPROST TROMETHAMINE 250 UG/ML
250 INJECTION, SOLUTION INTRAMUSCULAR
Status: DISCONTINUED | OUTPATIENT
Start: 2022-11-25 | End: 2022-11-26 | Stop reason: HOSPADM

## 2022-11-25 RX ORDER — LIDOCAINE 40 MG/G
CREAM TOPICAL
Status: DISCONTINUED | OUTPATIENT
Start: 2022-11-25 | End: 2022-11-26 | Stop reason: HOSPADM

## 2022-11-25 RX ORDER — TRANEXAMIC ACID 10 MG/ML
1 INJECTION, SOLUTION INTRAVENOUS EVERY 30 MIN PRN
Status: DISCONTINUED | OUTPATIENT
Start: 2022-11-25 | End: 2022-11-26 | Stop reason: HOSPADM

## 2022-11-25 RX ORDER — CEFAZOLIN SODIUM/WATER 2 G/20 ML
2 SYRINGE (ML) INTRAVENOUS SEE ADMIN INSTRUCTIONS
Status: DISCONTINUED | OUTPATIENT
Start: 2022-11-25 | End: 2022-11-26 | Stop reason: HOSPADM

## 2022-11-25 RX ORDER — OXYTOCIN 10 [USP'U]/ML
10 INJECTION, SOLUTION INTRAMUSCULAR; INTRAVENOUS
Status: DISCONTINUED | OUTPATIENT
Start: 2022-11-25 | End: 2022-11-26 | Stop reason: HOSPADM

## 2022-11-25 RX ORDER — CITRIC ACID/SODIUM CITRATE 334-500MG
30 SOLUTION, ORAL ORAL
Status: COMPLETED | OUTPATIENT
Start: 2022-11-25 | End: 2022-11-26

## 2022-11-25 RX ORDER — OXYTOCIN/0.9 % SODIUM CHLORIDE 30/500 ML
340 PLASTIC BAG, INJECTION (ML) INTRAVENOUS CONTINUOUS PRN
Status: DISCONTINUED | OUTPATIENT
Start: 2022-11-25 | End: 2022-11-26 | Stop reason: HOSPADM

## 2022-11-25 RX ORDER — MISOPROSTOL 100 UG/1
400 TABLET ORAL
Status: DISCONTINUED | OUTPATIENT
Start: 2022-11-25 | End: 2022-11-26 | Stop reason: HOSPADM

## 2022-11-25 RX ORDER — ACETAMINOPHEN 325 MG/1
975 TABLET ORAL ONCE
Status: COMPLETED | OUTPATIENT
Start: 2022-11-26 | End: 2022-11-26

## 2022-11-26 ENCOUNTER — ANESTHESIA (OUTPATIENT)
Dept: SURGERY | Facility: OTHER | Age: 32
End: 2022-11-26
Payer: COMMERCIAL

## 2022-11-26 ENCOUNTER — ANESTHESIA EVENT (OUTPATIENT)
Dept: SURGERY | Facility: OTHER | Age: 32
End: 2022-11-26
Payer: COMMERCIAL

## 2022-11-26 PROBLEM — Z98.891 S/P CESAREAN SECTION: Status: ACTIVE | Noted: 2022-11-26

## 2022-11-26 LAB
FLUAV RNA SPEC QL NAA+PROBE: NEGATIVE
FLUBV RNA RESP QL NAA+PROBE: NEGATIVE
GLUCOSE BLDC GLUCOMTR-MCNC: 92 MG/DL (ref 70–99)
RSV RNA SPEC NAA+PROBE: NEGATIVE
SARS-COV-2 RNA RESP QL NAA+PROBE: NEGATIVE
T PALLIDUM AB SER QL: NONREACTIVE

## 2022-11-26 PROCEDURE — 250N000009 HC RX 250: Performed by: OBSTETRICS & GYNECOLOGY

## 2022-11-26 PROCEDURE — 250N000011 HC RX IP 250 OP 636: Performed by: OBSTETRICS & GYNECOLOGY

## 2022-11-26 PROCEDURE — 250N000011 HC RX IP 250 OP 636: Performed by: NURSE ANESTHETIST, CERTIFIED REGISTERED

## 2022-11-26 PROCEDURE — 250N000013 HC RX MED GY IP 250 OP 250 PS 637: Performed by: OBSTETRICS & GYNECOLOGY

## 2022-11-26 PROCEDURE — 272N000001 HC OR GENERAL SUPPLY STERILE: Performed by: OBSTETRICS & GYNECOLOGY

## 2022-11-26 PROCEDURE — 360N000076 HC SURGERY LEVEL 3, PER MIN: Performed by: OBSTETRICS & GYNECOLOGY

## 2022-11-26 PROCEDURE — 250N000009 HC RX 250: Performed by: NURSE ANESTHETIST, CERTIFIED REGISTERED

## 2022-11-26 PROCEDURE — 120N000001 HC R&B MED SURG/OB

## 2022-11-26 PROCEDURE — 99140 ANES COMP EMERGENCY COND: CPT | Performed by: NURSE ANESTHETIST, CERTIFIED REGISTERED

## 2022-11-26 PROCEDURE — 59510 CESAREAN DELIVERY: CPT | Performed by: NURSE ANESTHETIST, CERTIFIED REGISTERED

## 2022-11-26 PROCEDURE — 258N000003 HC RX IP 258 OP 636: Performed by: OBSTETRICS & GYNECOLOGY

## 2022-11-26 PROCEDURE — 59510 CESAREAN DELIVERY: CPT | Performed by: OBSTETRICS & GYNECOLOGY

## 2022-11-26 PROCEDURE — 258N000003 HC RX IP 258 OP 636: Performed by: NURSE ANESTHETIST, CERTIFIED REGISTERED

## 2022-11-26 PROCEDURE — 370N000017 HC ANESTHESIA TECHNICAL FEE, PER MIN: Performed by: OBSTETRICS & GYNECOLOGY

## 2022-11-26 PROCEDURE — 710N000010 HC RECOVERY PHASE 1, LEVEL 2, PER MIN: Performed by: OBSTETRICS & GYNECOLOGY

## 2022-11-26 PROCEDURE — 999N000157 HC STATISTIC RCP TIME EA 10 MIN

## 2022-11-26 RX ORDER — OXYTOCIN/0.9 % SODIUM CHLORIDE 30/500 ML
340 PLASTIC BAG, INJECTION (ML) INTRAVENOUS CONTINUOUS PRN
Status: DISCONTINUED | OUTPATIENT
Start: 2022-11-26 | End: 2022-11-28 | Stop reason: HOSPADM

## 2022-11-26 RX ORDER — NALOXONE HYDROCHLORIDE 0.4 MG/ML
0.4 INJECTION, SOLUTION INTRAMUSCULAR; INTRAVENOUS; SUBCUTANEOUS
Status: DISCONTINUED | OUTPATIENT
Start: 2022-11-26 | End: 2022-11-28 | Stop reason: HOSPADM

## 2022-11-26 RX ORDER — AMOXICILLIN 250 MG
1 CAPSULE ORAL 2 TIMES DAILY
Status: DISCONTINUED | OUTPATIENT
Start: 2022-11-26 | End: 2022-11-28 | Stop reason: HOSPADM

## 2022-11-26 RX ORDER — OXYTOCIN 10 [USP'U]/ML
10 INJECTION, SOLUTION INTRAMUSCULAR; INTRAVENOUS
Status: DISCONTINUED | OUTPATIENT
Start: 2022-11-26 | End: 2022-11-28 | Stop reason: HOSPADM

## 2022-11-26 RX ORDER — IBUPROFEN 400 MG/1
800 TABLET, FILM COATED ORAL EVERY 6 HOURS
Status: DISCONTINUED | OUTPATIENT
Start: 2022-11-26 | End: 2022-11-28 | Stop reason: HOSPADM

## 2022-11-26 RX ORDER — NALOXONE HYDROCHLORIDE 0.4 MG/ML
0.2 INJECTION, SOLUTION INTRAMUSCULAR; INTRAVENOUS; SUBCUTANEOUS
Status: DISCONTINUED | OUTPATIENT
Start: 2022-11-26 | End: 2022-11-28 | Stop reason: HOSPADM

## 2022-11-26 RX ORDER — HYDROMORPHONE HCL IN WATER/PF 6 MG/30 ML
0.4 PATIENT CONTROLLED ANALGESIA SYRINGE INTRAVENOUS EVERY 5 MIN PRN
Status: DISCONTINUED | OUTPATIENT
Start: 2022-11-26 | End: 2022-11-26 | Stop reason: HOSPADM

## 2022-11-26 RX ORDER — ACETAMINOPHEN 325 MG/1
975 TABLET ORAL EVERY 6 HOURS
Status: DISCONTINUED | OUTPATIENT
Start: 2022-11-26 | End: 2022-11-28 | Stop reason: HOSPADM

## 2022-11-26 RX ORDER — PROCHLORPERAZINE MALEATE 10 MG
10 TABLET ORAL EVERY 6 HOURS PRN
Status: DISCONTINUED | OUTPATIENT
Start: 2022-11-26 | End: 2022-11-28 | Stop reason: HOSPADM

## 2022-11-26 RX ORDER — OXYTOCIN/0.9 % SODIUM CHLORIDE 30/500 ML
100-340 PLASTIC BAG, INJECTION (ML) INTRAVENOUS CONTINUOUS PRN
Status: DISCONTINUED | OUTPATIENT
Start: 2022-11-26 | End: 2022-11-28 | Stop reason: HOSPADM

## 2022-11-26 RX ORDER — METOCLOPRAMIDE 10 MG/1
10 TABLET ORAL EVERY 6 HOURS PRN
Status: DISCONTINUED | OUTPATIENT
Start: 2022-11-26 | End: 2022-11-28 | Stop reason: HOSPADM

## 2022-11-26 RX ORDER — PROCHLORPERAZINE 25 MG
25 SUPPOSITORY, RECTAL RECTAL EVERY 12 HOURS PRN
Status: DISCONTINUED | OUTPATIENT
Start: 2022-11-26 | End: 2022-11-28 | Stop reason: HOSPADM

## 2022-11-26 RX ORDER — OXYTOCIN/0.9 % SODIUM CHLORIDE 30/500 ML
PLASTIC BAG, INJECTION (ML) INTRAVENOUS CONTINUOUS PRN
Status: DISCONTINUED | OUTPATIENT
Start: 2022-11-26 | End: 2022-11-26

## 2022-11-26 RX ORDER — MODIFIED LANOLIN
OINTMENT (GRAM) TOPICAL
Status: DISCONTINUED | OUTPATIENT
Start: 2022-11-26 | End: 2022-11-28 | Stop reason: HOSPADM

## 2022-11-26 RX ORDER — SIMETHICONE 80 MG
80 TABLET,CHEWABLE ORAL 4 TIMES DAILY PRN
Status: DISCONTINUED | OUTPATIENT
Start: 2022-11-26 | End: 2022-11-28 | Stop reason: HOSPADM

## 2022-11-26 RX ORDER — ONDANSETRON 4 MG/1
4 TABLET, ORALLY DISINTEGRATING ORAL EVERY 30 MIN PRN
Status: DISCONTINUED | OUTPATIENT
Start: 2022-11-26 | End: 2022-11-26 | Stop reason: HOSPADM

## 2022-11-26 RX ORDER — METHYLERGONOVINE MALEATE 0.2 MG/ML
200 INJECTION INTRAVENOUS
Status: DISCONTINUED | OUTPATIENT
Start: 2022-11-26 | End: 2022-11-28 | Stop reason: HOSPADM

## 2022-11-26 RX ORDER — HYDROCORTISONE 25 MG/G
CREAM TOPICAL 3 TIMES DAILY PRN
Status: DISCONTINUED | OUTPATIENT
Start: 2022-11-26 | End: 2022-11-28 | Stop reason: HOSPADM

## 2022-11-26 RX ORDER — ONDANSETRON 2 MG/ML
4 INJECTION INTRAMUSCULAR; INTRAVENOUS EVERY 6 HOURS PRN
Status: DISCONTINUED | OUTPATIENT
Start: 2022-11-26 | End: 2022-11-28 | Stop reason: HOSPADM

## 2022-11-26 RX ORDER — HYDROMORPHONE HCL IN WATER/PF 6 MG/30 ML
0.2 PATIENT CONTROLLED ANALGESIA SYRINGE INTRAVENOUS EVERY 5 MIN PRN
Status: DISCONTINUED | OUTPATIENT
Start: 2022-11-26 | End: 2022-11-26 | Stop reason: HOSPADM

## 2022-11-26 RX ORDER — LIDOCAINE HYDROCHLORIDE 10 MG/ML
INJECTION, SOLUTION INFILTRATION; PERINEURAL PRN
Status: DISCONTINUED | OUTPATIENT
Start: 2022-11-26 | End: 2022-11-26

## 2022-11-26 RX ORDER — OXYCODONE HYDROCHLORIDE 5 MG/1
5 TABLET ORAL EVERY 4 HOURS PRN
Status: DISCONTINUED | OUTPATIENT
Start: 2022-11-26 | End: 2022-11-28 | Stop reason: HOSPADM

## 2022-11-26 RX ORDER — MORPHINE SULFATE 0.5 MG/ML
INJECTION, SOLUTION EPIDURAL; INTRATHECAL; INTRAVENOUS PRN
Status: DISCONTINUED | OUTPATIENT
Start: 2022-11-26 | End: 2022-11-26

## 2022-11-26 RX ORDER — KETOROLAC TROMETHAMINE 30 MG/ML
30 INJECTION, SOLUTION INTRAMUSCULAR; INTRAVENOUS EVERY 6 HOURS
Status: COMPLETED | OUTPATIENT
Start: 2022-11-26 | End: 2022-11-26

## 2022-11-26 RX ORDER — AMOXICILLIN 250 MG
2 CAPSULE ORAL 2 TIMES DAILY
Status: DISCONTINUED | OUTPATIENT
Start: 2022-11-26 | End: 2022-11-28 | Stop reason: HOSPADM

## 2022-11-26 RX ORDER — ONDANSETRON 2 MG/ML
INJECTION INTRAMUSCULAR; INTRAVENOUS PRN
Status: DISCONTINUED | OUTPATIENT
Start: 2022-11-26 | End: 2022-11-26

## 2022-11-26 RX ORDER — SODIUM CHLORIDE, SODIUM LACTATE, POTASSIUM CHLORIDE, CALCIUM CHLORIDE 600; 310; 30; 20 MG/100ML; MG/100ML; MG/100ML; MG/100ML
INJECTION, SOLUTION INTRAVENOUS CONTINUOUS
Status: DISCONTINUED | OUTPATIENT
Start: 2022-11-26 | End: 2022-11-26 | Stop reason: HOSPADM

## 2022-11-26 RX ORDER — BISACODYL 10 MG
10 SUPPOSITORY, RECTAL RECTAL DAILY PRN
Status: DISCONTINUED | OUTPATIENT
Start: 2022-11-28 | End: 2022-11-28 | Stop reason: HOSPADM

## 2022-11-26 RX ORDER — CARBOPROST TROMETHAMINE 250 UG/ML
250 INJECTION, SOLUTION INTRAMUSCULAR
Status: DISCONTINUED | OUTPATIENT
Start: 2022-11-26 | End: 2022-11-28 | Stop reason: HOSPADM

## 2022-11-26 RX ORDER — ONDANSETRON 2 MG/ML
4 INJECTION INTRAMUSCULAR; INTRAVENOUS EVERY 30 MIN PRN
Status: DISCONTINUED | OUTPATIENT
Start: 2022-11-26 | End: 2022-11-26 | Stop reason: HOSPADM

## 2022-11-26 RX ORDER — ONDANSETRON 4 MG/1
4 TABLET, ORALLY DISINTEGRATING ORAL EVERY 6 HOURS PRN
Status: DISCONTINUED | OUTPATIENT
Start: 2022-11-26 | End: 2022-11-28 | Stop reason: HOSPADM

## 2022-11-26 RX ORDER — FENTANYL CITRATE 50 UG/ML
50 INJECTION, SOLUTION INTRAMUSCULAR; INTRAVENOUS EVERY 5 MIN PRN
Status: DISCONTINUED | OUTPATIENT
Start: 2022-11-26 | End: 2022-11-26 | Stop reason: HOSPADM

## 2022-11-26 RX ORDER — MAGNESIUM HYDROXIDE 1200 MG/15ML
LIQUID ORAL PRN
Status: DISCONTINUED | OUTPATIENT
Start: 2022-11-26 | End: 2022-11-26 | Stop reason: HOSPADM

## 2022-11-26 RX ORDER — DEXTROSE, SODIUM CHLORIDE, SODIUM LACTATE, POTASSIUM CHLORIDE, AND CALCIUM CHLORIDE 5; .6; .31; .03; .02 G/100ML; G/100ML; G/100ML; G/100ML; G/100ML
INJECTION, SOLUTION INTRAVENOUS CONTINUOUS
Status: DISCONTINUED | OUTPATIENT
Start: 2022-11-26 | End: 2022-11-28 | Stop reason: HOSPADM

## 2022-11-26 RX ORDER — FENTANYL CITRATE 50 UG/ML
INJECTION, SOLUTION INTRAMUSCULAR; INTRAVENOUS PRN
Status: DISCONTINUED | OUTPATIENT
Start: 2022-11-26 | End: 2022-11-26

## 2022-11-26 RX ORDER — MISOPROSTOL 100 UG/1
400 TABLET ORAL
Status: DISCONTINUED | OUTPATIENT
Start: 2022-11-26 | End: 2022-11-28 | Stop reason: HOSPADM

## 2022-11-26 RX ORDER — LIDOCAINE 40 MG/G
CREAM TOPICAL
Status: DISCONTINUED | OUTPATIENT
Start: 2022-11-26 | End: 2022-11-28 | Stop reason: HOSPADM

## 2022-11-26 RX ORDER — TRANEXAMIC ACID 10 MG/ML
1 INJECTION, SOLUTION INTRAVENOUS EVERY 30 MIN PRN
Status: DISCONTINUED | OUTPATIENT
Start: 2022-11-26 | End: 2022-11-28 | Stop reason: HOSPADM

## 2022-11-26 RX ORDER — FENTANYL CITRATE 50 UG/ML
25 INJECTION, SOLUTION INTRAMUSCULAR; INTRAVENOUS EVERY 5 MIN PRN
Status: DISCONTINUED | OUTPATIENT
Start: 2022-11-26 | End: 2022-11-26 | Stop reason: HOSPADM

## 2022-11-26 RX ORDER — METOCLOPRAMIDE HYDROCHLORIDE 5 MG/ML
10 INJECTION INTRAMUSCULAR; INTRAVENOUS EVERY 6 HOURS PRN
Status: DISCONTINUED | OUTPATIENT
Start: 2022-11-26 | End: 2022-11-28 | Stop reason: HOSPADM

## 2022-11-26 RX ORDER — BUPIVACAINE HYDROCHLORIDE 7.5 MG/ML
INJECTION, SOLUTION INTRASPINAL PRN
Status: DISCONTINUED | OUTPATIENT
Start: 2022-11-26 | End: 2022-11-26

## 2022-11-26 RX ADMIN — SODIUM CHLORIDE, SODIUM LACTATE, POTASSIUM CHLORIDE, AND CALCIUM CHLORIDE: 600; 310; 30; 20 INJECTION, SOLUTION INTRAVENOUS at 00:20

## 2022-11-26 RX ADMIN — BUPIVACAINE HYDROCHLORIDE 1.7 ML: 7.5 INJECTION, SOLUTION INTRASPINAL at 00:55

## 2022-11-26 RX ADMIN — ACETAMINOPHEN 975 MG: 325 TABLET ORAL at 06:36

## 2022-11-26 RX ADMIN — FENTANYL CITRATE 50 MCG: 50 INJECTION, SOLUTION INTRAMUSCULAR; INTRAVENOUS at 01:23

## 2022-11-26 RX ADMIN — SIMETHICONE 80 MG: 80 TABLET, CHEWABLE ORAL at 14:47

## 2022-11-26 RX ADMIN — PHENYLEPHRINE HYDROCHLORIDE 0.5 MCG/KG/MIN: 10 INJECTION INTRAVENOUS at 00:55

## 2022-11-26 RX ADMIN — FENTANYL CITRATE 50 MCG: 50 INJECTION, SOLUTION INTRAMUSCULAR; INTRAVENOUS at 02:26

## 2022-11-26 RX ADMIN — FENTANYL CITRATE 50 MCG: 50 INJECTION, SOLUTION INTRAMUSCULAR; INTRAVENOUS at 02:12

## 2022-11-26 RX ADMIN — ACETAMINOPHEN 975 MG: 325 TABLET ORAL at 18:38

## 2022-11-26 RX ADMIN — ONDANSETRON 4 MG: 2 INJECTION INTRAMUSCULAR; INTRAVENOUS at 08:00

## 2022-11-26 RX ADMIN — KETOROLAC TROMETHAMINE 30 MG: 30 INJECTION, SOLUTION INTRAMUSCULAR; INTRAVENOUS at 03:38

## 2022-11-26 RX ADMIN — KETOROLAC TROMETHAMINE 30 MG: 30 INJECTION, SOLUTION INTRAMUSCULAR; INTRAVENOUS at 14:44

## 2022-11-26 RX ADMIN — ONDANSETRON HYDROCHLORIDE 4 MG: 2 SOLUTION INTRAMUSCULAR; INTRAVENOUS at 01:13

## 2022-11-26 RX ADMIN — SODIUM CHLORIDE, SODIUM LACTATE, POTASSIUM CHLORIDE, AND CALCIUM CHLORIDE: 600; 310; 30; 20 INJECTION, SOLUTION INTRAVENOUS at 01:21

## 2022-11-26 RX ADMIN — ACETAMINOPHEN 975 MG: 325 TABLET ORAL at 23:55

## 2022-11-26 RX ADMIN — SENNOSIDES AND DOCUSATE SODIUM 2 TABLET: 50; 8.6 TABLET ORAL at 21:03

## 2022-11-26 RX ADMIN — SODIUM CITRATE AND CITRIC ACID MONOHYDRATE 30 ML: 500; 334 SOLUTION ORAL at 00:39

## 2022-11-26 RX ADMIN — KETOROLAC TROMETHAMINE 30 MG: 30 INJECTION, SOLUTION INTRAMUSCULAR; INTRAVENOUS at 09:01

## 2022-11-26 RX ADMIN — SIMETHICONE 80 MG: 80 TABLET, CHEWABLE ORAL at 03:30

## 2022-11-26 RX ADMIN — Medication 2 G: at 00:43

## 2022-11-26 RX ADMIN — OXYCODONE HYDROCHLORIDE 5 MG: 5 TABLET ORAL at 19:26

## 2022-11-26 RX ADMIN — SENNOSIDES AND DOCUSATE SODIUM 1 TABLET: 50; 8.6 TABLET ORAL at 23:54

## 2022-11-26 RX ADMIN — Medication 340 ML/HR: at 01:13

## 2022-11-26 RX ADMIN — SIMETHICONE 80 MG: 80 TABLET, CHEWABLE ORAL at 11:51

## 2022-11-26 RX ADMIN — FENTANYL CITRATE 50 MCG: 50 INJECTION, SOLUTION INTRAMUSCULAR; INTRAVENOUS at 01:34

## 2022-11-26 RX ADMIN — FENTANYL CITRATE 50 MCG: 50 INJECTION, SOLUTION INTRAMUSCULAR; INTRAVENOUS at 02:03

## 2022-11-26 RX ADMIN — ACETAMINOPHEN 975 MG: 325 TABLET ORAL at 00:20

## 2022-11-26 RX ADMIN — MORPHINE SULFATE 0.1 MG: 0.5 INJECTION, SOLUTION EPIDURAL; INTRATHECAL; INTRAVENOUS at 00:55

## 2022-11-26 RX ADMIN — SODIUM CHLORIDE, SODIUM LACTATE, POTASSIUM CHLORIDE, CALCIUM CHLORIDE AND DEXTROSE MONOHYDRATE: 5; 600; 310; 30; 20 INJECTION, SOLUTION INTRAVENOUS at 03:42

## 2022-11-26 RX ADMIN — METOCLOPRAMIDE HYDROCHLORIDE 10 MG: 5 INJECTION INTRAMUSCULAR; INTRAVENOUS at 03:53

## 2022-11-26 RX ADMIN — OXYCODONE HYDROCHLORIDE 5 MG: 5 TABLET ORAL at 09:00

## 2022-11-26 RX ADMIN — ACETAMINOPHEN 975 MG: 325 TABLET ORAL at 11:50

## 2022-11-26 RX ADMIN — SENNOSIDES AND DOCUSATE SODIUM 1 TABLET: 50; 8.6 TABLET ORAL at 11:50

## 2022-11-26 RX ADMIN — IBUPROFEN 800 MG: 400 TABLET, FILM COATED ORAL at 21:03

## 2022-11-26 RX ADMIN — LIDOCAINE HYDROCHLORIDE 3 ML: 10 INJECTION, SOLUTION INFILTRATION; PERINEURAL at 00:54

## 2022-11-26 ASSESSMENT — ACTIVITIES OF DAILY LIVING (ADL)
ADLS_ACUITY_SCORE: 22
ADLS_ACUITY_SCORE: 22
ADLS_ACUITY_SCORE: 37
WALKING_OR_CLIMBING_STAIRS_DIFFICULTY: NO
CHANGE_IN_FUNCTIONAL_STATUS_SINCE_ONSET_OF_CURRENT_ILLNESS/INJURY: NO
ADLS_ACUITY_SCORE: 22
FALL_HISTORY_WITHIN_LAST_SIX_MONTHS: NO
DOING_ERRANDS_INDEPENDENTLY_DIFFICULTY: NO
ADLS_ACUITY_SCORE: 37
ADLS_ACUITY_SCORE: 37
CONCENTRATING,_REMEMBERING_OR_MAKING_DECISIONS_DIFFICULTY: NO
DIFFICULTY_EATING/SWALLOWING: NO
DRESSING/BATHING_DIFFICULTY: NO
NUMBER_OF_TIMES_PATIENT_HAS_FALLEN_WITHIN_LAST_SIX_MONTHS: 0
ADLS_ACUITY_SCORE: 22
ADLS_ACUITY_SCORE: 20
ADLS_ACUITY_SCORE: 37
WEAR_GLASSES_OR_BLIND: NO
TOILETING_ISSUES: NO
ADLS_ACUITY_SCORE: 22
ADLS_ACUITY_SCORE: 22
ADLS_ACUITY_SCORE: 20

## 2022-11-26 ASSESSMENT — LIFESTYLE VARIABLES: TOBACCO_USE: 1

## 2022-11-26 NOTE — OP NOTE
[facility]  Obstetrics Service Operative Report    Surgery Date:  2022  Surgeon(s): Adina Trimble MD   Assistants: Latasha Peterson MD    Preoperative Diagnoses:  1.  Intrauterine pregnancy at 39w2d  2.  History of  section x2  3.  SROM    Postoperative diagnoses: Same     Procedure performed:  Repeat low segment transverse  section via Pfannenstiel incision with double layer uterine closure    Anesthesia:  Spinal with duramorph  Quant Blood Loss (mL): 543 mL  Specimens: cord blood  Complications: None      Operative findings: Single viable male infant at 0112 hours on 2022. Apgars of 7 and 8 at one and five minutes.  Birth weight (GM): pending.  Fetal presentation: cephalic.  Position: ROT  Amniotic fluid: clear.  Placenta intact with 3 vessel cord.   Normal appearing uterus, fallopian tubes, ovaries. Filmy bladder serosa adhesions to anterior uterus.  No significant abdominal wall adhesions      Indication: Nyasia Raya is a 32 year old, , who was admitted at 39w2d by LMP c/w 10w ultrasound for SROM.  She had a history of  section x2 and desired repeat. The risks, benefits, and alternatives of  delivery were explained and the patient agreed to proceed.     Procedure details:  After obtaining informed consent, the patient was taken to the operating room. She received 2g Ancef prior to the skin incision. She was placed in the dorsal supine position with a leftward tilt and prepped and draped in the usual sterile fashion. Following test of adequate spinal anesthesia, the abdomen was entered through a transverse skin incision through her previous scar. The skin incision was made sharply and carried through the subcutaneous tissue to the fascia.  Fascia was incised in the midline and extended laterally with the Desir scissors.  The superior margin of the fascial incision was grasped with Kocher clamps and dissected from the underlying muscle sharp and  blunt dissecton, which was then repeated at the lower margin of the fascial incision.  The muscle was  in the midline.  The peritoneum was entered bluntly and the opening extended by digital dissection with care to avoid the bladder.  An Eamon O retractor was placed. The vesicouterine peritoneum was entered sharply with takedown of the filmy bladder serosal adhesions and the incision extended laterally. The bladder flap was created digitally. The lower segment of the uterus was opened sharply in a transverse fashion and extended with digital pressure. The infant's head was elevated to the level of the hysterotomy and was delivered atraumatically. The cord was doubly clamped and cut and the infant was handed off Dr Peterson.  The placenta was expressed.  The uterus was exteriorized from the abdomen and cleared of all clots and debris.  The uterus was massaged and was noted to be firm.  Oxytocin was given through the running IV.  With massage as well as administration of oxytocin, good uterine tone was achieved. The hysterotomy was repaired with 0-vicryl suture in a running locked fashion. A 2nd layer of 0-monocryl was used to imbricate the incision and good hemostasis was achieved. The bladder flap was inspected and found to be hemostatic.      The posterior cul-de-sac was suctioned and the uterus was returned to the abdomen.  The bilateral pericolic gutters were suctioned.  The hysterotomy was again inspected and found to have no active sites of bleeding.  The abdominal wall was examined and found to have no active sites of bleeding.  The fascia was closed with a running suture of 0-vicryl.  Subcutaneous tissue was irrigated. Areas that were oozing were controlled with cautery. The subcutaneous tissue was reapproximated with 3-0 plain gut. The skin was closed with 4-0 vicryl. The patient tolerated the procedure well and was taken to the recovery room in stable condition. All sponge, needle and instrument  counts were correct x2.     Assistant: Dr Peterson was requested to be present for this  section due to SROM and possible need for  resuscitation.    Adina Trimble  Ob/Gyn   2022 1:45 AM

## 2022-11-26 NOTE — PLAN OF CARE
Nyasia Raya is doing well after  section. Her dressing is still in place and does not have visible drainage on it.. Fundus is firm with light bleeding and small clots. Patient is breast feeding well. Patient has moderate amount so of pain managed with scheduled medications. Adorno remains in place.  She has most of her sensation back in her legs. Bowel sounds are active. She had had some nausea and itching.   Patient has verbalized understanding of routine cares and warning signs. The ISIDRO Judge is in the room.

## 2022-11-26 NOTE — ANESTHESIA PROCEDURE NOTES
"Intrathecal injection Procedure Note    Pre-Procedure   Staff -        CRNA: Trinidad Alvarado APRN CRNA       Performed By: CRNA       Location: OR       Procedure Start/Stop Times: 11/26/2022 12:51 AM and 11/26/2022 12:55 AM       Pre-Anesthestic Checklist: patient identified, IV checked, risks and benefits discussed, informed consent, monitors and equipment checked, pre-op evaluation, at physician/surgeon's request and post-op pain management  Timeout:       Correct Patient: Yes        Correct Procedure: Yes        Correct Site: Yes        Correct Position: Yes   Procedure Documentation  Procedure: intrathecal injection       Diagnosis: Primary Anesthesia       Patient Position: sitting       Patient Prep/Sterile Barriers: sterile gloves, mask, patient draped       Skin prep: Chloraprep       Insertion Site: L3-4. (midline approach).       Needle Gauge: 27.        Needle Length (Inches): 3.5        Spinal Needle Type: Debby tip       Introducer used       Introducer: 20 G       # of attempts: 1 and  # of redirects:  0    Assessment/Narrative         Paresthesias: No.       CSF fluid: clear.    Medication(s) Administered   Medication Administration Time: 11/26/2022 12:51 AM      FOR [facility] ONLY:   Pain Team Contact information: please page the Pain Team Via App [employer] [employer] Search \"Pain\". During daytime hours, please page the attending first. At night please page the resident first.    "

## 2022-11-26 NOTE — OR NURSING
Birth of viable baby boy @ 0112.  Cord blood and umbilical cord segment labeled and given to WHGABINO Hyman RN on 11/26/2022 at 1:21 AM Placenta not sent per Dr. Trimble

## 2022-11-26 NOTE — ANESTHESIA POSTPROCEDURE EVALUATION
Patient: Nyasia Raya    Procedure: Procedure(s):   SECTION       Anesthesia Type:  Spinal    Note:  Disposition: Outpatient   Postop Pain Control: Uneventful            Sign Out: Well controlled pain   PONV: No   Neuro/Psych: Uneventful            Sign Out: Acceptable/Baseline neuro status   Airway/Respiratory: Uneventful            Sign Out: Acceptable/Baseline resp. status   CV/Hemodynamics: Uneventful            Sign Out: Acceptable CV status; No obvious hypovolemia; No obvious fluid overload   Other NRE: NONE   DID A NON-ROUTINE EVENT OCCUR? No           Last vitals:  Vitals Value Taken Time   /82 220   Temp 97  F (36.1  C) 22   Pulse 78 22   Resp 16 22   SpO2 98 % 22   Vitals shown include unvalidated device data.    Electronically Signed By: KING OVALLE CRNA  2022  2:21 AM

## 2022-11-26 NOTE — PROGRESS NOTES
Patient arrived from home with FOB she is grossly ruptured. 1-2cm.  Clear fluid, high and ballotable. Contractions q2-5 minutes. Category I tracing.  MD notified. Will prep for C/section.

## 2022-11-26 NOTE — PROGRESS NOTES
Patient was itchy, nauseated, and starting to have post op incision pain. Was medicated for pain, nausea,and ice packs for itching which all been effective in treatment. Baby at bedside, breast feeding g with minimal assist ence.  at bedside and supportive.

## 2022-11-26 NOTE — ANESTHESIA PREPROCEDURE EVALUATION
Anesthesia Pre-Procedure Evaluation    Patient: Nyasia Raya   MRN: [id number] : 1990        Procedure : Procedure(s):   SECTION          Past Medical History:   Diagnosis Date     Asthma 5/10/2013     Insomnia 5/10/2013     Spondylolisthesis 5/10/2013     Tobacco abuse       Past Surgical History:   Procedure Laterality Date      SECTION  1/15/2014    Procedure:  SECTION;;  Surgeon: Leena Mayorga MD;  Location: [facility] OR      SECTION N/A 2021    Procedure:  SECTION;  Surgeon: Sadiq Lama MD;  Location: [facility] OR      No Known Allergies   Social History     Tobacco Use     Smoking status: Former     Packs/day: 0.08     Types: Cigarettes     Quit date:      Years since quittin.9     Smokeless tobacco: Never     Tobacco comments:     E-cig   Substance Use Topics     Alcohol use: No     Comment: signed up for quit plan and her workplace has programs      Wt Readings from Last 1 Encounters:   22 89.4 kg (197 lb)        Anesthesia Evaluation   Pt has had prior anesthetic.     No history of anesthetic complications       ROS/MED HX  ENT/Pulmonary:     (+) tobacco use, Current use, asthma     Neurologic:  - neg neurologic ROS     Cardiovascular:  - neg cardiovascular ROS     METS/Exercise Tolerance: >4 METS    Hematologic:  - neg hematologic  ROS     Musculoskeletal:  - neg musculoskeletal ROS     GI/Hepatic:     (+) GERD,     Renal/Genitourinary:  - neg Renal ROS     Endo:     (+) Obesity,     Psychiatric/Substance Use:     (+) psychiatric history anxiety     Infectious Disease:  - neg infectious disease ROS     Malignancy:  - neg malignancy ROS     Other:  - neg other ROS          Physical Exam    Airway  airway exam normal      Mallampati: II   TM distance: > 3 FB   Neck ROM: full   Mouth opening: > 3 cm    Respiratory Devices and Support         Dental  no notable dental history     (+) chipped    B=Bridge, C=Chipped, L=Loose,  M=Missing    Cardiovascular   cardiovascular exam normal       Rhythm and rate: regular and normal     Pulmonary   pulmonary exam normal        breath sounds clear to auscultation           OUTSIDE LABS:  CBC:   Lab Results   Component Value Date    WBC 10.8 11/25/2022    WBC 11.0 10/25/2022    HGB 9.5 (L) 11/25/2022    HGB 10.2 (L) 10/25/2022    HCT 29.2 (L) 11/25/2022    HCT 31.7 (L) 10/25/2022     11/25/2022     10/25/2022     BMP:   Lab Results   Component Value Date     02/14/2020     03/26/2019    POTASSIUM 3.7 02/14/2020    POTASSIUM 4.0 03/26/2019    CHLORIDE 105 02/14/2020    CHLORIDE 108 03/26/2019    CO2 25 02/14/2020    CO2 28 03/26/2019    BUN 11 02/14/2020    BUN 6 (L) 03/26/2019    CR 0.76 02/14/2020    CR 0.67 03/26/2019    GLC 95 02/14/2020    GLC 80 03/26/2019     COAGS:   Lab Results   Component Value Date    FIBR 537 (H) 10/17/2022     POC:   Lab Results   Component Value Date    HCG Positive (A) 03/10/2020    HCGS (A) 03/05/2014     Canceled, Test credited  CANCEL PER TISHA PENN TO DO QUANT HCG     HEPATIC:   Lab Results   Component Value Date    ALBUMIN 4.0 02/14/2020    PROTTOTAL 6.3 (L) 02/14/2020    ALT 8 02/14/2020    AST 9 (L) 02/14/2020    ALKPHOS 91 02/14/2020    BILITOTAL 0.4 02/14/2020     OTHER:   Lab Results   Component Value Date    LACT 0.8 03/26/2019    A1C 5.0 06/19/2019    JYOTSNA 8.8 02/14/2020    MAG 1.7 (L) 02/14/2020    TSH 0.41 06/19/2019       Anesthesia Plan    ASA Status:  2, emergent    NPO Status:  NPO Appropriate    Anesthesia Type: Spinal.              Consents    Anesthesia Plan(s) and associated risks, benefits, and realistic alternatives discussed. Questions answered and patient/representative(s) expressed understanding.     - Discussed: Risks, Benefits and Alternatives for BOTH SEDATION and the PROCEDURE were discussed     - Discussed with:  Patient      - Extended Intubation/Ventilatory Support Discussed: No.      - Patient is DNR/DNI  Status: No    Use of blood products discussed: No .     Postoperative Care    Pain management: IV analgesics, intrathecal morphine, Multi-modal analgesia.   PONV prophylaxis: Ondansetron (or other 5HT-3), Dexamethasone or Solumedrol     Comments:                KING OVALLE CRNA

## 2022-11-26 NOTE — OR NURSING
Baby left in warmer with OB RN, Dr. Peterson, and Nan Jones from respiratory therapy and Dad at 0143.

## 2022-11-26 NOTE — H&P
[facility] Labor and Delivery History and Physical    Nyasia Raya MRN# [id number]   Age: 32 year old YOB: 1990     Date of Admission:  2022    Primary care provider: Radha Garcia           Chief Complaint:   Nyasia Raya is a 32 year old  at 39w1d by LMP c/w 10 wk US admitted for SROM, history of  section. She reports her water broke around 10 PM.           Pregnancy history:     OBSTETRIC HISTORY:    OB History    Para Term  AB Living   5 2 2 0 2 2   SAB IAB Ectopic Multiple Live Births   0 1 0 0 2      # Outcome Date GA Lbr Moiz/2nd Weight Sex Delivery Anes PTL Lv   5 Current            4 Term 21 37w5d  3.7 kg (8 lb 2.5 oz) M CS-LTranv Spinal N TING      Name: Yulisa RAYA Aric      Apgar1: 8  Apgar5: 9   3 AB 20 7w3d    SAB      2 Term 01/15/14 41w4d  4.252 kg (9 lb 6 oz) M CS-LTranv TOPICAL, Spinal  TING      Birth Comments: none      Name: Wallace      Apgar1: 6  Apgar5: 8   1 IAB  6w0d             Birth Comments: no comps       EDC: Estimated Date of Delivery: Dec 1, 2022    Prenatal Labs:   Lab Results   Component Value Date    ABO O 2021    RH Pos 2021    AS Negative 2022    HEPBANG Nonreactive 2022    CHPCRT  2015     Negative   Negative for C. trachomatis rRNA by transcription mediated amplification.   A negative result by transcription mediated amplification does not preclude the   presence of C. trachomatis infection because results are dependent on proper   and adequate collection, absence of inhibitors, and sufficient rRNA to be   detected.      GCPCRT  2015     Negative   Negative for N. gonorrhoeae rRNA by transcription mediated amplification.   A negative result by transcription mediated amplification does not preclude the   presence of N. gonorrhoeae infection because results are dependent on proper   and adequate collection, absence of inhibitors, and sufficient  rRNA to be   detected.      TREPAB Negative 2013    RUBELLAABIGG 2013    HGB 9.5 (L) 2022    HIV Negative 2013       GBS Status:   No results found for: GBS    Active Problem List  Patient Active Problem List   Diagnosis     Insomnia     Asthma     Spondylolisthesis     Family history of congenital heart defect     Night terrors, adult     Obesity     Family planning     Smoker     Attention deficit hyperactivity disorder (ADHD), combined type     Congenital spondylolisthesis     History of  section       Medication Prior to Admission  Medications Prior to Admission   Medication Sig Dispense Refill Last Dose     albuterol (PROAIR HFA/PROVENTIL HFA/VENTOLIN HFA) 108 (90 Base) MCG/ACT inhaler Inhale 2 puffs into the lungs every 6 hours        diphenhydrAMINE-acetaminophen (TYLENOL PM)  MG tablet Take 1 tablet by mouth nightly as needed for sleep        ferrous sulfate (FEROSUL) 325 (65 Fe) MG tablet Take 1 tablet (325 mg) by mouth daily (with breakfast) (Patient not taking: Reported on 2022) 90 tablet 1      Prenatal Vit-Fe Fumarate-FA (PRENATAL MULTIVITAMIN W/IRON) 27-0.8 MG tablet Take 1 tablet by mouth daily      .        Maternal Past Medical History:     Past Medical History:   Diagnosis Date     Asthma 5/10/2013     Insomnia 5/10/2013     Spondylolisthesis 5/10/2013     Tobacco abuse      Past Surgical History:   Procedure Laterality Date      SECTION  1/15/2014    Procedure:  SECTION;;  Surgeon: Leena Mayorga MD;  Location: [facility] OR      SECTION N/A 2021    Procedure:  SECTION;  Surgeon: Sadiq Lama MD;  Location:  OR                       Family History:     Family History   Problem Relation Age of Onset     Diabetes Mother      Diabetes Father      Heart Disease Father 47        MI     Family history reviewed and updated in [facility]            Social History:     Social History     Tobacco Use     Smoking status: Former      Packs/day: 0.08     Types: Cigarettes     Quit date:      Years since quittin.8     Smokeless tobacco: Never     Tobacco comments:     E-cig   Substance Use Topics     Alcohol use: No     Comment: signed up for quit plan and her workplace has programs            Review of Systems:   The Review of Systems is negative other than noted in the HPI          Physical Exam:   No data found.  Gen: resting in bed, NAD  CV: RRR  Resp: CTAB  Abd: gravid, soft, nontender  Ext: nontender    Cervix: 1.5 cm per RN  Membranes: grossly ruptured  EFW: 8 lbs  Presentation:Cephalic    Fetal Heart Rate Tracin, mod variability, + accels, no decels  Tocometer: irregular, 3-5 ctx in 10 min        Assessment:   Nyasia Raya is a 32 year old  at 39w1d admitted with SROM, history of  section x2.          Plan:   FWB: Cat I, reactive. EFW 8 lbs  Prepare for repeat  section    Adina Trimble MD

## 2022-11-26 NOTE — OR NURSING
PACU Transfer Note    Nyasia Raya remains in 412.  Equipment used for transport:  none.  Accompanied by:  Mary DUNN  Prescriptions were: n/a    PACU Respiratory Event Documentation     1) Episodes of Apnea greater than or equal to 10 seconds: 0    2) Bradypnea - less than 8 breaths per minute: 0    3) Pain score on 0 to 10 scale: 6    4) Pain-sedation mismatch (yes or no): no    5) Repeated 02 desaturation less than 90% (yes or no): no    Anesthesia notified? (yes or no): no    Any of the above events occuring repeatedly in separate 30 minute intervals may be considered recurrent PACU respiratory events.    Patient stable and meets phase 1 discharge criteria for transport from PACU.

## 2022-11-26 NOTE — PROGRESS NOTES
Out of caring for self mostly indenpently.Breast feeds baby very well. Pain medications have been effective in pain management.Has had restful day.

## 2022-11-26 NOTE — ANESTHESIA CARE TRANSFER NOTE
Patient: Nyasia Raya    Procedure: Procedure(s):   SECTION       Diagnosis: History of  [Z98.891]  Diagnosis Additional Information: No value filed.    Anesthesia Type:   Spinal     Note:    Oropharynx: oropharynx clear of all foreign objects  Level of Consciousness: awake  Oxygen Supplementation: room air    Independent Airway: airway patency satisfactory and stable  Dentition: dentition unchanged  Vital Signs Stable: post-procedure vital signs reviewed and stable  Report to RN Given: handoff report given  Patient transferred to: Labor and Delivery    Handoff Report: Identifed the Patient, Identified the Reponsible Provider, Reviewed the pertinent medical history, Discussed the surgical course, Reviewed Intra-OP anesthesia mangement and issues during anesthesia, Set expectations for post-procedure period and Allowed opportunity for questions and acknowledgement of understanding      Vitals:  Vitals Value Taken Time   /67 22 0151   Temp     Pulse 79 22 0153   Resp 16 22 0153   SpO2 97 % 22 0153   Vitals shown include unvalidated device data.    Electronically Signed By: KING OVALLE CRNA  2022  1:53 AM

## 2022-11-27 LAB — HGB BLD-MCNC: 8.4 G/DL (ref 11.7–15.7)

## 2022-11-27 PROCEDURE — 85018 HEMOGLOBIN: CPT | Performed by: OBSTETRICS & GYNECOLOGY

## 2022-11-27 PROCEDURE — 36415 COLL VENOUS BLD VENIPUNCTURE: CPT | Performed by: OBSTETRICS & GYNECOLOGY

## 2022-11-27 PROCEDURE — 120N000001 HC R&B MED SURG/OB

## 2022-11-27 PROCEDURE — 250N000013 HC RX MED GY IP 250 OP 250 PS 637: Performed by: OBSTETRICS & GYNECOLOGY

## 2022-11-27 RX ADMIN — ACETAMINOPHEN 975 MG: 325 TABLET ORAL at 11:48

## 2022-11-27 RX ADMIN — SENNOSIDES AND DOCUSATE SODIUM 1 TABLET: 50; 8.6 TABLET ORAL at 21:09

## 2022-11-27 RX ADMIN — ACETAMINOPHEN 975 MG: 325 TABLET ORAL at 18:13

## 2022-11-27 RX ADMIN — SENNOSIDES AND DOCUSATE SODIUM 2 TABLET: 50; 8.6 TABLET ORAL at 10:10

## 2022-11-27 RX ADMIN — IBUPROFEN 800 MG: 400 TABLET, FILM COATED ORAL at 09:04

## 2022-11-27 RX ADMIN — IBUPROFEN 800 MG: 400 TABLET, FILM COATED ORAL at 21:09

## 2022-11-27 RX ADMIN — IBUPROFEN 800 MG: 400 TABLET, FILM COATED ORAL at 03:35

## 2022-11-27 RX ADMIN — OXYCODONE HYDROCHLORIDE 5 MG: 5 TABLET ORAL at 09:04

## 2022-11-27 RX ADMIN — ACETAMINOPHEN 975 MG: 325 TABLET ORAL at 06:21

## 2022-11-27 RX ADMIN — SIMETHICONE 80 MG: 80 TABLET, CHEWABLE ORAL at 15:03

## 2022-11-27 RX ADMIN — OXYCODONE HYDROCHLORIDE 5 MG: 5 TABLET ORAL at 19:45

## 2022-11-27 RX ADMIN — IBUPROFEN 800 MG: 400 TABLET, FILM COATED ORAL at 14:54

## 2022-11-27 RX ADMIN — OXYCODONE HYDROCHLORIDE 5 MG: 5 TABLET ORAL at 00:00

## 2022-11-27 RX ADMIN — OXYCODONE HYDROCHLORIDE 5 MG: 5 TABLET ORAL at 14:54

## 2022-11-27 RX ADMIN — SIMETHICONE 80 MG: 80 TABLET, CHEWABLE ORAL at 01:34

## 2022-11-27 RX ADMIN — OXYCODONE HYDROCHLORIDE 5 MG: 5 TABLET ORAL at 04:10

## 2022-11-27 ASSESSMENT — ACTIVITIES OF DAILY LIVING (ADL)
ADLS_ACUITY_SCORE: 22

## 2022-11-27 NOTE — PROGRESS NOTES
Patient up independently in the room. Incision dressing CDI. Fundus firm, bleeding light with no clots. Pain being managed with scheduled and PRN medications. Breast feeding infant independently. Patient denies further needs at this time.     Assessment unchanged from previous. Patient complains of gas pain, simethicone given. Cold packs in use for incisional pain.

## 2022-11-27 NOTE — PROGRESS NOTES
OB/GYN Postpartum Note      S:  Patient is feeling well this morning. A little more sore than last delivery but managing.  Lochia = menses.  Eating and drinking without nausea.  Ambulating without difficulty.  +Flatus. Breastfeeding without questions or concerns.      O:   Vitals:    22 0901 22 1612 22 0135 22 1010   BP: 123/67 125/68 119/67 (!) 143/82   BP Location: Left arm Left arm Left arm    Cuff Size: Adult Regular Adult Regular     Pulse: 70 72 87 85   Resp: 16 18 18 18   Temp: 98.3  F (36.8  C) 98.6  F (37  C) 97.2  F (36.2  C) 97.8  F (36.6  C)   TempSrc: Temporal Tympanic Tympanic Tympanic   SpO2:   97% 99%     General: resting in bed, in NAD  Resp: nonlabored  Abdomen: soft, nontender, nondistended  Fundus firm at umbilicus  Incision: c/d/i  Extremities: 1+ edema in BLE    Hemoglobin   Date Value Ref Range Status   2022 8.4 (L) 11.7 - 15.7 g/dL Final   2021 10.9 (L) 11.7 - 15.7 g/dL Final   ]    A: Ms. Nyasia Raya is a 32 year old  POD #1 s/p RLTCS    P:  Heme 9.5 >  > 8.4, asymptomatic. Will need iron at discharge  GI: tolerating regular diet  Feeding: breast  Contraception: Nexplanon at PP visit  Rubella Immune  Rh positive  Disposition: routine cares, anticipate discharge in 1-2 days    Adina Trimble MD FACOG  OB/GYN  2022 11:41 AM

## 2022-11-28 ENCOUNTER — TELEPHONE (OUTPATIENT)
Dept: OBGYN | Facility: OTHER | Age: 32
End: 2022-11-28

## 2022-11-28 VITALS
RESPIRATION RATE: 18 BRPM | HEART RATE: 74 BPM | DIASTOLIC BLOOD PRESSURE: 67 MMHG | OXYGEN SATURATION: 97 % | TEMPERATURE: 97.5 F | SYSTOLIC BLOOD PRESSURE: 133 MMHG

## 2022-11-28 PROCEDURE — 99207 PR NO CHARGE LOS: CPT | Performed by: OBSTETRICS & GYNECOLOGY

## 2022-11-28 PROCEDURE — 250N000013 HC RX MED GY IP 250 OP 250 PS 637: Performed by: OBSTETRICS & GYNECOLOGY

## 2022-11-28 RX ORDER — AMOXICILLIN 250 MG
1 CAPSULE ORAL 2 TIMES DAILY PRN
Qty: 20 TABLET | Refills: 0 | Status: SHIPPED | OUTPATIENT
Start: 2022-11-28 | End: 2022-12-06

## 2022-11-28 RX ORDER — IBUPROFEN 800 MG/1
800 TABLET, FILM COATED ORAL EVERY 8 HOURS PRN
Qty: 30 TABLET | Refills: 0 | Status: SHIPPED | OUTPATIENT
Start: 2022-11-28 | End: 2023-03-24

## 2022-11-28 RX ORDER — OXYCODONE HYDROCHLORIDE 5 MG/1
5 TABLET ORAL EVERY 6 HOURS PRN
Qty: 20 TABLET | Refills: 0 | Status: SHIPPED | OUTPATIENT
Start: 2022-11-28 | End: 2022-12-06

## 2022-11-28 RX ADMIN — OXYCODONE HYDROCHLORIDE 5 MG: 5 TABLET ORAL at 04:08

## 2022-11-28 RX ADMIN — IBUPROFEN 800 MG: 400 TABLET, FILM COATED ORAL at 08:53

## 2022-11-28 RX ADMIN — OXYCODONE HYDROCHLORIDE 5 MG: 5 TABLET ORAL at 08:52

## 2022-11-28 RX ADMIN — ACETAMINOPHEN 975 MG: 325 TABLET ORAL at 06:28

## 2022-11-28 RX ADMIN — IBUPROFEN 800 MG: 400 TABLET, FILM COATED ORAL at 03:00

## 2022-11-28 RX ADMIN — SENNOSIDES AND DOCUSATE SODIUM 2 TABLET: 50; 8.6 TABLET ORAL at 08:52

## 2022-11-28 RX ADMIN — ACETAMINOPHEN 975 MG: 325 TABLET ORAL at 00:08

## 2022-11-28 RX ADMIN — OXYCODONE HYDROCHLORIDE 5 MG: 5 TABLET ORAL at 00:08

## 2022-11-28 ASSESSMENT — ACTIVITIES OF DAILY LIVING (ADL)
ADLS_ACUITY_SCORE: 22

## 2022-11-28 NOTE — PLAN OF CARE
Nyasia Raya is doing well after  section. Her dressing has been removed and incision look dry and well approximated. Incision is closed with steri strips in place... Fundus is firm with light bleeding and no clots. Patient is breast feeding well. Patient has minimal/moderate amounts of pain that is well managed with oral analgesics. Patient is ambulating independently in room. She is voiding, spontaneously and without difficulty. Bowel sounds are active.Patient has verbalized understanding of routine cares and warning signs.

## 2022-11-28 NOTE — TELEPHONE ENCOUNTER
Patient call, verification of name and . Patient gives verbal permission for release of information for [email or web address]. To receive disability paperwork pertaining to most recent need of leave in regards to pregnancy/postpartum state. Advised patient to pursue filling out release for at upcoming visit on 2022 and specify agency clearance and permission. Patient verbalized understanding, no further questions or concerns. Jody Milan RN ....................  2022   4:18 PM

## 2022-11-28 NOTE — PROGRESS NOTES
DISCHARGE NOTE    Patient discharged to home at 12:40 PM via ambulation. Accompanied by significant other and staff. Discharge instructions reviewed with patient, opportunity offered to ask questions. Prescriptions sent to patients preferred pharmacy. All belongings sent with patient. 4-part ID bands matched with baby's.     Hakan Kim RN

## 2022-11-28 NOTE — PROGRESS NOTES
MD aware of patients score of 15 on edinburgh  depression scale. Patient stated to MD that she will be seeing her psychologist now that she is delivery and be able to resume medication that she was on pre-pregnancy.

## 2022-11-28 NOTE — DISCHARGE SUMMARY
[facility]    Discharge Summary  Obstetrics    Date of Admission:  2022  Date of Discharge:  2022  Discharging Provider: Sadiq Lama MD    Discharge Diagnoses   History of  [Z98.891]    Patient Active Problem List   Diagnosis     Insomnia     Asthma     Spondylolisthesis     Family history of congenital heart defect     Night terrors, adult     Obesity     Family planning     Smoker     Attention deficit hyperactivity disorder (ADHD), combined type     Congenital spondylolisthesis     History of  section     S/P  section       Procedure/Surgery Information   Procedure: Procedure(s):   SECTION   Surgeon(s): Surgeon(s) and Role:     * Adina Trimble MD - Primary     History of Present Illness   Nyasia Raya is a 32 year old female who presented with SROM and repeat     Hospital Course   The patient's hospital course was unremarkable.  She recovered as anticipated and experienced no post-operative complications.  On discharge, her pain was well controlled. Vaginal bleeding is similar to peak menstrual flow.  Voiding without difficulty.  Ambulating well and tolerating a normal diet.  No fever or significant wound drainage.  Breastfeeding well.  Infant is stable.  She was discharged on post-partum day #3.    Post-partum hemoglobin:   Hemoglobin   Date Value Ref Range Status   2022 8.4 (L) 11.7 - 15.7 g/dL Final   2021 10.9 (L) 11.7 - 15.7 g/dL Final       Sadiq Lama MD    Discharge Disposition   Discharged to home   Condition at discharge: Stable    Pending Results   Final pathology results: No pathology submitted  These results will be followed up by n/a  Unresulted Labs Ordered in the Past 30 Days of this Admission     No orders found from 10/26/2022 to 2022.          Primary Care Physician   Radha Garcia    Consultations This Hospital Stay   LACTATION IP CONSULT    Discharge Orders      Activity    Activity  as tolerated     Reason for your hospital stay    Maternity care     Follow Up    Follow up with provider in 2 weeks and 6 weeks for post-delivery checks     Breast pump    Breast Pump Documentation:  Manual/Electric Pump: To support adequate breast milk production and nutrition for infant.     I, the undersigned, certify that the above prescribed supplies are medically necessary for this patient and is both reasonable and necessary in reference to accepted standards of medical and necessary in reference to accepted standards of medical practice in the treatment of this patient's condition and is not prescribed as a convenience.     Diet    Resume previous diet     Discharge Medications   Current Discharge Medication List      START taking these medications    Details   ibuprofen (ADVIL/MOTRIN) 800 MG tablet Take 1 tablet (800 mg) by mouth every 8 hours as needed for moderate pain (4-6)  Qty: 30 tablet, Refills: 0    Associated Diagnoses: History of  section      oxyCODONE (ROXICODONE) 5 MG tablet Take 1 tablet (5 mg) by mouth every 6 hours as needed for moderate to severe pain  Qty: 20 tablet, Refills: 0    Associated Diagnoses: History of  section      senna-docusate (SENOKOT-S/PERICOLACE) 8.6-50 MG tablet Take 1 tablet by mouth 2 times daily as needed for constipation  Qty: 20 tablet, Refills: 0    Associated Diagnoses: History of  section         CONTINUE these medications which have NOT CHANGED    Details   albuterol (PROAIR HFA/PROVENTIL HFA/VENTOLIN HFA) 108 (90 Base) MCG/ACT inhaler Inhale 2 puffs into the lungs every 6 hours      diphenhydrAMINE-acetaminophen (TYLENOL PM)  MG tablet Take 1 tablet by mouth nightly as needed for sleep      ferrous sulfate (FEROSUL) 325 (65 Fe) MG tablet Take 1 tablet (325 mg) by mouth daily (with breakfast)  Qty: 90 tablet, Refills: 1    Associated Diagnoses: Anemia during pregnancy in second trimester      Prenatal Vit-Fe Fumarate-FA (PRENATAL  MULTIVITAMIN W/IRON) 27-0.8 MG tablet Take 1 tablet by mouth daily           Allergies   No Known Allergies

## 2022-11-28 NOTE — PROGRESS NOTES
[facility]    Obstetrics Post-Op / Progress Note    Assessment & Plan   Assessment:  -2 Days Post-Op  Procedure(s):   SECTION    Doing well.  Clean wound without signs of infection.  Normal healing wound.    Plan:  Discharge later today    Sadiq Lama MD     Interval History   Doing well.  Pain is well-controlled.  No fevers.  No history of wound drainage, warmth or significant erythema.  Good appetite.  Denies chest pain, shortness of breath, nausea or vomiting.  Ambulatory.  Breastfeeding well.    Medications     dextrose 5% lactated ringers Stopped (22 1200)     - MEDICATION INSTRUCTIONS -       - MEDICATION INSTRUCTIONS -       oxytocin in 0.9% NaCl       oxytocin in 0.9% NaCl         acetaminophen  975 mg Oral Q6H     ibuprofen  800 mg Oral Q6H     senna-docusate  1 tablet Oral BID    Or     senna-docusate  2 tablet Oral BID       Physical Exam   Temp: 97.5  F (36.4  C) Temp src: Temporal BP: 119/82 Pulse: 74   Resp: 20 SpO2: 97 % O2 Device: None (Room air)    There were no vitals filed for this visit.  Vital Signs with Ranges  Temp:  [96.2  F (35.7  C)-97.8  F (36.6  C)] 97.5  F (36.4  C)  Pulse:  [74-86] 74  Resp:  [18-20] 20  BP: (119-143)/(82) 119/82  SpO2:  [97 %-99 %] 97 %  No intake/output data recorded.    Uterine fundus is firm, non-tender and at the level of the umbilicus  Incision C/D/I  Extremities Non-tender    Data   Recent Labs   Lab Test 22  2340 21  0454 21  1116   ABO  --   --  O   RH  --   --  Pos   AS Negative   < > Neg    < > = values in this interval not displayed.     Recent Labs   Lab Test 22  0515 22  2340   HGB 8.4* 9.5*     Recent Labs   Lab Test 22  1434   RUQIGG Positive

## 2022-11-28 NOTE — LACTATION NOTE
This note was copied from a baby's chart.  INPATIENT LACTATION CONSULT      Consult with Nyasia and abyb regarding breastfeeding.  Nyasia nursed her last child for 3 months with no problems. Nyasia states she has been having some difficulty getting babe to latch on at times but feels like it is getting better. She states she notices obvious rooting with a strong latch during feeding sessions. Rhythmic and aggressive suckling also noted.  Instructed Nyasia on correct positioning and technique when latching babe on.  Nyasia is independent with latching babe onto breast.  Minimal assistance required.  Encouraged Nyasia on the importance of frequent feedings throughout the day (at least 8-12 feedings in a 24 hour period) and skin to skin contact.  Nyasia demonstrated and states she understands all information given. Nyasia and abby will follow-up with myself on Wednesday for an outpatient lactation consult.    Latasha Ordaz RN, IBCLC  Lactation Consultant  [facility]

## 2022-11-30 ENCOUNTER — LACTATION ENCOUNTER (OUTPATIENT)
Age: 32
End: 2022-11-30

## 2022-11-30 ENCOUNTER — HOSPITAL ENCOUNTER (OUTPATIENT)
Dept: OBGYN | Facility: OTHER | Age: 32
Discharge: HOME | End: 2022-11-30
Attending: OBSTETRICS & GYNECOLOGY
Payer: COMMERCIAL

## 2022-11-30 PROCEDURE — S9443 LACTATION CLASS: HCPCS | Performed by: REGISTERED NURSE

## 2022-11-30 NOTE — LACTATION NOTE
This note was copied from a baby's chart.  Outpatient Lactation Visit    Renetta Fritz  [id number]    Consultation Date: 2022     Reason for Lactation Referral: Initial Lactation Consult    Baby's : 2022    Baby's Current Age: 4 day old  Baby's Gestational Age: Gestational Age: 39w2d    Primary Care Provider: No Ref-Primary, Physician    Presenting Problem (concerns as stated by parent): no concerns    MATERNAL HISTORY   History of Breast Surgery: no  Breast Changes During Pregnancy: no  Breast Feeding History: nursed last child for 4 months  Maternal Meds: daily prenatal vitamin  Pregnancy Complications: none  Anesthesia during labor: spinal    MATERNAL ASSESSMENT    Breast Size: average, symmetrical, soft after feeding and filling prior to feeding  Nipple Appearance - Left: slightly cracked, with signs of healing, education on further healing techniques provided  Nipple Appearance - Right: slightly cracked, with signs of healing, education on further healing techniques provided  Nipple Erectility - Left: erect with stimulation  Nipple Erectility - Right: erect with stimulation  Areolas Compressibility: soft  Nipple Size: average  Special Equipment Used: none  Day mother reports milk came in:  Day 3    INFANT ASSESSMENT    Oral Anatomy  Mouth: normal  Palate: normal  Jaw: normal  Tongue: normal  Frenulum: normal   Digital Suck Exam: root    FEEDING   Feeding Time: aggressively for 15 minutes  Position:  cradle  Effort to Latch: awake and alert, latched easily  Duration of Breast Feeding: Right Breast: 0; Left Breast: 15  Results: excellent breast feed    Volume of Intake:    Birth Weight: 8 lb 3 oz    Hospital discharge weight: 7 lb 5.3 oz    Today's Weight 7 lb 2.5 oz    Total Intake: 0.75 oz  Output: 5-6 soil diapers in last 24 hours, 3-4 wet diapers in last 24 hours    LATCH Score:   Latch: 2 - Good Latch  Audible Swallowin - Spontaneous & frequent  Type of Nipple: (Breast/Nipple) 2 -  Everted  Comfort: 2 - Soft, Nontender  Hold: 2 - No Assist   Total LATCH Score:  10    FEEDING PLAN    Home Feeding Plan: Continue to feed on demand when  elicits feeding cues with deep latch.  Babe should be eating 8-12 times in a 24 hour period.  Exclusivity explained and encouraged in the early weeks to establish breastfeeding and order in milk supply.  Rooming-in encouraged with explanation of the benefits.  Continue to apply expressed breast milk and Lanolin cream to nipples after feedings for healing and comfort.  Postpartum breastfeeding assessment completed and education provided.  Items included in the education are:     proper positioning and latch    effectiveness of feeding    manual expression    handling and storing breastmilk    maintenance of breastfeeding for the first 6 months    sign/symptoms of infant feeding issues requiring referral to qualified health care provider    LACTATION COMMENTS   Deep latch explained for proper positioning of breast in infant's mouth, maximizing milk transfer and comfort.  Reassurance and encouragement provided in regard to mom's concerns about milk supply.  Follow-up support information provided.  Parents plan to keep  Well-Child Check with Dr. Garcia  as scheduled for 2 week well child check.      Face-to-face Time: 60 minutes with assessment and education.    Latasha Ordaz, RN  2022  9:58 AM

## 2022-12-05 ENCOUNTER — TELEPHONE (OUTPATIENT)
Dept: FAMILY MEDICINE | Facility: OTHER | Age: 32
End: 2022-12-05

## 2022-12-05 NOTE — TELEPHONE ENCOUNTER
Madison from [place] requesting clarification in what specific job functions patient is unable to do. Will fax over job description a long with new form to be filled out.   Helen Colon RN on 12/5/2022 at 1:34 PM

## 2022-12-06 ENCOUNTER — VIRTUAL VISIT (OUTPATIENT)
Dept: PSYCHIATRY | Facility: OTHER | Age: 32
End: 2022-12-06
Attending: PSYCHIATRY & NEUROLOGY
Payer: COMMERCIAL

## 2022-12-06 DIAGNOSIS — F31.81 BIPOLAR 2 DISORDER (H): Primary | ICD-10-CM

## 2022-12-06 PROCEDURE — 99213 OFFICE O/P EST LOW 20 MIN: CPT | Mod: TEL | Performed by: PSYCHIATRY & NEUROLOGY

## 2022-12-06 ASSESSMENT — ANXIETY QUESTIONNAIRES
6. BECOMING EASILY ANNOYED OR IRRITABLE: NEARLY EVERY DAY
GAD7 TOTAL SCORE: 20
1. FEELING NERVOUS, ANXIOUS, OR ON EDGE: NEARLY EVERY DAY
7. FEELING AFRAID AS IF SOMETHING AWFUL MIGHT HAPPEN: MORE THAN HALF THE DAYS
5. BEING SO RESTLESS THAT IT IS HARD TO SIT STILL: NEARLY EVERY DAY
2. NOT BEING ABLE TO STOP OR CONTROL WORRYING: NEARLY EVERY DAY
GAD7 TOTAL SCORE: 20
3. WORRYING TOO MUCH ABOUT DIFFERENT THINGS: NEARLY EVERY DAY

## 2022-12-06 ASSESSMENT — PATIENT HEALTH QUESTIONNAIRE - PHQ9
SUM OF ALL RESPONSES TO PHQ QUESTIONS 1-9: 15
5. POOR APPETITE OR OVEREATING: NEARLY EVERY DAY

## 2022-12-06 ASSESSMENT — PAIN SCALES - GENERAL: PAINLEVEL: NO PAIN (0)

## 2022-12-06 NOTE — PROGRESS NOTES
"Nyasia is a 32 year old who is being evaluated via a billable telephone visit.      What phone number would you like to be contacted at? [phone]  How would you like to obtain your AVS? Aramis    Telephone visit 22 minutes. Total visit time of 25 minutes. 3 minutes documenting, ordering meds (did most during our call)    SUBJECTIVE / INTERIM HISTORY                                                                       Last visit 4/15/22:  Continue Zoloft 100 mg daily. Decrease Adderall 30 mg 2 times daily to 20 mg 2 times daily x 4 days; then 10 mg 2 times daily x 4 days; then discontinue  - Krhuyen Ball    - Nyasia reminds me that \"pregnancy sort of levels me\" Feels is starting to slip back towards   - is breastfeeding.   - [employer] - was on 10 hour shifts. On maternity leave right.   - Wallace wasn't excited about Kru at fist but now is happy. Tiffanie isn't sure about Kru  - [place] turned 2 yo in   - [place] Justin. her cat almost . Had urinary issues. Aiden (went to [facility]). Also has Stephanie the cat    SYMPTOMS: + anxiety  NOT having any sx nanette at this time such as  elevated mood, impulsivity. + depressed mood, iritability,  erratic sleep, distracted, poor attention & concentration, anxiety, insomnia, overwhelmed.   MEDICAL ROS-   Back pain (spondylolisthesis), asthma (cough, SOB/wheezing)  MEDICAL / SURGICAL HISTORY                pregnant [if applicable]--no   Medical Team:     PMD- Dr. Devi       Therapist- her and Nu worked with therapist at [facility] in [place] for while  Patient Active Problem List   Diagnosis     Insomnia     Asthma     Spondylolisthesis     Family history of congenital heart defect     Night terrors, adult     Obesity     Family planning     Smoker     Attention deficit hyperactivity disorder (ADHD), combined type     Congenital spondylolisthesis     History of  section     S/P  section     ALLERGY   Patient has no known allergies.  MEDICATIONS              "                                                                                Current Outpatient Medications   Medication Sig     albuterol (PROAIR HFA/PROVENTIL HFA/VENTOLIN HFA) 108 (90 Base) MCG/ACT inhaler Inhale 2 puffs into the lungs every 6 hours     ibuprofen (ADVIL/MOTRIN) 800 MG tablet Take 1 tablet (800 mg) by mouth every 8 hours as needed for moderate pain (4-6)     diphenhydrAMINE-acetaminophen (TYLENOL PM)  MG tablet Take 1 tablet by mouth nightly as needed for sleep (Patient not taking: Reported on 12/6/2022)     ferrous sulfate (FEROSUL) 325 (65 Fe) MG tablet Take 1 tablet (325 mg) by mouth daily (with breakfast) (Patient not taking: Reported on 12/6/2022)     oxyCODONE (ROXICODONE) 5 MG tablet Take 1 tablet (5 mg) by mouth every 6 hours as needed for moderate to severe pain (Patient not taking: Reported on 12/6/2022)     Prenatal Vit-Fe Fumarate-FA (PRENATAL MULTIVITAMIN W/IRON) 27-0.8 MG tablet Take 1 tablet by mouth daily (Patient not taking: Reported on 12/6/2022)     senna-docusate (SENOKOT-S/PERICOLACE) 8.6-50 MG tablet Take 1 tablet by mouth 2 times daily as needed for constipation (Patient not taking: Reported on 12/6/2022)     No current facility-administered medications for this visit.       VITALS   LMP 02/24/2022 (Exact Date)      PHQ9                       PHQ-9 SCORE 2/21/2022 4/4/2022 12/6/2022   PHQ-9 Total Score - - -   PHQ-9 Total Score MyChart - - -   PHQ-9 Total Score 10 3 15       LABS                                                                                                                           TSH   Date Value Ref Range Status   06/19/2019 0.41 0.40 - 4.00 mU/L Final   ]     Last Comprehensive Metabolic Panel:  Sodium   Date Value Ref Range Status   02/14/2020 136 134 - 144 mmol/L Final     Potassium   Date Value Ref Range Status   02/14/2020 3.7 3.5 - 5.1 mmol/L Final     Chloride   Date Value Ref Range Status   02/14/2020 105 98 - 107 mmol/L Final     Carbon  "Dioxide   Date Value Ref Range Status   02/14/2020 25 21 - 31 mmol/L Final     Anion Gap   Date Value Ref Range Status   02/14/2020 6 3 - 14 mmol/L Final     Glucose   Date Value Ref Range Status   02/14/2020 95 70 - 105 mg/dL Final     GLUCOSE BY METER POCT   Date Value Ref Range Status   11/26/2022 92 70 - 99 mg/dL Final     Urea Nitrogen   Date Value Ref Range Status   02/14/2020 11 7 - 25 mg/dL Final     Creatinine   Date Value Ref Range Status   02/14/2020 0.76 0.60 - 1.20 mg/dL Final     GFR Estimate   Date Value Ref Range Status   02/14/2020 >90 >60 mL/min/[1.73_m2] Final     Calcium   Date Value Ref Range Status   02/14/2020 8.8 8.6 - 10.3 mg/dL Final     MENTAL STATUS EXAM                                                                                       Speech: normal volume.  Mood anxious, depressed.  Thought process linear, logical. No ROCKY or FOI.  No suicidal ideation, homicidal ideation or psychotic thought. No hallucinations. Insight was fair. Judgment was intact and adequate for safety. Fund of knowledge was intact. Attention and concentration were impaired --- needed to redirect her during our visit back to topic of conversation.     ASSESSMENT                                                                                                      HISTORICAL:  Initial psych consult 6/17/15  Notes:             Gabapentin :  headaches: lactation. buspar nausea and felt like was making her more sad. Topamax: tried dose of 25 mg and didn't help with appetite / weight. Seroquel: small half dose during day does help but if she took 3 at night \"when really amped up\" still didn't get her to sleep.    Nyasia Raya is a 33 yo with ADHD, bipolar II disorder and RANDI.  Nyasia went through a lot in her household growing up with mom with MS dad working all the time and 5 siblings. Nyasia took care of the household and she worked 2 jobs. Didn't end up finishing HS in [place] and looking back she is able to " recognize had a lot on her plate. Diagnosed with ADHD in past but parents didn't want her on stimulants. Nyasia had [place] 21. She now had Kru on 22.     Back last I touched base with Nyasia she was taking Zoloft and taking Adderall. She is NOT taking any meds right now - is breastfeeding and had baby Renetta Ball on 22. Nyasia notes though she doesn't want to start medications right now, she wants to get back into a schedule of us touching base regularly. Especially since she feels she may be beginning a period of depression. She's having a bit of difficulty judging depression sx vs. fatigue from having a  along with 2 yo Tiffanie and their third son, Wallace..    TREATMENT RISK STATEMENT: The risks, benefits, alternatives and potential adverse effects have been explained and are understood by the pt. The pt agrees to the treatment plan with the ability to do so. The pt knows to call the clinic for any problems or access emergency care if needed. Substance use is not a problem as noted above.     DIAGNOSES           Bipolar II disorder  ADHD  RANDI    Night terrors    PLAN                                                                                                                         1) MEDICATIONS:   -- Not taking meds at this time     2) THERAPY: No Change    3) LABS: None today.    4) PT MONITOR [call for probs]: Worsening symptoms, side effects from medications, SI/HI    5) REFERRALS [CD tx, medical, tests other]: None    6)  RTC: ~1 month

## 2022-12-12 ENCOUNTER — OFFICE VISIT (OUTPATIENT)
Dept: OBGYN | Facility: OTHER | Age: 32
End: 2022-12-12
Attending: OBSTETRICS & GYNECOLOGY
Payer: COMMERCIAL

## 2022-12-12 VITALS
SYSTOLIC BLOOD PRESSURE: 120 MMHG | TEMPERATURE: 96.6 F | BODY MASS INDEX: 37.16 KG/M2 | DIASTOLIC BLOOD PRESSURE: 72 MMHG | HEART RATE: 88 BPM | WEIGHT: 184 LBS

## 2022-12-12 DIAGNOSIS — Z98.890 POSTOPERATIVE STATE: Primary | ICD-10-CM

## 2022-12-12 PROCEDURE — 99024 POSTOP FOLLOW-UP VISIT: CPT | Performed by: OBSTETRICS & GYNECOLOGY

## 2022-12-12 ASSESSMENT — PAIN SCALES - GENERAL: PAINLEVEL: MILD PAIN (2)

## 2022-12-12 NOTE — NURSING NOTE
"Chief Complaint   Patient presents with     Surgical Followup     2 week post op    Patient is here for 2 week postop and doing well. Patient is thinking about Nexplanon at 6 week post partum     Initial /72   Pulse 88   Temp (!) 96.6  F (35.9  C)   Wt 83.5 kg (184 lb)   LMP 02/24/2022 (Exact Date)   BMI 37.16 kg/m   Estimated body mass index is 37.16 kg/m  as calculated from the following:    Height as of 4/4/22: 1.499 m (4' 11\").    Weight as of this encounter: 83.5 kg (184 lb).  Medication Reconciliation: complete        Idalia Turk LPN  "

## 2022-12-12 NOTE — PROGRESS NOTES
Nyasia Raya presents todayfor a postop checkup at 2 week(s) after repeat .    S: Bowels and bladder are functioning normally, no abnormal bleeding or pain. Some tenderness to LLQ of incision. Steri-strips removed.    Current Outpatient Medications   Medication     albuterol (PROAIR HFA/PROVENTIL HFA/VENTOLIN HFA) 108 (90 Base) MCG/ACT inhaler     ibuprofen (ADVIL/MOTRIN) 800 MG tablet     No current facility-administered medications for this visit.     No Known Allergies  Past Medical History:   Diagnosis Date     Asthma 5/10/2013     Insomnia 5/10/2013     Spondylolisthesis 5/10/2013     Tobacco abuse      Past Surgical History:   Procedure Laterality Date      SECTION  1/15/2014    Procedure:  SECTION;;  Surgeon: Leena Mayorga MD;  Location: [facility] OR      SECTION N/A 2021    Procedure:  SECTION;  Surgeon: Sadiq Lama MD;  Location:  OR      SECTION N/A 2022    Procedure:  SECTION;  Surgeon: Adina Trimble MD;  Location:  OR       COMPLETE REVIEW OF SYSTEMS: Neg except as above        O: /72   Pulse 88   Temp (!) 96.6  F (35.9  C)   Wt 83.5 kg (184 lb)   LMP 2022 (Exact Date)   BMI 37.16 kg/m   Body mass index is 37.16 kg/m .    EXAM:  NAD  Incision mildly raised on the left, no erythema or induration, no bleeding or discharge.    I/P:  Stable postop    Recheck in a month  Restrictions reiterated  Requests nexplaon at the six week visit.    Sadiq Lama MD FACOG  2:18 PM 2022

## 2022-12-21 ENCOUNTER — HOSPITAL ENCOUNTER (OUTPATIENT)
Dept: OBGYN | Facility: OTHER | Age: 32
Discharge: HOME | End: 2022-12-21
Attending: FAMILY MEDICINE
Payer: COMMERCIAL

## 2023-01-09 ENCOUNTER — VIRTUAL VISIT (OUTPATIENT)
Dept: PSYCHIATRY | Facility: OTHER | Age: 33
End: 2023-01-09
Attending: PSYCHIATRY & NEUROLOGY
Payer: COMMERCIAL

## 2023-01-09 DIAGNOSIS — F31.81 BIPOLAR 2 DISORDER (H): Primary | ICD-10-CM

## 2023-01-09 PROCEDURE — 99213 OFFICE O/P EST LOW 20 MIN: CPT | Mod: TEL | Performed by: PSYCHIATRY & NEUROLOGY

## 2023-01-09 ASSESSMENT — ANXIETY QUESTIONNAIRES
GAD7 TOTAL SCORE: 16
2. NOT BEING ABLE TO STOP OR CONTROL WORRYING: NEARLY EVERY DAY
1. FEELING NERVOUS, ANXIOUS, OR ON EDGE: NEARLY EVERY DAY
6. BECOMING EASILY ANNOYED OR IRRITABLE: NEARLY EVERY DAY
3. WORRYING TOO MUCH ABOUT DIFFERENT THINGS: SEVERAL DAYS
7. FEELING AFRAID AS IF SOMETHING AWFUL MIGHT HAPPEN: SEVERAL DAYS
5. BEING SO RESTLESS THAT IT IS HARD TO SIT STILL: MORE THAN HALF THE DAYS
GAD7 TOTAL SCORE: 16

## 2023-01-09 ASSESSMENT — PAIN SCALES - GENERAL: PAINLEVEL: NO PAIN (0)

## 2023-01-09 ASSESSMENT — PATIENT HEALTH QUESTIONNAIRE - PHQ9
SUM OF ALL RESPONSES TO PHQ QUESTIONS 1-9: 22
5. POOR APPETITE OR OVEREATING: NEARLY EVERY DAY

## 2023-01-09 NOTE — PROGRESS NOTES
"Nyasia is a 32 year old who is being evaluated via a billable telephone visit.      What phone number would you like to be contacted at? [phone]  How would you like to obtain your AVS? Aramis    Telephone visit 23 minutes. Total visit time of 30 minutes. 7 minutes documenting, ordering meds (did most during our call)    SUBJECTIVE / INTERIM HISTORY                                                                       Last 22: no meds started  - [employer] maternity leave changed \"bonding time\". Employer can approve up until child is one year. Nyasia has been off work since 2022. Right now her return to work date is 23  - Renetta Ball born    - \"I've been so stuck in my head\". Can't focus, zoned out. Stopped taking meds in March when Nyasia found out she was pregnant. Home and her life in general disorganized. Having a difficult time making even little decisions for example \"what are the kids going to wear for family photo\"  - \"Marco\" their little dog  - is breastfeeding.   - Wallace wasn't excited about Kru at fist but now is happy. Tiffanie isn't sure about Kru  - [place] turned 4 yo in   - [place] Aiden.. her cat almost . Had urinary issues. Aiden (went to [facility]). Also has Stephanie the cat    SYMPTOMS: + anxiety, has been having some impulsivity, racing thoughts, distractibility. + depressed mood, iritability,  erratic sleep, distracted, poor attention & concentration, anxiety, insomnia, overwhelmed.   MEDICAL ROS-   Back pain (spondylolisthesis), asthma (cough, SOB/wheezing)   MEDICAL / SURGICAL HISTORY                pregnant [if applicable]--no   Medical Team:     PMD- Dr. Devi       Therapist- her and Nu worked with therapist at [employer] in [place] for while  Patient Active Problem List   Diagnosis     Insomnia     Asthma     Spondylolisthesis     Family history of congenital heart defect     Night terrors, adult     Obesity     Family planning     Smoker     Attention " deficit hyperactivity disorder (ADHD), combined type     Congenital spondylolisthesis     History of  section     S/P  section     ALLERGY   Patient has no known allergies.  MEDICATIONS                                                                                             Current Outpatient Medications   Medication Sig     albuterol (PROAIR HFA/PROVENTIL HFA/VENTOLIN HFA) 108 (90 Base) MCG/ACT inhaler Inhale 2 puffs into the lungs every 6 hours     ibuprofen (ADVIL/MOTRIN) 800 MG tablet Take 1 tablet (800 mg) by mouth every 8 hours as needed for moderate pain (4-6) (Patient not taking: Reported on 2023)     No current facility-administered medications for this visit.       VITALS   LMP 2022 (Exact Date)      PHQ9                       PHQ-9 SCORE 2022   PHQ-9 Total Score - - -   PHQ-9 Total Score MyChart - - -   PHQ-9 Total Score 3 15 22       LABS                                                                                                                           TSH   Date Value Ref Range Status   2019 0.41 0.40 - 4.00 mU/L Final   ]     Last Comprehensive Metabolic Panel:  Sodium   Date Value Ref Range Status   2020 136 134 - 144 mmol/L Final     Potassium   Date Value Ref Range Status   2020 3.7 3.5 - 5.1 mmol/L Final     Chloride   Date Value Ref Range Status   2020 105 98 - 107 mmol/L Final     Carbon Dioxide   Date Value Ref Range Status   2020 25 21 - 31 mmol/L Final     Anion Gap   Date Value Ref Range Status   2020 6 3 - 14 mmol/L Final     Glucose   Date Value Ref Range Status   2020 95 70 - 105 mg/dL Final     GLUCOSE BY METER POCT   Date Value Ref Range Status   2022 92 70 - 99 mg/dL Final     Urea Nitrogen   Date Value Ref Range Status   2020 11 7 - 25 mg/dL Final     Creatinine   Date Value Ref Range Status   2020 0.76 0.60 - 1.20 mg/dL Final     GFR Estimate   Date Value Ref Range  "Status   02/14/2020 >90 >60 mL/min/[1.73_m2] Final     Calcium   Date Value Ref Range Status   02/14/2020 8.8 8.6 - 10.3 mg/dL Final     MENTAL STATUS EXAM                                                                                       Speech: normal volume.  Mood anxious. Speech: increased rate. .  Thought process circumstantial. No ROCKY or FOI.  No homicidal ideation or psychotic thought. + SI with no intent or planNo hallucinations. Insight was fair. Judgment was intact and adequate for safety. Fund of knowledge was intact. Attention and concentration were impaired --- needed to redirect her during our visit back to topic of conversation.     ASSESSMENT                                                                                                      HISTORICAL:  Initial psych consult 6/17/15  Notes:             Gabapentin :  headaches: lactation. buspar nausea and felt like was making her more sad. Topamax: tried dose of 25 mg and didn't help with appetite / weight. Seroquel: small half dose during day does help but if she took 3 at night \"when really amped up\" still didn't get her to sleep.  Nyasia Raya is a 31 yo with ADHD, bipolar II disorder and RANDI.  Nyasia went through a lot in her household growing up with mom with MS dad working all the time and 5 siblings. Nyasia took care of the household and she worked 2 jobs. Didn't end up finishing HS in [employer] and looking back she is able to recognize had a lot on her plate. Diagnosed with ADHD in past but parents didn't want her on stimulants. Nyasia had [place] 8/30/21 and Renetta was born 11/26/22.    She expresses some symptoms of nanette and anxiety: distractibility, racing thoughts, elevated mood, easily overwhelmed. She is breastfeeding and does not want to start any medications. Discussed Zoloft one of the meds with the most literature behind in terms of safety while breastfeeding. I generally do not advise taking stimulants while breastfeeding. Nyasia " opts to not start any meds. Current return to work date is 1/19/23 thus next week. She has our # in case she decides to start medication and knows she can call us. We agreed on having her RTC in 1 month.    TREATMENT RISK STATEMENT: The risks, benefits, alternatives and potential adverse effects have been explained and are understood by the pt. The pt agrees to the treatment plan with the ability to do so. The pt knows to call the clinic for any problems or access emergency care if needed. Substance use is not a problem as noted above.     DIAGNOSES           Bipolar II disorder  ADHD  RANDI    Night terrors    PLAN                                                                                                                         1) MEDICATIONS:   -- Not taking meds at this time     2) THERAPY: No Change    3) LABS: None today.    4) PT MONITOR [call for probs]: Worsening symptoms, side effects from medications, SI/HI    5) REFERRALS [CD tx, medical, tests other]: None    6)  RTC: ~1 month

## 2023-01-10 ENCOUNTER — PRENATAL OFFICE VISIT (OUTPATIENT)
Dept: OBGYN | Facility: OTHER | Age: 33
End: 2023-01-10
Attending: OBSTETRICS & GYNECOLOGY
Payer: COMMERCIAL

## 2023-01-10 VITALS
WEIGHT: 186 LBS | BODY MASS INDEX: 37.57 KG/M2 | HEART RATE: 86 BPM | DIASTOLIC BLOOD PRESSURE: 74 MMHG | SYSTOLIC BLOOD PRESSURE: 120 MMHG

## 2023-01-10 DIAGNOSIS — Z01.812 PRE-PROCEDURE LAB EXAM: Primary | ICD-10-CM

## 2023-01-10 DIAGNOSIS — Z12.4 CERVICAL CANCER SCREENING: ICD-10-CM

## 2023-01-10 DIAGNOSIS — Z30.017 INSERTION OF NEXPLANON: ICD-10-CM

## 2023-01-10 PROCEDURE — 87624 HPV HI-RISK TYP POOLED RSLT: CPT | Mod: ZL | Performed by: OBSTETRICS & GYNECOLOGY

## 2023-01-10 PROCEDURE — G0123 SCREEN CERV/VAG THIN LAYER: HCPCS | Performed by: OBSTETRICS & GYNECOLOGY

## 2023-01-10 PROCEDURE — 11981 INSERTION DRUG DLVR IMPLANT: CPT | Performed by: OBSTETRICS & GYNECOLOGY

## 2023-01-10 PROCEDURE — 250N000011 HC RX IP 250 OP 636: Performed by: OBSTETRICS & GYNECOLOGY

## 2023-01-10 RX ADMIN — ETONOGESTREL 68 MG: 68 IMPLANT SUBCUTANEOUS at 11:56

## 2023-01-10 NOTE — NURSING NOTE
"Chief Complaint   Patient presents with     Procedure     Neplanon insertion      Postpartum Care     6 week post partum    Patient is here for 6 week postpartum, and would like Nexplanon placed, patient has not had intercourse since delivery and declined UPT. Patient has not had a period since delivery.     Initial /74   Pulse 86   Wt 84.4 kg (186 lb)   LMP 02/24/2022 (Exact Date)   BMI 37.57 kg/m   Estimated body mass index is 37.57 kg/m  as calculated from the following:    Height as of 4/4/22: 1.499 m (4' 11\").    Weight as of this encounter: 84.4 kg (186 lb).  Medication Reconciliation: complete          Idalia Turk LPN  "

## 2023-01-10 NOTE — PROGRESS NOTES
CC: postpartum exam at 7 weeks from delivery    HPI: Nyasia Raya presents for postpartum exam. Baby was born by  delivery. Complications included none.  Mood:OK    Breastfeeding: going OK  Incision(s): some tenderness to touch  Bleeding: no  Birthcontrol: nexplanon today    Past Medical History:   Diagnosis Date     Asthma 5/10/2013     Insomnia 5/10/2013     Spondylolisthesis 5/10/2013     Tobacco abuse      Past Surgical History:   Procedure Laterality Date      SECTION  1/15/2014    Procedure:  SECTION;;  Surgeon: Leena Mayorga MD;  Location: [facility] OR      SECTION N/A 2021    Procedure:  SECTION;  Surgeon: Sadiq Lama MD;  Location: [facility] OR      SECTION N/A 2022    Procedure:  SECTION;  Surgeon: Adina Trimble MD;  Location:  OR     Current Outpatient Medications   Medication     albuterol (PROAIR HFA/PROVENTIL HFA/VENTOLIN HFA) 108 (90 Base) MCG/ACT inhaler     ibuprofen (ADVIL/MOTRIN) 800 MG tablet     Current Facility-Administered Medications   Medication     etonogestrel (NEXPLANON) subdermal implant 68 mg      No Known Allergies    REVIEW OF SYSTEMS:  Neg for bowel, bladder, and breast    O: /74   Pulse 86   Wt 84.4 kg (186 lb)   LMP 2022 (Exact Date)   BMI 37.57 kg/m    Body mass index is 37.57 kg/m .    Exam:  Constitutional: healthy, alert, active and no distress  Pelvic: Normal BUSE, vulva appears normal, vagina and cervix are normal. Pap obtained. Chaperone was present.    After consent was performed and a timeout observed patient was placed in supine position.  The skin was prepped with alcohol and 1% lidocaine was used to anesthetize the Nexplanon insertion site.  Sterile drape and prep were performed with ChloraPrep. A  Nexplanon was then inserted  and deployed without difficulty.  Immediately after deployment the device was palpable in the subcutaneous upper left forearm.  The incision was then dressed  with Steri-Strips with benzoin adhesive and a pressure dressing.  Patient tolerated the procedure well and was dismissed in stable condition.    I/P:  (Z12.4) Cervical cancer screening  Comment:   Plan: Pap Screen with HPV - recommended age 30 - 65         years            (Z30.017) Insertion of Nexplanon  Comment:   Plan: etonogestrel (NEXPLANON) subdermal implant 68         mg            I/P:  Postpartum visit.    Resume annual preventive healthcare. Continue PNV's until done with childbearing.  Return as needed. Instructed regarding signs of infection or migration, common side effects, and efficacy.  Replacement or removal recommended in three years.    RTC prn    Sadiq Lama MD FACOG  11:54 AM 1/10/2023

## 2023-01-13 LAB
BKR LAB AP GYN ADEQUACY: NORMAL
BKR LAB AP GYN INTERPRETATION: NORMAL
BKR LAB AP HPV REFLEX: NORMAL
BKR LAB AP PREVIOUS ABNORMAL: NORMAL
PATH REPORT.COMMENTS IMP SPEC: NORMAL
PATH REPORT.COMMENTS IMP SPEC: NORMAL
PATH REPORT.RELEVANT HX SPEC: NORMAL

## 2023-01-17 LAB
HUMAN PAPILLOMA VIRUS 16 DNA: NEGATIVE
HUMAN PAPILLOMA VIRUS 18 DNA: NEGATIVE
HUMAN PAPILLOMA VIRUS FINAL DIAGNOSIS: NORMAL
HUMAN PAPILLOMA VIRUS OTHER HR: NEGATIVE

## 2023-01-23 ENCOUNTER — MEDICAL CORRESPONDENCE (OUTPATIENT)
Dept: HEALTH INFORMATION MANAGEMENT | Facility: OTHER | Age: 33
End: 2023-01-23

## 2023-02-22 ENCOUNTER — VIRTUAL VISIT (OUTPATIENT)
Dept: PSYCHIATRY | Facility: OTHER | Age: 33
End: 2023-02-22
Attending: PSYCHIATRY & NEUROLOGY
Payer: COMMERCIAL

## 2023-02-22 DIAGNOSIS — F41.1 GAD (GENERALIZED ANXIETY DISORDER): Primary | ICD-10-CM

## 2023-02-22 DIAGNOSIS — F90.2 ADHD (ATTENTION DEFICIT HYPERACTIVITY DISORDER), COMBINED TYPE: ICD-10-CM

## 2023-02-22 PROCEDURE — 99214 OFFICE O/P EST MOD 30 MIN: CPT | Mod: TEL | Performed by: PSYCHIATRY & NEUROLOGY

## 2023-02-22 RX ORDER — DEXTROAMPHETAMINE SACCHARATE, AMPHETAMINE ASPARTATE, DEXTROAMPHETAMINE SULFATE AND AMPHETAMINE SULFATE 2.5; 2.5; 2.5; 2.5 MG/1; MG/1; MG/1; MG/1
10 TABLET ORAL 2 TIMES DAILY
Qty: 60 TABLET | Refills: 0 | Status: SHIPPED | OUTPATIENT
Start: 2023-02-22 | End: 2023-03-24

## 2023-02-22 ASSESSMENT — ANXIETY QUESTIONNAIRES
1. FEELING NERVOUS, ANXIOUS, OR ON EDGE: NEARLY EVERY DAY
5. BEING SO RESTLESS THAT IT IS HARD TO SIT STILL: SEVERAL DAYS
3. WORRYING TOO MUCH ABOUT DIFFERENT THINGS: MORE THAN HALF THE DAYS
2. NOT BEING ABLE TO STOP OR CONTROL WORRYING: NEARLY EVERY DAY
GAD7 TOTAL SCORE: 16
7. FEELING AFRAID AS IF SOMETHING AWFUL MIGHT HAPPEN: SEVERAL DAYS
6. BECOMING EASILY ANNOYED OR IRRITABLE: NEARLY EVERY DAY
GAD7 TOTAL SCORE: 16

## 2023-02-22 ASSESSMENT — PATIENT HEALTH QUESTIONNAIRE - PHQ9
5. POOR APPETITE OR OVEREATING: NEARLY EVERY DAY
SUM OF ALL RESPONSES TO PHQ QUESTIONS 1-9: 17

## 2023-02-22 NOTE — PROGRESS NOTES
"Nyasia is a 33 year old who is being evaluated via a billable telephone visit.      What phone number would you like to be contacted at? [phone]  How would you like to obtain your AVS? Aramis    Telephone visit 9 minutes. Total visit time of 15 minutes. 6 minutes documenting, ordering meds (did most during our call)    SUBJECTIVE / INTERIM HISTORY                                                                       Last 23: no meds started    - Nyasia notes she thinks she was manic last visit. Now looking back.   - leave for work extended. Goes until November but hopes to go back earlier  - lots of dread, feeling empty the past whereas now \"a whole lot of nothingness\". Really had to push herself to get out of the house, even had to push to get get herself out of her room   - her house tends to be a mess when she isn't doing well.. Nu has identified this and has been more supportive  - Renetta Ball born    - \"Marco\" their little dog  - is breastfeeding.   - Wallace wasn't excited about Kru at fist but now is happy. Tiffanie isn't sure about Kru  - [place] turned 4 yo in   - [facility] her [facility] almost . Had urinary issues. Aiden (went to [facility]). Also has Stephanie the cat    MEDICAL ROS-   Back pain (spondylolisthesis), asthma (cough, SOB/wheezing)   MEDICAL / SURGICAL HISTORY                pregnant [if applicable]--no   Medical Team:     PMD- Dr. Devi       Therapist- her and Nu worked with therapist at [facility] in [place] for while  Patient Active Problem List   Diagnosis     Insomnia     Asthma     Spondylolisthesis     Family history of congenital heart defect     Night terrors, adult     Obesity     Family planning     Smoker     Attention deficit hyperactivity disorder (ADHD), combined type     Congenital spondylolisthesis     History of  section     S/P  section     ALLERGY   Patient has no known allergies.  MEDICATIONS                                           "                                                   Current Outpatient Medications   Medication Sig     albuterol (PROAIR HFA/PROVENTIL HFA/VENTOLIN HFA) 108 (90 Base) MCG/ACT inhaler Inhale 2 puffs into the lungs every 6 hours     ibuprofen (ADVIL/MOTRIN) 800 MG tablet Take 1 tablet (800 mg) by mouth every 8 hours as needed for moderate pain (4-6) (Patient not taking: Reported on 1/9/2023)     Current Facility-Administered Medications   Medication     etonogestrel (NEXPLANON) subdermal implant 68 mg       VITALS   There were no vitals taken for this visit.     PHQ9                       PHQ-9 SCORE 12/6/2022 1/9/2023 2/22/2023   PHQ-9 Total Score - - -   PHQ-9 Total Score MyChart - - -   PHQ-9 Total Score 15 22 17       LABS                                                                                                                           TSH   Date Value Ref Range Status   06/19/2019 0.41 0.40 - 4.00 mU/L Final   ]     Last Comprehensive Metabolic Panel:  Sodium   Date Value Ref Range Status   02/14/2020 136 134 - 144 mmol/L Final     Potassium   Date Value Ref Range Status   02/14/2020 3.7 3.5 - 5.1 mmol/L Final     Chloride   Date Value Ref Range Status   02/14/2020 105 98 - 107 mmol/L Final     Carbon Dioxide   Date Value Ref Range Status   02/14/2020 25 21 - 31 mmol/L Final     Anion Gap   Date Value Ref Range Status   02/14/2020 6 3 - 14 mmol/L Final     Glucose   Date Value Ref Range Status   02/14/2020 95 70 - 105 mg/dL Final     GLUCOSE BY METER POCT   Date Value Ref Range Status   11/26/2022 92 70 - 99 mg/dL Final     Urea Nitrogen   Date Value Ref Range Status   02/14/2020 11 7 - 25 mg/dL Final     Creatinine   Date Value Ref Range Status   02/14/2020 0.76 0.60 - 1.20 mg/dL Final     GFR Estimate   Date Value Ref Range Status   02/14/2020 >90 >60 mL/min/[1.73_m2] Final     Calcium   Date Value Ref Range Status   02/14/2020 8.8 8.6 - 10.3 mg/dL Final     MENTAL STATUS EXAM                               "                                                         Speech: normal volume.  Mood anxious. Speech: increased rate. .  Thought process circumstantial. No ROCKY or FOI.  No homicidal ideation or psychotic thought. + SI with no intent or planNo hallucinations. Insight was fair. Judgment was intact and adequate for safety. Fund of knowledge was intact. Attention and concentration were impaired --- needed to redirect her during our visit back to topic of conversation.     ASSESSMENT                                                                                                      HISTORICAL:  Initial psych consult 6/17/15  Notes:             Gabapentin :  headaches: lactation. buspar nausea and felt like was making her more sad. Topamax: tried dose of 25 mg and didn't help with appetite / weight. Seroquel: small half dose during day does help but if she took 3 at night \"when really amped up\" still didn't get her to sleep.  Nyasia Raya is a 34 yo with ADHD, bipolar II disorder and RANDI.  Nyasia went through a lot in her household growing up with mom with MS dad working all the time and 5 siblings. Nyasia took care of the household and she worked 2 jobs. Didn't end up finishing HS in [place] and looking back she is able to recognize had a lot on her plate. Diagnosed with ADHD in past but parents didn't want her on stimulants. Nyasia had [place] 8/30/21 and Renetta was born 11/26/22.     Last visit she was experiencing manic symptoms. Today she reports she has been more depressed the past month. She is still off work. Depression has been to the point she has been unable to take care of their household at times. She is breastfeeding Cru still and is planning on transitioning him to bottle in next couple months. For now we agreed on starting sertraline which we discussed is safe in breastfeeding. She would like to start back on Adderall once she transitions to bottle and agreed I'll order it now.     TREATMENT RISK STATEMENT: " The risks, benefits, alternatives and potential adverse effects have been explained and are understood by the pt. The pt agrees to the treatment plan with the ability to do so. The pt knows to call the clinic for any problems or access emergency care if needed. Substance use is not a problem as noted above.     DIAGNOSES           Bipolar II disorder  ADHD  RANDI    Night terrors    PLAN                                                                                                                         1) MEDICATIONS:   -- Start sertraline 50 mg daily sent script today. Adderall IR 10 mg twice daily sent script     2) THERAPY: No Change    3) LABS: None today.    4) PT MONITOR [call for probs]: Worsening symptoms, side effects from medications, SI/HI    5) REFERRALS [CD tx, medical, tests other]: None    6)  RTC: ~1 month

## 2023-03-24 ENCOUNTER — VIRTUAL VISIT (OUTPATIENT)
Dept: PSYCHIATRY | Facility: OTHER | Age: 33
End: 2023-03-24
Attending: PSYCHIATRY & NEUROLOGY
Payer: COMMERCIAL

## 2023-03-24 ENCOUNTER — TELEPHONE (OUTPATIENT)
Dept: PSYCHIATRY | Facility: OTHER | Age: 33
End: 2023-03-24

## 2023-03-24 DIAGNOSIS — F41.1 GAD (GENERALIZED ANXIETY DISORDER): Primary | ICD-10-CM

## 2023-03-24 PROCEDURE — 99213 OFFICE O/P EST LOW 20 MIN: CPT | Mod: TEL | Performed by: PSYCHIATRY & NEUROLOGY

## 2023-03-24 RX ORDER — ACETAMINOPHEN 325 MG/1
325-650 TABLET ORAL PRN
COMMUNITY

## 2023-03-24 ASSESSMENT — PATIENT HEALTH QUESTIONNAIRE - PHQ9
SUM OF ALL RESPONSES TO PHQ QUESTIONS 1-9: 17
5. POOR APPETITE OR OVEREATING: MORE THAN HALF THE DAYS

## 2023-03-24 ASSESSMENT — ANXIETY QUESTIONNAIRES
2. NOT BEING ABLE TO STOP OR CONTROL WORRYING: NEARLY EVERY DAY
3. WORRYING TOO MUCH ABOUT DIFFERENT THINGS: NEARLY EVERY DAY
GAD7 TOTAL SCORE: 18
5. BEING SO RESTLESS THAT IT IS HARD TO SIT STILL: MORE THAN HALF THE DAYS
1. FEELING NERVOUS, ANXIOUS, OR ON EDGE: NEARLY EVERY DAY
6. BECOMING EASILY ANNOYED OR IRRITABLE: NEARLY EVERY DAY
GAD7 TOTAL SCORE: 18
7. FEELING AFRAID AS IF SOMETHING AWFUL MIGHT HAPPEN: MORE THAN HALF THE DAYS

## 2023-03-24 ASSESSMENT — PAIN SCALES - GENERAL: PAINLEVEL: NO PAIN (0)

## 2023-03-24 NOTE — PROGRESS NOTES
"Nyasia is a 33 year old who is being evaluated via a billable telephone visit.      What phone number would you like to be contacted at? [phone]  How would you like to obtain your AVS? Aramis    Telephone visit 13 minutes. Total visit time of 20 minutes. 7 minutes documenting, ordering meds     SUBJECTIVE / INTERIM HISTORY                                                                       Last 23:  Start sertraline 50 mg daily sent script today. Adderall IR 10 mg twice daily sent script     - has been \"super anxious\". Been weeks. \"steady heavy chest\"  - feels sertraline is helping with depression   - Is taking sertraline still. Took Adderall for couple days and didn't feel it did much so stopped taking   - has been getting out of her room. try  - leave for work extended. Goes until November but hopes to go back earlier  - her house tends to be a mess when she isn't doing well.. Nu has identified this and has been more supportive  - Renetta Ball born    - \"Marco\" their little dog  - [place] wasn't excited about Kru at fist but now is happy. Tiffanie isn't sure about Kru  - [place] turned 4 yo in   - [place] Aiden.. her cat almost . Had urinary issues. Aiden (went to [facility]). Also has Stephanie the cat    MEDICAL ROS-   Back pain (spondylolisthesis), asthma (cough, SOB/wheezing)   MEDICAL / SURGICAL HISTORY                pregnant [if applicable]--no   Medical Team:     PMD- Dr. Devi       Therapist- her and [place] worked with therapist at [facility] in [place] for while  Patient Active Problem List   Diagnosis     Insomnia     Asthma     Spondylolisthesis     Family history of congenital heart defect     Night terrors, adult     Obesity     Family planning     Smoker     Attention deficit hyperactivity disorder (ADHD), combined type     Congenital spondylolisthesis     History of  section     S/P  section     ALLERGY   Patient has no known allergies.  MEDICATIONS              "                                                                                Current Outpatient Medications   Medication Sig     acetaminophen (TYLENOL) 325 MG tablet Take 325-650 mg by mouth as needed for mild pain     albuterol (PROAIR HFA/PROVENTIL HFA/VENTOLIN HFA) 108 (90 Base) MCG/ACT inhaler Inhale 2 puffs into the lungs every 6 hours     sertraline (ZOLOFT) 50 MG tablet Take 1 tablet (50 mg) by mouth daily     Current Facility-Administered Medications   Medication     etonogestrel (NEXPLANON) subdermal implant 68 mg       VITALS   There were no vitals taken for this visit.     PHQ9                       PHQ-9 SCORE 1/9/2023 2/22/2023 3/24/2023   PHQ-9 Total Score - - -   PHQ-9 Total Score MyChart - - -   PHQ-9 Total Score 22 17 17       LABS                                                                                                                           TSH   Date Value Ref Range Status   06/19/2019 0.41 0.40 - 4.00 mU/L Final   ]     Last Comprehensive Metabolic Panel:  Sodium   Date Value Ref Range Status   02/14/2020 136 134 - 144 mmol/L Final     Potassium   Date Value Ref Range Status   02/14/2020 3.7 3.5 - 5.1 mmol/L Final     Chloride   Date Value Ref Range Status   02/14/2020 105 98 - 107 mmol/L Final     Carbon Dioxide   Date Value Ref Range Status   02/14/2020 25 21 - 31 mmol/L Final     Anion Gap   Date Value Ref Range Status   02/14/2020 6 3 - 14 mmol/L Final     Glucose   Date Value Ref Range Status   02/14/2020 95 70 - 105 mg/dL Final     GLUCOSE BY METER POCT   Date Value Ref Range Status   11/26/2022 92 70 - 99 mg/dL Final     Urea Nitrogen   Date Value Ref Range Status   02/14/2020 11 7 - 25 mg/dL Final     Creatinine   Date Value Ref Range Status   02/14/2020 0.76 0.60 - 1.20 mg/dL Final     GFR Estimate   Date Value Ref Range Status   02/14/2020 >90 >60 mL/min/[1.73_m2] Final     Calcium   Date Value Ref Range Status   02/14/2020 8.8 8.6 - 10.3 mg/dL Final     MENTAL STATUS EXAM   "                                                                                     Speech: normal volume.  Mood anxious. Speech: increased rate. .  Thought process linear and logical. No ROCKY or FOI.  No homicidal ideation or psychotic thought. No SI. No hallucinations. Insight adequate.  Judgment was intact and adequate for safety. Fund of knowledge was intact. Attention and concentration were impaired --- needed to redirect her during our visit back to topic of conversation.     ASSESSMENT                                                                                                      HISTORICAL:  Initial psych consult 6/17/15  Notes:             Gabapentin :  headaches: lactation. buspar nausea and felt like was making her more sad. Topamax: tried dose of 25 mg and didn't help with appetite / weight. Seroquel: small half dose during day does help but if she took 3 at night \"when really amped up\" still didn't get her to sleep.  Nyasia Raya is a 32 yo with ADHD, bipolar II disorder and RANDI.  Nyasia went through a lot in her household growing up with mom with MS dad working all the time and 5 siblings. Nyasia took care of the household and she worked 2 jobs. Didn't end up finishing HS in [employer] and looking back she is able to recognize had a lot on her plate. Diagnosed with ADHD in past but parents didn't want her on stimulants. Nyasia had [place] 8/30/21 and Renetta was born 11/26/22.     Last visit Nyasia reported she had been feeling more depressed and it was getting to the point she was unable to care for their household at times. We agreed on her starting sertraline of which she is still taking and feels it is helping with mood. Last visit we discussed Nyasia was planning to switch from breastfeeding to bottlefeeding and I was comfortable prescribing Adderall she could start once she stopped breastfeeding. Today she notes she is still breastfeeding and is not taking Adderall. Today she sounded on manic side: " increased rate of speech, been online shopping. I pointed this out to Nyasia and I inquired if she thinks Zoloft is contributing to this. She didn't think so and notes she prefers to stick with it given helping with depression.    TREATMENT RISK STATEMENT: The risks, benefits, alternatives and potential adverse effects have been explained and are understood by the pt. The pt agrees to the treatment plan with the ability to do so. The pt knows to call the clinic for any problems or access emergency care if needed. Substance use is not a problem as noted above.     DIAGNOSES           Bipolar II disorder  ADHD  RANDI    Night terrors    PLAN                                                                                                                         1) MEDICATIONS:   -- Continue sertraline 50 mg daily.  NOT taking Adderall IR 10 mg twice daily     2) THERAPY: No Change    3) LABS: None today.    4) PT MONITOR [call for probs]: Worsening symptoms, side effects from medications, SI/HI    5) REFERRALS [CD tx, medical, tests other]: None    6)  RTC: ~1 month

## 2023-04-09 ENCOUNTER — HEALTH MAINTENANCE LETTER (OUTPATIENT)
Age: 33
End: 2023-04-09

## 2023-06-16 ENCOUNTER — TELEPHONE (OUTPATIENT)
Dept: PSYCHIATRY | Facility: OTHER | Age: 33
End: 2023-06-16

## 2023-06-16 DIAGNOSIS — F90.0 ADHD (ATTENTION DEFICIT HYPERACTIVITY DISORDER), INATTENTIVE TYPE: Primary | ICD-10-CM

## 2023-06-16 RX ORDER — DEXTROAMPHETAMINE SACCHARATE, AMPHETAMINE ASPARTATE, DEXTROAMPHETAMINE SULFATE AND AMPHETAMINE SULFATE 2.5; 2.5; 2.5; 2.5 MG/1; MG/1; MG/1; MG/1
10 TABLET ORAL 2 TIMES DAILY
Qty: 60 TABLET | Refills: 0 | Status: SHIPPED | OUTPATIENT
Start: 2023-06-16 | End: 2023-07-16

## 2023-06-16 NOTE — TELEPHONE ENCOUNTER
Patient is requesting a refill of Adderall 10 mg tablet.  Medication is no longer on current medication list.  Please send script to [facility] in [place].  Thank you, please advise.  Next F/u appt is on 7-7-2023.  Telephone visit.

## 2023-07-07 ENCOUNTER — VIRTUAL VISIT (OUTPATIENT)
Dept: PSYCHIATRY | Facility: OTHER | Age: 33
End: 2023-07-07
Attending: PSYCHIATRY & NEUROLOGY
Payer: COMMERCIAL

## 2023-07-07 DIAGNOSIS — F41.1 GAD (GENERALIZED ANXIETY DISORDER): ICD-10-CM

## 2023-07-07 DIAGNOSIS — F90.0 ADHD (ATTENTION DEFICIT HYPERACTIVITY DISORDER), INATTENTIVE TYPE: Primary | ICD-10-CM

## 2023-07-07 PROCEDURE — 99213 OFFICE O/P EST LOW 20 MIN: CPT | Mod: 93 | Performed by: PSYCHIATRY & NEUROLOGY

## 2023-07-07 RX ORDER — DEXTROAMPHETAMINE SACCHARATE, AMPHETAMINE ASPARTATE, DEXTROAMPHETAMINE SULFATE AND AMPHETAMINE SULFATE 5; 5; 5; 5 MG/1; MG/1; MG/1; MG/1
20 TABLET ORAL 2 TIMES DAILY
Qty: 60 TABLET | Refills: 0 | Status: SHIPPED | OUTPATIENT
Start: 2023-08-07 | End: 2023-09-06

## 2023-07-07 RX ORDER — DEXTROAMPHETAMINE SACCHARATE, AMPHETAMINE ASPARTATE, DEXTROAMPHETAMINE SULFATE AND AMPHETAMINE SULFATE 5; 5; 5; 5 MG/1; MG/1; MG/1; MG/1
20 TABLET ORAL 2 TIMES DAILY
Qty: 60 TABLET | Refills: 0 | Status: SHIPPED | OUTPATIENT
Start: 2023-07-07 | End: 2023-08-06

## 2023-07-07 ASSESSMENT — PAIN SCALES - GENERAL: PAINLEVEL: NO PAIN (0)

## 2023-07-07 ASSESSMENT — ANXIETY QUESTIONNAIRES
3. WORRYING TOO MUCH ABOUT DIFFERENT THINGS: MORE THAN HALF THE DAYS
6. BECOMING EASILY ANNOYED OR IRRITABLE: NEARLY EVERY DAY
7. FEELING AFRAID AS IF SOMETHING AWFUL MIGHT HAPPEN: SEVERAL DAYS
1. FEELING NERVOUS, ANXIOUS, OR ON EDGE: NEARLY EVERY DAY
5. BEING SO RESTLESS THAT IT IS HARD TO SIT STILL: NOT AT ALL
GAD7 TOTAL SCORE: 14
2. NOT BEING ABLE TO STOP OR CONTROL WORRYING: NEARLY EVERY DAY
GAD7 TOTAL SCORE: 14

## 2023-07-07 ASSESSMENT — PATIENT HEALTH QUESTIONNAIRE - PHQ9
SUM OF ALL RESPONSES TO PHQ QUESTIONS 1-9: 14
5. POOR APPETITE OR OVEREATING: MORE THAN HALF THE DAYS

## 2023-07-07 NOTE — PROGRESS NOTES
"Nyasia is a 33 year old who is being evaluated via a billable telephone visit.      What phone number would you like to be contacted at? [phone]  How would you like to obtain your AVS? Aramis    Telephone visit 15 minutes. Total visit time of 16 minutes. 1 minute outside of telephone call day of visit documenting and ordering meds (did most of during our call).    SUBJECTIVE / INTERIM HISTORY                                                                       Last 23:  Start sertraline 50 mg daily sent script today. Adderall IR 10 mg twice daily sent script     - Nu has reconnected with his family. His family is in the [facility]  - is taking sertraline but just picked it up.   - feels sertraline is helping with depression   - Wallace got \"his face broken\" by a baseball at practice last week.   - Is taking sertraline still. Took Adderall for couple days and didn't feel it did much so stopped taking   - leave for work extended. Goes until November but hopes to go back earlier  - \"Marco\" their little dog  - Wallace wasn't excited about Kru at fist but now is happy. Tiffanie isn't sure about Kru  - Tuthill turned 4 yo in   - [place] Cole Wolfe.. her cat almost . Had urinary issues. Aiden (went to [facility]). Also has Stephanie the cat    MEDICAL ROS-   Back pain (spondylolisthesis), asthma (cough, SOB/wheezing)   MEDICAL / SURGICAL HISTORY                pregnant [if applicable]--no   Medical Team:     PMD- Dr. Devi       Therapist- her and Nu worked with therapist at [facility] in [place] for while  Patient Active Problem List   Diagnosis     Insomnia     Asthma     Spondylolisthesis     Family history of congenital heart defect     Night terrors, adult     Obesity     Family planning     Smoker     Attention deficit hyperactivity disorder (ADHD), combined type     Congenital spondylolisthesis     History of  section     S/P  section     ALLERGY   Patient has no known allergies.  MEDICATIONS   "                                                                                           Current Outpatient Medications   Medication Sig     albuterol (PROAIR HFA/PROVENTIL HFA/VENTOLIN HFA) 108 (90 Base) MCG/ACT inhaler Inhale 2 puffs into the lungs every 6 hours     amphetamine-dextroamphetamine (ADDERALL) 10 MG tablet Take 1 tablet (10 mg) by mouth 2 times daily for 30 days     sertraline (ZOLOFT) 50 MG tablet Take 1 tablet (50 mg) by mouth daily     acetaminophen (TYLENOL) 325 MG tablet Take 325-650 mg by mouth as needed for mild pain (Patient not taking: Reported on 7/7/2023)     Current Facility-Administered Medications   Medication     etonogestrel (NEXPLANON) subdermal implant 68 mg       VITALS   There were no vitals taken for this visit.     PHQ9                           2/22/2023     7:54 AM 3/24/2023    11:08 AM 7/7/2023     8:25 AM   PHQ-9 SCORE   PHQ-9 Total Score 17 17 14       LABS                                                                                                                           TSH   Date Value Ref Range Status   06/19/2019 0.41 0.40 - 4.00 mU/L Final   ]     Last Comprehensive Metabolic Panel:  Sodium   Date Value Ref Range Status   02/14/2020 136 134 - 144 mmol/L Final     Potassium   Date Value Ref Range Status   02/14/2020 3.7 3.5 - 5.1 mmol/L Final     Chloride   Date Value Ref Range Status   02/14/2020 105 98 - 107 mmol/L Final     Carbon Dioxide   Date Value Ref Range Status   02/14/2020 25 21 - 31 mmol/L Final     Anion Gap   Date Value Ref Range Status   02/14/2020 6 3 - 14 mmol/L Final     Glucose   Date Value Ref Range Status   02/14/2020 95 70 - 105 mg/dL Final     GLUCOSE BY METER POCT   Date Value Ref Range Status   11/26/2022 92 70 - 99 mg/dL Final     Urea Nitrogen   Date Value Ref Range Status   02/14/2020 11 7 - 25 mg/dL Final     Creatinine   Date Value Ref Range Status   02/14/2020 0.76 0.60 - 1.20 mg/dL Final     GFR Estimate   Date Value Ref Range  "Status   02/14/2020 >90 >60 mL/min/[1.73_m2] Final     Calcium   Date Value Ref Range Status   02/14/2020 8.8 8.6 - 10.3 mg/dL Final     MENTAL STATUS EXAM                                                                                       Speech: normal volume.  Mood anxious. Speech: increased rate. .  Thought process linear and logical. No ROCKY or FOI.  No homicidal ideation or psychotic thought. No SI. No hallucinations. Insight adequate.  Judgment was intact and adequate for safety. Fund of knowledge was intact. Attention and concentration were impaired --- needed to redirect her during our visit back to topic of conversation.     ASSESSMENT                                                                                                      HISTORICAL:  Initial psych consult 6/17/15  Notes:             Gabapentin :  headaches: lactation. buspar nausea and felt like was making her more sad. Topamax: tried dose of 25 mg and didn't help with appetite / weight. Seroquel: small half dose during day does help but if she took 3 at night \"when really amped up\" still didn't get her to sleep.  Nyasia Raya is a 32 yo with ADHD, bipolar II disorder and RANDI.  Nyasia went through a lot in her household growing up with mom with MS dad working all the time and 5 siblings. Nyasia took care of the household and she worked 2 jobs. Didn't end up finishing HS in [place] and looking back she is able to recognize had a lot on her plate. Diagnosed with ADHD in past but parents didn't want her on stimulants. Nyasia had Roland 8/30/21 and Renetta was born 11/26/22.     She is still taking sertraline and is back taking Adderall. Medications Nyasia feels are helping. She is getting better at cleaning up the house. She is spending more time out of her room (she tends to stay in her bedroom a lot when depressed and anxious).    TREATMENT RISK STATEMENT: The risks, benefits, alternatives and potential adverse effects have been explained and " are understood by the pt. The pt agrees to the treatment plan with the ability to do so. The pt knows to call the clinic for any problems or access emergency care if needed. Substance use is not a problem as noted above.     DIAGNOSES           Bipolar II disorder  ADHD  RANDI    Night terrors    PLAN                                                                                                                         1) MEDICATIONS:   -- Continue sertraline 50 mg daily. Increase Adderall IR 10 mg twice daily to 20 mg twice daily and I filled for today 7/7 and for 8/7    2) THERAPY: No Change    3) LABS: None today.    4) PT MONITOR [call for probs]: Worsening symptoms, side effects from medications, SI/HI    5) REFERRALS [CD tx, medical, tests other]: None    6)  RTC: ~2 months

## 2023-09-08 ENCOUNTER — TELEPHONE (OUTPATIENT)
Dept: PSYCHIATRY | Facility: OTHER | Age: 33
End: 2023-09-08

## 2023-09-08 DIAGNOSIS — F41.1 GAD (GENERALIZED ANXIETY DISORDER): ICD-10-CM

## 2023-09-08 DIAGNOSIS — F90.0 ADHD (ATTENTION DEFICIT HYPERACTIVITY DISORDER), INATTENTIVE TYPE: Primary | ICD-10-CM

## 2023-09-08 RX ORDER — DEXTROAMPHETAMINE SACCHARATE, AMPHETAMINE ASPARTATE, DEXTROAMPHETAMINE SULFATE AND AMPHETAMINE SULFATE 5; 5; 5; 5 MG/1; MG/1; MG/1; MG/1
20 TABLET ORAL 2 TIMES DAILY
Qty: 60 TABLET | Refills: 0 | Status: SHIPPED | OUTPATIENT
Start: 2023-09-08 | End: 2023-10-16 | Stop reason: DRUGHIGH

## 2023-09-08 NOTE — TELEPHONE ENCOUNTER
Received a call from this patient. She apologizes for missing her appointment and rescheduled to next week. She is requesting refills on her Zoloft and Adderall. The orders are pended to sign if appropriate.     Pharmacy: [facility]: [place]

## 2023-09-12 ENCOUNTER — VIRTUAL VISIT (OUTPATIENT)
Dept: PSYCHIATRY | Facility: OTHER | Age: 33
End: 2023-09-12
Attending: PSYCHIATRY & NEUROLOGY

## 2023-09-12 DIAGNOSIS — F90.0 ADHD (ATTENTION DEFICIT HYPERACTIVITY DISORDER), INATTENTIVE TYPE: Primary | ICD-10-CM

## 2023-09-12 DIAGNOSIS — F41.0 PANIC DISORDER WITHOUT AGORAPHOBIA: ICD-10-CM

## 2023-09-12 PROCEDURE — 99442 PR PHYSICIAN TELEPHONE EVALUATION 11-20 MIN: CPT | Performed by: PSYCHIATRY & NEUROLOGY

## 2023-09-12 RX ORDER — DEXTROAMPHETAMINE SACCHARATE, AMPHETAMINE ASPARTATE, DEXTROAMPHETAMINE SULFATE AND AMPHETAMINE SULFATE 7.5; 7.5; 7.5; 7.5 MG/1; MG/1; MG/1; MG/1
30 TABLET ORAL 2 TIMES DAILY
Qty: 60 TABLET | Refills: 0 | Status: SHIPPED | OUTPATIENT
Start: 2023-10-06 | End: 2023-10-16

## 2023-09-12 RX ORDER — LORAZEPAM 0.5 MG/1
0.5 TABLET ORAL DAILY PRN
Qty: 15 TABLET | Refills: 0 | Status: SHIPPED | OUTPATIENT
Start: 2023-09-12 | End: 2023-10-16

## 2023-09-12 ASSESSMENT — PATIENT HEALTH QUESTIONNAIRE - PHQ9
5. POOR APPETITE OR OVEREATING: NEARLY EVERY DAY
SUM OF ALL RESPONSES TO PHQ QUESTIONS 1-9: 10

## 2023-09-12 ASSESSMENT — ANXIETY QUESTIONNAIRES
6. BECOMING EASILY ANNOYED OR IRRITABLE: NEARLY EVERY DAY
2. NOT BEING ABLE TO STOP OR CONTROL WORRYING: NEARLY EVERY DAY
7. FEELING AFRAID AS IF SOMETHING AWFUL MIGHT HAPPEN: MORE THAN HALF THE DAYS
GAD7 TOTAL SCORE: 18
5. BEING SO RESTLESS THAT IT IS HARD TO SIT STILL: SEVERAL DAYS
3. WORRYING TOO MUCH ABOUT DIFFERENT THINGS: NEARLY EVERY DAY
1. FEELING NERVOUS, ANXIOUS, OR ON EDGE: NEARLY EVERY DAY
GAD7 TOTAL SCORE: 18

## 2023-09-12 ASSESSMENT — PAIN SCALES - GENERAL: PAINLEVEL: NO PAIN (0)

## 2023-09-12 NOTE — PROGRESS NOTES
"Nyasia is a 33 year old who is being evaluated via a billable telephone visit.      What phone number would you like to be contacted at? [phone]  How would you like to obtain your AVS? Elviat    Telephone visit 15 minutes. Total visit time of 20 minutes. 5 minute outside of telephone call day of visit documenting and ordering meds .    SUBJECTIVE / INTERIM HISTORY                                                                       Last 23: Continue sertraline 50 mg daily. Increase Adderall IR 10 mg twice daily to 20 mg twice daily and I filled for today  and for   - with increase of Adderall did notice improvement. Just picked up 20 mg and feels ready to increase  - Nyasia and Nu had been doing well. Then recently one day he came home said he didn't want to be together anymore. That being said, they are still together.   - planning back to work   - been having panic attacks 1 or 2 per week.   - \"Marco\" their little dog  - Kru is 9 months.   - Navarro turned 5 yo in August   - [place] Aiden.. her cat almost . Had urinary issues. Aiden (went to [facility]). Also has Stephanie the cat    MEDICAL ROS-   Back pain (spondylolisthesis), asthma (cough, SOB/wheezing)   MEDICAL / SURGICAL HISTORY                pregnant [if applicable]--no   Medical Team:     PMD- Dr. Devi       Therapist- her and Boima worked with therapist at [facility] in [place] for while  Patient Active Problem List   Diagnosis    Insomnia    Asthma    Spondylolisthesis    Family history of congenital heart defect    Night terrors, adult    Obesity    Family planning    Smoker    Attention deficit hyperactivity disorder (ADHD), combined type    Congenital spondylolisthesis    History of  section    S/P  section     ALLERGY   Patient has no known allergies.  MEDICATIONS                                                                                             Current Outpatient Medications   Medication Sig    " acetaminophen (TYLENOL) 325 MG tablet Take 325-650 mg by mouth as needed for mild pain    albuterol (PROAIR HFA/PROVENTIL HFA/VENTOLIN HFA) 108 (90 Base) MCG/ACT inhaler Inhale 2 puffs into the lungs every 6 hours    amphetamine-dextroamphetamine (ADDERALL) 20 MG tablet Take 1 tablet (20 mg) by mouth 2 times daily    sertraline (ZOLOFT) 50 MG tablet Take 1 tablet (50 mg) by mouth daily     Current Facility-Administered Medications   Medication    etonogestrel (NEXPLANON) subdermal implant 68 mg       VITALS   There were no vitals taken for this visit.     PHQ9                           3/24/2023    11:08 AM 7/7/2023     8:25 AM 9/12/2023     8:42 AM   PHQ-9 SCORE   PHQ-9 Total Score 17 14 10       LABS                                                                                                                           TSH   Date Value Ref Range Status   06/19/2019 0.41 0.40 - 4.00 mU/L Final   ]     Last Comprehensive Metabolic Panel:  Sodium   Date Value Ref Range Status   02/14/2020 136 134 - 144 mmol/L Final     Potassium   Date Value Ref Range Status   02/14/2020 3.7 3.5 - 5.1 mmol/L Final     Chloride   Date Value Ref Range Status   02/14/2020 105 98 - 107 mmol/L Final     Carbon Dioxide   Date Value Ref Range Status   02/14/2020 25 21 - 31 mmol/L Final     Anion Gap   Date Value Ref Range Status   02/14/2020 6 3 - 14 mmol/L Final     Glucose   Date Value Ref Range Status   02/14/2020 95 70 - 105 mg/dL Final     GLUCOSE BY METER POCT   Date Value Ref Range Status   11/26/2022 92 70 - 99 mg/dL Final     Urea Nitrogen   Date Value Ref Range Status   02/14/2020 11 7 - 25 mg/dL Final     Creatinine   Date Value Ref Range Status   02/14/2020 0.76 0.60 - 1.20 mg/dL Final     GFR Estimate   Date Value Ref Range Status   02/14/2020 >90 >60 mL/min/[1.73_m2] Final     Calcium   Date Value Ref Range Status   02/14/2020 8.8 8.6 - 10.3 mg/dL Final     MENTAL STATUS EXAM                                                     "                                   Speech: normal volume.  Mood anxious. Speech: increased rate. .  Thought process linear and logical. No ROCKY or FOI.  No homicidal ideation or psychotic thought. No SI. No hallucinations. Insight adequate.  Judgment was intact and adequate for safety. Fund of knowledge was intact. Attention and concentration were impaired --- needed to redirect her during our visit back to topic of conversation.     ASSESSMENT                                                                                                      HISTORICAL:  Initial psych consult 6/17/15  Notes:             Gabapentin :  headaches: lactation. buspar nausea and felt like was making her more sad. Topamax: tried dose of 25 mg and didn't help with appetite / weight. Seroquel: small half dose during day does help but if she took 3 at night \"when really amped up\" still didn't get her to sleep.  Nyasia Raya is a 32 yo with ADHD, bipolar II disorder and RANDI.  Nyasia went through a lot in her household growing up with mom with MS dad working all the time and 5 siblings. Nyasia took care of the household and she worked 2 jobs. Didn't end up finishing HS in [place] and looking back she is able to recognize had a lot on her plate. Diagnosed with ADHD in past but parents didn't want her on stimulants. Nyasia had [place] 8/30/21 and Renetta was born 11/26/22.     She is still taking sertraline and is back taking Adderall. Medications Nyasia feels are helping and we agreed increase Adderall to 30 mg BID (what she took prior to pregnancy with Renetta. Panic attacks which she hasn't had for quite awhile: agreed on Ativan. Discussed aim for sparingly (1 or 2 x per week) and we disucssed benzodiazepines including with regular, prolonged use the potential for withdrawal if abruptly stopped, tolerance, dependence. Cautioned her driving taking it though she notes when she is having panic attack she can't / doesn't drive.     TREATMENT RISK " STATEMENT: The risks, benefits, alternatives and potential adverse effects have been explained and are understood by the pt. The pt agrees to the treatment plan with the ability to do so. The pt knows to call the clinic for any problems or access emergency care if needed. Substance use is not a problem as noted above.     DIAGNOSES           Bipolar II disorder  ADHD  RANDI    Night terrors    PLAN                                                                                                                         1) MEDICATIONS:   -- Continue sertraline 50 mg daily. Increase Adderall IR 20 mg twice daily last filled 9/8/23 to Adderall IR 30 mg twice daily set to fill for 10/6/23. Start Ativan 0.5 mg daily as needed for anxiety filled #15 tabs.     2) THERAPY: No Change    3) LABS: None today.    4) PT MONITOR [call for probs]: Worsening symptoms, side effects from medications, SI/HI    5) REFERRALS [CD tx, medical, tests other]: None    6)  RTC: ~1 month

## 2023-10-16 ENCOUNTER — VIRTUAL VISIT (OUTPATIENT)
Dept: PSYCHIATRY | Facility: OTHER | Age: 33
End: 2023-10-16
Attending: PSYCHIATRY & NEUROLOGY
Payer: COMMERCIAL

## 2023-10-16 DIAGNOSIS — F41.1 GAD (GENERALIZED ANXIETY DISORDER): Primary | ICD-10-CM

## 2023-10-16 DIAGNOSIS — F90.0 ADHD (ATTENTION DEFICIT HYPERACTIVITY DISORDER), INATTENTIVE TYPE: ICD-10-CM

## 2023-10-16 PROCEDURE — 99442 PR PHYSICIAN TELEPHONE EVALUATION 11-20 MIN: CPT | Performed by: PSYCHIATRY & NEUROLOGY

## 2023-10-16 RX ORDER — DEXTROAMPHETAMINE SACCHARATE, AMPHETAMINE ASPARTATE, DEXTROAMPHETAMINE SULFATE AND AMPHETAMINE SULFATE 7.5; 7.5; 7.5; 7.5 MG/1; MG/1; MG/1; MG/1
30 TABLET ORAL 2 TIMES DAILY
Qty: 60 TABLET | Refills: 0 | Status: SHIPPED | OUTPATIENT
Start: 2023-11-03 | End: 2023-11-15

## 2023-10-16 RX ORDER — LORAZEPAM 1 MG/1
1 TABLET ORAL DAILY PRN
Qty: 15 TABLET | Refills: 0 | Status: SHIPPED | OUTPATIENT
Start: 2023-10-16 | End: 2023-11-15

## 2023-10-16 ASSESSMENT — ANXIETY QUESTIONNAIRES
3. WORRYING TOO MUCH ABOUT DIFFERENT THINGS: SEVERAL DAYS
2. NOT BEING ABLE TO STOP OR CONTROL WORRYING: NEARLY EVERY DAY
6. BECOMING EASILY ANNOYED OR IRRITABLE: MORE THAN HALF THE DAYS
5. BEING SO RESTLESS THAT IT IS HARD TO SIT STILL: SEVERAL DAYS
7. FEELING AFRAID AS IF SOMETHING AWFUL MIGHT HAPPEN: SEVERAL DAYS
GAD7 TOTAL SCORE: 13
GAD7 TOTAL SCORE: 13
1. FEELING NERVOUS, ANXIOUS, OR ON EDGE: NEARLY EVERY DAY

## 2023-10-16 ASSESSMENT — PAIN SCALES - GENERAL: PAINLEVEL: NO PAIN (0)
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
## 2023-11-15 ENCOUNTER — VIRTUAL VISIT (OUTPATIENT)
Dept: PSYCHIATRY | Facility: OTHER | Age: 33
End: 2023-11-15
Attending: PSYCHIATRY & NEUROLOGY

## 2023-11-15 DIAGNOSIS — F90.0 ADHD (ATTENTION DEFICIT HYPERACTIVITY DISORDER), INATTENTIVE TYPE: ICD-10-CM

## 2023-11-15 DIAGNOSIS — F41.1 GAD (GENERALIZED ANXIETY DISORDER): ICD-10-CM

## 2023-11-15 PROCEDURE — 99212 OFFICE O/P EST SF 10 MIN: CPT | Mod: 95 | Performed by: PSYCHIATRY & NEUROLOGY

## 2023-11-15 RX ORDER — DEXTROAMPHETAMINE SACCHARATE, AMPHETAMINE ASPARTATE, DEXTROAMPHETAMINE SULFATE AND AMPHETAMINE SULFATE 7.5; 7.5; 7.5; 7.5 MG/1; MG/1; MG/1; MG/1
30 TABLET ORAL 2 TIMES DAILY
Qty: 60 TABLET | Refills: 0 | Status: SHIPPED | OUTPATIENT
Start: 2023-12-01 | End: 2023-12-29

## 2023-11-15 RX ORDER — LORAZEPAM 1 MG/1
1 TABLET ORAL DAILY PRN
Qty: 15 TABLET | Refills: 0 | Status: SHIPPED | OUTPATIENT
Start: 2023-11-15 | End: 2023-12-29

## 2023-11-15 ASSESSMENT — PAIN SCALES - GENERAL: PAINLEVEL: NO PAIN (0)

## 2023-11-15 ASSESSMENT — ANXIETY QUESTIONNAIRES
5. BEING SO RESTLESS THAT IT IS HARD TO SIT STILL: SEVERAL DAYS
2. NOT BEING ABLE TO STOP OR CONTROL WORRYING: NEARLY EVERY DAY
6. BECOMING EASILY ANNOYED OR IRRITABLE: NEARLY EVERY DAY
7. FEELING AFRAID AS IF SOMETHING AWFUL MIGHT HAPPEN: MORE THAN HALF THE DAYS
3. WORRYING TOO MUCH ABOUT DIFFERENT THINGS: MORE THAN HALF THE DAYS
1. FEELING NERVOUS, ANXIOUS, OR ON EDGE: NEARLY EVERY DAY
GAD7 TOTAL SCORE: 15
GAD7 TOTAL SCORE: 15

## 2023-11-15 ASSESSMENT — PATIENT HEALTH QUESTIONNAIRE - PHQ9
SUM OF ALL RESPONSES TO PHQ QUESTIONS 1-9: 8
5. POOR APPETITE OR OVEREATING: SEVERAL DAYS

## 2023-11-15 NOTE — PROGRESS NOTES
"Nyasia is a 33 year old who is being evaluated via a billable telephone visit.      What phone number would you like to be contacted at? [phone]  How would you like to obtain your AVS? Aramis    Telephone visit 12 minutes. Total visit time of 12 minutes    SUBJECTIVE / INTERIM HISTORY                                                                       Last visit 10/16/23: Continue sertraline 50 mg daily. Continue Adderall IR 30 mg twice daily set to fill for 11/3/23.  Increase Ativan 0.5 mg daily as needed for anxiety to1 mg and filled #15 tabs  - went back to work on . Feels she does well   - things at home are still messed up. At times wants to stay  - finances are major issue, kids' school pics  - Wallace is struggling a bit  - been having panic attacks 1 or 2 per week. Often in relation to things with Nu  - \"Marco\" their little dog  - Kru is 9 months.   - Aurora turned 3 yo in August   - [facility] Aiden.. her cat almost . Had urinary issues. Aiden (went to ). Also has Stephanie the cat    MEDICAL ROS-   Back pain (spondylolisthesis), asthma (cough, SOB/wheezing)   MEDICAL / SURGICAL HISTORY                pregnant [if applicable]--no   Medical Team:     PMD- Dr. Devi       Therapist- her and Nu worked with therapist at [facility] in [place] for while  Patient Active Problem List   Diagnosis    Insomnia    Asthma    Spondylolisthesis    Family history of congenital heart defect    Night terrors, adult    Obesity    Family planning    Smoker    Attention deficit hyperactivity disorder (ADHD), combined type    Congenital spondylolisthesis    History of  section    S/P  section     ALLERGY   Patient has no known allergies.  MEDICATIONS                                                                                             Current Outpatient Medications   Medication Sig    acetaminophen (TYLENOL) 325 MG tablet Take 325-650 mg by mouth as needed for mild pain    albuterol (PROAIR " HFA/PROVENTIL HFA/VENTOLIN HFA) 108 (90 Base) MCG/ACT inhaler Inhale 2 puffs into the lungs every 6 hours    amphetamine-dextroamphetamine (ADDERALL) 30 MG tablet Take 1 tablet (30 mg) by mouth 2 times daily    LORazepam (ATIVAN) 1 MG tablet Take 1 tablet (1 mg) by mouth daily as needed for anxiety    sertraline (ZOLOFT) 50 MG tablet Take 1 tablet (50 mg) by mouth daily (Patient not taking: Reported on 11/15/2023)     Current Facility-Administered Medications   Medication    etonogestrel (NEXPLANON) subdermal implant 68 mg       VITALS   There were no vitals taken for this visit.     PHQ9                           9/12/2023     8:42 AM 10/16/2023     9:56 AM 11/15/2023    10:36 AM   PHQ-9 SCORE   PHQ-9 Total Score 10 13 8       LABS                                                                                                                           TSH   Date Value Ref Range Status   06/19/2019 0.41 0.40 - 4.00 mU/L Final   ]     Last Comprehensive Metabolic Panel:  Sodium   Date Value Ref Range Status   02/14/2020 136 134 - 144 mmol/L Final     Potassium   Date Value Ref Range Status   02/14/2020 3.7 3.5 - 5.1 mmol/L Final     Chloride   Date Value Ref Range Status   02/14/2020 105 98 - 107 mmol/L Final     Carbon Dioxide   Date Value Ref Range Status   02/14/2020 25 21 - 31 mmol/L Final     Anion Gap   Date Value Ref Range Status   02/14/2020 6 3 - 14 mmol/L Final     Glucose   Date Value Ref Range Status   02/14/2020 95 70 - 105 mg/dL Final     GLUCOSE BY METER POCT   Date Value Ref Range Status   11/26/2022 92 70 - 99 mg/dL Final     Urea Nitrogen   Date Value Ref Range Status   02/14/2020 11 7 - 25 mg/dL Final     Creatinine   Date Value Ref Range Status   02/14/2020 0.76 0.60 - 1.20 mg/dL Final     GFR Estimate   Date Value Ref Range Status   02/14/2020 >90 >60 mL/min/[1.73_m2] Final     Calcium   Date Value Ref Range Status   02/14/2020 8.8 8.6 - 10.3 mg/dL Final     MENTAL STATUS EXAM                      "                                                                  Speech: normal volume.  Mood depressed, anxoius. Speech:normal volume, rhythm, rate.   Thought process linear and logical. No ROCKY or FOI.  No homicidal ideation or psychotic thought. No SI. No hallucinations. Insight adequate.  Judgment was intact and adequate for safety. Fund of knowledge was intact. Attention and concentration were impaired --- needed to redirect her during our visit back to topic of conversation.     ASSESSMENT                                                                                                      HISTORICAL:  Initial psych consult 6/17/15  Notes:             Gabapentin :  headaches: lactation. buspar nausea and felt like was making her more sad. Topamax: tried dose of 25 mg and didn't help with appetite / weight. Seroquel: small half dose during day does help but if she took 3 at night \"when really amped up\" still didn't get her to sleep.  Nyasia Raya is a 34 yo with ADHD, bipolar II disorder and RANDI.  Nyasia went through a lot in her household growing up with mom with MS dad working all the time and 5 siblings. Nyasia took care of the household and she worked 2 jobs. Didn't end up finishing HS in [place] and looking back she is able to recognize had a lot on her plate. Diagnosed with ADHD in past but parents didn't want her on stimulants. Naysia had [place] 8/30/21 and Renetta was born 11/26/22.     Rough time for Nyasia in regards to her relationship with Nu. She returned to work and is going okay thus far. Nyasia tries to limit taking Ativan.. she isn't taking sertraline and notes plans to.     TREATMENT RISK STATEMENT: The risks, benefits, alternatives and potential adverse effects have been explained and are understood by the pt. The pt agrees to the treatment plan with the ability to do so. The pt knows to call the clinic for any problems or access emergency care if needed. Substance use is not a problem as " "noted above.     DIAGNOSES           Bipolar II disorder  ADHD  RANDI    Night terrors    PLAN                                                                                                                         1) MEDICATIONS:   -- Continue sertraline 50 mg daily. Continue Adderall IR 30 mg twice daily set to fill for  continue Ativan 1 mg and re-filled #15 tabs    2) THERAPY: No Change    3) LABS: None today.    4) PT MONITOR [call for probs]: Worsening symptoms, side effects from medications, SI/HI    5) REFERRALS [CD tx, medical, tests other]: None    6)  RTC: ~1 month                                                                                                                                                                                                                                                                                                                                                                          Nyasia is a 33 year old who is being evaluated via a billable telephone visit.      What phone number would you like to be contacted at? [phone]  How would you like to obtain your AVS? [employer]    Telephone visit 15 minutes. Total visit time of 20 minutes. 5 minute outside of telephone call day of visit documenting and ordering meds .    SUBJECTIVE / INTERIM HISTORY                                                                       Last 7/7/23: Continue sertraline 50 mg daily. Increase Adderall IR 10 mg twice daily to 20 mg twice daily and I filled for today 7/7 and for 8/7  - with increase of Adderall did notice improvement. Just picked up 20 mg and feels ready to increase  - Nyasia and Nu had been doing well. Then recently one day he came home said he didn't want to be together anymore. That being said, they are still together.   - planning back to work October 9  - been having panic attacks 1 or 2 per week.   - \"Marco\" their little dog  - Kru is 9 months.   - Carter turned 5 yo " in  [place].. her cat almost . Had urinary issues. [place] (went to [facility]). Also has Stephanie the cat    MEDICAL ROS-   Back pain (spondylolisthesis), asthma (cough, SOB/wheezing)   MEDICAL / SURGICAL HISTORY                pregnant [if applicable]--no   Medical Team:     PMD- Dr. Devi       Therapist- her and Nu worked with therapist at [facility] in [place] for while  Patient Active Problem List   Diagnosis    Insomnia    Asthma    Spondylolisthesis    Family history of congenital heart defect    Night terrors, adult    Obesity    Family planning    Smoker    Attention deficit hyperactivity disorder (ADHD), combined type    Congenital spondylolisthesis    History of  section    S/P  section     ALLERGY   Patient has no known allergies.  MEDICATIONS                                                                                             Current Outpatient Medications   Medication Sig    acetaminophen (TYLENOL) 325 MG tablet Take 325-650 mg by mouth as needed for mild pain    albuterol (PROAIR HFA/PROVENTIL HFA/VENTOLIN HFA) 108 (90 Base) MCG/ACT inhaler Inhale 2 puffs into the lungs every 6 hours    amphetamine-dextroamphetamine (ADDERALL) 30 MG tablet Take 1 tablet (30 mg) by mouth 2 times daily    LORazepam (ATIVAN) 1 MG tablet Take 1 tablet (1 mg) by mouth daily as needed for anxiety    sertraline (ZOLOFT) 50 MG tablet Take 1 tablet (50 mg) by mouth daily (Patient not taking: Reported on 11/15/2023)     Current Facility-Administered Medications   Medication    etonogestrel (NEXPLANON) subdermal implant 68 mg       VITALS   There were no vitals taken for this visit.     PHQ9                           2023     8:42 AM 10/16/2023     9:56 AM 11/15/2023    10:36 AM   PHQ-9 SCORE   PHQ-9 Total Score 10 13 8       LABS                                                                                                                           TSH   Date Value Ref Range Status  "  06/19/2019 0.41 0.40 - 4.00 mU/L Final   ]     Last Comprehensive Metabolic Panel:  Sodium   Date Value Ref Range Status   02/14/2020 136 134 - 144 mmol/L Final     Potassium   Date Value Ref Range Status   02/14/2020 3.7 3.5 - 5.1 mmol/L Final     Chloride   Date Value Ref Range Status   02/14/2020 105 98 - 107 mmol/L Final     Carbon Dioxide   Date Value Ref Range Status   02/14/2020 25 21 - 31 mmol/L Final     Anion Gap   Date Value Ref Range Status   02/14/2020 6 3 - 14 mmol/L Final     Glucose   Date Value Ref Range Status   02/14/2020 95 70 - 105 mg/dL Final     GLUCOSE BY METER POCT   Date Value Ref Range Status   11/26/2022 92 70 - 99 mg/dL Final     Urea Nitrogen   Date Value Ref Range Status   02/14/2020 11 7 - 25 mg/dL Final     Creatinine   Date Value Ref Range Status   02/14/2020 0.76 0.60 - 1.20 mg/dL Final     GFR Estimate   Date Value Ref Range Status   02/14/2020 >90 >60 mL/min/[1.73_m2] Final     Calcium   Date Value Ref Range Status   02/14/2020 8.8 8.6 - 10.3 mg/dL Final     MENTAL STATUS EXAM                                                                                       Speech: normal volume.  Mood anxious. Speech: increased rate. .  Thought process linear and logical. No ROCKY or FOI.  No homicidal ideation or psychotic thought. No SI. No hallucinations. Insight adequate.  Judgment was intact and adequate for safety. Fund of knowledge was intact. Attention and concentration were impaired --- needed to redirect her during our visit back to topic of conversation.     ASSESSMENT                                                                                                      HISTORICAL:  Initial psych consult 6/17/15  Notes:             Gabapentin :  headaches: lactation. buspar nausea and felt like was making her more sad. Topamax: tried dose of 25 mg and didn't help with appetite / weight. Seroquel: small half dose during day does help but if she took 3 at night \"when really amped up\" " still didn't get her to sleep.  Nyasia Raya is a 32 yo with ADHD, bipolar II disorder and RANDI.  Nyasia went through a lot in her household growing up with mom with MS dad working all the time and 5 siblings. Nyasia took care of the household and she worked 2 jobs. Didn't end up finishing HS in [place] and looking back she is able to recognize had a lot on her plate. Diagnosed with ADHD in past but parents didn't want her on stimulants. Nyasia had [place] 8/30/21 and Renetta was born 11/26/22.     She is still taking sertraline and is back taking Adderall. Medications Nyasia feels are helping and we agreed increase Adderall to 30 mg BID (what she took prior to pregnancy with Renetta. Panic attacks which she hasn't had for quite awhile: agreed on Ativan. Discussed aim for sparingly (1 or 2 x per week) and we disucssed benzodiazepines including with regular, prolonged use the potential for withdrawal if abruptly stopped, tolerance, dependence. Cautioned her driving taking it though she notes when she is having panic attack she can't / doesn't drive.     TREATMENT RISK STATEMENT: The risks, benefits, alternatives and potential adverse effects have been explained and are understood by the pt. The pt agrees to the treatment plan with the ability to do so. The pt knows to call the clinic for any problems or access emergency care if needed. Substance use is not a problem as noted above.     DIAGNOSES           Bipolar II disorder  ADHD  RANDI    Night terrors    PLAN                                                                                                                         1) MEDICATIONS:   -- Continue sertraline 50 mg daily. Increase Adderall IR 20 mg twice daily last filled 9/8/23 to Adderall IR 30 mg twice daily set to fill for 10/6/23. Start Ativan 0.5 mg daily as needed for anxiety filled #15 tabs.     2) THERAPY: No Change    3) LABS: None today.    4) PT MONITOR [call for probs]: Worsening symptoms, side effects  from medications, SI/HI    5) REFERRALS [CD tx, medical, tests other]: None    6)  RTC: ~1 month

## 2023-12-28 DIAGNOSIS — F90.0 ADHD (ATTENTION DEFICIT HYPERACTIVITY DISORDER), INATTENTIVE TYPE: ICD-10-CM

## 2023-12-28 DIAGNOSIS — F41.1 GAD (GENERALIZED ANXIETY DISORDER): ICD-10-CM

## 2023-12-28 NOTE — TELEPHONE ENCOUNTER
Adderall      Last Written Prescription Date:  12/01/23  Last Fill Quantity: 60,   # refills: 0  Last Office Visit: 11/15/23  Future Office visit:         Lorazepam      Last Written Prescription Date:  11/15/23  Last Fill Quantity: 15,   # refills: 0  Last Office Visit: 11/15/23  Future Office visit:

## 2023-12-29 RX ORDER — DEXTROAMPHETAMINE SACCHARATE, AMPHETAMINE ASPARTATE, DEXTROAMPHETAMINE SULFATE AND AMPHETAMINE SULFATE 7.5; 7.5; 7.5; 7.5 MG/1; MG/1; MG/1; MG/1
TABLET ORAL
Qty: 60 TABLET | Refills: 0 | Status: SHIPPED | OUTPATIENT
Start: 2023-12-29 | End: 2024-02-21

## 2023-12-29 RX ORDER — LORAZEPAM 1 MG/1
TABLET ORAL
Qty: 15 TABLET | Refills: 0 | Status: SHIPPED | OUTPATIENT
Start: 2023-12-29 | End: 2024-01-17

## 2024-01-17 DIAGNOSIS — F41.1 GAD (GENERALIZED ANXIETY DISORDER): ICD-10-CM

## 2024-01-17 RX ORDER — LORAZEPAM 1 MG/1
1 TABLET ORAL DAILY PRN
Qty: 15 TABLET | Refills: 0 | Status: SHIPPED | OUTPATIENT
Start: 2024-01-17 | End: 2024-02-21 | Stop reason: ALTCHOICE

## 2024-01-17 NOTE — TELEPHONE ENCOUNTER
Patient is requesting a refill of the Lorazepam 1 mg tablet.  Patient reports having multiple panic attacks.  Medication has been pended.  Please fill and send to pharmacy if appropriate.  Next follow up appt is on 1-23-24. Thank you, please advise.

## 2024-01-23 ENCOUNTER — VIRTUAL VISIT (OUTPATIENT)
Dept: PSYCHIATRY | Facility: OTHER | Age: 34
End: 2024-01-23
Attending: PSYCHIATRY & NEUROLOGY

## 2024-01-23 DIAGNOSIS — F90.0 ADHD (ATTENTION DEFICIT HYPERACTIVITY DISORDER), INATTENTIVE TYPE: ICD-10-CM

## 2024-01-23 DIAGNOSIS — F41.1 GAD (GENERALIZED ANXIETY DISORDER): Primary | ICD-10-CM

## 2024-01-23 PROCEDURE — 99442 PR PHYSICIAN TELEPHONE EVALUATION 11-20 MIN: CPT | Mod: 93 | Performed by: PSYCHIATRY & NEUROLOGY

## 2024-01-23 RX ORDER — DEXTROAMPHETAMINE SACCHARATE, AMPHETAMINE ASPARTATE, DEXTROAMPHETAMINE SULFATE AND AMPHETAMINE SULFATE 7.5; 7.5; 7.5; 7.5 MG/1; MG/1; MG/1; MG/1
30 TABLET ORAL 2 TIMES DAILY
Qty: 60 TABLET | Refills: 0 | Status: SHIPPED | OUTPATIENT
Start: 2024-01-29 | End: 2024-02-21

## 2024-01-23 RX ORDER — DEXTROAMPHETAMINE SACCHARATE, AMPHETAMINE ASPARTATE, DEXTROAMPHETAMINE SULFATE AND AMPHETAMINE SULFATE 5; 5; 5; 5 MG/1; MG/1; MG/1; MG/1
20 TABLET ORAL 2 TIMES DAILY
Qty: 14 TABLET | Refills: 0 | Status: SHIPPED | OUTPATIENT
Start: 2024-01-23 | End: 2024-01-23 | Stop reason: DRUGHIGH

## 2024-01-23 RX ORDER — CLONAZEPAM 0.5 MG/1
0.5 TABLET ORAL DAILY PRN
Qty: 20 TABLET | Refills: 0 | Status: SHIPPED | OUTPATIENT
Start: 2024-01-23 | End: 2024-02-21

## 2024-01-23 ASSESSMENT — ANXIETY QUESTIONNAIRES
7. FEELING AFRAID AS IF SOMETHING AWFUL MIGHT HAPPEN: SEVERAL DAYS
GAD7 TOTAL SCORE: 14
3. WORRYING TOO MUCH ABOUT DIFFERENT THINGS: MORE THAN HALF THE DAYS
5. BEING SO RESTLESS THAT IT IS HARD TO SIT STILL: SEVERAL DAYS
6. BECOMING EASILY ANNOYED OR IRRITABLE: MORE THAN HALF THE DAYS
1. FEELING NERVOUS, ANXIOUS, OR ON EDGE: NEARLY EVERY DAY
GAD7 TOTAL SCORE: 14
2. NOT BEING ABLE TO STOP OR CONTROL WORRYING: NEARLY EVERY DAY

## 2024-01-23 ASSESSMENT — PATIENT HEALTH QUESTIONNAIRE - PHQ9
5. POOR APPETITE OR OVEREATING: MORE THAN HALF THE DAYS
SUM OF ALL RESPONSES TO PHQ QUESTIONS 1-9: 16

## 2024-01-23 ASSESSMENT — PAIN SCALES - GENERAL: PAINLEVEL: NO PAIN (0)

## 2024-01-23 NOTE — PROGRESS NOTES
"Nyasia is a 33 year old who is being evaluated via a billable telephone visit.      What phone number would you like to be contacted at? [phone]  How would you like to obtain your AVS? Aramis    Type of service: telephone visit 20 minutes. 3 minutes ordering meds, documenting. Total visit time of 23 minutes.     start time: 10:37 am    end time: 10:57 am    Originating location (pt location): home    Distant location (provider location): [facility]        SUBJECTIVE / INTERIM HISTORY                                                                       Last visit 11/15/23:     - \"everything has exploded\". Nyasia found out Nu has had a girlfriend since Nyasia Jackson. The girl messaged Nyasia.  - Wallace knows what is going. Opened up Nyasia's phone and saw the picture of his dad's girlfriend  - Nu has at one point said he planned on moving out and moving to [place] with his girlfirend  -  to help work on housing.   - Nu stole / hid her meds (he did this in the past also)  - is back to work. Struggling given everything going on but is making it.  - \"Marco\" their little dog  - Olvinu is 2 yo  - Cummaquid turned 3 yo in August   - [facility] Aiden.. her cat almost . Had urinary issues. Aiden (went to [facility]). Also has Stephanie the cat    MEDICAL ROS-   Back pain (spondylolisthesis), asthma (cough, SOB/wheezing)   MEDICAL / SURGICAL HISTORY                pregnant [if applicable]--no   Medical Team:     PMD- Dr. Devi       Therapist- her and Nu worked with therapist at [facility] in [place] for while  Patient Active Problem List   Diagnosis    Insomnia    Asthma    Spondylolisthesis    Family history of congenital heart defect    Night terrors, adult    Obesity    Family planning    Smoker    Attention deficit hyperactivity disorder (ADHD), combined type    Congenital spondylolisthesis    History of  section    S/P  section     ALLERGY   Patient has no known " allergies.  MEDICATIONS                                                                                             Current Outpatient Medications   Medication Sig    acetaminophen (TYLENOL) 325 MG tablet Take 325-650 mg by mouth as needed for mild pain    albuterol (PROAIR HFA/PROVENTIL HFA/VENTOLIN HFA) 108 (90 Base) MCG/ACT inhaler Inhale 2 puffs into the lungs every 6 hours    amphetamine-dextroamphetamine (ADDERALL) 30 MG tablet 12/1/23 TAKE 1 TABLET (30 MG) BY MOUTH 2 TIMES DAILY    LORazepam (ATIVAN) 1 MG tablet Take 1 tablet (1 mg) by mouth daily as needed for anxiety    sertraline (ZOLOFT) 50 MG tablet Take 1 tablet (50 mg) by mouth daily (Patient not taking: Reported on 11/15/2023)     Current Facility-Administered Medications   Medication    etonogestrel (NEXPLANON) subdermal implant 68 mg       VITALS   There were no vitals taken for this visit.     PHQ9                           10/16/2023     9:56 AM 11/15/2023    10:36 AM 1/23/2024    10:07 AM   PHQ-9 SCORE   PHQ-9 Total Score 13 8 16       LABS                                                                                                                           TSH   Date Value Ref Range Status   06/19/2019 0.41 0.40 - 4.00 mU/L Final   ]     Last Comprehensive Metabolic Panel:  Sodium   Date Value Ref Range Status   02/14/2020 136 134 - 144 mmol/L Final     Potassium   Date Value Ref Range Status   02/14/2020 3.7 3.5 - 5.1 mmol/L Final     Chloride   Date Value Ref Range Status   02/14/2020 105 98 - 107 mmol/L Final     Carbon Dioxide   Date Value Ref Range Status   02/14/2020 25 21 - 31 mmol/L Final     Anion Gap   Date Value Ref Range Status   02/14/2020 6 3 - 14 mmol/L Final     Glucose   Date Value Ref Range Status   02/14/2020 95 70 - 105 mg/dL Final     GLUCOSE BY METER POCT   Date Value Ref Range Status   11/26/2022 92 70 - 99 mg/dL Final     Urea Nitrogen   Date Value Ref Range Status   02/14/2020 11 7 - 25 mg/dL Final     Creatinine  "  Date Value Ref Range Status   02/14/2020 0.76 0.60 - 1.20 mg/dL Final     GFR Estimate   Date Value Ref Range Status   02/14/2020 >90 >60 mL/min/[1.73_m2] Final     Calcium   Date Value Ref Range Status   02/14/2020 8.8 8.6 - 10.3 mg/dL Final     MENTAL STATUS EXAM                                                                                       Speech: normal volume.  Mood depressed, and anxious - tearful (she was crying during our telephone visit) Speech:normal volume, rhythm, rate.   Thought process linear and logical. No ROCKY or FOI.  No homicidal ideation or psychotic thought. No SI. No hallucinations. Insight adequate.  Judgment was intact and adequate for safety. Fund of knowledge was intact. Attention and concentration were impaired --- needed to redirect her during our visit back to topic of conversation.     ASSESSMENT                                                                                                      HISTORICAL:  Initial psych consult 6/17/15  Notes:             Gabapentin :  headaches: lactation. buspar nausea and felt like was making her more sad. Topamax: tried dose of 25 mg and didn't help with appetite / weight. Seroquel: small half dose during day does help but if she took 3 at night \"when really amped up\" still didn't get her to sleep.  Nyasia Raya is a 34 yo with ADHD, bipolar II disorder and RANDI.  Nyasia went through a lot in her household growing up with mom with MS dad working all the time and 5 siblings. Nyasia took care of the household and she worked 2 jobs. Didn't end up finishing HS in [place] and looking back she is able to recognize had a lot on her plate. Diagnosed with ADHD in past but parents didn't want her on stimulants. Nyasia had [place] 8/30/21 and Renetta was born 11/26/22.     Rough time for Nyasia in regards to her relationship with Nu. She returned to work and is making it work but home life has \"exploded\" including turns out Nu has had a girlfriend " since Nyasia had Kru. Nu took / hid her meds as he has in the past which makes Nyasia all the more emotional. She's in a rough spot. She did reach out to [address] today and spoke with  about housing. We agreed on try switch ot Klonopin instead of Ativan.     Stephanie, Aiden, and the dog.     TREATMENT RISK STATEMENT: The risks, benefits, alternatives and potential adverse effects have been explained and are understood by the pt. The pt agrees to the treatment plan with the ability to do so. The pt knows to call the clinic for any problems or access emergency care if needed. Substance use is not a problem as noted above.     DIAGNOSES           Bipolar II disorder  ADHD  RANDI    Night terrors    PLAN                                                                                                                         1) MEDICATIONS:   -- Continue sertraline 50 mg daily. Continue Adderall IR 30 mg twice daily set to fill for 1/29/24.  Discontinue Ativan 1 mg. Start Klonopin 0.5 mg up to daily prn anxiety.     2) THERAPY: No Change    3) LABS: None today.    4) PT MONITOR [call for probs]: Worsening symptoms, side effects from medications, SI/HI    5) REFERRALS [CD tx, medical, tests other]: None    6)  RTC: ~1 month                                                                                                                                                                                                                                                                                                                                                                          Nyasia is a 33 year old who is being evaluated via a billable telephone visit.      What phone number would you like to be contacted at? [phone]  How would you like to obtain your AVS? Elviat    Telephone visit 15 minutes. Total visit time of 20 minutes. 5 minute outside of telephone call day of visit documenting and ordering meds  ".    SUBJECTIVE / INTERIM HISTORY                                                                       Last 23: Continue sertraline 50 mg daily. Increase Adderall IR 10 mg twice daily to 20 mg twice daily and I filled for today  and for   - with increase of Adderall did notice improvement. Just picked up 20 mg and feels ready to increase  - Tera had been doing well. Then recently one day he came home said he didn't want to be together anymore. That being said, they are still together.   - planning back to work   - been having panic attacks 1 or 2 per week.   - \"Marco\" their little dog  - Kru is 9 months.   - McHenry turned 5 yo in August   - [place] Aiden.. her cat almost . Had urinary issues. Aiden (went to [facility]). Also has Stephanie the cat    MEDICAL ROS-   Back pain (spondylolisthesis), asthma (cough, SOB/wheezing)   MEDICAL / SURGICAL HISTORY                pregnant [if applicable]--no   Medical Team:     PMD- Dr. Devi       Therapist- her and Nu worked with therapist at [facility] in [place] for while  Patient Active Problem List   Diagnosis    Insomnia    Asthma    Spondylolisthesis    Family history of congenital heart defect    Night terrors, adult    Obesity    Family planning    Smoker    Attention deficit hyperactivity disorder (ADHD), combined type    Congenital spondylolisthesis    History of  section    S/P  section     ALLERGY   Patient has no known allergies.  MEDICATIONS                                                                                             Current Outpatient Medications   Medication Sig    acetaminophen (TYLENOL) 325 MG tablet Take 325-650 mg by mouth as needed for mild pain    albuterol (PROAIR HFA/PROVENTIL HFA/VENTOLIN HFA) 108 (90 Base) MCG/ACT inhaler Inhale 2 puffs into the lungs every 6 hours    amphetamine-dextroamphetamine (ADDERALL) 30 MG tablet 23 TAKE 1 TABLET (30 MG) BY MOUTH 2 TIMES DAILY    LORazepam (ATIVAN) " 1 MG tablet Take 1 tablet (1 mg) by mouth daily as needed for anxiety    sertraline (ZOLOFT) 50 MG tablet Take 1 tablet (50 mg) by mouth daily (Patient not taking: Reported on 11/15/2023)     Current Facility-Administered Medications   Medication    etonogestrel (NEXPLANON) subdermal implant 68 mg       VITALS   There were no vitals taken for this visit.     PHQ9                           10/16/2023     9:56 AM 11/15/2023    10:36 AM 1/23/2024    10:07 AM   PHQ-9 SCORE   PHQ-9 Total Score 13 8 16       LABS                                                                                                                           TSH   Date Value Ref Range Status   06/19/2019 0.41 0.40 - 4.00 mU/L Final   ]     Last Comprehensive Metabolic Panel:  Sodium   Date Value Ref Range Status   02/14/2020 136 134 - 144 mmol/L Final     Potassium   Date Value Ref Range Status   02/14/2020 3.7 3.5 - 5.1 mmol/L Final     Chloride   Date Value Ref Range Status   02/14/2020 105 98 - 107 mmol/L Final     Carbon Dioxide   Date Value Ref Range Status   02/14/2020 25 21 - 31 mmol/L Final     Anion Gap   Date Value Ref Range Status   02/14/2020 6 3 - 14 mmol/L Final     Glucose   Date Value Ref Range Status   02/14/2020 95 70 - 105 mg/dL Final     GLUCOSE BY METER POCT   Date Value Ref Range Status   11/26/2022 92 70 - 99 mg/dL Final     Urea Nitrogen   Date Value Ref Range Status   02/14/2020 11 7 - 25 mg/dL Final     Creatinine   Date Value Ref Range Status   02/14/2020 0.76 0.60 - 1.20 mg/dL Final     GFR Estimate   Date Value Ref Range Status   02/14/2020 >90 >60 mL/min/[1.73_m2] Final     Calcium   Date Value Ref Range Status   02/14/2020 8.8 8.6 - 10.3 mg/dL Final     MENTAL STATUS EXAM                                                                                       Speech: normal volume.  Mood anxious. Speech: increased rate. .  Thought process linear and logical. No ROCKY or FOI.  No homicidal ideation or psychotic thought. No  "SI. No hallucinations. Insight adequate.  Judgment was intact and adequate for safety. Fund of knowledge was intact. Attention and concentration were impaired --- needed to redirect her during our visit back to topic of conversation.     ASSESSMENT                                                                                                      HISTORICAL:  Initial psych consult 6/17/15  Notes:             Gabapentin :  headaches: lactation. buspar nausea and felt like was making her more sad. Topamax: tried dose of 25 mg and didn't help with appetite / weight. Seroquel: small half dose during day does help but if she took 3 at night \"when really amped up\" still didn't get her to sleep.  Nyasia Raya is a 34 yo with ADHD, bipolar II disorder and RANDI.  Nyasia went through a lot in her household growing up with mom with MS dad working all the time and 5 siblings. Nyasia took care of the household and she worked 2 jobs. Didn't end up finishing HS in [place] and looking back she is able to recognize had a lot on her plate. Diagnosed with ADHD in past but parents didn't want her on stimulants. Nyasia had [place] 8/30/21 and Renetta was born 11/26/22.     She is still taking sertraline and is back taking Adderall. Medications Nyasia feels are helping and we agreed increase Adderall to 30 mg BID (what she took prior to pregnancy with Renetta. Panic attacks which she hasn't had for quite awhile: agreed on Ativan. Discussed aim for sparingly (1 or 2 x per week) and we disucssed benzodiazepines including with regular, prolonged use the potential for withdrawal if abruptly stopped, tolerance, dependence. Cautioned her driving taking it though she notes when she is having panic attack she can't / doesn't drive.     TREATMENT RISK STATEMENT: The risks, benefits, alternatives and potential adverse effects have been explained and are understood by the pt. The pt agrees to the treatment plan with the ability to do so. The pt knows to " call the clinic for any problems or access emergency care if needed. Substance use is not a problem as noted above.     DIAGNOSES           Bipolar II disorder  ADHD  RANDI    Night terrors    PLAN                                                                                                                         1) MEDICATIONS:   -- Continue sertraline 50 mg daily. Increase Adderall IR 20 mg twice daily last filled 9/8/23 to Adderall IR 30 mg twice daily set to fill for 10/6/23. Start Ativan 0.5 mg daily as needed for anxiety filled #15 tabs.     2) THERAPY: No Change    3) LABS: None today.    4) PT MONITOR [call for probs]: Worsening symptoms, side effects from medications, SI/HI    5) REFERRALS [CD tx, medical, tests other]: None    6)  RTC: ~1 month

## 2024-02-07 ENCOUNTER — TELEPHONE (OUTPATIENT)
Dept: PSYCHIATRY | Facility: OTHER | Age: 34
End: 2024-02-07

## 2024-02-07 NOTE — TELEPHONE ENCOUNTER
Patient is asking for an emotional support letter to have her 4 animals for when she moves into a new place.  Wondering if Jeanne can do this.  Patient will let us know where to send this or if she will be picking it up.  Next follow up appt is on 2-21-24.  Thank you

## 2024-02-07 NOTE — LETTER
2/7/2024        RE: Nyasia Raya  [address]  [place] [address]        I work with Nyasia Raya in my psychiatry clinic at [facility] in [place]. I am writing to note her pets are her Emotional Support Animals (NICO). If you have questions and / or require additional information I can be reached at [phone] and our fax is [phone].          Sincerely,        Jeanne Guerrero MD

## 2024-02-13 NOTE — TELEPHONE ENCOUNTER
Patient contacted and notified requested emotional support letter was faxed to Patrice Mayorga per patient.

## 2024-02-21 ENCOUNTER — VIRTUAL VISIT (OUTPATIENT)
Dept: PSYCHIATRY | Facility: OTHER | Age: 34
End: 2024-02-21
Attending: PSYCHIATRY & NEUROLOGY

## 2024-02-21 DIAGNOSIS — F90.0 ADHD (ATTENTION DEFICIT HYPERACTIVITY DISORDER), INATTENTIVE TYPE: ICD-10-CM

## 2024-02-21 DIAGNOSIS — F41.1 GAD (GENERALIZED ANXIETY DISORDER): ICD-10-CM

## 2024-02-21 PROCEDURE — 99442 PR PHYSICIAN TELEPHONE EVALUATION 11-20 MIN: CPT | Mod: 93 | Performed by: PSYCHIATRY & NEUROLOGY

## 2024-02-21 RX ORDER — DEXTROAMPHETAMINE SACCHARATE, AMPHETAMINE ASPARTATE, DEXTROAMPHETAMINE SULFATE AND AMPHETAMINE SULFATE 7.5; 7.5; 7.5; 7.5 MG/1; MG/1; MG/1; MG/1
30 TABLET ORAL 2 TIMES DAILY
Qty: 60 TABLET | Refills: 0 | Status: SHIPPED | OUTPATIENT
Start: 2024-02-26 | End: 2024-03-20

## 2024-02-21 RX ORDER — CLONAZEPAM 0.5 MG/1
0.5 TABLET ORAL DAILY PRN
Qty: 20 TABLET | Refills: 0 | Status: SHIPPED | OUTPATIENT
Start: 2024-02-21 | End: 2024-03-20

## 2024-02-21 ASSESSMENT — PAIN SCALES - GENERAL: PAINLEVEL: NO PAIN (0)

## 2024-02-21 NOTE — PROGRESS NOTES
Attempt # 1  Outcome: Talked with Patient    Comment: I spoke with the pt we were able to get her scheduled for her one month follow up

## 2024-02-21 NOTE — PROGRESS NOTES
After Visit Summary   1/2/2018    Quiana Dunaway    MRN: [id number]           Patient Information     Date Of Birth          1934        Visit Information        Provider Department      1/2/2018 1:30 PM Nurse, [facility]        Today's Diagnoses     Multiple myeloma not having achieved remission (H)    -  1       Follow-ups after your visit        Your next 10 appointments already scheduled     Jan 02, 2018  2:00 PM CST   Return Visit with Alex Parry MD   [facility] ([facility])    [facility] [place] [id number]  [place] [address]   [phone]            Jan 02, 2018  2:30 PM CST   Level 2 with SH INFUSION CHAIR 5   [facility] ([facility])    [facility]  [address]  [place] [address]   [phone]            Jan 08, 2018  2:00 PM CST   Level 2 with  INFUSION CHAIR 5   [facility] ([facility])    [facility] [place] [id number]  [place] [address]   [phone]            Guru 15, 2018  2:00 PM CST   Level 2 with  INFUSION CHAIR 5   [facility] ([facility])    [facility]  [address]  [place] [address]   [phone]            Jan 22, 2018  2:00 PM CST   Level 2 with SH INFUSION CHAIR 18   [facility] ([facility])    [facility]  [address]  [place] [address]   [phone]            Jan 29, 2018  2:00 PM CST   Level 2 with  INFUSION CHAIR 5   [facility] ([facility])    [facility]  [address]  [place] [address]   [phone]            Feb 05, 2018  2:00 PM CST   Level 2 with SH  "Nyasia is a 34 year old who is being evaluated via a billable telephone visit.      What phone number would you like to be contacted at? [phone]  How would you like to obtain your AVS? Aramis    Type of service: telephone visit 15minutes.  start time: 1:50 pm    end time: 2:05 pm     Originating location (pt location): home    Distant location (provider location): [facility]        SUBJECTIVE / INTERIM HISTORY                                                                       Last visit 2024:   - found a new place. Will be in area behind [facility].   - Nu now realizes she is serious about moving out and he is saying htings like they should have another baby  - \"my body is freaking out\". Hot flashes, panic attacks.  - \"I know this is the most practical, sane thing I've done in 5-7 years\" in terms of breaking up and moving out  - when got the call about move in date had panic attack - > then asthma attack  - is back to work. Struggling given everything going on but is making it.  - \"Marco\" their little dog  - Olvinu is 2 yo  - [place] turned 5 yo in August   - [facility] Aiden.. her cat almost . Had urinary issues. Aiden (went to [facility]). Also has Stephanie the cat    MEDICAL ROS-   Back pain (spondylolisthesis), asthma (cough, SOB/wheezing)   MEDICAL / SURGICAL HISTORY                pregnant [if applicable]--no   Medical Team:     PMD- Dr. Devi       Therapist- her and Nu worked with therapist at [facility] for while  Patient Active Problem List   Diagnosis    Insomnia    Asthma    Spondylolisthesis    Family history of congenital heart defect    Night terrors, adult    Obesity    Family planning    Smoker    Attention deficit hyperactivity disorder (ADHD), combined type    Congenital spondylolisthesis    History of  section    S/P  section     ALLERGY   Patient has no known allergies.  MEDICATIONS                                                                "                              Current Outpatient Medications   Medication Sig    acetaminophen (TYLENOL) 325 MG tablet Take 325-650 mg by mouth as needed for mild pain    albuterol (PROAIR HFA/PROVENTIL HFA/VENTOLIN HFA) 108 (90 Base) MCG/ACT inhaler Inhale 2 puffs into the lungs every 6 hours    amphetamine-dextroamphetamine (ADDERALL) 30 MG tablet Take 1 tablet (30 mg) by mouth 2 times daily    clonazePAM (KLONOPIN) 0.5 MG tablet Take 1 tablet (0.5 mg) by mouth daily as needed for anxiety    sertraline (ZOLOFT) 50 MG tablet Take 1 tablet (50 mg) by mouth daily (Patient not taking: Reported on 11/15/2023)     Current Facility-Administered Medications   Medication    etonogestrel (NEXPLANON) subdermal implant 68 mg       VITALS   There were no vitals taken for this visit.     PHQ9                         Depression Screening Follow Up        1/23/2024    10:07 AM   PHQ   PHQ-9 Total Score 16   Q9: Thoughts of better off dead/self-harm past 2 weeks Not at all         Follow Up Actions Taken  Patient counseled, no additional follow up at this time.         LABS                                                                                                                           TSH   Date Value Ref Range Status   06/19/2019 0.41 0.40 - 4.00 mU/L Final   ]     Last Comprehensive Metabolic Panel:  Sodium   Date Value Ref Range Status   02/14/2020 136 134 - 144 mmol/L Final     Potassium   Date Value Ref Range Status   02/14/2020 3.7 3.5 - 5.1 mmol/L Final     Chloride   Date Value Ref Range Status   02/14/2020 105 98 - 107 mmol/L Final     Carbon Dioxide   Date Value Ref Range Status   02/14/2020 25 21 - 31 mmol/L Final     Anion Gap   Date Value Ref Range Status   02/14/2020 6 3 - 14 mmol/L Final     Glucose   Date Value Ref Range Status   02/14/2020 95 70 - 105 mg/dL Final     GLUCOSE BY METER POCT   Date Value Ref Range Status   11/26/2022 92 70 - 99 mg/dL Final     Urea Nitrogen   Date Value Ref Range Status  "INFUSION CHAIR 4   [facility] ([facility])    [facility] [place]  [address]  [place] [address] [phone]            Apr 09, 2018  2:30 PM CDT   Remote PPM Check with GABRIEL PAUL   [facility] ([facility])    [address]  [place] [address] [phone]           This appointment is for a remote check of your pacemaker.  This is not an appointment at the office.            Apr 09, 2018  3:15 PM CDT   [facility] EP RETURN with Alberto Worthington MD   [facility])    [address]  [facility] [id number] [phone]              Who to contact     If you have questions or need follow up information about today's clinic visit or your schedule please contact [facility] directly at [phone].  Normal or non-critical lab and imaging results will be communicated to you by [employer], letter or phone within 4 business days after the clinic has received the results. If you do not hear from us within 7 days, please contact the clinic through [employer] or phone. If you have a critical or abnormal lab result, we will notify you by phone as soon as possible.  Submit refill requests through [employer] or call your pharmacy and they will forward the refill request to us. Please allow 3 business days for your refill to be completed.          Additional Information About Your Visit        [employer] Information     [employer] lets you send messages to your doctor, view your test results, renew your prescriptions, schedule appointments and more. To sign up, go to [email or web address] . Click on \"Log in\" on the left side of the screen, which will take you to the Welcome page. Then click on \"Sign up Now\" on the right side of the page.     You will be asked to enter the access code listed below, as well as some " "  02/14/2020 11 7 - 25 mg/dL Final     Creatinine   Date Value Ref Range Status   02/14/2020 0.76 0.60 - 1.20 mg/dL Final     GFR Estimate   Date Value Ref Range Status   02/14/2020 >90 >60 mL/min/[1.73_m2] Final     Calcium   Date Value Ref Range Status   02/14/2020 8.8 8.6 - 10.3 mg/dL Final     MENTAL STATUS EXAM                                                                                       Speech: normal volume.  Mood depressed, and anxious. Speech:normal volume, rhythm, rate.   Thought process linear and logical. No ROCKY or FOI.  No homicidal ideation or psychotic thought. No SI. No hallucinations. Insight adequate.  Judgment was intact and adequate for safety. Fund of knowledge was intact. Attention and concentration were impaired --- needed to redirect her during our visit back to topic of conversation.     ASSESSMENT                                                                                                      HISTORICAL:  Initial psych consult 6/17/15  Notes:             Gabapentin :  headaches: lactation. buspar nausea and felt like was making her more sad. Topamax: tried dose of 25 mg and didn't help with appetite / weight. Seroquel: small half dose during day does help but if she took 3 at night \"when really amped up\" still didn't get her to sleep.  Nyasia Raya is a 34 yo with ADHD, bipolar II disorder and RANDI.  Nyasia went through a lot in her household growing up with mom with MS dad working all the time and 5 siblings. Nyasia took care of the household and she worked 2 jobs. Didn't end up finishing HS in [place] and looking back she is able to recognize had a lot on her plate. Diagnosed with ADHD in past but parents didn't want her on stimulants. Nyasia had Collin 8/30/21 and Olvinu was born 11/26/22.     Rough time for Nyasia in regards to her relationship with Nu. Now that she is committed to moving out he is talking about having more kids, family vacations... thus far she is " personal information. Please follow the directions to create your username and password.     Your access code is: [email or web address]  Expires: 1/15/2018  1:13 PM     Your access code will  in 90 days. If you need help or a new code, please call your [facility] or [phone].        Care EveryWhere ID     This is your Care EveryWhere ID. This could be used by other organizations to access your [place] medical records  [id number]         Blood Pressure from Last 3 Encounters:   17 143/74   17 136/65   17 136/67    Weight from Last 3 Encounters:   17 74.2 kg (163 lb 9.6 oz)   17 72.1 kg (159 lb)   17 74.1 kg (163 lb 6.4 oz)              Today, you had the following     No orders found for display       Primary Care Provider Office Phone # Fax #    César GISELE Chung [phone]       [facility] [address] [address]        Equal Access to Services     [facility] : Hadii aad ku hadasho Sonellie, waaxda luqadaha, qaybta kaalmada miriam, phoenix rodriguez . So [facility] [phone].    ATENCIÓN: Si habla español, tiene a contreras disposición servicios gratuitos de asistencia lingüística. [facility] [phone].    We comply with applicable federal civil rights laws and [place] laws. We do not discriminate on the basis of race, color, national origin, age, disability, sex, sexual orientation, or gender identity.            Thank you!     Thank you for choosing [facility]  for your care. Our goal is always to provide you with excellent care. Hearing back from our patients is one way we can continue to improve our services. Please take a few minutes to complete the written survey that you may receive in the mail after your visit with us. Thank you!             Your Updated Medication List - Protect others around you: Learn how to safely use, store and throw away your medicines at www.disposemymeds.org.           This list is accurate as of: 1/2/18  1:55 PM.  Always use your most recent med list.                   Brand Name Dispense Instructions for use Diagnosis    ACYCLOVIR PO      Take 400 mg by mouth 2 times daily        aspirin 81 MG chewable tablet      Take 81 mg by mouth daily        calcium carbonate-vitamin D 600-400 MG-UNIT Chew     180 tablet    Take 1 chew tab by mouth 2 times daily    Multiple myeloma not having achieved remission (H)       * dexamethasone 4 MG tablet    DECADRON    30 tablet    Take 10 tablets (40 mg) by mouth every 7 days Days 1, 8, and 15.    Multiple myeloma not having achieved remission (H)       * dexamethasone 4 MG tablet    DECADRON    30 tablet    Take 10 tablets (40 mg) by mouth every 7 days for 3 doses Days 1, 8, and 15.    Multiple myeloma not having achieved remission (H)       escitalopram 5 MG tablet    LEXAPRO    90 tablet    TAKE 1 TABLET BY MOUTH DAILY    ROBER (generalized anxiety disorder)       LENalidomide 10 MG Caps capsule CHEMO    REVLIMID    14 capsule    Take 1 capsule (10 mg) by mouth daily for 14 days Days 1 through 14.    Multiple myeloma not having achieved remission (H)       lisinopril 10 MG tablet    PRINIVIL/ZESTRIL    90 tablet    Take 1 tablet (10 mg) by mouth daily    Benign essential hypertension       LORazepam 0.5 MG tablet    ATIVAN    10 tablet    Take 1 tablet (0.5 mg) by mouth every 8 hours as needed (Anxiety, Nausea/Vomiting or Sleep)    Multiple myeloma not having achieved remission (H)       nitroGLYcerin 0.4 MG sublingual tablet    NITROSTAT    25 tablet    For chest pain place 1 tablet under the tongue every 5 minutes for 3 doses. If symptoms persist 5 minutes after 1st dose call 911.    Atypical chest pain       * order for DME     1 Units    Dispense one 4 wheeled walker with hand brakes and seat    Multiple myeloma not having achieved remission (H)       * order for DME     1 Units    Wheelchair.    Need for assistance due to unsteady  continuing to stick with moving out. We changed to Klonopin (from ativan ) and she feels is more helpful. Wlil refill.     TREATMENT RISK STATEMENT: The risks, benefits, alternatives and potential adverse effects have been explained and are understood by the pt. The pt agrees to the treatment plan with the ability to do so. The pt knows to call the clinic for any problems or access emergency care if needed. Substance use is not a problem as noted above.     DIAGNOSES           Bipolar II disorder  ADHD  RANDI    Night terrors    PLAN                                                                                                                         1) MEDICATIONS:   -- Continue sertraline 50 mg daily. Continue Adderall IR 30 mg twice daily refilled. Continue Klonopin 0.5 mg up to daily prn anxiety, rfilled.     2) THERAPY: No Change    3) LABS: None today.    4) PT MONITOR [call for probs]: Worsening symptoms, side effects from medications, SI/HI    5) REFERRALS [CD tx, medical, tests other]: None    6)  RTC: ~1 month                                                                                                                                                                                                                                   gait       prochlorperazine 10 MG tablet    COMPAZINE    30 tablet    Take 1 tablet (10 mg) by mouth every 6 hours as needed (Nausea/Vomiting)    Multiple myeloma not having achieved remission (H)       * Notice:  This list has 4 medication(s) that are the same as other medications prescribed for you. Read the directions carefully, and ask your doctor or other care provider to review them with you.

## 2024-03-20 ENCOUNTER — VIRTUAL VISIT (OUTPATIENT)
Dept: PSYCHIATRY | Facility: OTHER | Age: 34
End: 2024-03-20
Attending: PSYCHIATRY & NEUROLOGY

## 2024-03-20 DIAGNOSIS — F90.0 ADHD (ATTENTION DEFICIT HYPERACTIVITY DISORDER), INATTENTIVE TYPE: ICD-10-CM

## 2024-03-20 DIAGNOSIS — F41.1 GAD (GENERALIZED ANXIETY DISORDER): ICD-10-CM

## 2024-03-20 PROCEDURE — 99442 PR PHYSICIAN TELEPHONE EVALUATION 11-20 MIN: CPT | Mod: 93 | Performed by: PSYCHIATRY & NEUROLOGY

## 2024-03-20 RX ORDER — DEXTROAMPHETAMINE SACCHARATE, AMPHETAMINE ASPARTATE, DEXTROAMPHETAMINE SULFATE AND AMPHETAMINE SULFATE 7.5; 7.5; 7.5; 7.5 MG/1; MG/1; MG/1; MG/1
30 TABLET ORAL 2 TIMES DAILY
Qty: 60 TABLET | Refills: 0 | Status: SHIPPED | OUTPATIENT
Start: 2024-03-25 | End: 2024-04-22

## 2024-03-20 RX ORDER — CLONAZEPAM 0.5 MG/1
0.5 TABLET ORAL DAILY PRN
Qty: 20 TABLET | Refills: 0 | Status: SHIPPED | OUTPATIENT
Start: 2024-03-20 | End: 2024-04-18

## 2024-03-20 ASSESSMENT — PATIENT HEALTH QUESTIONNAIRE - PHQ9
SUM OF ALL RESPONSES TO PHQ QUESTIONS 1-9: 20
5. POOR APPETITE OR OVEREATING: NEARLY EVERY DAY

## 2024-03-20 ASSESSMENT — PAIN SCALES - GENERAL: PAINLEVEL: NO PAIN (0)

## 2024-03-20 ASSESSMENT — ANXIETY QUESTIONNAIRES
GAD7 TOTAL SCORE: 18
2. NOT BEING ABLE TO STOP OR CONTROL WORRYING: NEARLY EVERY DAY
6. BECOMING EASILY ANNOYED OR IRRITABLE: MORE THAN HALF THE DAYS
GAD7 TOTAL SCORE: 18
3. WORRYING TOO MUCH ABOUT DIFFERENT THINGS: NEARLY EVERY DAY
7. FEELING AFRAID AS IF SOMETHING AWFUL MIGHT HAPPEN: MORE THAN HALF THE DAYS
5. BEING SO RESTLESS THAT IT IS HARD TO SIT STILL: MORE THAN HALF THE DAYS
1. FEELING NERVOUS, ANXIOUS, OR ON EDGE: NEARLY EVERY DAY

## 2024-03-20 NOTE — PROGRESS NOTES
"Nyasia is a 34 year old who is being evaluated via a billable telephone visit.      What phone number would you like to be contacted at? [phone]  How would you like to obtain your AVS? [employer]    Virtual Visit Details    Type of service:  Telephone Visit   Phone call duration: 19 minutes   Originating Location (pt. Location): Home    Distant Location (provider location):  On-site      Start time: 2:26 pm  End time: 2:45 pm       SUBJECTIVE / INTERIM HISTORY                                                                       Last visit 24: Continue sertraline 50 mg daily. Continue Adderall IR 30 mg twice daily refilled. Continue Klonopin 0.5 mg up to daily prn anxiety, rfilled.   - found a new place. Will be in area behind [facility].   - still in the house with Nu  - Nu continues to \"try to keep us\"  - started sertraline last week and thus far hasn't noticed any improvement with symptoms. No SEs that she is aware of  - mom has continued to be supportive  - \"I've been really depressed but my body is still doing the same things\"  - panic attacks are getting more frequent. Daily sometimes more than one daily. Gets confused and \"the wrong words come out of my mouth\". Also noticing when people are talking to her can tell they are communicating to her but she isn't comprehending.   - First day from work 3/14/24 - 24. Nyasia's hope is that she can reutrn even earlier with hope the sertraline will start working.  - \"Marco\" their little dog  - Krhuyen is 2 yo  - Aline turned 3 yo in August and has been suggested he get evaluated for autism. Nyasia notes he screens night and day.   - Krystin Galvan Aiden.. her cat almost . Had urinary issues. Aiden (went to [facility]). Also has Stephanie the cat    MEDICAL ROS-   Back pain (spondylolisthesis), asthma (cough, SOB/wheezing)   MEDICAL / SURGICAL HISTORY                pregnant [if applicable]--no   Medical Team:     PMD- Dr. Devi       Therapist- her and Nu " worked with therapist at [facility] for while  Patient Active Problem List   Diagnosis    Insomnia    Asthma    Spondylolisthesis    Family history of congenital heart defect    Night terrors, adult    Obesity    Family planning    Smoker    Attention deficit hyperactivity disorder (ADHD), combined type    Congenital spondylolisthesis    History of  section    S/P  section     ALLERGY   Patient has no known allergies.  MEDICATIONS                                                                                             Current Outpatient Medications   Medication Sig    acetaminophen (TYLENOL) 325 MG tablet Take 325-650 mg by mouth as needed for mild pain    albuterol (PROAIR HFA/PROVENTIL HFA/VENTOLIN HFA) 108 (90 Base) MCG/ACT inhaler Inhale 2 puffs into the lungs every 6 hours    amphetamine-dextroamphetamine (ADDERALL) 30 MG tablet Take 1 tablet (30 mg) by mouth 2 times daily    clonazePAM (KLONOPIN) 0.5 MG tablet Take 1 tablet (0.5 mg) by mouth daily as needed for anxiety    sertraline (ZOLOFT) 50 MG tablet Take 1 tablet (50 mg) by mouth daily     Current Facility-Administered Medications   Medication    etonogestrel (NEXPLANON) subdermal implant 68 mg       VITALS   There were no vitals taken for this visit.     PHQ9                         Depression Screening Follow Up        3/20/2024     2:14 PM   PHQ   PHQ-9 Total Score 20   Q9: Thoughts of better off dead/self-harm past 2 weeks Not at all         Follow Up Actions Taken  Patient counseled, no additional follow up at this time.         LABS                                                                                                                           TSH   Date Value Ref Range Status   2019 0.41 0.40 - 4.00 mU/L Final   ]     Last Comprehensive Metabolic Panel:  Sodium   Date Value Ref Range Status   2020 136 134 - 144 mmol/L Final     Potassium   Date Value Ref Range Status   2020 3.7 3.5 - 5.1 mmol/L  "Final     Chloride   Date Value Ref Range Status   02/14/2020 105 98 - 107 mmol/L Final     Carbon Dioxide   Date Value Ref Range Status   02/14/2020 25 21 - 31 mmol/L Final     Anion Gap   Date Value Ref Range Status   02/14/2020 6 3 - 14 mmol/L Final     Glucose   Date Value Ref Range Status   02/14/2020 95 70 - 105 mg/dL Final     GLUCOSE BY METER POCT   Date Value Ref Range Status   11/26/2022 92 70 - 99 mg/dL Final     Urea Nitrogen   Date Value Ref Range Status   02/14/2020 11 7 - 25 mg/dL Final     Creatinine   Date Value Ref Range Status   02/14/2020 0.76 0.60 - 1.20 mg/dL Final     GFR Estimate   Date Value Ref Range Status   02/14/2020 >90 >60 mL/min/[1.73_m2] Final     Calcium   Date Value Ref Range Status   02/14/2020 8.8 8.6 - 10.3 mg/dL Final     MENTAL STATUS EXAM                                                                                       Speech: normal volume.  Mood depressed, and anxious. Speech:normal volume, rhythm, rate.   Thought process linear and logical. No ROCKY or FOI.  No homicidal ideation or psychotic thought. No SI. No hallucinations. Insight adequate.  Judgment was intact and adequate for safety. Fund of knowledge was intact. Attention and concentration were impaired --- needed to redirect her during our visit back to topic of conversation.     ASSESSMENT                                                                                                      HISTORICAL:  Initial psych consult 6/17/15  Notes:             Gabapentin :  headaches: lactation. buspar nausea and felt like was making her more sad. Topamax: tried dose of 25 mg and didn't help with appetite / weight. Seroquel: small half dose during day does help but if she took 3 at night \"when really amped up\" still didn't get her to sleep.  Nyasia Raya is a 35 yo with ADHD, bipolar II disorder and RANDI.  Nyasia went through a lot in her household growing up with mom with MS dad working all the time and 5 siblings. " Nyasia took care of the household and she worked 2 jobs. Didn't end up finishing HS in [place] and looking back she is able to recognize had a lot on her plate. Diagnosed with ADHD in past but parents didn't want her on stimulants. Nyasia had Lutz 8/30/21 and Renetta was born 11/26/22.     Today: Nyasia did start sertraline last week. Notes she took short term from work as of 3/14/24 given symptoms reported above. We agreed have her RTC in ~1 month and I refilled Klonopin and set Adderall ot fill fo rnext week.     TREATMENT RISK STATEMENT: The risks, benefits, alternatives and potential adverse effects have been explained and are understood by the pt. The pt agrees to the treatment plan with the ability to do so. The pt knows to call the clinic for any problems or access emergency care if needed. Substance use is not a problem as noted above.     DIAGNOSES           Bipolar II disorder  ADHD  RANDI    Night terrors    PLAN                                                                                                                         1) MEDICATIONS:   -- Continue sertraline 50 mg daily. Continue Adderall IR 30 mg twice daily refilled for next week 3/25/24  Continue Klonopin 0.5 mg up to daily prn anxiety, refilled    2) THERAPY: No Change    3) LABS: None today.    4) PT MONITOR [call for probs]: Worsening symptoms, side effects from medications, SI/HI    5) REFERRALS [CD tx, medical, tests other]: None    6)  RTC: ~1 month

## 2024-03-25 ENCOUNTER — TELEPHONE (OUTPATIENT)
Dept: PSYCHIATRY | Facility: OTHER | Age: 34
End: 2024-03-25

## 2024-03-25 NOTE — TELEPHONE ENCOUNTER
Received a PA request from [employer] for amphetamine-dextroamphetamine (ADDERALL) 30 MG tablet. Submitted on CMM. Waiting for a response.

## 2024-03-25 NOTE — TELEPHONE ENCOUNTER
Received an APPROVAL from [employer] for amphetamine-dextroamphetamine (ADDERALL) 30 MG tablet. Effective dates 3/25/24-3/25/27.

## 2024-03-25 NOTE — TELEPHONE ENCOUNTER
RICHARD for return call from patient.  Received approval from [facility] for the Adderall.  Call back # given.

## 2024-04-17 DIAGNOSIS — F41.1 GAD (GENERALIZED ANXIETY DISORDER): ICD-10-CM

## 2024-04-17 NOTE — TELEPHONE ENCOUNTER
Disp Refills Start End RADHA   clonazePAM (KLONOPIN) 0.5 MG tablet 20 tablet 0 3/20/2024 -- No     Last Office Visit: 03/20/2024  Future Office visit:       Routing refill request to provider for review/approval because:

## 2024-04-18 RX ORDER — CLONAZEPAM 0.5 MG/1
0.5 TABLET ORAL DAILY PRN
Qty: 20 TABLET | Refills: 0 | Status: SHIPPED | OUTPATIENT
Start: 2024-04-18 | End: 2024-05-20 | Stop reason: DRUGHIGH

## 2024-04-22 ENCOUNTER — VIRTUAL VISIT (OUTPATIENT)
Dept: PSYCHIATRY | Facility: OTHER | Age: 34
End: 2024-04-22
Attending: PSYCHIATRY & NEUROLOGY
Payer: COMMERCIAL

## 2024-04-22 ENCOUNTER — TELEPHONE (OUTPATIENT)
Dept: PSYCHIATRY | Facility: OTHER | Age: 34
End: 2024-04-22

## 2024-04-22 DIAGNOSIS — F41.1 GAD (GENERALIZED ANXIETY DISORDER): Primary | ICD-10-CM

## 2024-04-22 DIAGNOSIS — F90.0 ADHD (ATTENTION DEFICIT HYPERACTIVITY DISORDER), INATTENTIVE TYPE: ICD-10-CM

## 2024-04-22 PROCEDURE — 99442 PR PHYSICIAN TELEPHONE EVALUATION 11-20 MIN: CPT | Mod: 93 | Performed by: PSYCHIATRY & NEUROLOGY

## 2024-04-22 RX ORDER — CLONAZEPAM 1 MG/1
1 TABLET ORAL DAILY PRN
Qty: 20 TABLET | Refills: 1 | Status: SHIPPED | OUTPATIENT
Start: 2024-04-22 | End: 2024-06-18

## 2024-04-22 RX ORDER — SERTRALINE HYDROCHLORIDE 100 MG/1
100 TABLET, FILM COATED ORAL DAILY
Qty: 30 TABLET | Refills: 5 | Status: SHIPPED | OUTPATIENT
Start: 2024-04-22 | End: 2024-07-26

## 2024-04-22 RX ORDER — DEXTROAMPHETAMINE SACCHARATE, AMPHETAMINE ASPARTATE, DEXTROAMPHETAMINE SULFATE AND AMPHETAMINE SULFATE 7.5; 7.5; 7.5; 7.5 MG/1; MG/1; MG/1; MG/1
30 TABLET ORAL 2 TIMES DAILY
Qty: 60 TABLET | Refills: 0 | Status: SHIPPED | OUTPATIENT
Start: 2024-04-22 | End: 2024-05-20

## 2024-04-22 ASSESSMENT — ANXIETY QUESTIONNAIRES
GAD7 TOTAL SCORE: 17
7. FEELING AFRAID AS IF SOMETHING AWFUL MIGHT HAPPEN: MORE THAN HALF THE DAYS
5. BEING SO RESTLESS THAT IT IS HARD TO SIT STILL: SEVERAL DAYS
6. BECOMING EASILY ANNOYED OR IRRITABLE: MORE THAN HALF THE DAYS
1. FEELING NERVOUS, ANXIOUS, OR ON EDGE: NEARLY EVERY DAY
3. WORRYING TOO MUCH ABOUT DIFFERENT THINGS: NEARLY EVERY DAY
GAD7 TOTAL SCORE: 17
2. NOT BEING ABLE TO STOP OR CONTROL WORRYING: NEARLY EVERY DAY

## 2024-04-22 ASSESSMENT — PAIN SCALES - GENERAL: PAINLEVEL: NO PAIN (0)

## 2024-04-22 ASSESSMENT — PATIENT HEALTH QUESTIONNAIRE - PHQ9
SUM OF ALL RESPONSES TO PHQ QUESTIONS 1-9: 18
5. POOR APPETITE OR OVEREATING: NEARLY EVERY DAY

## 2024-04-22 NOTE — TELEPHONE ENCOUNTER
Received incoming VM from patient about extending work.  Instead of 2 months, she is thinking of 3 weeks.  Paperwork will be faxed 4-23-24.  Patient will plan to return to work on 5-.  Thank you

## 2024-04-22 NOTE — PROGRESS NOTES
"Nyasia is a 34 year old who is being evaluated via a billable telephone visit.      What phone number would you like to be contacted at? [phone]  How would you like to obtain your AVS? [employer]    Virtual Visit Details    Type of service:  Telephone Visit   Phone call duration: 20  minutes   Originating Location (pt. Location): Home    Distant Location (provider location):  On-site      Start time: 8:50 am  End time: 9:10am       SUBJECTIVE / INTERIM HISTORY                                                                       Last visit 3/20/24: Continue sertraline 50 mg daily. Continue Adderall IR 30 mg twice daily refilled for next week 3/25/24  Continue Klonopin 0.5 mg up to daily prn anxiety, refilled  - found a new place. Will be in area behind [facility]. Not sleeping there yet  - \"I'm way sadder than I thought I'd be\" in having moved out.   - Wallace in school [employer] and [employer] are changing this next year  - Nu has been more emotionally vulnerable  - mom has continued to be supportive  - panic attacks \"not great, but better than I was\". 2-3 times per week. Klonopin doesn't hep as much as it used to  - \"Marco\" their little dog  - Kru is 2 yo  - [place] turned 3 yo in August and has been suggested he get evaluated for autism. Nyasia notes he screens night and day.   - Krystin Galvan Aiden.. her cat almost . Had urinary issues. Aiden (went to [facility]). Also has Stephanie the cat    MEDICAL ROS-   Back pain (spondylolisthesis), asthma (cough, SOB/wheezing)   MEDICAL / SURGICAL HISTORY                pregnant [if applicable]--no   Medical Team:     PMD- Dr. Devi       Therapist- her and Nu worked with therapist at [facility] for while  Patient Active Problem List   Diagnosis    Insomnia    Asthma    Spondylolisthesis    Family history of congenital heart defect    Night terrors, adult    Obesity    Family planning    Smoker    Attention deficit hyperactivity disorder (ADHD), combined type    Congenital " spondylolisthesis    History of  section    S/P  section     ALLERGY   Patient has no known allergies.  MEDICATIONS                                                                                             Current Outpatient Medications   Medication Sig Dispense Refill    acetaminophen (TYLENOL) 325 MG tablet Take 325-650 mg by mouth as needed for mild pain      albuterol (PROAIR HFA/PROVENTIL HFA/VENTOLIN HFA) 108 (90 Base) MCG/ACT inhaler Inhale 2 puffs into the lungs every 6 hours      amphetamine-dextroamphetamine (ADDERALL) 30 MG tablet Take 1 tablet (30 mg) by mouth 2 times daily 60 tablet 0    clonazePAM (KLONOPIN) 0.5 MG tablet TAKE 1 TABLET (0.5 MG) BY MOUTH DAILY AS NEEDED FOR ANXIETY 20 tablet 0    sertraline (ZOLOFT) 50 MG tablet Take 1 tablet (50 mg) by mouth daily 30 tablet 3     Current Facility-Administered Medications   Medication Dose Route Frequency Provider Last Rate Last Admin    etonogestrel (NEXPLANON) subdermal implant 68 mg  1 each Subdermal Continuous Sadiq Lama MD   68 mg at 01/10/23 1156       VITALS   There were no vitals taken for this visit.     PHQ9                         Depression Screening Follow Up        2024     8:35 AM   PHQ   PHQ-9 Total Score 18   Q9: Thoughts of better off dead/self-harm past 2 weeks Several days         Follow Up Actions Taken  Patient counseled, no additional follow up at this time.         LABS                                                                                                                           TSH   Date Value Ref Range Status   2019 0.41 0.40 - 4.00 mU/L Final   ]     Last Comprehensive Metabolic Panel:  Sodium   Date Value Ref Range Status   2020 136 134 - 144 mmol/L Final     Potassium   Date Value Ref Range Status   2020 3.7 3.5 - 5.1 mmol/L Final     Chloride   Date Value Ref Range Status   2020 105 98 - 107 mmol/L Final     Carbon Dioxide   Date Value Ref Range Status  "  02/14/2020 25 21 - 31 mmol/L Final     Anion Gap   Date Value Ref Range Status   02/14/2020 6 3 - 14 mmol/L Final     Glucose   Date Value Ref Range Status   02/14/2020 95 70 - 105 mg/dL Final     GLUCOSE BY METER POCT   Date Value Ref Range Status   11/26/2022 92 70 - 99 mg/dL Final     Urea Nitrogen   Date Value Ref Range Status   02/14/2020 11 7 - 25 mg/dL Final     Creatinine   Date Value Ref Range Status   02/14/2020 0.76 0.60 - 1.20 mg/dL Final     GFR Estimate   Date Value Ref Range Status   02/14/2020 >90 >60 mL/min/[1.73_m2] Final     Calcium   Date Value Ref Range Status   02/14/2020 8.8 8.6 - 10.3 mg/dL Final     MENTAL STATUS EXAM                                                                                       Speech: normal volume.  Mood depressed, and anxious. Speech:normal volume, rhythm, rate.   Thought process linear and logical. No ROCKY or FOI.  No homicidal ideation or psychotic thought. No SI. No hallucinations. Insight adequate.  Judgment was intact and adequate for safety. Fund of knowledge was intact. Attention and concentration were impaired --- needed to redirect her during our visit back to topic of conversation.     ASSESSMENT                                                                                                      HISTORICAL:  Initial psych consult 6/17/15  Notes:             Gabapentin :  headaches: lactation. buspar nausea and felt like was making her more sad. Topamax: tried dose of 25 mg and didn't help with appetite / weight. Seroquel: small half dose during day does help but if she took 3 at night \"when really amped up\" still didn't get her to sleep.  Nyasia Raya is a 33 yo with ADHD, bipolar II disorder and RANDI.  Nyasia went through a lot in her household growing up with mom with MS dad working all the time and 5 siblings. Nyasia took care of the household and she worked 2 jobs. Didn't end up finishing HS in [place] and looking back she is able to recognize had a " lot on her plate. Diagnosed with ADHD in past but parents didn't want her on stimulants. Nyasia had Monona 8/30/21 and Renetta was born 11/26/22.     Zoloft: it's helping but Nyasia is definitely feeling quite down. We agreed on trying an increase in dose of Zoloft. Also with Klonopin we are increasing dose as it isn't helping as it was for panic attacks.    TREATMENT RISK STATEMENT: The risks, benefits, alternatives and potential adverse effects have been explained and are understood by the pt. The pt agrees to the treatment plan with the ability to do so. The pt knows to call the clinic for any problems or access emergency care if needed. Substance use is not a problem as noted above.     DIAGNOSES           Bipolar II disorder  ADHD  RANDI    Night terrors    PLAN                                                                                                                         1) MEDICATIONS:   -- Increase sertraline 50 mg daily to 100 mg daily.. Continue Adderall IR 30 mg twice daily last filled 3/25/24 I set to fill next today 4/22/24 Increase Klonopin 0.5 mg up to daily prn anxiety to 1 mg daily prn     2) THERAPY: No Change    3) LABS: None today.    4) PT MONITOR [call for probs]: Worsening symptoms, side effects from medications, SI/HI    5) REFERRALS [CD tx, medical, tests other]: None    6)  RTC: ~1 month

## 2024-05-02 NOTE — TELEPHONE ENCOUNTER
CJ, I finished up Nyasia's paperwork if you wouldn't mind bringing over to the [facility] to fax?    Thank you!

## 2024-05-20 ENCOUNTER — VIRTUAL VISIT (OUTPATIENT)
Dept: PSYCHIATRY | Facility: OTHER | Age: 34
End: 2024-05-20
Attending: PSYCHIATRY & NEUROLOGY
Payer: COMMERCIAL

## 2024-05-20 DIAGNOSIS — F90.0 ADHD (ATTENTION DEFICIT HYPERACTIVITY DISORDER), INATTENTIVE TYPE: ICD-10-CM

## 2024-05-20 PROCEDURE — 99442 PR PHYSICIAN TELEPHONE EVALUATION 11-20 MIN: CPT | Mod: 93 | Performed by: PSYCHIATRY & NEUROLOGY

## 2024-05-20 RX ORDER — DEXTROAMPHETAMINE SACCHARATE, AMPHETAMINE ASPARTATE, DEXTROAMPHETAMINE SULFATE AND AMPHETAMINE SULFATE 7.5; 7.5; 7.5; 7.5 MG/1; MG/1; MG/1; MG/1
30 TABLET ORAL 2 TIMES DAILY
Qty: 60 TABLET | Refills: 0 | Status: SHIPPED | OUTPATIENT
Start: 2024-05-20 | End: 2024-06-19

## 2024-05-20 ASSESSMENT — ANXIETY QUESTIONNAIRES
7. FEELING AFRAID AS IF SOMETHING AWFUL MIGHT HAPPEN: NEARLY EVERY DAY
6. BECOMING EASILY ANNOYED OR IRRITABLE: MORE THAN HALF THE DAYS
GAD7 TOTAL SCORE: 15
5. BEING SO RESTLESS THAT IT IS HARD TO SIT STILL: SEVERAL DAYS
3. WORRYING TOO MUCH ABOUT DIFFERENT THINGS: MORE THAN HALF THE DAYS
2. NOT BEING ABLE TO STOP OR CONTROL WORRYING: NEARLY EVERY DAY
1. FEELING NERVOUS, ANXIOUS, OR ON EDGE: MORE THAN HALF THE DAYS
GAD7 TOTAL SCORE: 15

## 2024-05-20 ASSESSMENT — PAIN SCALES - GENERAL: PAINLEVEL: NO PAIN (0)

## 2024-05-20 NOTE — PROGRESS NOTES
"Nyasia is a 34 year old who is being evaluated via a billable telephone visit.      What phone number would you like to be contacted at? [phone]  How would you like to obtain your AVS? [employer]    Virtual Visit Details    Type of service:  Telephone Visit   Phone call duration: 17 minutes   Originating Location (pt. Location): Home    Distant Location (provider location):  On-site      Start time: 10:18 am  End time:10:35 am       SUBJECTIVE / INTERIM HISTORY                                                                       Last visit 24: Increase sertraline 50 mg daily to 100 mg daily.. Continue Adderall IR 30 mg twice daily last filled 3/25/24 I set to fill next today 24 Increase Klonopin 0.5 mg up to daily prn anxiety to 1 mg daily prn   - feels increase in Zoloft helped. Also been working on taking it the same time everyday. For while did have SEs: headache, bloating. She note hard to say however if from the med vs. From anxiety and stress  - \"Jeanne, I moved in!\" Is sleeping there. Notes she however still has codependency issues going on with [place]  - kids are with Nyasia in the apartment  - \"going to bed here is so peaceful\"  - been maintaining her house better. Keeping up to date on things like dishes, washing clothes, keeping the boys rooms clean  - panic attacks still but lessened. ~1 per week. Depends on triggers. Still has difficult time getting out of her house.   - Renetta is 2 yo  - [place] turned 3 yo in August and has been suggested he get evaluated for autism. Nyasia notes he screens night and day.   - Krystin Wolfe.. her cat almost . Had urinary issues. Aiden (went to [facility]). Also has [facility] the cat    MEDICAL ROS-   Back pain (spondylolisthesis), asthma (cough, SOB/wheezing)   MEDICAL / SURGICAL HISTORY                pregnant [if applicable]--no   Medical Team:     PMD- Dr. Devi       Therapist- her and [place]pop worked with therapist at [facility] in [place] for while  Patient Active " Problem List   Diagnosis    Insomnia    Asthma    Spondylolisthesis    Family history of congenital heart defect    Night terrors, adult    Obesity    Family planning    Smoker    Attention deficit hyperactivity disorder (ADHD), combined type    Congenital spondylolisthesis    History of  section    S/P  section     ALLERGY   Patient has no known allergies.  MEDICATIONS                                                                                             Current Outpatient Medications   Medication Sig Dispense Refill    acetaminophen (TYLENOL) 325 MG tablet Take 325-650 mg by mouth as needed for mild pain      albuterol (PROAIR HFA/PROVENTIL HFA/VENTOLIN HFA) 108 (90 Base) MCG/ACT inhaler Inhale 2 puffs into the lungs every 6 hours      amphetamine-dextroamphetamine (ADDERALL) 30 MG tablet Take 1 tablet (30 mg) by mouth 2 times daily 60 tablet 0    clonazePAM (KLONOPIN) 1 MG tablet Take 1 tablet (1 mg) by mouth daily as needed for anxiety 20 tablet 1    sertraline (ZOLOFT) 100 MG tablet Take 1 tablet (100 mg) by mouth daily 30 tablet 5     Current Facility-Administered Medications   Medication Dose Route Frequency Provider Last Rate Last Admin    etonogestrel (NEXPLANON) subdermal implant 68 mg  1 each Subdermal Continuous Sadiq Lama MD   68 mg at 01/10/23 1156       VITALS   There were no vitals taken for this visit.     PHQ9                         Depression Screening Follow Up        2024    10:08 AM   PHQ   PHQ-9 Total Score 17   Q9: Thoughts of better off dead/self-harm past 2 weeks Not at all         Follow Up Actions Taken  Patient counseled, no additional follow up at this time.         LABS                                                                                                                           TSH   Date Value Ref Range Status   2019 0.41 0.40 - 4.00 mU/L Final   ]     Last Comprehensive Metabolic Panel:  Sodium   Date Value Ref Range Status  "  02/14/2020 136 134 - 144 mmol/L Final     Potassium   Date Value Ref Range Status   02/14/2020 3.7 3.5 - 5.1 mmol/L Final     Chloride   Date Value Ref Range Status   02/14/2020 105 98 - 107 mmol/L Final     Carbon Dioxide   Date Value Ref Range Status   02/14/2020 25 21 - 31 mmol/L Final     Anion Gap   Date Value Ref Range Status   02/14/2020 6 3 - 14 mmol/L Final     Glucose   Date Value Ref Range Status   02/14/2020 95 70 - 105 mg/dL Final     GLUCOSE BY METER POCT   Date Value Ref Range Status   11/26/2022 92 70 - 99 mg/dL Final     Urea Nitrogen   Date Value Ref Range Status   02/14/2020 11 7 - 25 mg/dL Final     Creatinine   Date Value Ref Range Status   02/14/2020 0.76 0.60 - 1.20 mg/dL Final     GFR Estimate   Date Value Ref Range Status   02/14/2020 >90 >60 mL/min/[1.73_m2] Final     Calcium   Date Value Ref Range Status   02/14/2020 8.8 8.6 - 10.3 mg/dL Final     MENTAL STATUS EXAM                                                                                       Speech: normal volume.  Mood described as feeling she is doing a little better. Speech:normal volume, rhythm, rate.   Thought process linear and logical. No ROCKY or FOI.  No homicidal ideation or psychotic thought. No SI. No hallucinations. Insight adequate.  Judgment was intact and adequate for safety. Fund of knowledge was intact. Attention / concentration: distractabel.    ASSESSMENT                                                                                                      HISTORICAL:  Initial psych consult 6/17/15  Notes:             Gabapentin :  headaches: lactation. buspar nausea and felt like was making her more sad. Topamax: tried dose of 25 mg and didn't help with appetite / weight. Seroquel: small half dose during day does help but if she took 3 at night \"when really amped up\" still didn't get her to sleep.  Nyasia Raya is a 35 yo with ADHD, bipolar II disorder and RANDI.  Nyasia went through a lot in her household " growing up with mom with MS dad working all the time and 5 siblings. Nyasia took care of the household and she worked 2 jobs. Didn't end up finishing HS in [place] and looking back she is able to recognize had a lot on her plate. Diagnosed with ADHD in past but parents didn't want her on stimulants. Nyasia had Fajardo 8/30/21 and Renetta was born 11/26/22.     We increased Zoloft last visit and she does feel helped. Nyasia did experience some headaches and bloating initially but these have dissipated. No medication changes today.        TREATMENT RISK STATEMENT: The risks, benefits, alternatives and potential adverse effects have been explained and are understood by the pt. The pt agrees to the treatment plan with the ability to do so. The pt knows to call the clinic for any problems or access emergency care if needed. Substance use is not a problem as noted above.     DIAGNOSES           Bipolar II disorder  ADHD  RANDI    Night terrors    PLAN                                                                                                                         1) MEDICATIONS:   --Continue sertraline 100 mg daily.. Continue Adderall IR 30 mg twice daily last filled  4/22/24 I set to fill today for 5/20. Continue Klonopin  1 mg daily prn     2) THERAPY: No Change    3) LABS: None today.    4) PT MONITOR [call for probs]: Worsening symptoms, side effects from medications, SI/HI    5) REFERRALS [CD tx, medical, tests other]: None    6)  RTC: ~1 month

## 2024-06-15 ENCOUNTER — HEALTH MAINTENANCE LETTER (OUTPATIENT)
Age: 34
End: 2024-06-15

## 2024-06-17 DIAGNOSIS — F41.1 GAD (GENERALIZED ANXIETY DISORDER): ICD-10-CM

## 2024-06-18 RX ORDER — CLONAZEPAM 1 MG/1
TABLET ORAL
Qty: 20 TABLET | Refills: 1 | Status: SHIPPED | OUTPATIENT
Start: 2024-06-25 | End: 2024-08-19

## 2024-06-18 NOTE — TELEPHONE ENCOUNTER
Klonopin      Last Written Prescription Date:  5.28.24  Last Fill Quantity: #20,   # refills: 0  Last Office Visit: 5.20.24  Future Office visit:       Routing refill request to provider for review/approval because:  Drug not on the [facility], [facility] or [facility] refill protocol or controlled substance    
declines

## 2024-06-19 DIAGNOSIS — F90.0 ADHD (ATTENTION DEFICIT HYPERACTIVITY DISORDER), INATTENTIVE TYPE: ICD-10-CM

## 2024-06-19 RX ORDER — DEXTROAMPHETAMINE SACCHARATE, AMPHETAMINE ASPARTATE, DEXTROAMPHETAMINE SULFATE AND AMPHETAMINE SULFATE 7.5; 7.5; 7.5; 7.5 MG/1; MG/1; MG/1; MG/1
30 TABLET ORAL 2 TIMES DAILY
Qty: 60 TABLET | Refills: 0 | Status: SHIPPED | OUTPATIENT
Start: 2024-06-19 | End: 2024-08-20

## 2024-06-19 NOTE — TELEPHONE ENCOUNTER
Adderall      Last Written Prescription Date:  5/20/24  Last Fill Quantity: 60,   # refills: 0  Last Office Visit: 5/20/24  Future Office visit:       Routing refill request to provider for review/approval because:

## 2024-06-24 ENCOUNTER — VIRTUAL VISIT (OUTPATIENT)
Dept: PSYCHIATRY | Facility: OTHER | Age: 34
End: 2024-06-24
Attending: PSYCHIATRY & NEUROLOGY
Payer: COMMERCIAL

## 2024-06-24 DIAGNOSIS — F90.0 ADHD (ATTENTION DEFICIT HYPERACTIVITY DISORDER), INATTENTIVE TYPE: Primary | ICD-10-CM

## 2024-06-24 PROCEDURE — 99443 PR PHYSICIAN TELEPHONE EVALUATION 21-30 MIN: CPT | Mod: 93 | Performed by: PSYCHIATRY & NEUROLOGY

## 2024-06-24 RX ORDER — DEXTROAMPHETAMINE SACCHARATE, AMPHETAMINE ASPARTATE, DEXTROAMPHETAMINE SULFATE AND AMPHETAMINE SULFATE 7.5; 7.5; 7.5; 7.5 MG/1; MG/1; MG/1; MG/1
30 TABLET ORAL 2 TIMES DAILY
Qty: 60 TABLET | Refills: 0 | Status: SHIPPED | OUTPATIENT
Start: 2024-07-22 | End: 2024-07-26

## 2024-06-24 ASSESSMENT — ANXIETY QUESTIONNAIRES
3. WORRYING TOO MUCH ABOUT DIFFERENT THINGS: NEARLY EVERY DAY
5. BEING SO RESTLESS THAT IT IS HARD TO SIT STILL: MORE THAN HALF THE DAYS
7. FEELING AFRAID AS IF SOMETHING AWFUL MIGHT HAPPEN: SEVERAL DAYS
6. BECOMING EASILY ANNOYED OR IRRITABLE: NEARLY EVERY DAY
GAD7 TOTAL SCORE: 17
1. FEELING NERVOUS, ANXIOUS, OR ON EDGE: NEARLY EVERY DAY
2. NOT BEING ABLE TO STOP OR CONTROL WORRYING: NEARLY EVERY DAY
GAD7 TOTAL SCORE: 17

## 2024-06-24 ASSESSMENT — PATIENT HEALTH QUESTIONNAIRE - PHQ9
5. POOR APPETITE OR OVEREATING: MORE THAN HALF THE DAYS
SUM OF ALL RESPONSES TO PHQ QUESTIONS 1-9: 18

## 2024-06-24 ASSESSMENT — PAIN SCALES - GENERAL: PAINLEVEL: MODERATE PAIN (4)

## 2024-06-24 NOTE — PROGRESS NOTES
"Nyasia is a 34 year old who is being evaluated via a billable telephone visit.      What phone number would you like to be contacted at? [phone]  How would you like to obtain your AVS? [employer]    Virtual Visit Details    Type of service:  Telephone Visit   Phone call duration: 25 minutes   Originating Location (pt. Location): Home    Distant Location (provider location):  On-site      Start time: 8:41 am  End time:9:06 am       SUBJECTIVE / INTERIM HISTORY                                                                       Last visit 24: Continue sertraline 100 mg daily.. Continue Adderall IR 30 mg twice daily last filled  24 I set to fill today for . Continue Klonopin  1 mg daily prn   - \"I'm sorry, I'm neurotic today!\" Feels it is causing a lot of year  - recalls on May 20 said she was doing well. Not the case. Date was . Last worked .   - requesting extension for work. Notes \"I need this to be the last one.\"  - kids are with Nyasia in the apartment  - brothers Stephen and William are her best friends. Nyasia notes found out her one brother attempted suicide and his mental health is not as stable as she thought  - friend passed away from an overdose   - Kru is 2 yo  - [place] turned 3 yo in August and has been suggested he get evaluated for autism. Nyasia notes he screens night and day.   [facility] A[facility].. her cat almost . Had urinary issues. Aiden (went to [facility]). Also has Stephanie the cat     MEDICAL / SURGICAL HISTORY                pregnant [if applicable]--no   Medical Team:     PMD- Dr. Devi       Therapist- her and Nu worked with therapist at [employer] in [place] for while  Patient Active Problem List   Diagnosis    Insomnia    Asthma    Spondylolisthesis    Family history of congenital heart defect    Night terrors, adult    Obesity    Family planning    Smoker    Attention deficit hyperactivity disorder (ADHD), combined type    Congenital spondylolisthesis    History " "of  section    S/P  section     ALLERGY   Patient has no known allergies.  MEDICATIONS                                                                                             Current Outpatient Medications   Medication Sig Dispense Refill    acetaminophen (TYLENOL) 325 MG tablet Take 325-650 mg by mouth as needed for mild pain      albuterol (PROAIR HFA/PROVENTIL HFA/VENTOLIN HFA) 108 (90 Base) MCG/ACT inhaler Inhale 2 puffs into the lungs every 6 hours      amphetamine-dextroamphetamine (ADDERALL) 30 MG tablet TAKE 1 TABLET (30 MG) BY MOUTH 2 TIMES DAILY 60 tablet 0    [START ON 2024] clonazePAM (KLONOPIN) 1 MG tablet TAKE 1 TABLET (1 MG) BY MOUTH EVERY DAY AS NEEDED FOR ANXIETY 20 tablet 1    sertraline (ZOLOFT) 100 MG tablet Take 1 tablet (100 mg) by mouth daily 30 tablet 5     Current Facility-Administered Medications   Medication Dose Route Frequency Provider Last Rate Last Admin    etonogestrel (NEXPLANON) subdermal implant 68 mg  1 each Subdermal Continuous Sadiq Lama MD   68 mg at 01/10/23 1156       VITALS   There were no vitals taken for this visit.     PHQ9                         Depression Screening Follow Up        2024     8:24 AM   PHQ   PHQ-9 Total Score 18   Q9: Thoughts of better off dead/self-harm past 2 weeks Several days         Follow Up Actions Taken  Patient counseled, no additional follow up at this time.         LABS                                                                                                                           Lab Results   Component Value Date    WBC 10.8 2022    WBC 12.2 2021     Lab Results   Component Value Date    RBC 3.66 2022    RBC 3.49 2021     Lab Results   Component Value Date    HGB 8.4 2022    HGB 10.9 2021     Lab Results   Component Value Date    HCT 29.2 2022    HCT 31.8 2021     No components found for: \"MCT\"  Lab Results   Component Value Date    MCV 80 2022 " "   MCV 91 06/14/2021     Lab Results   Component Value Date    MCH 26.0 11/25/2022    MCH 31.2 06/14/2021     Lab Results   Component Value Date    MCHC 32.5 11/25/2022    MCHC 34.3 06/14/2021     Lab Results   Component Value Date    RDW 15.4 11/25/2022    RDW 12.9 06/14/2021     Lab Results   Component Value Date     11/25/2022     06/14/2021        MENTAL STATUS EXAM                                                                                       Speech: normal volume.  Mood described as \"I'm sorry. I'm neurotic today.\" Speech:normal volume, rhythm, rate.   Thought process circumferential.  No ROCKY or FOI.  No homicidal ideation or psychotic thought. +SI with no intent or plan.  No hallucinations. Insight adequate.  Judgment was intact and adequate for safety. Fund of knowledge was intact. Attention / concentration: distractabel.    ASSESSMENT                                                                                                      HISTORICAL:  Initial psych consult 6/17/15  Notes:             Gabapentin :  headaches: lactation. buspar nausea and felt like was making her more sad. Topamax: tried dose of 25 mg and didn't help with appetite / weight. Seroquel: small half dose during day does help but if she took 3 at night \"when really amped up\" still didn't get her to sleep.  Nyasia Raya is a 35 yo with ADHD, bipolar II disorder and RANDI.  Nyasia went through a lot in her household growing up with mom with MS dad working all the time and 5 siblings. Nyasia took care of the household and she worked 2 jobs. Didn't end up finishing HS in [place] and looking back she is able to recognize had a lot on her plate. Diagnosed with ADHD in past but parents didn't want her on stimulants. Nyasia had [place] 8/30/21 and Renetta was born 11/26/22.     Today she notes not doing well and she is stressed and has had several major stressors in the past several days. Her brother Stephen whom she is close with " is not doing well mentally. Nyasia's friend passed away in the last couple days from an overdose. Taney screams for hours on end. Nyasia and Nu are in relationship however they continue to live in separate households. Nyasia notes on a positive note she has been making her apartment more homey - putting up pictures, etc. Though she is stressed and feeling overwhelmed we feel med changes are not warranted and they are helping - more so situational stressors she is going through.         TREATMENT RISK STATEMENT: The risks, benefits, alternatives and potential adverse effects have been explained and are understood by the pt. The pt agrees to the treatment plan with the ability to do so. The pt knows to call the clinic for any problems or access emergency care if needed. Substance use is not a problem as noted above.     DIAGNOSES           Bipolar II disorder  ADHD  RANDI    Night terrors    PLAN                                                                                                                         1) MEDICATIONS:   --Continue sertraline 100 mg daily.. Continue Adderall IR 30 mg twice daily last written for 6/19 so I will fill another script for 7/22/24. Continue Klonopin  1 mg daily prn     2) THERAPY: No Change    3) LABS: None today.    4) PT MONITOR [call for probs]: Worsening symptoms, side effects from medications, SI/HI    5) REFERRALS [CD tx, medical, tests other]: None    6)  RTC: ~1 month

## 2024-07-26 ENCOUNTER — VIRTUAL VISIT (OUTPATIENT)
Dept: PSYCHIATRY | Facility: OTHER | Age: 34
End: 2024-07-26
Attending: PSYCHIATRY & NEUROLOGY
Payer: COMMERCIAL

## 2024-07-26 ENCOUNTER — TELEPHONE (OUTPATIENT)
Dept: PSYCHIATRY | Facility: OTHER | Age: 34
End: 2024-07-26

## 2024-07-26 DIAGNOSIS — F41.1 GAD (GENERALIZED ANXIETY DISORDER): ICD-10-CM

## 2024-07-26 DIAGNOSIS — F90.0 ADHD (ATTENTION DEFICIT HYPERACTIVITY DISORDER), INATTENTIVE TYPE: ICD-10-CM

## 2024-07-26 DIAGNOSIS — F31.81 BIPOLAR 2 DISORDER (H): Primary | ICD-10-CM

## 2024-07-26 PROCEDURE — 99442 PR PHYSICIAN TELEPHONE EVALUATION 11-20 MIN: CPT | Mod: 93 | Performed by: PSYCHIATRY & NEUROLOGY

## 2024-07-26 RX ORDER — SERTRALINE HYDROCHLORIDE 100 MG/1
150 TABLET, FILM COATED ORAL DAILY
Qty: 45 TABLET | Refills: 5 | Status: SHIPPED | OUTPATIENT
Start: 2024-07-26 | End: 2024-08-20

## 2024-07-26 RX ORDER — DEXTROAMPHETAMINE SACCHARATE, AMPHETAMINE ASPARTATE, DEXTROAMPHETAMINE SULFATE AND AMPHETAMINE SULFATE 7.5; 7.5; 7.5; 7.5 MG/1; MG/1; MG/1; MG/1
30 TABLET ORAL 2 TIMES DAILY
Qty: 60 TABLET | Refills: 0 | Status: SHIPPED | OUTPATIENT
Start: 2024-08-19 | End: 2024-09-13

## 2024-07-26 ASSESSMENT — PATIENT HEALTH QUESTIONNAIRE - PHQ9
SUM OF ALL RESPONSES TO PHQ QUESTIONS 1-9: 19
5. POOR APPETITE OR OVEREATING: NEARLY EVERY DAY

## 2024-07-26 ASSESSMENT — ANXIETY QUESTIONNAIRES
IF YOU CHECKED OFF ANY PROBLEMS ON THIS QUESTIONNAIRE, HOW DIFFICULT HAVE THESE PROBLEMS MADE IT FOR YOU TO DO YOUR WORK, TAKE CARE OF THINGS AT HOME, OR GET ALONG WITH OTHER PEOPLE: EXTREMELY DIFFICULT
7. FEELING AFRAID AS IF SOMETHING AWFUL MIGHT HAPPEN: NEARLY EVERY DAY
1. FEELING NERVOUS, ANXIOUS, OR ON EDGE: NEARLY EVERY DAY
GAD7 TOTAL SCORE: 20
GAD7 TOTAL SCORE: 20
5. BEING SO RESTLESS THAT IT IS HARD TO SIT STILL: MORE THAN HALF THE DAYS
2. NOT BEING ABLE TO STOP OR CONTROL WORRYING: NEARLY EVERY DAY
6. BECOMING EASILY ANNOYED OR IRRITABLE: NEARLY EVERY DAY
3. WORRYING TOO MUCH ABOUT DIFFERENT THINGS: NEARLY EVERY DAY

## 2024-07-26 ASSESSMENT — PAIN SCALES - GENERAL: PAINLEVEL: NO PAIN (0)

## 2024-07-26 NOTE — PROGRESS NOTES
"Nyasia is a 34 year old who is being evaluated via a billable telephone visit.      What phone number would you like to be contacted at? [phone]  How would you like to obtain your AVS? [facility]    Virtual Visit Details    Type of service:  Telephone Visit   Phone call duration: 20 minutes   Originating Location (pt. Location): Home    Distant Location (provider location):  On-site      Start time: 10:33 am  End time: 10:53 am       SUBJECTIVE / INTERIM HISTORY                                                                       Last visit 24:  -Continue sertraline 100 mg daily.. Continue Adderall IR 30 mg twice daily last written for  so I will fill another script for 24. Continue Klonopin  1 mg daily prn   - \"I'm a rollercoaster\" depression, panic attacks. Having panic attacks daily  - finds herself constantly distracted and feels is a way she is avoiding things  - Nu. She feels like she is trying to apply boundaries and he doesn't follow them. For example she will ask him to go home / not stay over but he will stay home anyway  - \"I know I am never going back\" in terms of moving back in with Nu  - Wallace has been really mean.   - kids are with Nyasia in the apartment  - brothers Stephen and William are her best friends. Brother who has major mental mental health issues is avoiding Nyasia. Not answering her calls.     MEDICAL / SURGICAL HISTORY                pregnant [if applicable]--no   Medical Team:     PMD- Dr. Devi       Therapist- her and Nu worked with therapist at [facility] in [place] for while  Patient Active Problem List   Diagnosis    Insomnia    Asthma    Spondylolisthesis    Family history of congenital heart defect    Night terrors, adult    Obesity    Family planning    Smoker    Attention deficit hyperactivity disorder (ADHD), combined type    Congenital spondylolisthesis    History of  section    S/P  section     ALLERGY   Patient has no known " "allergies.  MEDICATIONS                                                                                             Current Outpatient Medications   Medication Sig Dispense Refill    acetaminophen (TYLENOL) 325 MG tablet Take 325-650 mg by mouth as needed for mild pain      albuterol (PROAIR HFA/PROVENTIL HFA/VENTOLIN HFA) 108 (90 Base) MCG/ACT inhaler Inhale 2 puffs into the lungs every 6 hours      amphetamine-dextroamphetamine (ADDERALL) 30 MG tablet Take 1 tablet (30 mg) by mouth 2 times daily for 30 days 60 tablet 0    amphetamine-dextroamphetamine (ADDERALL) 30 MG tablet TAKE 1 TABLET (30 MG) BY MOUTH 2 TIMES DAILY 60 tablet 0    clonazePAM (KLONOPIN) 1 MG tablet TAKE 1 TABLET (1 MG) BY MOUTH EVERY DAY AS NEEDED FOR ANXIETY 20 tablet 1    sertraline (ZOLOFT) 100 MG tablet Take 1 tablet (100 mg) by mouth daily 30 tablet 5     Current Facility-Administered Medications   Medication Dose Route Frequency Provider Last Rate Last Admin    etonogestrel (NEXPLANON) subdermal implant 68 mg  1 each Subdermal Continuous Sadiq Lama MD   68 mg at 01/10/23 1156       VITALS   There were no vitals taken for this visit.     PHQ9                         Depression Screening Follow Up        7/26/2024     9:01 AM   PHQ   PHQ-9 Total Score 19   Q9: Thoughts of better off dead/self-harm past 2 weeks Several days         Follow Up Actions Taken  Patient counseled, no additional follow up at this time.         LABS                                                                                                                           Lab Results   Component Value Date    WBC 10.8 11/25/2022    WBC 12.2 06/14/2021     Lab Results   Component Value Date    RBC 3.66 11/25/2022    RBC 3.49 06/14/2021     Lab Results   Component Value Date    HGB 8.4 11/27/2022    HGB 10.9 06/14/2021     Lab Results   Component Value Date    HCT 29.2 11/25/2022    HCT 31.8 06/14/2021     No components found for: \"MCT\"  Lab Results   Component Value " "Date    MCV 80 11/25/2022    MCV 91 06/14/2021     Lab Results   Component Value Date    MCH 26.0 11/25/2022    MCH 31.2 06/14/2021     Lab Results   Component Value Date    MCHC 32.5 11/25/2022    MCHC 34.3 06/14/2021     Lab Results   Component Value Date    RDW 15.4 11/25/2022    RDW 12.9 06/14/2021     Lab Results   Component Value Date     11/25/2022     06/14/2021        MENTAL STATUS EXAM                                                                                       Speech: normal volume.  Mood described as \"I'm sorry. I'm neurotic today.\" Speech:normal volume, rhythm, rate.   Thought process circumferential.  No ROCKY or FOI.  No homicidal ideation or psychotic thought. +SI with no intent or plan.  No hallucinations. Insight adequate.  Judgment was intact and adequate for safety. Fund of knowledge was intact. Attention / concentration: distractabel.    ASSESSMENT                                                                                                      HISTORICAL:  Initial psych consult 6/17/15  Notes:             Gabapentin :  headaches: lactation. buspar nausea and felt like was making her more sad. Topamax: tried dose of 25 mg and didn't help with appetite / weight. Seroquel: small half dose during day does help but if she took 3 at night \"when really amped up\" still didn't get her to sleep.  Nyasia Raya is a 33 yo with ADHD, bipolar II disorder and RANDI.  Nyasia went through a lot in her household growing up with mom with MS dad working all the time and 5 siblings. Nyasia took care of the household and she worked 2 jobs. Didn't end up finishing HS in [place] and looking back she is able to recognize had a lot on her plate. Diagnosed with ADHD in past but parents didn't want her on stimulants. Nyasia had [place] 8/30/21 and Renetta was born 11/26/22.     Work plan: return to work with restrictions. Nyasia notes she put in time of an extension. Panic attacks are \"a steady dread\" " getting them once per week. Today we agreed on increase of sertraline. I'm much in agreement with her establishing with a therapist. She has concern with working with a woman. I suggested Arcen[facility] Quezada [facility] in [place] and gave Nyaisa contact info / telephone #.        TREATMENT RISK STATEMENT: The risks, benefits, alternatives and potential adverse effects have been explained and are understood by the pt. The pt agrees to the treatment plan with the ability to do so. The pt knows to call the clinic for any problems or access emergency care if needed. Substance use is not a problem as noted above.     DIAGNOSES           Bipolar II disorder  ADHD  RANDI    Night terrors    PLAN                                                                                                                         1) MEDICATIONS:   --Increase sertraline 100 mg daily to 150 mg daily.. Continue Adderall IR 30 mg twice daily last written for 7/22/24 set to fill next for  8/19/24.  Continue Klonopin  1 mg daily prn     2) THERAPY: No Change    3) LABS: None today.    4) PT MONITOR [call for probs]: Worsening symptoms, side effects from medications, SI/HI    5) REFERRALS [CD tx, medical, tests other]: None    6)  RTC: ~she has apptmt set

## 2024-07-26 NOTE — TELEPHONE ENCOUNTER
I called the patient about the [email or web address] and she would like a DME order sent to [facility] in [place]. I let her know that it is most likely an out of pocket expense, but she would like to try to run it through her insurance. DME order pended to sign.

## 2024-07-29 NOTE — TELEPHONE ENCOUNTER
Thanks Na, I did a DME order. First try it printed to the plaza but I think I got it to work to print to our printer / pod 10 and I can sign it tomorrow and then have it faxed to [facility].

## 2024-07-31 ENCOUNTER — VIRTUAL VISIT (OUTPATIENT)
Dept: PSYCHIATRY | Facility: OTHER | Age: 34
End: 2024-07-31
Attending: PSYCHIATRY & NEUROLOGY
Payer: COMMERCIAL

## 2024-07-31 DIAGNOSIS — F41.1 GAD (GENERALIZED ANXIETY DISORDER): ICD-10-CM

## 2024-07-31 PROCEDURE — 99441 PR PHYSICIAN TELEPHONE EVALUATION 5-10 MIN: CPT | Mod: 93 | Performed by: PSYCHIATRY & NEUROLOGY

## 2024-07-31 ASSESSMENT — PAIN SCALES - GENERAL: PAINLEVEL: NO PAIN (0)

## 2024-07-31 NOTE — PROGRESS NOTES
Nyasia is a 34 year old who is being evaluated via a billable telephone visit.      What phone number would you like to be contacted at? [phone]  How would you like to obtain your AVS? [employer]    Virtual Visit Details    Type of service:  Telephone Visit   Phone call duration: 5 minutes   Originating Location (pt. Location): Home    Distant Location (provider location):  On-site      Start time: 3:24 pm  End time:29 pm      SUBJECTIVE / INTERIM HISTORY                                                                       Last visit 24: Increase sertraline 100 mg daily to 150 mg daily.. Continue Adderall IR 30 mg twice daily last written for 24 set to fill next for  24.  Continue Klonopin  1 mg daily prn     - did not make the change yet with sertraline.   - panic attacks: 2 the past couple days. 2 since the last time we talked. Feels intense dread.   - Nu. I asked how things with him and she notes she has continued to ask him to leave some nights. He has actually left one night.   - did get a lot one day but her body didn't want to. Was sitting in a parking lot crying, shaking.     MEDICAL / SURGICAL HISTORY                pregnant [if applicable]--no   Medical Team:     PMD- Dr. Devi       Therapist- her and Nu worked with therapist at [facility] for while  Patient Active Problem List   Diagnosis    Insomnia    Asthma    Spondylolisthesis    Family history of congenital heart defect    Night terrors, adult    Obesity    Family planning    Smoker    Attention deficit hyperactivity disorder (ADHD), combined type    Congenital spondylolisthesis    History of  section    S/P  section     ALLERGY   Patient has no known allergies.  MEDICATIONS                                                                                             Current Outpatient Medications   Medication Sig Dispense Refill    acetaminophen (TYLENOL) 325 MG tablet Take 325-650 mg by mouth as needed  "for mild pain      albuterol (PROAIR HFA/PROVENTIL HFA/VENTOLIN HFA) 108 (90 Base) MCG/ACT inhaler Inhale 2 puffs into the lungs every 6 hours      amphetamine-dextroamphetamine (ADDERALL) 30 MG tablet TAKE 1 TABLET (30 MG) BY MOUTH 2 TIMES DAILY 60 tablet 0    clonazePAM (KLONOPIN) 1 MG tablet TAKE 1 TABLET (1 MG) BY MOUTH EVERY DAY AS NEEDED FOR ANXIETY 20 tablet 1    sertraline (ZOLOFT) 100 MG tablet Take 1.5 tablets (150 mg) by mouth daily 45 tablet 5    [START ON 8/19/2024] amphetamine-dextroamphetamine (ADDERALL) 30 MG tablet Take 1 tablet (30 mg) by mouth 2 times daily 60 tablet 0     Current Facility-Administered Medications   Medication Dose Route Frequency Provider Last Rate Last Admin    etonogestrel (NEXPLANON) subdermal implant 68 mg  1 each Subdermal Continuous Sadiq Lama MD   68 mg at 01/10/23 1156       VITALS   There were no vitals taken for this visit.     PHQ9                         Depression Screening Follow Up        7/26/2024     9:01 AM   PHQ   PHQ-9 Total Score 19   Q9: Thoughts of better off dead/self-harm past 2 weeks Several days         Follow Up Actions Taken  Patient counseled, no additional follow up at this time.         LABS                                                                                                                           Lab Results   Component Value Date    WBC 10.8 11/25/2022    WBC 12.2 06/14/2021     Lab Results   Component Value Date    RBC 3.66 11/25/2022    RBC 3.49 06/14/2021     Lab Results   Component Value Date    HGB 8.4 11/27/2022    HGB 10.9 06/14/2021     Lab Results   Component Value Date    HCT 29.2 11/25/2022    HCT 31.8 06/14/2021     No components found for: \"MCT\"  Lab Results   Component Value Date    MCV 80 11/25/2022    MCV 91 06/14/2021     Lab Results   Component Value Date    MCH 26.0 11/25/2022    MCH 31.2 06/14/2021     Lab Results   Component Value Date    MCHC 32.5 11/25/2022    MCHC 34.3 06/14/2021     Lab Results " "  Component Value Date    RDW 15.4 11/25/2022    RDW 12.9 06/14/2021     Lab Results   Component Value Date     11/25/2022     06/14/2021        MENTAL STATUS EXAM                                                                                       Speech: normal volume.  Mood today anxious.  Speech:normal volume, rhythm, rate.   Thought process circumferential.  No ROCKY or FOI.  No homicidal ideation or psychotic thought. +SI with no intent or plan.  No hallucinations. Insight adequate.  Judgment was intact and adequate for safety. Fund of knowledge was intact. Attention / concentration: distractabel.    ASSESSMENT                                                                                                      HISTORICAL:  Initial psych consult 6/17/15  Notes:             Gabapentin :  headaches: lactation. buspar nausea and felt like was making her more sad. Topamax: tried dose of 25 mg and didn't help with appetite / weight. Seroquel: small half dose during day does help but if she took 3 at night \"when really amped up\" still didn't get her to sleep.  Nyasia Raya is a 35 yo with ADHD, bipolar II disorder and RANDI.  Nyasia went through a lot in her household growing up with mom with MS dad working all the time and 5 siblings. Nyasia took care of the household and she worked 2 jobs. Didn't end up finishing HS in [place] and looking back she is able to recognize had a lot on her plate. Diagnosed with ADHD in past but parents didn't want her on stimulants. Nyasia had Rhea 8/30/21 and Renetta was born 11/26/22.     Work plan: return to work with restrictions. Nyasia notes she put in time of an extension. We agreed last week on having her increase sertraline to 150 mg daily. She didn't start this yet. She inquires about refilling her Klonopin of which was filled 7/16/24 #20 with directions to take once daily as needed and I noted yes too early given directions are to take up to once daily prn. "       TREATMENT RISK STATEMENT: The risks, benefits, alternatives and potential adverse effects have been explained and are understood by the pt. The pt agrees to the treatment plan with the ability to do so. The pt knows to call the clinic for any problems or access emergency care if needed. Substance use is not a problem as noted above.     DIAGNOSES           Bipolar II disorder  ADHD  RANDI    Night terrors    PLAN                                                                                                                         1) MEDICATIONS:   --continue Zoloft 150 mg daily.. Continue Adderall IR 30 mg twice daily last written for 7/22/24 set to fill next for  8/19/24.  Continue Klonopin  1 mg daily prn     2) THERAPY: No Change    3) LABS: None today.    4) PT MONITOR [call for probs]: Worsening symptoms, side effects from medications, SI/HI    5) REFERRALS [CD tx, medical, tests other]: None    6)  RTC: ~1 month

## 2024-07-31 NOTE — TELEPHONE ENCOUNTER
Klonopin      Last Written Prescription Date:  6/25/24  Last Fill Quantity: 20,   # refills: 1  Last Office Visit: 7/26/24  Future Office visit:       Routing refill request to provider for review/approval because:

## 2024-08-01 RX ORDER — CLONAZEPAM 1 MG/1
TABLET ORAL
Qty: 20 TABLET | Refills: 0 | OUTPATIENT
Start: 2024-08-01

## 2024-08-19 DIAGNOSIS — F41.1 GAD (GENERALIZED ANXIETY DISORDER): ICD-10-CM

## 2024-08-19 RX ORDER — CLONAZEPAM 1 MG/1
1 TABLET ORAL DAILY PRN
Qty: 20 TABLET | Refills: 0 | Status: SHIPPED | OUTPATIENT
Start: 2024-08-19 | End: 2024-09-18

## 2024-08-19 NOTE — TELEPHONE ENCOUNTER
Klonopin  Last Written Prescription Date: 6/25/24  Last Fill Quantity: 20 # of Refills: 1  Last Office Visit: 7/31/24

## 2024-08-20 ENCOUNTER — VIRTUAL VISIT (OUTPATIENT)
Dept: PSYCHIATRY | Facility: OTHER | Age: 34
End: 2024-08-20
Attending: PSYCHIATRY & NEUROLOGY
Payer: COMMERCIAL

## 2024-08-20 DIAGNOSIS — F41.1 GAD (GENERALIZED ANXIETY DISORDER): ICD-10-CM

## 2024-08-20 DIAGNOSIS — F31.81 BIPOLAR 2 DISORDER (H): Primary | ICD-10-CM

## 2024-08-20 DIAGNOSIS — F90.0 ADHD (ATTENTION DEFICIT HYPERACTIVITY DISORDER), INATTENTIVE TYPE: ICD-10-CM

## 2024-08-20 PROCEDURE — 99443 PR PHYSICIAN TELEPHONE EVALUATION 21-30 MIN: CPT | Mod: 93 | Performed by: PSYCHIATRY & NEUROLOGY

## 2024-08-20 RX ORDER — SERTRALINE HYDROCHLORIDE 100 MG/1
200 TABLET, FILM COATED ORAL DAILY
Qty: 60 TABLET | Refills: 5 | Status: SHIPPED | OUTPATIENT
Start: 2024-08-20 | End: 2024-09-13

## 2024-08-20 ASSESSMENT — ANXIETY QUESTIONNAIRES
1. FEELING NERVOUS, ANXIOUS, OR ON EDGE: NEARLY EVERY DAY
GAD7 TOTAL SCORE: 17
5. BEING SO RESTLESS THAT IT IS HARD TO SIT STILL: MORE THAN HALF THE DAYS
3. WORRYING TOO MUCH ABOUT DIFFERENT THINGS: NEARLY EVERY DAY
6. BECOMING EASILY ANNOYED OR IRRITABLE: NEARLY EVERY DAY
GAD7 TOTAL SCORE: 17
7. FEELING AFRAID AS IF SOMETHING AWFUL MIGHT HAPPEN: SEVERAL DAYS
2. NOT BEING ABLE TO STOP OR CONTROL WORRYING: NEARLY EVERY DAY

## 2024-08-20 ASSESSMENT — PATIENT HEALTH QUESTIONNAIRE - PHQ9
SUM OF ALL RESPONSES TO PHQ QUESTIONS 1-9: 19
5. POOR APPETITE OR OVEREATING: MORE THAN HALF THE DAYS

## 2024-08-20 ASSESSMENT — PAIN SCALES - GENERAL: PAINLEVEL: SEVERE PAIN (6)

## 2024-08-20 NOTE — PROGRESS NOTES
"  Virtual Visit Details    Type of service:  Telephone Visit   Phone call duration: 22 minutes   Originating Location (pt. Location): Home    Distant Location (provider location):  Off-site    Start time: 1:53 pm  End time: 2:15 pm      SUBJECTIVE / INTERIM HISTORY                                                                       Last visit 24: -continue Zoloft 150 mg daily.. Continue Adderall IR 30 mg twice daily last written for 24 set to fill next for  24.  Continue Klonopin  1 mg daily prn   - trying to be quiet because her son is napping  - has not established with therapist yet: notes she has the name and place / contact info and plans to call after this appointment today  - would like to extend her return to work so she can get into therapy  - has been able to notice a positive difference with 150 mg sertraline. Helping especially with anxiety.   - panic attacks have been less frequent. Has had one since we last spoke.   - I inquired how going with [place] \"terribly\". Really struggling with establishing boundaries    MEDICAL / SURGICAL HISTORY                pregnant [if applicable]--no   Medical Team:     PMD- Dr. Devi       Therapist- her and Nu worked with therapist at [facility] for while  Patient Active Problem List   Diagnosis    Insomnia    Asthma    Spondylolisthesis    Family history of congenital heart defect    Night terrors, adult    Obesity    Family planning    Smoker    Attention deficit hyperactivity disorder (ADHD), combined type    Congenital spondylolisthesis    History of  section    S/P  section     ALLERGY   Patient has no known allergies.  MEDICATIONS                                                                                             Current Outpatient Medications   Medication Sig Dispense Refill    clonazePAM (KLONOPIN) 1 MG tablet Take 1 tablet (1 mg) by mouth daily as needed for anxiety 20 tablet 0    sertraline (ZOLOFT) 100 MG " "tablet Take 1.5 tablets (150 mg) by mouth daily 45 tablet 5    acetaminophen (TYLENOL) 325 MG tablet Take 325-650 mg by mouth as needed for mild pain      albuterol (PROAIR HFA/PROVENTIL HFA/VENTOLIN HFA) 108 (90 Base) MCG/ACT inhaler Inhale 2 puffs into the lungs every 6 hours      amphetamine-dextroamphetamine (ADDERALL) 30 MG tablet Take 1 tablet (30 mg) by mouth 2 times daily 60 tablet 0    amphetamine-dextroamphetamine (ADDERALL) 30 MG tablet TAKE 1 TABLET (30 MG) BY MOUTH 2 TIMES DAILY 60 tablet 0     Current Facility-Administered Medications   Medication Dose Route Frequency Provider Last Rate Last Admin    etonogestrel (NEXPLANON) subdermal implant 68 mg  1 each Subdermal Continuous Sadiq Lama MD   68 mg at 01/10/23 1156       VITALS   There were no vitals taken for this visit.     PHQ9                         Depression Screening Follow Up        8/20/2024     1:42 PM   PHQ   PHQ-9 Total Score 19   Q9: Thoughts of better off dead/self-harm past 2 weeks Not at all         Follow Up Actions Taken  Patient counseled, no additional follow up at this time.         LABS                                                                                                                           Lab Results   Component Value Date    WBC 10.8 11/25/2022    WBC 12.2 06/14/2021     Lab Results   Component Value Date    RBC 3.66 11/25/2022    RBC 3.49 06/14/2021     Lab Results   Component Value Date    HGB 8.4 11/27/2022    HGB 10.9 06/14/2021     Lab Results   Component Value Date    HCT 29.2 11/25/2022    HCT 31.8 06/14/2021     No components found for: \"MCT\"  Lab Results   Component Value Date    MCV 80 11/25/2022    MCV 91 06/14/2021     Lab Results   Component Value Date    MCH 26.0 11/25/2022    MCH 31.2 06/14/2021     Lab Results   Component Value Date    MCHC 32.5 11/25/2022    MCHC 34.3 06/14/2021     Lab Results   Component Value Date    RDW 15.4 11/25/2022    RDW 12.9 06/14/2021     Lab Results   Component " "Value Date     11/25/2022     06/14/2021        MENTAL STATUS EXAM                                                                                       Speech: normal volume.  Mood today anxious. Speech:normal volume, rhythm, rate.   Thought process circumferential.  No ROCKY or FOI.  No homicidal ideation or psychotic thought. +SI with no intent or plan.  No hallucinations. Insight adequate.  Judgment was intact and adequate for safety. Fund of knowledge was intact. Attention / concentration: distractabel.    ASSESSMENT                                                                                                      HISTORICAL:  Initial psych consult 6/17/15  Notes:             Gabapentin :  headaches: lactation. buspar nausea and felt like was making her more sad. Topamax: tried dose of 25 mg and didn't help with appetite / weight. Seroquel: small half dose during day does help but if she took 3 at night \"when really amped up\" still didn't get her to sleep.  Nyasia Raya is a 33 yo with ADHD, bipolar II disorder and RANDI.  Nyasia went through a lot in her household growing up with mom with MS dad working all the time and 5 siblings. Nyasia took care of the household and she worked 2 jobs. Didn't end up finishing HS in [place] and looking back she is able to recognize had a lot on her plate. Diagnosed with ADHD in past but parents didn't want her on stimulants. Nyasia had [place] 8/30/21 and Renetta was born 11/26/22.     Today Nyasia notes the 150 mg of Zoloft has made a difference - includes has had one panic attack since we last touched base whereas was having more freuqnelty. She feels room for improvement however hence we agreed on increasing dose.     Nyasia notes she did not call the therapist I suggested last visit yet (at [facility]) but plans on calling today after this appointment. I do think therapy is an important part of treatment that has been missing for Nyasia. There are many psychosocial " stressors in her life.     Nyasia asks to extend her work return date to out ~6 months. She feel she needs to start with a therapist. I noted I am comfortable filling out paperwork for this however certain is her employer's decision ultimately as to if they feel okay with this.       TREATMENT RISK STATEMENT: The risks, benefits, alternatives and potential adverse effects have been explained and are understood by the pt. The pt agrees to the treatment plan with the ability to do so. The pt knows to call the clinic for any problems or access emergency care if needed. Substance use is not a problem as noted above.     DIAGNOSES           Bipolar II disorder  ADHD  RANDI    Night terrors    PLAN                                                                                                                         1) MEDICATIONS:   --Incresae Zoloft 150 mg daily to 200 mg daily.. Continue Adderall IR 30 mg twice daily last written for 8/19/24  Continue Klonopin  1 mg daily prn filled for yesterday 8/19/24.     2) THERAPY: No Change    3) LABS: None today.    4) PT MONITOR [call for probs]: Worsening symptoms, side effects from medications, SI/HI    5) REFERRALS [CD tx, medical, tests other]: None    6)  RTC: ~1 month

## 2024-09-13 DIAGNOSIS — F41.1 GAD (GENERALIZED ANXIETY DISORDER): ICD-10-CM

## 2024-09-13 DIAGNOSIS — F90.0 ADHD (ATTENTION DEFICIT HYPERACTIVITY DISORDER), INATTENTIVE TYPE: ICD-10-CM

## 2024-09-13 RX ORDER — SERTRALINE HYDROCHLORIDE 100 MG/1
200 TABLET, FILM COATED ORAL DAILY
Qty: 60 TABLET | Refills: 0 | Status: SHIPPED | OUTPATIENT
Start: 2024-09-13

## 2024-09-13 RX ORDER — DEXTROAMPHETAMINE SACCHARATE, AMPHETAMINE ASPARTATE, DEXTROAMPHETAMINE SULFATE AND AMPHETAMINE SULFATE 7.5; 7.5; 7.5; 7.5 MG/1; MG/1; MG/1; MG/1
TABLET ORAL
Qty: 60 TABLET | Refills: 0 | Status: SHIPPED | OUTPATIENT
Start: 2024-09-18 | End: 2024-09-23

## 2024-09-18 DIAGNOSIS — F41.1 GAD (GENERALIZED ANXIETY DISORDER): ICD-10-CM

## 2024-09-18 RX ORDER — CLONAZEPAM 1 MG/1
TABLET ORAL
Qty: 20 TABLET | Refills: 0 | Status: SHIPPED | OUTPATIENT
Start: 2024-09-18 | End: 2024-09-23

## 2024-09-18 NOTE — TELEPHONE ENCOUNTER
Clonazepam (Klonopin) 1 mg tablet  Take 1 tablet (1 mg) by mouth daily as needed for anxiety     Last Written Prescription Date:  8-19-24  Last Fill Quantity: 20 tablet,   # refills: 0  Last Office Visit: 8-20-24  Future Office visit:

## 2024-09-23 ENCOUNTER — VIRTUAL VISIT (OUTPATIENT)
Dept: PSYCHIATRY | Facility: OTHER | Age: 34
End: 2024-09-23
Attending: PSYCHIATRY & NEUROLOGY
Payer: COMMERCIAL

## 2024-09-23 DIAGNOSIS — F90.0 ADHD (ATTENTION DEFICIT HYPERACTIVITY DISORDER), INATTENTIVE TYPE: ICD-10-CM

## 2024-09-23 DIAGNOSIS — F41.1 GAD (GENERALIZED ANXIETY DISORDER): ICD-10-CM

## 2024-09-23 PROCEDURE — 99443 PR PHYSICIAN TELEPHONE EVALUATION 21-30 MIN: CPT | Mod: 93 | Performed by: PSYCHIATRY & NEUROLOGY

## 2024-09-23 RX ORDER — DEXTROAMPHETAMINE SACCHARATE, AMPHETAMINE ASPARTATE, DEXTROAMPHETAMINE SULFATE AND AMPHETAMINE SULFATE 7.5; 7.5; 7.5; 7.5 MG/1; MG/1; MG/1; MG/1
30 TABLET ORAL 2 TIMES DAILY
Qty: 60 TABLET | Refills: 0 | Status: SHIPPED | OUTPATIENT
Start: 2024-10-16

## 2024-09-23 RX ORDER — CLONAZEPAM 1 MG/1
1 TABLET ORAL DAILY PRN
Qty: 20 TABLET | Refills: 0 | Status: SHIPPED | OUTPATIENT
Start: 2024-10-16

## 2024-09-23 ASSESSMENT — PATIENT HEALTH QUESTIONNAIRE - PHQ9
SUM OF ALL RESPONSES TO PHQ QUESTIONS 1-9: 16
5. POOR APPETITE OR OVEREATING: NEARLY EVERY DAY

## 2024-09-23 ASSESSMENT — ANXIETY QUESTIONNAIRES
2. NOT BEING ABLE TO STOP OR CONTROL WORRYING: NEARLY EVERY DAY
GAD7 TOTAL SCORE: 16
1. FEELING NERVOUS, ANXIOUS, OR ON EDGE: NEARLY EVERY DAY
6. BECOMING EASILY ANNOYED OR IRRITABLE: MORE THAN HALF THE DAYS
7. FEELING AFRAID AS IF SOMETHING AWFUL MIGHT HAPPEN: NEARLY EVERY DAY
3. WORRYING TOO MUCH ABOUT DIFFERENT THINGS: SEVERAL DAYS
5. BEING SO RESTLESS THAT IT IS HARD TO SIT STILL: SEVERAL DAYS
GAD7 TOTAL SCORE: 16

## 2024-09-23 ASSESSMENT — PAIN SCALES - GENERAL: PAINLEVEL: WORST PAIN (10)

## 2024-09-23 NOTE — PROGRESS NOTES
"  Virtual Visit Details    Type of service:  Telephone Visit   Phone call duration: 32 minutes   Originating Location (pt. Location): Home    Distant Location (provider location):  Off-site    Start time: 2:11 pm  End time: 2:43 pm      SUBJECTIVE / INTERIM HISTORY                                                                       Last visit 24: Increase Zoloft 150 mg daily to 200 mg daily.. Continue Adderall IR 30 mg twice daily last written for 24  Continue Klonopin  1 mg daily prn filled for yesterday 24.     - feels the increase in sertraline helped  - sick as are the two youngest boys. Nyasia is going to take a Covid test  - power and internet were shut off in her apartment. Moved back in with Nu. Stays in the bedroom.  - had a full blown panic attack. Had a new symptom she usually doesn't have. I inquired about if any triggers and she notes she went on internet [employer] and they denied her short-term claim. Then noted \"to be determined\"  - Wallace 10 back at school. Wallace joined football and seems to be really liking it thus far.    MEDICAL / SURGICAL HISTORY                pregnant [if applicable]--no   Medical Team:     PMD- Dr. Devi       Therapist- none currently. Her and Nu worked with therapist at [facility] in [place] for while  Patient Active Problem List   Diagnosis    Insomnia    Asthma    Spondylolisthesis    Family history of congenital heart defect    Night terrors, adult    Obesity    Family planning    Smoker    Attention deficit hyperactivity disorder (ADHD), combined type    Congenital spondylolisthesis    History of  section    S/P  section     ALLERGY   Patient has no known allergies.  MEDICATIONS                                                                                             Current Outpatient Medications   Medication Sig Dispense Refill    acetaminophen (TYLENOL) 325 MG tablet Take 325-650 mg by mouth as needed for mild pain      albuterol (PROAIR " "HFA/PROVENTIL HFA/VENTOLIN HFA) 108 (90 Base) MCG/ACT inhaler Inhale 2 puffs into the lungs every 6 hours      amphetamine-dextroamphetamine (ADDERALL) 30 MG tablet 8/19/24 TAKE 1 TABLET (30 MG) BY MOUTH 2 TIMES DAILY 60 tablet 0    clonazePAM (KLONOPIN) 1 MG tablet TAKE 1 TABLET (1 MG) BY MOUTH EVERY DAY AS NEEDED FOR ANXIETY 20 tablet 0    sertraline (ZOLOFT) 100 MG tablet TAKE 2 TABLETS BY MOUTH DAILY 60 tablet 0     Current Facility-Administered Medications   Medication Dose Route Frequency Provider Last Rate Last Admin    etonogestrel (NEXPLANON) subdermal implant 68 mg  1 each Subdermal Continuous Sadiq Lama MD   68 mg at 01/10/23 1156       VITALS   There were no vitals taken for this visit.     PHQ9                         Depression Screening Follow Up        9/23/2024     1:57 PM   PHQ   PHQ-9 Total Score 16   Q9: Thoughts of better off dead/self-harm past 2 weeks Not at all       LABS                                                                                                                           Lab Results   Component Value Date    WBC 10.8 11/25/2022    WBC 12.2 06/14/2021     Lab Results   Component Value Date    RBC 3.66 11/25/2022    RBC 3.49 06/14/2021     Lab Results   Component Value Date    HGB 8.4 11/27/2022    HGB 10.9 06/14/2021     Lab Results   Component Value Date    HCT 29.2 11/25/2022    HCT 31.8 06/14/2021     No components found for: \"MCT\"  Lab Results   Component Value Date    MCV 80 11/25/2022    MCV 91 06/14/2021     Lab Results   Component Value Date    MCH 26.0 11/25/2022    MCH 31.2 06/14/2021     Lab Results   Component Value Date    MCHC 32.5 11/25/2022    MCHC 34.3 06/14/2021     Lab Results   Component Value Date    RDW 15.4 11/25/2022    RDW 12.9 06/14/2021     Lab Results   Component Value Date     11/25/2022     06/14/2021        MENTAL STATUS EXAM                                                                                       Speech: " "normal volume.  Mood depressed, anxious. Speech:normal volume, rhythm, rate.   Thought process circumferential.  No ROCKY or FOI.  No homicidal ideation or psychotic thought. +SI with no intent or plan.  No hallucinations. Insight adequate.  Judgment was intact and adequate for safety. Fund of knowledge was intact. Attention / concentration: distractabel.    ASSESSMENT                                                                                                      HISTORICAL:  Initial psych consult 6/17/15  Notes:             Gabapentin :  headaches: lactation. buspar nausea and felt like was making her more sad. Topamax: tried dose of 25 mg and didn't help with appetite / weight. Seroquel: small half dose during day does help but if she took 3 at night \"when really amped up\" still didn't get her to sleep.  Nyasia Raya is a 35 yo with ADHD, bipolar II disorder and RANDI.  Nyasia went through a lot in her household growing up with mom with MS dad working all the time and 5 siblings. Nyasia took care of the household and she worked 2 jobs. Didn't end up finishing HS in [employer] [employer] and looking back she is able to recognize had a lot on her plate. Diagnosed with ADHD in past but parents didn't want her on stimulants. Nyasia had [place] 8/30/21 and Renetta was born 11/26/22.     Increase in sertraline did help. Nyasia notes she at this time is not in her apartment as the power and internet were turned off and she is staying back with [employer].On waiting list to see [facility] at [facility].      TREATMENT RISK STATEMENT: The risks, benefits, alternatives and potential adverse effects have been explained and are understood by the pt. The pt agrees to the treatment plan with the ability to do so. The pt knows to call the clinic for any problems or access emergency care if needed. Substance use is not a problem as noted above.     DIAGNOSES           Bipolar II disorder  ADHD  RANDI    Night terrors    PLAN                                         "                                                                                 1) MEDICATIONS:   --continue Zoloft 200 mg daily.. Continue Adderall IR 30 mg twice daily last filled 9/18 set to fill next for 10/16. Continue Klonopin  1 mg daily prn last filled 9/18/ set to fill next for 10/16    2) THERAPY: No Change    3) LABS: None today.    4) PT MONITOR [call for probs]: Worsening symptoms, side effects from medications, SI/HI    5) REFERRALS [CD tx, medical, tests other]: None    6)  RTC: ~1 month

## 2024-10-17 ENCOUNTER — TELEPHONE (OUTPATIENT)
Dept: PSYCHIATRY | Facility: OTHER | Age: 34
End: 2024-10-17

## 2024-10-17 DIAGNOSIS — F90.0 ADHD (ATTENTION DEFICIT HYPERACTIVITY DISORDER), INATTENTIVE TYPE: ICD-10-CM

## 2024-10-17 RX ORDER — DEXTROAMPHETAMINE SACCHARATE, AMPHETAMINE ASPARTATE, DEXTROAMPHETAMINE SULFATE AND AMPHETAMINE SULFATE 7.5; 7.5; 7.5; 7.5 MG/1; MG/1; MG/1; MG/1
30 TABLET ORAL 2 TIMES DAILY
Qty: 60 TABLET | Refills: 0 | Status: SHIPPED | OUTPATIENT
Start: 2024-10-17 | End: 2024-10-28

## 2024-10-17 NOTE — TELEPHONE ENCOUNTER
Please disregard previous msg.  Patient is requesting to use the [facility] in [place] for the regular Adderall 30 mg 2 times daily.  Thank you

## 2024-10-17 NOTE — TELEPHONE ENCOUNTER
Patient contacted and notified script for Adderall 30 mg twice daily was sent to [facility] in [place].  Patient is happy about this

## 2024-10-17 NOTE — TELEPHONE ENCOUNTER
Return call to patient. Because of the Adderall shortage, she is requesting the script for Adderall be changed to Adderall XR 30 mg and send to [facility] at [address] [place] [address]. This pharmacy has this in stock for now.  Thank you, please advise.  Patient knows that Dr. Guerrero is out today.  Next follow up is on 10-28-24.    I believe these are capsules

## 2024-10-28 ENCOUNTER — VIRTUAL VISIT (OUTPATIENT)
Dept: PSYCHIATRY | Facility: OTHER | Age: 34
End: 2024-10-28
Attending: PSYCHIATRY & NEUROLOGY
Payer: COMMERCIAL

## 2024-10-28 DIAGNOSIS — F90.0 ADHD (ATTENTION DEFICIT HYPERACTIVITY DISORDER), INATTENTIVE TYPE: ICD-10-CM

## 2024-10-28 DIAGNOSIS — F41.1 GAD (GENERALIZED ANXIETY DISORDER): ICD-10-CM

## 2024-10-28 PROCEDURE — 99443 PR PHYSICIAN TELEPHONE EVALUATION 21-30 MIN: CPT | Mod: 93 | Performed by: PSYCHIATRY & NEUROLOGY

## 2024-10-28 RX ORDER — DEXTROAMPHETAMINE SACCHARATE, AMPHETAMINE ASPARTATE, DEXTROAMPHETAMINE SULFATE AND AMPHETAMINE SULFATE 7.5; 7.5; 7.5; 7.5 MG/1; MG/1; MG/1; MG/1
30 TABLET ORAL 2 TIMES DAILY
Qty: 60 TABLET | Refills: 0 | Status: SHIPPED | OUTPATIENT
Start: 2024-11-14

## 2024-10-28 RX ORDER — SERTRALINE HYDROCHLORIDE 100 MG/1
200 TABLET, FILM COATED ORAL DAILY
Qty: 60 TABLET | Refills: 11 | Status: SHIPPED | OUTPATIENT
Start: 2024-10-28

## 2024-10-28 RX ORDER — CLONAZEPAM 1 MG/1
1 TABLET ORAL DAILY PRN
Qty: 20 TABLET | Refills: 0 | Status: SHIPPED | OUTPATIENT
Start: 2024-11-11

## 2024-10-28 ASSESSMENT — PAIN SCALES - GENERAL: PAINLEVEL_OUTOF10: MILD PAIN (3)

## 2024-10-28 NOTE — PROGRESS NOTES
Patient declined the depression and anxiety screening questions today.        Virtual Visit Details    Type of service:  Telephone Visit   Phone call duration: 33 minutes   Originating Location (pt. Location): Home    Distant Location (provider location):  Off-site    Start time: 2:56 pm  End time: 3:29 pm      SUBJECTIVE / INTERIM HISTORY                                                                       Last visit 24:  continue Zoloft 200 mg daily.. Continue Adderall IR 30 mg twice daily last filled  set to fill next for 10/16. Continue Klonopin  1 mg daily prn last filled / set to fill next for 10/16    - brother's baby was born at 24 weeks. Baby doing okay. They are down in [place]  - sick and been sick for month now  - Nu and Nyasia been keeping busy with family activities.  - two sisters not treating their mom well.  - stayed with Nu but back in her apartment now.  - her and Nu had a disagreement last week  - Wallace 10 back at school. Wallace joined football and seems to be really liking it thus far.  - Tiffanie (3 yo)  screams and is getting a little better at communicating.     MEDICAL / SURGICAL HISTORY                pregnant [if applicable]--no   Medical Team:     PMD- Dr. Devi       Therapist- none currently. Her and Nu worked with therapist at [facility] [facility] in [place] for while  Patient Active Problem List   Diagnosis    Insomnia    Asthma    Spondylolisthesis    Family history of congenital heart defect    Night terrors, adult    Obesity    Family planning    Smoker    Attention deficit hyperactivity disorder (ADHD), combined type    Congenital spondylolisthesis    History of  section    S/P  section     ALLERGY   Patient has no known allergies.  MEDICATIONS                                                                                             Current Outpatient Medications   Medication Sig Dispense Refill    acetaminophen (TYLENOL) 325 MG tablet Take 325-650 mg by  "mouth as needed for mild pain      albuterol (PROAIR HFA/PROVENTIL HFA/VENTOLIN HFA) 108 (90 Base) MCG/ACT inhaler Inhale 2 puffs into the lungs every 6 hours      amphetamine-dextroamphetamine (ADDERALL) 30 MG tablet Take 1 tablet (30 mg) by mouth 2 times daily. 60 tablet 0    clonazePAM (KLONOPIN) 1 MG tablet Take 1 tablet (1 mg) by mouth daily as needed for anxiety. 20 tablet 0    sertraline (ZOLOFT) 100 MG tablet TAKE 2 TABLETS BY MOUTH DAILY 60 tablet 0     Current Facility-Administered Medications   Medication Dose Route Frequency Provider Last Rate Last Admin    etonogestrel (NEXPLANON) subdermal implant 68 mg  1 each Subdermal Continuous Sadiq Lama MD   68 mg at 01/10/23 1156       VITALS   There were no vitals taken for this visit.     PHQ9                         Depression Screening Follow Up        9/23/2024     1:57 PM   PHQ   PHQ-9 Total Score 16   Q9: Thoughts of better off dead/self-harm past 2 weeks Not at all       LABS                                                                                                                           Lab Results   Component Value Date    WBC 10.8 11/25/2022    WBC 12.2 06/14/2021     Lab Results   Component Value Date    RBC 3.66 11/25/2022    RBC 3.49 06/14/2021     Lab Results   Component Value Date    HGB 8.4 11/27/2022    HGB 10.9 06/14/2021     Lab Results   Component Value Date    HCT 29.2 11/25/2022    HCT 31.8 06/14/2021     No components found for: \"MCT\"  Lab Results   Component Value Date    MCV 80 11/25/2022    MCV 91 06/14/2021     Lab Results   Component Value Date    MCH 26.0 11/25/2022    MCH 31.2 06/14/2021     Lab Results   Component Value Date    MCHC 32.5 11/25/2022    MCHC 34.3 06/14/2021     Lab Results   Component Value Date    RDW 15.4 11/25/2022    RDW 12.9 06/14/2021     Lab Results   Component Value Date     11/25/2022     06/14/2021        MENTAL STATUS EXAM                                                          " "                             Speech: normal volume.  Mood depressed. Speech:normal volume, rhythm, rate.   Thought process circumferential.  No ROCKY or FOI.  No homicidal ideation or psychotic thought. +SI with no intent or plan.  No hallucinations. Insight adequate.  Judgment was intact and adequate for safety. Fund of knowledge was intact. Attention / concentration: distractabel.    ASSESSMENT                                                                                                      HISTORICAL:  Initial psych consult 6/17/15  Notes:             Gabapentin :  headaches: lactation. buspar nausea and felt like was making her more sad. Topamax: tried dose of 25 mg and didn't help with appetite / weight. Seroquel: small half dose during day does help but if she took 3 at night \"when really amped up\" still didn't get her to sleep.  Nyasia Raya is a 35 yo with ADHD, bipolar II disorder and RANDI.  Nyasia went through a lot in her household growing up with mom with MS dad working all the time and 5 siblings. Nyasia took care of the household and she worked 2 jobs. Didn't end up finishing HS in [employer] and looking back she is able to recognize had a lot on her plate. Diagnosed with ADHD in past but parents didn't want her on stimulants. Nyasia had [place] 8/30/21 and Renetta was born 11/26/22.     Last visit Nyasia noted increase in sertraline did help. Today she notes feeling quite depressed in regards to psychosocial stressors (majority of being relationship with Nu). Nyasia and the boys were staying with Nu last I spoke with her in September. Today she notes her and boys are back in the apartment.       TREATMENT RISK STATEMENT: The risks, benefits, alternatives and potential adverse effects have been explained and are understood by the pt. The pt agrees to the treatment plan with the ability to do so. The pt knows to call the clinic for any problems or access emergency care if needed. Substance use is not a " problem as noted above.     DIAGNOSES           Bipolar II disorder  ADHD  RANDI    Night terrors    PLAN                                                                                                                         1) MEDICATIONS:   --continue Zoloft 200 mg daily.. Continue Adderall IR 30 mg twice daily last filled 10/17 set to fill next for 11/14/24. Continue Klonopin  1 mg daily prn last filled  10/14 set to fill next for 11/11    2) THERAPY: No Change    3) LABS: None today.    4) PT MONITOR [call for probs]: Worsening symptoms, side effects from medications, SI/HI    5) REFERRALS [CD tx, medical, tests other]: None    6)  RTC: ~2 months, notes she will call to schedule

## 2024-12-09 DIAGNOSIS — F41.1 GAD (GENERALIZED ANXIETY DISORDER): ICD-10-CM

## 2024-12-09 DIAGNOSIS — F90.0 ADHD (ATTENTION DEFICIT HYPERACTIVITY DISORDER), INATTENTIVE TYPE: ICD-10-CM

## 2024-12-10 RX ORDER — CLONAZEPAM 1 MG/1
TABLET ORAL
Qty: 20 TABLET | Refills: 0 | Status: SHIPPED | OUTPATIENT
Start: 2024-12-10

## 2024-12-10 RX ORDER — DEXTROAMPHETAMINE SACCHARATE, AMPHETAMINE ASPARTATE, DEXTROAMPHETAMINE SULFATE AND AMPHETAMINE SULFATE 7.5; 7.5; 7.5; 7.5 MG/1; MG/1; MG/1; MG/1
TABLET ORAL
Qty: 60 TABLET | Refills: 0 | Status: SHIPPED | OUTPATIENT
Start: 2024-12-10

## 2024-12-10 NOTE — TELEPHONE ENCOUNTER
Patient is requesting a refill of the Klonopin and Adderall.  Medications are pended.  Please fill and send to pharmacy.  Thank you, please advise.

## 2025-01-08 DIAGNOSIS — F90.0 ADHD (ATTENTION DEFICIT HYPERACTIVITY DISORDER), INATTENTIVE TYPE: ICD-10-CM

## 2025-01-08 DIAGNOSIS — F41.1 GAD (GENERALIZED ANXIETY DISORDER): ICD-10-CM

## 2025-01-09 RX ORDER — CLONAZEPAM 1 MG/1
1 TABLET ORAL DAILY PRN
Qty: 20 TABLET | Refills: 0 | Status: SHIPPED | OUTPATIENT
Start: 2025-01-09

## 2025-01-09 RX ORDER — DEXTROAMPHETAMINE SACCHARATE, AMPHETAMINE ASPARTATE, DEXTROAMPHETAMINE SULFATE AND AMPHETAMINE SULFATE 7.5; 7.5; 7.5; 7.5 MG/1; MG/1; MG/1; MG/1
30 TABLET ORAL 2 TIMES DAILY
Qty: 60 TABLET | Refills: 0 | Status: SHIPPED | OUTPATIENT
Start: 2025-01-09

## 2025-01-27 ENCOUNTER — VIRTUAL VISIT (OUTPATIENT)
Dept: PSYCHIATRY | Facility: OTHER | Age: 35
End: 2025-01-27
Attending: PSYCHIATRY & NEUROLOGY
Payer: COMMERCIAL

## 2025-01-27 DIAGNOSIS — F90.0 ADHD (ATTENTION DEFICIT HYPERACTIVITY DISORDER), INATTENTIVE TYPE: ICD-10-CM

## 2025-01-27 DIAGNOSIS — F41.1 GAD (GENERALIZED ANXIETY DISORDER): ICD-10-CM

## 2025-01-27 RX ORDER — DEXTROAMPHETAMINE SACCHARATE, AMPHETAMINE ASPARTATE, DEXTROAMPHETAMINE SULFATE AND AMPHETAMINE SULFATE 7.5; 7.5; 7.5; 7.5 MG/1; MG/1; MG/1; MG/1
30 TABLET ORAL 2 TIMES DAILY
Qty: 60 TABLET | Refills: 0 | Status: SHIPPED | OUTPATIENT
Start: 2025-02-06

## 2025-01-27 RX ORDER — CLONAZEPAM 1 MG/1
1 TABLET ORAL DAILY PRN
Qty: 20 TABLET | Refills: 0 | Status: SHIPPED | OUTPATIENT
Start: 2025-02-06

## 2025-01-27 ASSESSMENT — ANXIETY QUESTIONNAIRES
GAD7 TOTAL SCORE: 16
GAD7 TOTAL SCORE: 16
2. NOT BEING ABLE TO STOP OR CONTROL WORRYING: NEARLY EVERY DAY
5. BEING SO RESTLESS THAT IT IS HARD TO SIT STILL: SEVERAL DAYS
1. FEELING NERVOUS, ANXIOUS, OR ON EDGE: NEARLY EVERY DAY
6. BECOMING EASILY ANNOYED OR IRRITABLE: NEARLY EVERY DAY
3. WORRYING TOO MUCH ABOUT DIFFERENT THINGS: SEVERAL DAYS
7. FEELING AFRAID AS IF SOMETHING AWFUL MIGHT HAPPEN: MORE THAN HALF THE DAYS

## 2025-01-27 ASSESSMENT — PAIN SCALES - GENERAL: PAINLEVEL_OUTOF10: NO PAIN (0)

## 2025-01-27 ASSESSMENT — PATIENT HEALTH QUESTIONNAIRE - PHQ9
5. POOR APPETITE OR OVEREATING: NEARLY EVERY DAY
SUM OF ALL RESPONSES TO PHQ QUESTIONS 1-9: 18

## 2025-01-27 NOTE — PROGRESS NOTES
"  Virtual Visit Details    Type of service:  Telephone Visit   Phone call duration: 18 minutes   Originating Location (pt. Location): Home    Distant Location (provider location):  Off-site  Telephone visit completed due to the patient did not consent to a video visit.    Start time: 8:06 am  End time: 8:24 am      SUBJECTIVE / INTERIM HISTORY                                                                       Last visit 10/28/24: continue Zoloft 200 mg daily.. Continue Adderall IR 30 mg twice daily last filled 10/17 set to fill next for 24. Continue Klonopin  1 mg daily prn last filled  10/14 set to fill next for   - \"I think I'm in the middle of a freakout\"   - has been having crying spells again. For the last week been like every day 5-6 times in a day  - still gets anxious and feels panicky but not to the point of feeling like she is having a heart attack.   - is consistently taking her medications. Feels is helping and thinks if she wasn't taking it would be stuck in bed  - still has apartment but stays with Nu a lot  - brother's baby Mari was born at 24 weeks. She is still in the NICU in [place]  - gave mom her car and so pretty much everywhere Nyasia is with her mom. Has been helping her mom stand up to her sisters.  - Wallace 11 back at school. Wallace joined football and seems to be really liking it thus far.  - Tiffanie (3 yo)  screams and is getting a little better at communicating.     MEDICAL / SURGICAL HISTORY                pregnant [if applicable]--no   Medical Team:     PMD- Dr. Devi       Therapist- none currently. Her and Nu worked with therapist at [facility] for while  Patient Active Problem List   Diagnosis    Insomnia    Asthma    Spondylolisthesis    Family history of congenital heart defect    Night terrors, adult    Obesity    Family planning    Smoker    Attention deficit hyperactivity disorder (ADHD), combined type    Congenital spondylolisthesis    History of  " "section    S/P  section     ALLERGY   Patient has no known allergies.  MEDICATIONS                                                                                             Current Outpatient Medications   Medication Sig Dispense Refill    amphetamine-dextroamphetamine (ADDERALL) 30 MG tablet Take 1 tablet (30 mg) by mouth 2 times daily. 60 tablet 0    clonazePAM (KLONOPIN) 1 MG tablet Take 1 tablet (1 mg) by mouth daily as needed for anxiety. 20 tablet 0    sertraline (ZOLOFT) 100 MG tablet Take 2 tablets (200 mg) by mouth daily. 60 tablet 11    acetaminophen (TYLENOL) 325 MG tablet Take 325-650 mg by mouth as needed for mild pain      albuterol (PROAIR HFA/PROVENTIL HFA/VENTOLIN HFA) 108 (90 Base) MCG/ACT inhaler Inhale 2 puffs into the lungs every 6 hours       Current Facility-Administered Medications   Medication Dose Route Frequency Provider Last Rate Last Admin    etonogestrel (NEXPLANON) subdermal implant 68 mg  1 each Subdermal Continuous Sadiq Lama MD   68 mg at 01/10/23 1156       VITALS   There were no vitals taken for this visit.     PHQ9                         Depression Screening Follow Up        2025     8:00 AM   PHQ   PHQ-9 Total Score 18   Q9: Thoughts of better off dead/self-harm past 2 weeks Not at all       LABS                                                                                                                           Lab Results   Component Value Date    WBC 10.8 2022    WBC 12.2 2021     Lab Results   Component Value Date    RBC 3.66 2022    RBC 3.49 2021     Lab Results   Component Value Date    HGB 8.4 2022    HGB 10.9 2021     Lab Results   Component Value Date    HCT 29.2 2022    HCT 31.8 2021     No components found for: \"MCT\"  Lab Results   Component Value Date    MCV 80 2022    MCV 91 2021     Lab Results   Component Value Date    MCH 26.0 2022    MCH 31.2 2021     Lab Results " "  Component Value Date    [facility] 32.5 11/25/2022    MCHC 34.3 06/14/2021     Lab Results   Component Value Date    RDW 15.4 11/25/2022    RDW 12.9 06/14/2021     Lab Results   Component Value Date     11/25/2022     06/14/2021        MENTAL STATUS EXAM                                                                                       Speech: normal volume.  Mood depressed, anxious. Speech:normal volume, rhythm, rate.   Thought process circumferential.  No ROCKY or FOI.  No suicidal or homicidal ideation or psychotic thought..  No hallucinations. Insight adequate.  Judgment was intact and adequate for safety. Fund of knowledge was intact. Attention / concentration: distractabel.    ASSESSMENT                                                                                                      HISTORICAL:  Initial psych consult 6/17/15  Notes:             Gabapentin :  headaches: lactation. buspar nausea and felt like was making her more sad. Topamax: tried dose of 25 mg and didn't help with appetite / weight. Seroquel: small half dose during day does help but if she took 3 at night \"when really amped up\" still didn't get her to sleep.  Nyasia Raya is a 35 yo with ADHD, bipolar II disorder and RANDI.  Nyasia went through a lot in her household growing up with mom with MS dad working all the time and 5 siblings. Nyasia took care of the household and she worked 2 jobs. Didn't end up finishing HS in [employer] and looking back she is able to recognize had a lot on her plate. Diagnosed with ADHD in past but parents didn't want her on stimulants. N[place]ia had [place] 8/30/21 and Renetta was born 11/26/22. Wallace is now 10 yo.    Last touched base with Nyasia in October hence ~3 months ago. She notes she has been tearful and lots of negative self-talk. She thinks this likely has to do with now ~year out and is still pretty much in the same situation. Granted she is struggling, she feels medications are helping and we agreed " on continuing as are.      TREATMENT RISK STATEMENT: The risks, benefits, alternatives and potential adverse effects have been explained and are understood by the pt. The pt agrees to the treatment plan with the ability to do so. The pt knows to call the clinic for any problems or access emergency care if needed. Substance use is not a problem as noted above.     DIAGNOSES           Bipolar II disorder  ADHD  RANDI    Night terrors    PLAN                                                                                                                         1) MEDICATIONS:   --continue Zoloft 200 mg daily.. Continue Adderall IR 30 mg twice daily last filled 1/9 set to fill next for 2/6. Continue Klonopin  1 mg daily prn last filled 1/9 set fill for 2/6    2) THERAPY: No Change    3) LABS: None today.    4) PT MONITOR [call for probs]: Worsening symptoms, side effects from medications, SI/HI    5) REFERRALS [CD tx, medical, tests other]: None    6)  RTC: ~2 months

## 2025-03-26 DIAGNOSIS — F90.0 ADHD (ATTENTION DEFICIT HYPERACTIVITY DISORDER), INATTENTIVE TYPE: ICD-10-CM

## 2025-03-26 DIAGNOSIS — F41.1 GAD (GENERALIZED ANXIETY DISORDER): ICD-10-CM

## 2025-03-26 NOTE — TELEPHONE ENCOUNTER
Klonopin      Last Written Prescription Date:  2/28/25  Last Fill Quantity: 20,   # refills: 0  Last Office Visit: 1/27/25  Future Office visit:       Routing refill request to provider for review/approval because:      Adderall      Last Written Prescription Date:  3/6/25  Last Fill Quantity: 60,   # refills: 0  Last Office Visit: 1/27/25  Future Office visit:       Routing refill request to provider for review/approval because:

## 2025-03-27 RX ORDER — CLONAZEPAM 1 MG/1
1 TABLET ORAL DAILY PRN
Qty: 20 TABLET | Refills: 0 | Status: SHIPPED | OUTPATIENT
Start: 2025-03-27

## 2025-03-27 RX ORDER — DEXTROAMPHETAMINE SACCHARATE, AMPHETAMINE ASPARTATE, DEXTROAMPHETAMINE SULFATE AND AMPHETAMINE SULFATE 7.5; 7.5; 7.5; 7.5 MG/1; MG/1; MG/1; MG/1
30 TABLET ORAL 2 TIMES DAILY
Qty: 60 TABLET | Refills: 0 | Status: SHIPPED | OUTPATIENT
Start: 2025-04-04

## 2025-05-12 ENCOUNTER — VIRTUAL VISIT (OUTPATIENT)
Dept: PSYCHIATRY | Facility: OTHER | Age: 35
End: 2025-05-12
Attending: PSYCHIATRY & NEUROLOGY
Payer: COMMERCIAL

## 2025-05-12 DIAGNOSIS — F90.0 ADHD (ATTENTION DEFICIT HYPERACTIVITY DISORDER), INATTENTIVE TYPE: ICD-10-CM

## 2025-05-12 DIAGNOSIS — F41.1 GAD (GENERALIZED ANXIETY DISORDER): ICD-10-CM

## 2025-05-12 RX ORDER — DEXTROAMPHETAMINE SACCHARATE, AMPHETAMINE ASPARTATE MONOHYDRATE, DEXTROAMPHETAMINE SULFATE AND AMPHETAMINE SULFATE 7.5; 7.5; 7.5; 7.5 MG/1; MG/1; MG/1; MG/1
30 CAPSULE, EXTENDED RELEASE ORAL 2 TIMES DAILY
Qty: 60 CAPSULE | Refills: 0 | Status: SHIPPED | OUTPATIENT
Start: 2025-05-30

## 2025-05-12 RX ORDER — CLONAZEPAM 1 MG/1
1 TABLET ORAL DAILY PRN
Qty: 20 TABLET | Refills: 1 | Status: SHIPPED | OUTPATIENT
Start: 2025-05-20

## 2025-05-12 ASSESSMENT — ANXIETY QUESTIONNAIRES
GAD7 TOTAL SCORE: 15
7. FEELING AFRAID AS IF SOMETHING AWFUL MIGHT HAPPEN: MORE THAN HALF THE DAYS
5. BEING SO RESTLESS THAT IT IS HARD TO SIT STILL: SEVERAL DAYS
GAD7 TOTAL SCORE: 15
2. NOT BEING ABLE TO STOP OR CONTROL WORRYING: NEARLY EVERY DAY
6. BECOMING EASILY ANNOYED OR IRRITABLE: MORE THAN HALF THE DAYS
3. WORRYING TOO MUCH ABOUT DIFFERENT THINGS: MORE THAN HALF THE DAYS
1. FEELING NERVOUS, ANXIOUS, OR ON EDGE: MORE THAN HALF THE DAYS

## 2025-05-12 ASSESSMENT — PATIENT HEALTH QUESTIONNAIRE - PHQ9
SUM OF ALL RESPONSES TO PHQ QUESTIONS 1-9: 18
5. POOR APPETITE OR OVEREATING: NEARLY EVERY DAY

## 2025-05-12 ASSESSMENT — PAIN SCALES - GENERAL: PAINLEVEL_OUTOF10: NO PAIN (0)

## 2025-05-12 NOTE — PROGRESS NOTES
"Virtual Visit Details    Type of service:  Video Visit     Originating Location (pt. Location): Home    Distant Location (provider location):  Off-site  Platform used for Video Visit: Elvia    Start time: 1:19 pm  End time: 1:48 pm      SUBJECTIVE / INTERIM HISTORY                                                                       Last visit 25: continue Zoloft 200 mg daily.. Continue Adderall IR 30 mg twice daily last filled  set to fill next for 2/. Continue Klonopin  1 mg daily prn last filled  set fill for 2  - been really hard. Notes been trying to keep trying to keep her kids in the sun. I asked her what she means and Nyasia notes trying to keep them out of her \"depression room\". Room with curtains shut, etc.   - parents not in good health. Mom MS and dad with heart issues  - feels Adderall not lasting long enough  - been a year now in her apartment. Been at [place] more often since especially since no internet at her apartment.   - Stephanie 14 yo cat. Aiden the [employer]. Their dog Praful. Baby the cat  - brother's baby Mari was born at 24 weeks. Her brother Mari has cerebral palsy  - gave mom her car. Getting a minivan this week.  - Wallace 11 back at school. Wallace joined football and seems to be really liking it thus far.  - Tiffanie (3 yo)  screams     MEDICAL / SURGICAL HISTORY                pregnant [if applicable]--no   Medical Team:     PMD- Dr. Devi       Therapist- none currently. Her and Nu worked with therapist at [facility] in [place] for while  Patient Active Problem List   Diagnosis    Insomnia    Asthma    Spondylolisthesis    Family history of congenital heart defect    Night terrors, adult    Obesity    Family planning    Smoker    Attention deficit hyperactivity disorder (ADHD), combined type    Congenital spondylolisthesis    History of  section    S/P  section     ALLERGY   Patient has no known allergies.  MEDICATIONS                                             " "                                                Current Outpatient Medications   Medication Sig Dispense Refill    albuterol (PROAIR HFA/PROVENTIL HFA/VENTOLIN HFA) 108 (90 Base) MCG/ACT inhaler Inhale 2 puffs into the lungs every 6 hours      amphetamine-dextroamphetamine (ADDERALL) 30 MG tablet Take 1 tablet (30 mg) by mouth 2 times daily. 60 tablet 0    clonazePAM (KLONOPIN) 1 MG tablet Take 1 tablet (1 mg) by mouth daily as needed for anxiety. 20 tablet 0    sertraline (ZOLOFT) 100 MG tablet Take 2 tablets (200 mg) by mouth daily. 60 tablet 11    acetaminophen (TYLENOL) 325 MG tablet Take 325-650 mg by mouth as needed for mild pain (Patient not taking: Reported on 5/12/2025)       Current Facility-Administered Medications   Medication Dose Route Frequency Provider Last Rate Last Admin    etonogestrel (NEXPLANON) subdermal implant 68 mg  1 each Subdermal Continuous Sadiq Lama MD   68 mg at 01/10/23 1156       VITALS   There were no vitals taken for this visit.     PHQ9                         Depression Screening Follow Up        5/12/2025     1:10 PM   PHQ   PHQ-9 Total Score 18   Q9: Thoughts of better off dead/self-harm past 2 weeks Several days       LABS                                                                                                                           Lab Results   Component Value Date    WBC 10.8 11/25/2022    WBC 12.2 06/14/2021     Lab Results   Component Value Date    RBC 3.66 11/25/2022    RBC 3.49 06/14/2021     Lab Results   Component Value Date    HGB 8.4 11/27/2022    HGB 10.9 06/14/2021     Lab Results   Component Value Date    HCT 29.2 11/25/2022    HCT 31.8 06/14/2021     No components found for: \"MCT\"  Lab Results   Component Value Date    MCV 80 11/25/2022    MCV 91 06/14/2021     Lab Results   Component Value Date    MCH 26.0 11/25/2022    MCH 31.2 06/14/2021     Lab Results   Component Value Date    MCHC 32.5 11/25/2022    MCHC 34.3 06/14/2021     Lab Results " "  Component Value Date    RDW 15.4 11/25/2022    RDW 12.9 06/14/2021     Lab Results   Component Value Date     11/25/2022     06/14/2021        MENTAL STATUS EXAM                                                                                       Speech: normal volume.  Mood depressed, anxious. Speech:normal volume, rhythm, rate.   Thought process circumferential.  No ROCKY or FOI.  No homicidal ideation or psychotic thought..  No hallucinations. Insight adequate.  Judgment was intact and adequate for safety. Fund of knowledge was intact. Attention / concentration: distractabel.    ASSESSMENT                                                                                                      HISTORICAL:  Initial psych consult 6/17/15  Notes:             Gabapentin :  headaches: lactation. buspar nausea and felt like was making her more sad. Topamax: tried dose of 25 mg and didn't help with appetite / weight. Seroquel: small half dose during day does help but if she took 3 at night \"when really amped up\" still didn't get her to sleep.  Nyasia Raya is a 36 yo with ADHD, bipolar II disorder and RANDI.  Nyasia went through a lot in her household growing up with mom with MS dad working all the time and 5 siblings. Nyasia took care of the household and she worked 2 jobs. Didn't end up finishing HS in [employer] and looking back she is able to recognize had a lot on her plate. Diagnosed with ADHD in past but parents didn't want her on stimulants. Nyasia had Sheridan 8/30/21 and Renetta was born 11/26/22. Wallace is now 12 yo.    On waiting list for therapist at [facility]. Still feels down but is taking zoloft more consistently (for RANDI and mood) and does feel it helps. Nyasia notes she feels like her meds aren't working as they should; in particular Adderall (ADHD) isn't lasting long enough. I noted we could change to XR Adderall or we could have her try Vyvanse. She isn't keen on switching medication hence prefers stick with " Adderall but switch to XR. Will continue clonazepam for RANDI. I brought up considering mood stabilizer for bipolar disorder today - Lamictal? She is going to think about it.    TREATMENT RISK STATEMENT: The risks, benefits, alternatives and potential adverse effects have been explained and are understood by the pt. The pt agrees to the treatment plan with the ability to do so. The pt knows to call the clinic for any problems or access emergency care if needed. Substance use is not a problem as noted above.     DIAGNOSES           Bipolar II disorder  ADHD  RANDI     PLAN                                                                                                                         1) MEDICATIONS:   Continue Zoloft 200 mg daily.. Change Adderall IR 30 mg twice daily to Adderall XR 30 mg twice daily last filled 5/2/25 set to fill next for 5/30/25. Continue Klonopin  1 mg daily prn I set to fill again for 5/20 with 1 refill    2) THERAPY: No Change    3) LABS: None today.    4) PT MONITOR [call for probs]: Worsening symptoms, side effects from medications, SI/HI    5) REFERRALS [CD tx, medical, tests other]: None    6)  RTC: ~1 month

## 2025-05-13 ENCOUNTER — TELEPHONE (OUTPATIENT)
Dept: PSYCHIATRY | Facility: OTHER | Age: 35
End: 2025-05-13

## 2025-05-13 NOTE — CONFIDENTIAL NOTE
May 13, 2025    Left message for patient to return call to schedule 1 mo follow up with Jeanne Guerrero.    -Shreya Jones on 5/13/2025 at 9:16 AM

## 2025-05-14 NOTE — NURSING NOTE
"Patient here for prental care and new OB physical. States she has been nauseous but not throwing up. Pt states she has had consistent \"cramping\" feeling on her right side since the start of the pregnancy that concerns her. She said if she coughs, laughs or sneezes, it causes more pain. Pt states her calves are also sore.   Pt states she has a meeting with her psych doctor to work on tapering off her meds.    Medication Reconciliation: complete    Jorge Hawkins LPN  1/25/2021 10:10 AM  " CHF Care Plan      Patient's EF (Ejection Fraction) is less than 40%    Heart Failure Medications:  Diuretics:: None    (One of the following REQUIRED for EF </= 40%/SYSTOLIC FAILURE but MAY be used in EF% >40%/DIASTOLIC FAILURE)        ACE:: None        ARB:: None         ARNI:: None    (Beta Blockers)  NON- Evidenced Based Beta Blocker (for EF% >40%/DIASTOLIC FAILURE): None    Evidenced Based Beta Blocker::(REQUIRED for EF% <40%/SYSTOLIC FAILURE) Carvedilol- Coreg  ...................................................................................................................................................    Failed to redirect to the Timeline version of the [employer] SmartLink.      Patient's weights and intake/output reviewed    Daily Weight log at bedside, patient/family participation in use of log: \"yes    Patient's current weight today shows a difference of 1 lbs less than last documented weight.      Intake/Output Summary (Last 24 hours) at 5/14/2025 0653  Last data filed at 5/14/2025 0547  Gross per 24 hour   Intake 600 ml   Output 500 ml   Net 100 ml       Education Booklet Provided: yes    Comorbidities Reviewed Yes    Patient has a past medical history of Arthritis, Atrial fibrillation (HCC), CHF (congestive heart failure) (HCC), Congenital heart disease, COPD (chronic obstructive pulmonary disease) (HCC), GERD (gastroesophageal reflux disease), Hypertension, Irritable bowel syndrome, Obesity, On home oxygen therapy, and Restless legs syndrome.     >>For CHF and Comorbidity documentation on Education Time and Topics, please see Education Tab      CHF Education    Learners:  Patient  Readineess:   Acceptance  Method:   Explanation  Response:   Verbalizes Understanding  Comments:       Time Spent: 15      Pt sitting in bed at this time on  2 L O2. Pt denies shortness of breath. Pt without lower extremity edema.     Patient and/or Family's stated Goal of Care this Admission: reduce shortness of breath,

## 2025-06-21 ENCOUNTER — HEALTH MAINTENANCE LETTER (OUTPATIENT)
Age: 35
End: 2025-06-21

## 2025-06-21 DIAGNOSIS — F41.1 GAD (GENERALIZED ANXIETY DISORDER): ICD-10-CM

## 2025-06-21 DIAGNOSIS — F90.0 ADHD (ATTENTION DEFICIT HYPERACTIVITY DISORDER), INATTENTIVE TYPE: ICD-10-CM

## 2025-06-23 RX ORDER — DEXTROAMPHETAMINE SACCHARATE, AMPHETAMINE ASPARTATE, DEXTROAMPHETAMINE SULFATE AND AMPHETAMINE SULFATE 7.5; 7.5; 7.5; 7.5 MG/1; MG/1; MG/1; MG/1
30 TABLET ORAL DAILY
Qty: 30 TABLET | Refills: 0 | OUTPATIENT
Start: 2025-06-27

## 2025-06-23 RX ORDER — DEXTROAMPHETAMINE SACCHARATE, AMPHETAMINE ASPARTATE MONOHYDRATE, DEXTROAMPHETAMINE SULFATE AND AMPHETAMINE SULFATE 7.5; 7.5; 7.5; 7.5 MG/1; MG/1; MG/1; MG/1
30 CAPSULE, EXTENDED RELEASE ORAL 2 TIMES DAILY
Qty: 60 CAPSULE | Refills: 0 | Status: SHIPPED | OUTPATIENT
Start: 2025-06-27

## 2025-06-23 RX ORDER — CLONAZEPAM 1 MG/1
TABLET ORAL
Qty: 20 TABLET | Refills: 0 | Status: SHIPPED | OUTPATIENT
Start: 2025-07-03

## 2025-06-23 NOTE — TELEPHONE ENCOUNTER
Adderall, Adderall, Klonopin      Last Written Prescription Date:  5.30.25, 5.2.25, 6.5.25  Last Fill Quantity: #60, #60, #20,   # refills: 0  Last Office Visit: 5.12.25  Future Office visit:       Routing refill request to provider for review/approval because:  Drug not on the [facility], [facility] or [facility] refill protocol or controlled substance

## 2025-06-27 ENCOUNTER — APPOINTMENT (OUTPATIENT)
Dept: CT IMAGING | Facility: OTHER | Age: 35
End: 2025-06-27
Attending: PHYSICIAN ASSISTANT
Payer: COMMERCIAL

## 2025-06-27 ENCOUNTER — APPOINTMENT (OUTPATIENT)
Dept: GENERAL RADIOLOGY | Facility: OTHER | Age: 35
End: 2025-06-27
Attending: PHYSICIAN ASSISTANT
Payer: COMMERCIAL

## 2025-06-27 ENCOUNTER — HOSPITAL ENCOUNTER (EMERGENCY)
Facility: OTHER | Age: 35
Discharge: OTHER INSTITUTION | End: 2025-06-27
Attending: PHYSICIAN ASSISTANT
Payer: COMMERCIAL

## 2025-06-27 VITALS
SYSTOLIC BLOOD PRESSURE: 131 MMHG | RESPIRATION RATE: 18 BRPM | BODY MASS INDEX: 39.27 KG/M2 | HEART RATE: 125 BPM | TEMPERATURE: 99.9 F | OXYGEN SATURATION: 95 % | WEIGHT: 200 LBS | HEIGHT: 60 IN | DIASTOLIC BLOOD PRESSURE: 71 MMHG

## 2025-06-27 DIAGNOSIS — R52 INTRACTABLE PAIN: ICD-10-CM
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
## 2025-06-27 DIAGNOSIS — S82.122A CLOSED FRACTURE OF LATERAL PORTION OF LEFT TIBIAL PLATEAU, INITIAL ENCOUNTER: ICD-10-CM

## 2025-06-27 LAB
ALBUMIN SERPL BCG-MCNC: 4 G/DL (ref 3.5–5.2)
ALP SERPL-CCNC: 102 U/L (ref 40–150)
ALT SERPL W P-5'-P-CCNC: 16 U/L (ref 0–50)
ANION GAP SERPL CALCULATED.3IONS-SCNC: 11 MMOL/L (ref 7–15)
APTT PPP: 31 SECONDS (ref 22–38)
AST SERPL W P-5'-P-CCNC: 19 U/L (ref 0–45)
BASOPHILS # BLD AUTO: 0.1 10E3/UL (ref 0–0.2)
BASOPHILS NFR BLD AUTO: 0 %
BILIRUB SERPL-MCNC: 0.6 MG/DL
BUN SERPL-MCNC: 11.2 MG/DL (ref 6–20)
CALCIUM SERPL-MCNC: 9.1 MG/DL (ref 8.8–10.4)
CHLORIDE SERPL-SCNC: 107 MMOL/L (ref 98–107)
CREAT SERPL-MCNC: 0.65 MG/DL (ref 0.51–0.95)
EGFRCR SERPLBLD CKD-EPI 2021: >90 ML/MIN/1.73M2
EOSINOPHIL # BLD AUTO: 0.4 10E3/UL (ref 0–0.7)
EOSINOPHIL NFR BLD AUTO: 3 %
ERYTHROCYTE [DISTWIDTH] IN BLOOD BY AUTOMATED COUNT: 13.7 % (ref 10–15)
GLUCOSE SERPL-MCNC: 112 MG/DL (ref 70–99)
HCO3 SERPL-SCNC: 21 MMOL/L (ref 22–29)
HCT VFR BLD AUTO: 38.2 % (ref 35–47)
HGB BLD-MCNC: 12.5 G/DL (ref 11.7–15.7)
HOLD SPECIMEN: NORMAL
HOLD SPECIMEN: NORMAL
IMM GRANULOCYTES # BLD: 0.1 10E3/UL
IMM GRANULOCYTES NFR BLD: 1 %
INR PPP: 1.09 (ref 0.85–1.15)
LYMPHOCYTES # BLD AUTO: 2.7 10E3/UL (ref 0.8–5.3)
LYMPHOCYTES NFR BLD AUTO: 22 %
MCH RBC QN AUTO: 27.9 PG (ref 26.5–33)
MCHC RBC AUTO-ENTMCNC: 32.7 G/DL (ref 31.5–36.5)
MCV RBC AUTO: 85 FL (ref 78–100)
MONOCYTES # BLD AUTO: 0.5 10E3/UL (ref 0–1.3)
MONOCYTES NFR BLD AUTO: 4 %
NEUTROPHILS # BLD AUTO: 8.9 10E3/UL (ref 1.6–8.3)
NEUTROPHILS NFR BLD AUTO: 70 %
NRBC # BLD AUTO: 0 10E3/UL
NRBC BLD AUTO-RTO: 0 /100
PLATELET # BLD AUTO: 305 10E3/UL (ref 150–450)
POTASSIUM SERPL-SCNC: 3.7 MMOL/L (ref 3.4–5.3)
PROT SERPL-MCNC: 6.9 G/DL (ref 6.4–8.3)
PROTHROMBIN TIME: 14.2 SECONDS (ref 11.8–14.8)
RBC # BLD AUTO: 4.48 10E6/UL (ref 3.8–5.2)
SODIUM SERPL-SCNC: 139 MMOL/L (ref 135–145)
WBC # BLD AUTO: 12.6 10E3/UL (ref 4–11)

## 2025-06-27 PROCEDURE — 96361 HYDRATE IV INFUSION ADD-ON: CPT | Performed by: PHYSICIAN ASSISTANT

## 2025-06-27 PROCEDURE — 85730 THROMBOPLASTIN TIME PARTIAL: CPT | Performed by: PHYSICIAN ASSISTANT

## 2025-06-27 PROCEDURE — 99285 EMERGENCY DEPT VISIT HI MDM: CPT | Mod: 25 | Performed by: PHYSICIAN ASSISTANT

## 2025-06-27 PROCEDURE — 258N000003 HC RX IP 258 OP 636: Performed by: PHYSICIAN ASSISTANT

## 2025-06-27 PROCEDURE — 73562 X-RAY EXAM OF KNEE 3: CPT | Mod: 26 | Performed by: RADIOLOGY

## 2025-06-27 PROCEDURE — 73590 X-RAY EXAM OF LOWER LEG: CPT | Mod: LT

## 2025-06-27 PROCEDURE — 96376 TX/PRO/DX INJ SAME DRUG ADON: CPT | Performed by: PHYSICIAN ASSISTANT

## 2025-06-27 PROCEDURE — 84155 ASSAY OF PROTEIN SERUM: CPT | Performed by: PHYSICIAN ASSISTANT

## 2025-06-27 PROCEDURE — 250N000011 HC RX IP 250 OP 636: Performed by: PHYSICIAN ASSISTANT

## 2025-06-27 PROCEDURE — 85004 AUTOMATED DIFF WBC COUNT: CPT | Performed by: PHYSICIAN ASSISTANT

## 2025-06-27 PROCEDURE — 99285 EMERGENCY DEPT VISIT HI MDM: CPT | Performed by: PHYSICIAN ASSISTANT

## 2025-06-27 PROCEDURE — 73590 X-RAY EXAM OF LOWER LEG: CPT | Mod: 26 | Performed by: RADIOLOGY

## 2025-06-27 PROCEDURE — 85610 PROTHROMBIN TIME: CPT | Performed by: PHYSICIAN ASSISTANT

## 2025-06-27 PROCEDURE — 250N000013 HC RX MED GY IP 250 OP 250 PS 637: Performed by: PHYSICIAN ASSISTANT

## 2025-06-27 PROCEDURE — 36415 COLL VENOUS BLD VENIPUNCTURE: CPT | Performed by: PHYSICIAN ASSISTANT

## 2025-06-27 PROCEDURE — 73700 CT LOWER EXTREMITY W/O DYE: CPT | Mod: 26 | Performed by: RADIOLOGY

## 2025-06-27 PROCEDURE — 96374 THER/PROPH/DIAG INJ IV PUSH: CPT | Performed by: PHYSICIAN ASSISTANT

## 2025-06-27 PROCEDURE — 96375 TX/PRO/DX INJ NEW DRUG ADDON: CPT | Performed by: PHYSICIAN ASSISTANT

## 2025-06-27 PROCEDURE — 73700 CT LOWER EXTREMITY W/O DYE: CPT | Mod: LT

## 2025-06-27 PROCEDURE — 73562 X-RAY EXAM OF KNEE 3: CPT | Mod: LT

## 2025-06-27 RX ORDER — KETOROLAC TROMETHAMINE 15 MG/ML
15 INJECTION, SOLUTION INTRAMUSCULAR; INTRAVENOUS ONCE
Status: COMPLETED | OUTPATIENT
Start: 2025-06-27 | End: 2025-06-27

## 2025-06-27 RX ORDER — HYDROXYZINE PAMOATE 25 MG/1
25 CAPSULE ORAL ONCE
Status: COMPLETED | OUTPATIENT
Start: 2025-06-27 | End: 2025-06-27

## 2025-06-27 RX ORDER — HYDROMORPHONE HYDROCHLORIDE 1 MG/ML
0.5 INJECTION, SOLUTION INTRAMUSCULAR; INTRAVENOUS; SUBCUTANEOUS EVERY 30 MIN PRN
Refills: 0 | Status: DISCONTINUED | OUTPATIENT
Start: 2025-06-27 | End: 2025-06-28 | Stop reason: HOSPADM

## 2025-06-27 RX ORDER — ACETAMINOPHEN 500 MG
1000 TABLET ORAL ONCE
Status: COMPLETED | OUTPATIENT
Start: 2025-06-27 | End: 2025-06-27

## 2025-06-27 RX ORDER — ONDANSETRON 2 MG/ML
4 INJECTION INTRAMUSCULAR; INTRAVENOUS
Status: COMPLETED | OUTPATIENT
Start: 2025-06-27 | End: 2025-06-27

## 2025-06-27 RX ADMIN — HYDROMORPHONE HYDROCHLORIDE 0.5 MG: 1 INJECTION, SOLUTION INTRAMUSCULAR; INTRAVENOUS; SUBCUTANEOUS at 21:10

## 2025-06-27 RX ADMIN — ACETAMINOPHEN 1000 MG: 500 TABLET, FILM COATED ORAL at 20:06

## 2025-06-27 RX ADMIN — KETOROLAC TROMETHAMINE 15 MG: 15 INJECTION, SOLUTION INTRAMUSCULAR; INTRAVENOUS at 18:25

## 2025-06-27 RX ADMIN — SODIUM CHLORIDE 1000 ML: 0.9 INJECTION, SOLUTION INTRAVENOUS at 20:04

## 2025-06-27 RX ADMIN — KETOROLAC TROMETHAMINE 15 MG: 15 INJECTION, SOLUTION INTRAMUSCULAR; INTRAVENOUS at 19:54

## 2025-06-27 RX ADMIN — HYDROXYZINE PAMOATE 25 MG: 25 CAPSULE ORAL at 19:54

## 2025-06-27 RX ADMIN — ONDANSETRON 4 MG: 2 INJECTION INTRAMUSCULAR; INTRAVENOUS at 21:34

## 2025-06-27 RX ADMIN — HYDROMORPHONE HYDROCHLORIDE 0.5 MG: 1 INJECTION, SOLUTION INTRAMUSCULAR; INTRAVENOUS; SUBCUTANEOUS at 19:03

## 2025-06-27 ASSESSMENT — ACTIVITIES OF DAILY LIVING (ADL)
ADLS_ACUITY_SCORE: 50
ADLS_ACUITY_SCORE: 50
ADLS_ACUITY_SCORE: 46
ADLS_ACUITY_SCORE: 50

## 2025-06-27 ASSESSMENT — COLUMBIA-SUICIDE SEVERITY RATING SCALE - C-SSRS
6. HAVE YOU EVER DONE ANYTHING, STARTED TO DO ANYTHING, OR PREPARED TO DO ANYTHING TO END YOUR LIFE?: NO
1. IN THE PAST MONTH, HAVE YOU WISHED YOU WERE DEAD OR WISHED YOU COULD GO TO SLEEP AND NOT WAKE UP?: NO
2. HAVE YOU ACTUALLY HAD ANY THOUGHTS OF KILLING YOURSELF IN THE PAST MONTH?: NO

## 2025-06-27 NOTE — ED TRIAGE NOTES
"Pt arrives via EMS with a c/o left knee pain. Pt fell down a 25 ft embankment and heard her knee \"pop\" pt was given 100 mcg Fentanyl enroute. Pt states she has intermittent numbness to the foot. Cap refill les thatn 3 sec. Pedal pulse +2.   Vital signs:  Temp: 99.9  F (37.7  C) Temp src: Tympanic BP: 103/76 Pulse: (!) 125   Resp: 18 SpO2: 96 % O2 Device: None (Room air)   Height: 151.1 cm (4' 11.5\") Weight: 90.7 kg (200 lb)  Estimated body mass index is 39.72 kg/m  as calculated from the following:    Height as of this encounter: 1.511 m (4' 11.5\").    Weight as of this encounter: 90.7 kg (200 lb).        Triage Assessment (Adult)       Row Name 06/27/25 1808          Triage Assessment    Airway WDL WDL        Respiratory WDL    Respiratory WDL WDL        Skin Circulation/Temperature WDL    Skin Circulation/Temperature WDL WDL        Cardiac WDL    Cardiac WDL WDL        Peripheral/Neurovascular WDL    Peripheral Neurovascular WDL WDL        Cognitive/Neuro/Behavioral WDL    Cognitive/Neuro/Behavioral WDL WDL                     "

## 2025-06-28 NOTE — ED NOTES
[facility] called and stated that bed number on ortho unit is 502, they want pt to stop in ER first to possibly receive a block. Nurse to nurse number on ortho unit is [phone], nurse to nurse in ER is [phone].

## 2025-06-28 NOTE — ED PROVIDER NOTES
EMERGENCY DEPARTMENT ENCOUNTER      NAME: Nyasia Raya  AGE: 35 year old female  YOB: 1990  MRN: [id number]  EVALUATION DATE & TIME: 2025  6:07 PM    PCP: Radha Garcia    ED PROVIDER: Bright Swan PA-C       CHIEF COMPLAINT:  Chief Complaint   Patient presents with    Fall    Knee Injury         HPI  Nyasia Raya is a pleasant 35 year old female who presents to the ER today for concerns of left knee injury.  Happened just prior to arrival.  Patient states that [place] and [place].  She felt a pop/crack in the left knee.  [place]  Patient denies any head or neck injury.  Patient states pain is localized to the left knee.  Having significant swelling.  Unable to bear weight or bend her knee.  10 out of 10 pain.  Some numbness in her left foot.  No prior left knee surgeries.  No left hip or left ankle pain.  No blood thinners.      REVIEW OF SYSTEMS   Review of Systems  As above, otherwise ROS is unremarkable.      PAST MEDICAL HISTORY:  Past Medical History:   Diagnosis Date    Asthma 5/10/2013    Insomnia 5/10/2013    Spondylolisthesis 5/10/2013    Tobacco abuse          PAST SURGICAL HISTORY:  Past Surgical History:   Procedure Laterality Date     SECTION  1/15/2014    Procedure:  SECTION;;  Surgeon: Leena Mayorga MD;  Location: [facility] OR     SECTION N/A 2021    Procedure:  SECTION;  Surgeon: Sadiq Lama MD;  Location: [facility] OR     SECTION N/A 2022    Procedure:  SECTION;  Surgeon: Adina Trimble MD;  Location:  OR         CURRENT MEDICATIONS:    Current Outpatient Medications   Medication Instructions    acetaminophen (TYLENOL) 325-650 mg, PRN    albuterol (PROAIR HFA/PROVENTIL HFA/VENTOLIN HFA) 108 (90 Base) MCG/ACT inhaler 2 puffs, EVERY 6 HOURS    amphetamine-dextroamphetamine (ADDERALL XR) 30 MG 24 hr capsule 30 mg, Oral, 2 TIMES DAILY  "   amphetamine-dextroamphetamine (ADDERALL) 30 MG tablet 30 mg, Oral, 2 TIMES DAILY    [START ON 7/3/2025] clonazePAM (KLONOPIN) 1 MG tablet Take 1 tablet (1 mg) up to daily as needed for anxiety    sertraline (ZOLOFT) 200 mg, Oral, DAILY         ALLERGIES:  No Known Allergies      FAMILY HISTORY:  Family History   Problem Relation Age of Onset    Diabetes Mother     Diabetes Father     Heart Disease Father 47        MI         SOCIAL HISTORY:   Social History     Socioeconomic History    Marital status: Single     Spouse name: None    Number of children: None    Years of education: None    Highest education level: None   Tobacco Use    Smoking status: Every Day     Current packs/day: 0.00     Types: Cigarettes, Vaping Device     Last attempt to quit: 2022     Years since quitting: 3.4    Smokeless tobacco: Never    Tobacco comments:     E-cig   Vaping Use    Vaping status: Former    Quit date: 7/25/2021    Substances: Nicotine    Devices: Refillable tank   Substance and Sexual Activity    Alcohol use: No     Comment: signed up for quit plan and her workplace has programs    Drug use: No    Sexual activity: Yes     Partners: Male   Other Topics Concern    Parent/sibling w/ CABG, MI or angioplasty before 65F 55M? Yes         ==================================================================================================================================    PHYSICAL EXAM    VITAL SIGNS: BP (!) 127/117   Pulse (!) 125   Temp 99.9  F (37.7  C) (Tympanic)   Resp 18   Ht 1.511 m (4' 11.5\")   Wt 90.7 kg (200 lb)   LMP 06/23/2025   SpO2 96%   BMI 39.72 kg/m      Patient Vitals for the past 24 hrs:   BP Temp Temp src Pulse Resp SpO2 Height Weight   06/27/25 2019 (!) 127/117 -- -- -- -- 96 % -- --   06/27/25 2004 (!) 123/93 -- -- -- -- 96 % -- --   06/27/25 1949 (!) 127/97 -- -- -- -- 95 % -- --   06/27/25 1934 113/89 -- -- -- -- 95 % -- --   06/27/25 1914 (!) 121/97 -- -- -- -- 97 % -- --   06/27/25 1903 112/82 -- -- " "-- -- 95 % -- --   06/27/25 1818 112/89 -- -- -- -- 95 % -- --   06/27/25 1812 103/76 99.9  F (37.7  C) Tympanic (!) 125 18 96 % 1.511 m (4' 11.5\") 90.7 kg (200 lb)       Physical Exam  Vitals and nursing note reviewed.   Constitutional:       Appearance: Normal appearance.   HENT:      Head: Normocephalic.      Nose: Nose normal.      Mouth/Throat:      Mouth: Mucous membranes are moist.      Pharynx: Oropharynx is clear.   Eyes:      Conjunctiva/sclera: Conjunctivae normal.   Cardiovascular:      Rate and Rhythm: Normal rate and regular rhythm.      Pulses: Normal pulses.      Heart sounds: Normal heart sounds.   Pulmonary:      Effort: Pulmonary effort is normal.      Breath sounds: Normal breath sounds.   Musculoskeletal:      Cervical back: Normal range of motion.      Comments: Evaluation the left knee reveals significant swelling and effusion in the left knee.  Range of motion not tested due to pain.  Patient with significant tenderness over the tibial plateau region.  No patellar tenderness.  Patient able to lift heel off the bed, less likely quadricep tendon injury.  No left hip or left ankle tenderness.  Palpable DP PT pulses bilaterally.  Slight decrease sensation of the dorsum of the left foot.  Able to wiggle toes.  Achilles tendon was intact.   Skin:     General: Skin is warm and dry.   Neurological:      Mental Status: She is alert.      Comments: Patient with slight decrease sensation over the dorsum of the left foot.  Patient able to wiggle toes.   Psychiatric:         Mood and Affect: Mood normal.           ==================================================================================================================================     LABS & RADIOLOGY:  All pertinent labs reviewed and interpreted. Reviewed all pertinent imaging. Please see official radiology report.  Results for orders placed or performed during the hospital encounter of 06/27/25   XR Knee Left 3 Views    Impression    " IMPRESSION: Acute, comminuted fracture of the tibial plateau with intra-articular extension at the lateral tibial plateau. Mild impaction at the medial metaphyseal region of the proximal tibia. Large volume knee lipohemarthrosis.   CT Knee Left w/o Contrast    Impression    IMPRESSION:  1.  Acute, comminuted fracture of the proximal tibia with intra-articular extension as detailed.  2.  Large volume lipohemarthrosis.       XR Tibia and Fibula Left 2 Views    Impression    IMPRESSION: Acute, comminuted fracture of the tibial plateau with intra-articular extension at the lateral tibial plateau. Mild impaction at the medial metaphyseal region of the proximal tibia. Large volume knee lipohemarthrosis.   Comprehensive metabolic panel   Result Value Ref Range    Sodium 139 135 - 145 mmol/L    Potassium 3.7 3.4 - 5.3 mmol/L    Carbon Dioxide (CO2) 21 (L) 22 - 29 mmol/L    Anion Gap 11 7 - 15 mmol/L    Urea Nitrogen 11.2 6.0 - 20.0 mg/dL    Creatinine 0.65 0.51 - 0.95 mg/dL    GFR Estimate >90 >60 mL/min/1.73m2    Calcium 9.1 8.8 - 10.4 mg/dL    Chloride 107 98 - 107 mmol/L    Glucose 112 (H) 70 - 99 mg/dL    Alkaline Phosphatase 102 40 - 150 U/L    AST 19 0 - 45 U/L    ALT 16 0 - 50 U/L    Protein Total 6.9 6.4 - 8.3 g/dL    Albumin 4.0 3.5 - 5.2 g/dL    Bilirubin Total 0.6 <=1.2 mg/dL   INR   Result Value Ref Range    INR 1.09 0.85 - 1.15    PT 14.2 11.8 - 14.8 Seconds   Partial thromboplastin time   Result Value Ref Range    aPTT 31 22 - 38 Seconds   CBC with platelets and differential   Result Value Ref Range    WBC Count 12.6 (H) 4.0 - 11.0 10e3/uL    RBC Count 4.48 3.80 - 5.20 10e6/uL    Hemoglobin 12.5 11.7 - 15.7 g/dL    Hematocrit 38.2 35.0 - 47.0 %    MCV 85 78 - 100 fL    MCH 27.9 26.5 - 33.0 pg    MCHC 32.7 31.5 - 36.5 g/dL    RDW 13.7 10.0 - 15.0 %    Platelet Count 305 150 - 450 10e3/uL    % Neutrophils 70 %    % Lymphocytes 22 %    % Monocytes 4 %    % Eosinophils 3 %    % Basophils 0 %    % Immature  "Granulocytes 1 %    NRBCs per 100 WBC 0 <1 /100    Absolute Neutrophils 8.9 (H) 1.6 - 8.3 10e3/uL    Absolute Lymphocytes 2.7 0.8 - 5.3 10e3/uL    Absolute Monocytes 0.5 0.0 - 1.3 10e3/uL    Absolute Eosinophils 0.4 0.0 - 0.7 10e3/uL    Absolute Basophils 0.1 0.0 - 0.2 10e3/uL    Absolute Immature Granulocytes 0.1 <=0.4 10e3/uL    Absolute NRBCs 0.0 10e3/uL     CT Knee Left w/o Contrast   Final Result   IMPRESSION:   1.  Acute, comminuted fracture of the proximal tibia with intra-articular extension as detailed.   2.  Large volume lipohemarthrosis.            XR Tibia and Fibula Left 2 Views   Final Result   IMPRESSION: Acute, comminuted fracture of the tibial plateau with intra-articular extension at the lateral tibial plateau. Mild impaction at the medial metaphyseal region of the proximal tibia. Large volume knee lipohemarthrosis.      XR Knee Left 3 Views   Final Result   IMPRESSION: Acute, comminuted fracture of the tibial plateau with intra-articular extension at the lateral tibial plateau. Mild impaction at the medial metaphyseal region of the proximal tibia. Large volume knee lipohemarthrosis.            ==================================================================================================================================    ED COURSE, MEDICAL DECISION MAKING, FINAL IMPRESSION AND PLAN:      Assessment / Plan:  1. Closed fracture of lateral portion of left tibial plateau, initial encounter    2. Fall from slip, trip, or stumble, initial encounter    3. Intractable pain        The patient was interviewed and examined.  HPI and physical exam as below.  Differential diagnosis and MDM Key Documentation Elements as below.  Vitals, triage note, and nursing notes were reviewed.BP (!) 127/117   Pulse (!) 125   Temp 99.9  F (37.7  C) (Tympanic)   Resp 18   Ht 1.511 m (4' 11.5\")   Wt 90.7 kg (200 lb)   LMP 06/23/2025   SpO2 96%   BMI 39.72 kg/m      # Acute, closed, comminuted, intra-articular " fracture of the left tibial plateau   # Fall from trip/slip/stumble  -Patient with significant effusion of the left knee.  X-rays with an acute, comminuted fracture of the left tibial plateau with extension into the tubular space through the lateral tibial plateau.  Patient also with mild impaction in the medial metaphyseal region of the proximal tibia with large effusion noted.  Laboratory studies were reassuring.  Minimal elevation in white blood cell count, most likely secondary to the pain.  -CT imaging was obtained.  Knee immobilizer placed.  -Patient was given fentanyl 100 mcg in the ambulance with no significant proved in pain.  Patient was given IV Dilaudid 0.5 mg x 2, IV Toradol 50 mg x 2, oral Vistaril 25 mg, oral Tylenol 1000 mg with pain only improving down to roughly 8/10.  Patient was tachycardic and given IV fluids 1 L normal saline.  Patient continued to remain tachycardic, most likely secondary to the pain.  - No signs of injury to the head or neck.  No injury to the hip or ankle.  - Initial discussed patient with on-call orthopedic surgeon, Dr. Aguila.  He recommend transfer to [facility].  I discussed patient with intake provider ALINA Shannon, who was kind enough to accept patient.  Patient will be transferred to [facility] for a closed, comminuted, intra-articular left tibial plateau fracture intractable pain via ground ambulance for further evaluation and treatment.  Signed EMTALA obtained.  Imaging pushed forward to [facility].  Patient amenable to transfer.  - Patient transferred in stable condition.          Pertinent Labs & Imaging studies reviewed. (See chart for details)  Results for orders placed or performed during the hospital encounter of 06/27/25   XR Knee Left 3 Views    Impression    IMPRESSION: Acute, comminuted fracture of the tibial plateau with intra-articular extension at the lateral tibial plateau. Mild impaction at the medial metaphyseal region of the proximal tibia.  Large volume knee lipohemarthrosis.   CT Knee Left w/o Contrast    Impression    IMPRESSION:  1.  Acute, comminuted fracture of the proximal tibia with intra-articular extension as detailed.  2.  Large volume lipohemarthrosis.       XR Tibia and Fibula Left 2 Views    Impression    IMPRESSION: Acute, comminuted fracture of the tibial plateau with intra-articular extension at the lateral tibial plateau. Mild impaction at the medial metaphyseal region of the proximal tibia. Large volume knee lipohemarthrosis.   Comprehensive metabolic panel   Result Value Ref Range    Sodium 139 135 - 145 mmol/L    Potassium 3.7 3.4 - 5.3 mmol/L    Carbon Dioxide (CO2) 21 (L) 22 - 29 mmol/L    Anion Gap 11 7 - 15 mmol/L    Urea Nitrogen 11.2 6.0 - 20.0 mg/dL    Creatinine 0.65 0.51 - 0.95 mg/dL    GFR Estimate >90 >60 mL/min/1.73m2    Calcium 9.1 8.8 - 10.4 mg/dL    Chloride 107 98 - 107 mmol/L    Glucose 112 (H) 70 - 99 mg/dL    Alkaline Phosphatase 102 40 - 150 U/L    AST 19 0 - 45 U/L    ALT 16 0 - 50 U/L    Protein Total 6.9 6.4 - 8.3 g/dL    Albumin 4.0 3.5 - 5.2 g/dL    Bilirubin Total 0.6 <=1.2 mg/dL   INR   Result Value Ref Range    INR 1.09 0.85 - 1.15    PT 14.2 11.8 - 14.8 Seconds   Partial thromboplastin time   Result Value Ref Range    aPTT 31 22 - 38 Seconds   CBC with platelets and differential   Result Value Ref Range    WBC Count 12.6 (H) 4.0 - 11.0 10e3/uL    RBC Count 4.48 3.80 - 5.20 10e6/uL    Hemoglobin 12.5 11.7 - 15.7 g/dL    Hematocrit 38.2 35.0 - 47.0 %    MCV 85 78 - 100 fL    MCH 27.9 26.5 - 33.0 pg    MCHC 32.7 31.5 - 36.5 g/dL    RDW 13.7 10.0 - 15.0 %    Platelet Count 305 150 - 450 10e3/uL    % Neutrophils 70 %    % Lymphocytes 22 %    % Monocytes 4 %    % Eosinophils 3 %    % Basophils 0 %    % Immature Granulocytes 1 %    NRBCs per 100 WBC 0 <1 /100    Absolute Neutrophils 8.9 (H) 1.6 - 8.3 10e3/uL    Absolute Lymphocytes 2.7 0.8 - 5.3 10e3/uL    Absolute Monocytes 0.5 0.0 - 1.3 10e3/uL    Absolute  Eosinophils 0.4 0.0 - 0.7 10e3/uL    Absolute Basophils 0.1 0.0 - 0.2 10e3/uL    Absolute Immature Granulocytes 0.1 <=0.4 10e3/uL    Absolute NRBCs 0.0 10e3/uL     Lab Results   Component Value Date    ABORH O POS 11/25/2022         Reassessments, Medications, Interventions, & Response to Treatments:  -as above    Medications given during today's ER visit:  Medications   HYDROmorphone (PF) (DILAUDID) injection 0.5 mg (0.5 mg Intravenous $Given 6/27/25 1903)   sodium chloride 0.9% BOLUS 1,000 mL (1,000 mLs Intravenous $New Bag 6/27/25 2004)   ketorolac (TORADOL) injection 15 mg (15 mg Intravenous $Given 6/27/25 1825)   ketorolac (TORADOL) injection 15 mg (15 mg Intravenous $Given 6/27/25 1954)   hydrOXYzine brenna (VISTARIL) capsule 25 mg (25 mg Oral $Given 6/27/25 1954)   acetaminophen (TYLENOL) tablet 1,000 mg (1,000 mg Oral $Given 6/27/25 2006)       Consultations:  As above    Decision Rules, Medical Calculators, Sepsis Criteria, and Risk Stratification Tools:  None    MDM Key Documentation Elements for Patient's Evaluation:  Differential diagnosis to include high risk considerations: As above  Escalation to admission/observation considered: Admission/observation considered, patient was admitted/transferred in stable condition  Discussions and management with other clinicians:    3a. Independent interpretation of testing performed by another health professional:  -Yes  3b. Discussion of management or test interpretation with another health professional: -Yes  Independent interpretation of tests:  Ordering and/or review of 3+ test(s)  Discussion of test interpretations with radiology:  No  History obtained from source other than patient or assessment requiring an independent historian:  No  Review of non-ED/external records:  review of 3+ records  Diagnostic tests considered but not ultimately performed/deferred:  -MRI left knee  Prescription medications considered but not prescribed:  - Patient was  admitted/transferred   Chronic conditions affecting care:  -None  Care affected by social determinants of health:  -None    The patient's management involved:   - Laboratory studies  - Imaging studies  - Parenteral controlled substance  - Decision regarding hospitalization/transfer      A shared decision making model was used. Time was taken to answer all questions.  Patient and/or associated parties understood and were agreeable to treatment plan.  Plan and all results were discussed.  Patient was admitted/transferred in stable condition      NEW PRESCRIPTIONS STARTED AT TODAY'S ER VISIT:  New Prescriptions    No medications on file          ==================================================================================================================================      I, Stephen Swan PA-C, personally performed the services described in this documentation, and it is both accurate and complete.       Bright Swan PA-C  06/27/25 2053

## 2025-06-30 ENCOUNTER — VIRTUAL VISIT (OUTPATIENT)
Dept: PSYCHIATRY | Facility: OTHER | Age: 35
End: 2025-06-30
Attending: PSYCHIATRY & NEUROLOGY
Payer: COMMERCIAL

## 2025-06-30 DIAGNOSIS — F90.0 ADHD (ATTENTION DEFICIT HYPERACTIVITY DISORDER), INATTENTIVE TYPE: ICD-10-CM

## 2025-06-30 RX ORDER — DEXTROAMPHETAMINE SACCHARATE, AMPHETAMINE ASPARTATE MONOHYDRATE, DEXTROAMPHETAMINE SULFATE AND AMPHETAMINE SULFATE 7.5; 7.5; 7.5; 7.5 MG/1; MG/1; MG/1; MG/1
30 CAPSULE, EXTENDED RELEASE ORAL 2 TIMES DAILY
Qty: 60 CAPSULE | Refills: 0 | Status: SHIPPED | OUTPATIENT
Start: 2025-07-25
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
## 2025-07-01 ENCOUNTER — TELEPHONE (OUTPATIENT)
Dept: PSYCHIATRY | Facility: OTHER | Age: 35
End: 2025-07-01

## 2025-07-01 NOTE — CONFIDENTIAL NOTE
July 1, 2025    Left message for patient to return call to schedule 1 mo follow up with Jeanne Guerrero.    -Shreya Jones on 7/1/2025 at 9:52 AM

## 2025-07-29 ENCOUNTER — TELEPHONE (OUTPATIENT)
Dept: PSYCHIATRY | Facility: OTHER | Age: 35
End: 2025-07-29
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
## 2025-07-30 NOTE — TELEPHONE ENCOUNTER
Patient contacted and notified no early refills of controlled substances.  Patient verbalizes understanding.

## 2025-08-14 DIAGNOSIS — F41.1 GAD (GENERALIZED ANXIETY DISORDER): ICD-10-CM

## 2025-08-16 ENCOUNTER — OFFICE VISIT (OUTPATIENT)
Dept: FAMILY MEDICINE | Facility: OTHER | Age: 35
End: 2025-08-16
Payer: COMMERCIAL

## 2025-08-16 VITALS
RESPIRATION RATE: 16 BRPM | HEIGHT: 59 IN | TEMPERATURE: 98.8 F | HEART RATE: 90 BPM | SYSTOLIC BLOOD PRESSURE: 120 MMHG | OXYGEN SATURATION: 98 % | BODY MASS INDEX: 39.72 KG/M2 | WEIGHT: 197 LBS | DIASTOLIC BLOOD PRESSURE: 82 MMHG

## 2025-08-16 DIAGNOSIS — T81.49XA INCISIONAL INFECTION: Primary | ICD-10-CM

## 2025-08-16 PROCEDURE — 87070 CULTURE OTHR SPECIMN AEROBIC: CPT | Mod: ZL

## 2025-08-16 PROCEDURE — 99203 OFFICE O/P NEW LOW 30 MIN: CPT

## 2025-08-16 PROCEDURE — 1125F AMNT PAIN NOTED PAIN PRSNT: CPT

## 2025-08-16 PROCEDURE — 3074F SYST BP LT 130 MM HG: CPT

## 2025-08-16 PROCEDURE — 3079F DIAST BP 80-89 MM HG: CPT

## 2025-08-16 RX ORDER — CEPHALEXIN 500 MG/1
500 CAPSULE ORAL 3 TIMES DAILY
Qty: 21 CAPSULE | Refills: 0 | Status: SHIPPED | OUTPATIENT
Start: 2025-08-16 | End: 2025-08-23

## 2025-08-16 RX ORDER — PSEUDOEPHED/ACETAMINOPH/DIPHEN 30MG-500MG
TABLET ORAL
COMMUNITY
Start: 2025-07-03

## 2025-08-16 RX ORDER — CLONAZEPAM 1 MG/1
TABLET ORAL
Qty: 20 TABLET | Refills: 0 | Status: SHIPPED | OUTPATIENT
Start: 2025-08-20

## 2025-08-16 ASSESSMENT — ASTHMA QUESTIONNAIRES
QUESTION_4 LAST FOUR WEEKS HOW OFTEN HAVE YOU USED YOUR RESCUE INHALER OR NEBULIZER MEDICATION (SUCH AS ALBUTEROL): ONCE A WEEK OR LESS
QUESTION_5 LAST FOUR WEEKS HOW WOULD YOU RATE YOUR ASTHMA CONTROL: WELL CONTROLLED
ACT_TOTALSCORE: 22
QUESTION_2 LAST FOUR WEEKS HOW OFTEN HAVE YOU HAD SHORTNESS OF BREATH: NOT AT ALL
QUESTION_1 LAST FOUR WEEKS HOW MUCH OF THE TIME DID YOUR ASTHMA KEEP YOU FROM GETTING AS MUCH DONE AT WORK, SCHOOL OR AT HOME: NONE OF THE TIME
ACT_TOTALSCORE: 22
QUESTION_3 LAST FOUR WEEKS HOW OFTEN DID YOUR ASTHMA SYMPTOMS (WHEEZING, COUGHING, SHORTNESS OF BREATH, CHEST TIGHTNESS OR PAIN) WAKE YOU UP AT NIGHT OR EARLIER THAN USUAL IN THE MORNING: ONCE OR TWICE

## 2025-08-16 ASSESSMENT — ENCOUNTER SYMPTOMS
CARDIOVASCULAR NEGATIVE: 1
COLOR CHANGE: 1
RESPIRATORY NEGATIVE: 1
FEVER: 0
CHILLS: 0

## 2025-08-16 ASSESSMENT — PAIN SCALES - GENERAL: PAINLEVEL_OUTOF10: MILD PAIN (3)

## 2025-08-18 LAB
BACTERIA SKIN AEROBE CULT: NORMAL
GRAM STAIN RESULT: NORMAL
GRAM STAIN RESULT: NORMAL

## 2025-08-25 ENCOUNTER — TELEPHONE (OUTPATIENT)
Dept: PSYCHIATRY | Facility: OTHER | Age: 35
End: 2025-08-25

## (undated) DEVICE — PAD ABD 5X9" STERILE 9190A

## (undated) DEVICE — ESU GROUND PAD ADULT W/CORD E7507

## (undated) DEVICE — SU VICRYL 4-0 KS 27" UND J662H

## (undated) DEVICE — GLOVE PROTEXIS POWDER FREE SMT 7.5  2D72PT75X

## (undated) DEVICE — SU CHROMIC 1 CTX 36" 905H

## (undated) DEVICE — STPL SKIN SUBCUTICULAR INSORB  2030

## (undated) DEVICE — ESU PENCIL SMOKE EVAC W/ROCKER SWITCH 0703-047-000

## (undated) DEVICE — DRSG MEDIPORE 3 1/2X8" 3570

## (undated) DEVICE — COVER LIGHT HANDLE LT-F02

## (undated) DEVICE — Device

## (undated) DEVICE — SLEEVE COMPRESSION SCD KNEE MED 74022

## (undated) DEVICE — PREP CHLORAPREP 26ML TINTED ORANGE  260815

## (undated) DEVICE — DRSG STERI STRIP 1/2X4" R1547

## (undated) DEVICE — SU VICRYL 0 CTX 36" J370H

## (undated) DEVICE — PAD CHUX UNDERPAD 30X36" P3036C

## (undated) DEVICE — SUTURE PLAIN GUT 3-0 CT-1 842H

## (undated) DEVICE — GLOVE PROTEXIS POWDER FREE SMT 6.5  2D72PT65X

## (undated) DEVICE — PACK C SECTION SMA15CSFCA

## (undated) DEVICE — SOL WATER 1500ML

## (undated) DEVICE — SU VICRYL 3-0 CT 36" J356H

## (undated) DEVICE — GLOVE PROTEXIS BLUE W/NEU-THERA 6.5  2D73EB65

## (undated) DEVICE — SU PDS II 0 CTX 60" Z990G

## (undated) DEVICE — ADH LIQUID MASTISOL TOPICAL VIAL 2-3ML 0523-48

## (undated) DEVICE — SU MONOCRYL 0 CT-1 36" UND Y946H

## (undated) DEVICE — SU VICRYL 0 CT-1 36" J346H

## (undated) RX ORDER — HYDROXYZINE PAMOATE 25 MG/1
CAPSULE ORAL
Status: DISPENSED
Start: 2025-06-27

## (undated) RX ORDER — HYDROMORPHONE HYDROCHLORIDE 1 MG/ML
INJECTION, SOLUTION INTRAMUSCULAR; INTRAVENOUS; SUBCUTANEOUS
Status: DISPENSED
Start: 2025-06-27

## (undated) RX ORDER — KETOROLAC TROMETHAMINE 30 MG/ML
INJECTION, SOLUTION INTRAMUSCULAR; INTRAVENOUS
Status: DISPENSED
Start: 2020-02-14

## (undated) RX ORDER — SODIUM CHLORIDE, SODIUM LACTATE, POTASSIUM CHLORIDE, CALCIUM CHLORIDE 600; 310; 30; 20 MG/100ML; MG/100ML; MG/100ML; MG/100ML
INJECTION, SOLUTION INTRAVENOUS
Status: DISPENSED
Start: 2022-11-26

## (undated) RX ORDER — MORPHINE SULFATE 0.5 MG/ML
INJECTION, SOLUTION EPIDURAL; INTRATHECAL; INTRAVENOUS
Status: DISPENSED
Start: 2021-08-30

## (undated) RX ORDER — FENTANYL CITRATE 50 UG/ML
INJECTION, SOLUTION INTRAMUSCULAR; INTRAVENOUS
Status: DISPENSED
Start: 2022-11-26

## (undated) RX ORDER — ACETAMINOPHEN 500 MG
TABLET ORAL
Status: DISPENSED
Start: 2025-06-27

## (undated) RX ORDER — MISOPROSTOL 100 UG/1
TABLET ORAL
Status: DISPENSED
Start: 2020-04-30

## (undated) RX ORDER — OXYTOCIN/0.9 % SODIUM CHLORIDE 30/500 ML
PLASTIC BAG, INJECTION (ML) INTRAVENOUS
Status: DISPENSED
Start: 2021-08-30

## (undated) RX ORDER — KETOROLAC TROMETHAMINE 15 MG/ML
INJECTION, SOLUTION INTRAMUSCULAR; INTRAVENOUS
Status: DISPENSED
Start: 2025-06-27

## (undated) RX ORDER — SODIUM CHLORIDE 9 MG/ML
INJECTION, SOLUTION INTRAVENOUS
Status: DISPENSED
Start: 2020-02-14

## (undated) RX ORDER — DEXMEDETOMIDINE HYDROCHLORIDE 100 UG/ML
INJECTION, SOLUTION INTRAVENOUS
Status: DISPENSED
Start: 2021-08-30

## (undated) RX ORDER — DIPHENHYDRAMINE HYDROCHLORIDE 50 MG/ML
INJECTION INTRAMUSCULAR; INTRAVENOUS
Status: DISPENSED
Start: 2020-02-14

## (undated) RX ORDER — ONDANSETRON 2 MG/ML
INJECTION INTRAMUSCULAR; INTRAVENOUS
Status: DISPENSED
Start: 2021-08-30

## (undated) RX ORDER — CEFAZOLIN SODIUM 1 G/3ML
INJECTION, POWDER, FOR SOLUTION INTRAMUSCULAR; INTRAVENOUS
Status: DISPENSED
Start: 2021-08-30

## (undated) RX ORDER — BUPIVACAINE HYDROCHLORIDE 5 MG/ML
INJECTION, SOLUTION EPIDURAL; INTRACAUDAL
Status: DISPENSED
Start: 2021-08-30

## (undated) RX ORDER — MORPHINE SULFATE 0.5 MG/ML
INJECTION, SOLUTION EPIDURAL; INTRATHECAL; INTRAVENOUS
Status: DISPENSED
Start: 2022-11-26

## (undated) RX ORDER — RIZATRIPTAN BENZOATE 10 MG/1
TABLET, ORALLY DISINTEGRATING ORAL
Status: DISPENSED
Start: 2020-02-14

## (undated) RX ORDER — PHENYLEPHRINE HCL IN 0.9% NACL 50MG/250ML
PLASTIC BAG, INJECTION (ML) INTRAVENOUS
Status: DISPENSED
Start: 2022-11-26

## (undated) RX ORDER — OXYTOCIN/0.9 % SODIUM CHLORIDE 30/500 ML
PLASTIC BAG, INJECTION (ML) INTRAVENOUS
Status: DISPENSED
Start: 2022-11-26

## (undated) RX ORDER — PHENYLEPHRINE HCL IN 0.9% NACL 50MG/250ML
PLASTIC BAG, INJECTION (ML) INTRAVENOUS
Status: DISPENSED
Start: 2021-08-30

## (undated) RX ORDER — ONDANSETRON 2 MG/ML
INJECTION INTRAMUSCULAR; INTRAVENOUS
Status: DISPENSED
Start: 2025-06-27

## (undated) RX ORDER — METHYLPREDNISOLONE SODIUM SUCCINATE 125 MG/2ML
INJECTION, POWDER, LYOPHILIZED, FOR SOLUTION INTRAMUSCULAR; INTRAVENOUS
Status: DISPENSED
Start: 2020-02-14

## (undated) RX ORDER — ONDANSETRON 2 MG/ML
INJECTION INTRAMUSCULAR; INTRAVENOUS
Status: DISPENSED
Start: 2022-11-26

## (undated) RX ORDER — FENTANYL CITRATE-0.9 % NACL/PF 10 MCG/ML
PLASTIC BAG, INJECTION (ML) INTRAVENOUS
Status: DISPENSED
Start: 2021-08-30

## (undated) RX ORDER — ONDANSETRON 2 MG/ML
INJECTION INTRAMUSCULAR; INTRAVENOUS
Status: DISPENSED
Start: 2020-02-14

## (undated) RX ORDER — MAGNESIUM OXIDE 400 MG/1
TABLET ORAL
Status: DISPENSED
Start: 2020-02-14